# Patient Record
Sex: FEMALE | Race: WHITE | Employment: OTHER | URBAN - METROPOLITAN AREA
[De-identification: names, ages, dates, MRNs, and addresses within clinical notes are randomized per-mention and may not be internally consistent; named-entity substitution may affect disease eponyms.]

---

## 2017-07-01 ENCOUNTER — APPOINTMENT (OUTPATIENT)
Dept: GENERAL RADIOLOGY | Facility: CLINIC | Age: 82
End: 2017-07-01
Attending: EMERGENCY MEDICINE
Payer: MEDICARE

## 2017-07-01 ENCOUNTER — HOSPITAL ENCOUNTER (EMERGENCY)
Facility: CLINIC | Age: 82
Discharge: HOME | End: 2017-07-01
Attending: EMERGENCY MEDICINE | Admitting: EMERGENCY MEDICINE
Payer: MEDICARE

## 2017-07-01 ENCOUNTER — APPOINTMENT (OUTPATIENT)
Dept: CT IMAGING | Facility: CLINIC | Age: 82
End: 2017-07-01
Attending: EMERGENCY MEDICINE
Payer: MEDICARE

## 2017-07-01 VITALS
OXYGEN SATURATION: 95 % | SYSTOLIC BLOOD PRESSURE: 157 MMHG | TEMPERATURE: 98.9 F | RESPIRATION RATE: 20 BRPM | DIASTOLIC BLOOD PRESSURE: 94 MMHG | HEART RATE: 78 BPM

## 2017-07-01 DIAGNOSIS — G89.29 CHRONIC PAIN OF LEFT KNEE: ICD-10-CM

## 2017-07-01 DIAGNOSIS — M25.562 CHRONIC PAIN OF LEFT KNEE: ICD-10-CM

## 2017-07-01 PROCEDURE — 99284 EMERGENCY DEPT VISIT MOD MDM: CPT | Mod: 25

## 2017-07-01 PROCEDURE — 73700 CT LOWER EXTREMITY W/O DYE: CPT | Mod: LT

## 2017-07-01 PROCEDURE — A9270 NON-COVERED ITEM OR SERVICE: HCPCS | Mod: GY | Performed by: EMERGENCY MEDICINE

## 2017-07-01 PROCEDURE — 73502 X-RAY EXAM HIP UNI 2-3 VIEWS: CPT

## 2017-07-01 PROCEDURE — 25000132 ZZH RX MED GY IP 250 OP 250 PS 637: Mod: GY | Performed by: EMERGENCY MEDICINE

## 2017-07-01 PROCEDURE — 73562 X-RAY EXAM OF KNEE 3: CPT | Mod: LT

## 2017-07-01 RX ORDER — CYCLOBENZAPRINE HCL 10 MG
10 TABLET ORAL ONCE
Status: COMPLETED | OUTPATIENT
Start: 2017-07-01 | End: 2017-07-01

## 2017-07-01 RX ORDER — CYCLOBENZAPRINE HCL 10 MG
TABLET ORAL
Status: DISCONTINUED
Start: 2017-07-01 | End: 2017-07-01 | Stop reason: HOSPADM

## 2017-07-01 RX ORDER — CYCLOBENZAPRINE HCL 10 MG
10 TABLET ORAL 2 TIMES DAILY PRN
Qty: 10 TABLET | Refills: 0 | Status: SHIPPED | OUTPATIENT
Start: 2017-07-01 | End: 2018-06-25

## 2017-07-01 RX ADMIN — CYCLOBENZAPRINE HYDROCHLORIDE 10 MG: 10 TABLET, FILM COATED ORAL at 16:38

## 2017-07-01 NOTE — ED PROVIDER NOTES
History     Chief Complaint:  Knee pain    HPI   Kelsey Wood is an 88 year old female with a history of chronic knee pain who presents with knee pain. The patient states that she has cortisone injections in her left knee every 3 months and has fluid drained from her left knee every three months, but she has had the knee drained twice in the past month. Her last cortisone injection was a week and a half ago. The patient states the cortisone injection has not helped her pain and she has been experiencing difficulty ambulating recently. She also noted that she has been experiencing right hip spasms for the last several months that she has talked to her PMD about. The patient called EMS and was given 100 mcg of fentanyl. No fevers. She has been eating and drinking normally. She denies recent trauma or other concerns or complaints at this time.    Allergies:  No known drug allergies.    Medications:    The patient is currently on no regular medications.    Past Medical History:    Hard of hearing  Chronic knee pain    Past Surgical History:    Cholecystectomy  Appendectomy    Family History:    History reviewed. No pertinent family history.    Social History:  Marital Status:   Presents to the ED alone via EMS  PCP: Jen Godfrey     Review of Systems   Musculoskeletal:        Positive for left knee pain  Positive for right hip spasming   All other systems reviewed and are negative.    Physical Exam     Patient Vitals for the past 24 hrs:   BP Temp Temp src Pulse Heart Rate Resp SpO2   07/01/17 1715 - - - - - - 95 %   07/01/17 1700 - - - - - - 99 %   07/01/17 1630 158/73 - - - - - 98 %   07/01/17 1615 159/73 - - - - - 95 %   07/01/17 1537 (!) 174/91 - - - - - 97 %   07/01/17 1530 (!) 174/91 - - - - - 98 %   07/01/17 1523 160/69 98.9  F (37.2  C) Temporal 78 78 20 95 %      Physical Exam  Constitutional: The patient is oriented to person, place, and time. Alert and cooperative.  HENT:   Right Ear: External ear  normal.   Left Ear: External ear normal.   Nose: Nose normal.   Mouth/Throat: Uvula is midline, oropharynx is clear and moist and mucous membranes are normal. No posterior oropharyngeal edema or erythema.   Eyes: Conjunctivae, EOM and lids are normal. Pupils are equal, round, and reactive to light.   Neck: Trachea normal. Normal range of motion. Neck supple.   Cardiovascular: Normal rate, regular rhythm, normal heart sounds, and intact distal pulses.    Pulmonary/Chest: Effort normal and breath sounds equal bilaterally. No crackles or wheezing.   Abdominal: Soft. No tenderness. No rebound and no guarding.   Musculoskeletal: RLE: no obvious deformity, skin intact. Non-tender to palpation over the greater troch, femur, knee, lower leg, ankle, and foot. Normal ROM at the hip, knee, ankle, and foot. Sensation intact to light touch throughout. 2+ DP and PT pulse.  LLE: no obvious deformity, skin intact. Effusion present to the knee. No erythema. No warmth. Non-tender to palpation over the greater troch, femur, knee, lower leg, ankle, and foot. Normal ROM at the hip, knee, ankle, and foot. Sensation intact to light touch throughout. 2+ DP and PT pulse.  Neurological: Alert and Oriented. Strength 5/5 in upper and lower extremities bilaterally. Sensation intact to light touch throughout.  Skin: Skin is dry. No rash noted.          Emergency Department Course   Imaging:  Radiographic findings were communicated with the patient who voiced understanding of the findings.    CT Knee Left without contrast:  IMPRESSION:  1. Advanced osteoarthritis of the medial compartment with large joint effusion.  2. No evidence for acute fracture.    XR Pelvis with hip, right, 2 views:  IMPRESSION: No evidence for fracture or dislocation. Lower lumbar fusion procedure noted along with some degenerative changes in the sacroiliac joints. Stool obscures the sacrum.    XR Knee, 3 views, left:  IMPRESSION: Large joint effusion is present along with  some tricompartmental osteoarthritis. There is a curvilinear density adjacent to the medial tibial plateau which may be related to a fracture. The medial femoral condyle also appears to be possibly indenting the medial tibial plateau suggesting that there may be medial tibial plateau fracture. If clinically indicated, CT might helpful in further evaluation.    Imaging independently reviewed and agree with radiologist interpretation.     Interventions:  1638: Flexeril 10 mg PO     Emergency Department Course:  The patient arrived in the emergency department via EMS.  Past medical records, nursing notes, and vitals reviewed.  I performed an exam of the patient and obtained history as documented above.   Above workup undertaken.  1741: I rechecked the patient.  Findings and plan explained to the Patient. Patient discharged home with instructions regarding supportive care, medications, and reasons to return. The importance of close follow-up was reviewed.      Impression & Plan    Medical Decision Making:  Kelsey Wood is an 88 year old female with a history of chronic knee pain who presents with knee pain. On presentation to the ED, the patient is nontoxic appearing and vitals are within normal limits and stable.  On exam, she is well-appearing.  She is alert, oriented, and neurologic exam is nonfocal.  Cardiopulmonary exam is unremarkable.  Abdomen is soft and nontender throughout.  On exam of the right lower extremity, there is no obvious deformity and skin is intact.  She has no significant tenderness to palpation and has good range of motion of the right lower extremity without significant pain.  She is neurovascularly intact.  On exam of the left lower extremity, there is no obvious deformity and skin is intact.  There is no erythema.  She does have evidence of an effusion of the left knee.  There is no warmth or significant tenderness to palpation in this location.  She has good range of motion of the knee without  significant pain.  She is neurovascularly intact.  The rest of exam is as mentioned above.    X-ray of the pelvis and right hip was obtained and demonstrates no evidence for fracture or dislocation.  X-ray of the left knee was obtained and demonstrates a large joint effusion along with tricompartmental osteoarthritis.  Per radiology, there is a curvilinear density adjacent to the lateral tibial plateau which may be related to a fracture.  Per radiology, if clinically indicated, CT would be helpful for further evaluation.  CT of the knee was then obtained and demonstrates advanced osteoarthritis with a large joint effusion.  There is no evidence for an acute fracture.  These results were discussed with the patient and she notes understanding.  The patient notes a long-standing history of osteoarthritis in the left knee with recurrent effusions.  She does state that she follows with an orthopedic physician for this and the knee is aspirated intermittently.  The patient has no infectious signs on exam here to suggest an infectious process such as a septic joint.  She is afebrile, with no warmth or tenderness over the knee, and has good range of motion of the knee without significant pain.  Therefore, I do not feel that labs are indicated at this time.  Given that she has no findings to suggest a septic arthritis, I do not feel that aspiration of the knee is indicated at this time.  I do feel the risk of introduction of infection outweighs the benefits of therapeutic aspiration.  I did discuss the patient the on-call orthopedic physician and he is in agreement the patient can follow up closely as an outpatient for further evaluation and management.  The patient was given a trial of ambulation and tolerated this well with her walker without difficulty.  The patient also notes a long-standing history of spasms in the right hip, for which she has been evaluated thoroughly for.  She denies taking any muscle relaxers for  this, therefore she was given a dose of Flexeril.  Overall, given that she is well-appearing with relatively unremarkable workup, I do feel that she can be discharged home.  I did discuss with the patient that I recommend close follow-up with a primary care physician and her orthopedic physician and she notes understanding and agreement with this plan.  Return instructions were given.  She was stable/Improved at the time of discharge.    Diagnosis:    ICD-10-CM    1. Chronic pain of left knee M25.562     G89.29        Disposition:  Discharged to home with plan as outlined.    Discharge Medications:  New Prescriptions    CYCLOBENZAPRINE (FLEXERIL) 10 MG TABLET    Take 1 tablet (10 mg) by mouth 2 times daily as needed for muscle spasms     I, Judd Nelson, am serving as a scribe on 7/1/2017 at 3:29 PM to personally document services performed by Dr. Richardson based on my observations and the provider's statements to me.        Radha Richardson MD  07/02/17 1220

## 2017-07-01 NOTE — ED AVS SNAPSHOT
[facility] Emergency Department    [address]    [facility] [address]    Phone:  [phone]    Fax:  [phone]                                       Kelsey Wood   MRN: [id number]    Department:  [facility] Emergency Department   Date of Visit:  7/1/2017           After Visit Summary Signature Page     I have received my discharge instructions, and my questions have been answered. I have discussed any challenges I see with this plan with the nurse or doctor.    ..........................................................................................................................................  Patient/Patient Representative Signature      ..........................................................................................................................................  Patient Representative Print Name and Relationship to Patient    ..................................................               ................................................  Date                                            Time    ..........................................................................................................................................  Reviewed by Signature/Title    ...................................................              ..............................................  Date                                                            Time

## 2017-07-01 NOTE — ED NOTES
Verified correct assisted living facility with pt; called [facility] to give discharge report.  Unable to reach any staff on either number; after hours number was not answered after multiple attempts.

## 2017-07-01 NOTE — ED NOTES
"Reports recurrent L knee pain, EMS states she needs to have fluid drained from knee every 1-2 weeks lately; states pain is much worse today, also reports intermittent spasms to R hip since fall 3 months ago also worse today; given 100 mcg fentanyl by EMS; ABCs intact.  Pt unsure of medications at this time \"pain medicine and lots of vitamins\".  "

## 2017-07-01 NOTE — DISCHARGE INSTRUCTIONS
Please follow up closely with your orthopedic physician. Please return to the ED if your symptoms worsen or if you develop new or concerning symptoms.         Knee Pain  Knee pain is very common. It s especially common in active people who put a lot of pressure on their knees, like runners. It affects women more often than men.  Your kneecap (patella) is a thick, round bone. It covers and protects the front portion of your knee joint. It moves along a groove in your thighbone (femur) as part of the patellofemoral joint. A layer of cartilage surrounds the underside of your kneecap. This layer protects it from grinding against your femur.  When this cartilage softens and breaks down, it can cause knee pain. This is partly because of repetitive stress. The stress irritates the lining of the joint. This causes pain in the underlying bone.  What causes knee pain?  Many things can cause knee pain. You may have more than one cause. Some of these include:    Overuse of the knee joint    The kneecap doesn t line up with the tissue around it    Damage to small nerves in the area    Damage to the ligament-like structure that holds the kneecap in place (retinaculum)    Breakdown of the bone under the cartilage    Swelling in the soft tissues around the kneecap    Injury  You might be more likely to have knee pain if you:    Exercise a lot    Recently increased the intensity of your workouts    Have a body mass index (BMI) greater than 25    Have poor alignment of your kneecap    Walk with your feet turned overly outward or inward    Have weakness in surrounding muscle groups (inner quad or hip adductor muscles)    Have too much tightness in surrounding muscle groups (hamstrings or iliotibial band)    Have a recent history of injury to the area    Are female  Symptoms of knee pain  This type of knee pain is a dull, aching pain in the front of the knee in the area under and around the kneecap. This pain may start quickly or  slowly. Your pain might be worse when you squat, run, or sit for a long time. You might also sometimes feel like your knee is giving out. You may have symptoms in one or both of your knees.  Diagnosing knee pain  Your health care provider will ask about your medical history and your symptoms. Be sure to describe any activities that make your knee pain worse. He or she will look at your knee. This will include tests of your range of motion, strength, and areas of pain of your knee. Your knee alignment will be checked.  Your health care provider will need to rule out other causes of your knee pain, such as arthritis. You may need an imaging test, such as an X-ray or MRI.  Treatment for knee pain  Treatments that can help ease your symptoms may include:    Avoiding activities for a while that make your pain worse, returning to activity over time    Icing the outside of your knee when it causes you pain    Taking over-the-counter pain medicine    Wearing a knee brace or taping your knee to support it    Wearing special shoe inserts to help keep your feet in the proper alignment    Doing special exercises to stretch and strengthen the muscles around your hip and your knee  These steps help most people manage knee pain. But some cases of knee pain need to be treated with surgery. You may need surgery right away. Or you may need it later if other treatments don t work. Your health care provider may refer you to an orthopedic surgeon. He or she will talk with you about your choices.  Preventing knee pain  Losing weight and correcting excess muscle tightness or muscle weakness may help lower your risk.  In some cases, you can prevent knee pain. To help prevent a flare-up of knee pain, you do these things:    Regularly do all the exercises your doctor or physical therapist advises    Support your knee as advised by your doctor or physical therapist    Increase training gradually, and ease up on training when needed    Have an  expert check your gait for running or other sporting activities    Stretch properly before and after exercise    Replace your running shoes regularly    Lose excess weight     When to call your health care provider  Call your health care provider right away if:    Your symptoms don t get better after a few weeks of treatment    You have any new symptoms   Date Last Reviewed: 3/19/2015    [id number] [employer]. [address] [place] [address]. All rights reserved. This information is not intended as a substitute for professional medical care. Always follow your healthcare professional's instructions.

## 2017-07-01 NOTE — ED AVS SNAPSHOT
[facility] Emergency Department    [address]    [facility] [address]    Phone:  [phone]    Fax:  [phone]                                       Kelsey Wood   MRN: [id number]    Department:  [facility] Emergency Department   Date of Visit:  7/1/2017           Patient Information     Date Of Birth          4/18/1929        Your diagnoses for this visit were:     Chronic pain of left knee        You were seen by Radha Richardson MD.      Follow-up Information     Follow up with Jen Godfrey MD In 3 days.    Specialty:  Family Practice    Contact information:    [facility]  [address]  [facility] [address]  [phone]          Schedule an appointment as soon as possible for a [facility]    Contact information:    [address]  [address]  [facility] [address] [phone]        Discharge Instructions       Please follow up closely with your orthopedic physician. Please return to the ED if your symptoms worsen or if you develop new or concerning symptoms.         Knee Pain  Knee pain is very common. It s especially common in active people who put a lot of pressure on their knees, like runners. It affects women more often than men.  Your kneecap (patella) is a thick, round bone. It covers and protects the front portion of your knee joint. It moves along a groove in your thighbone (femur) as part of the patellofemoral joint. A layer of cartilage surrounds the underside of your kneecap. This layer protects it from grinding against your femur.  When this cartilage softens and breaks down, it can cause knee pain. This is partly because of repetitive stress. The stress irritates the lining of the joint. This causes pain in the underlying bone.  What causes knee pain?  Many things can cause knee pain. You may have more than one cause. Some of these include:    Overuse of the knee joint    The  kneecap doesn t line up with the tissue around it    Damage to small nerves in the area    Damage to the ligament-like structure that holds the kneecap in place (retinaculum)    Breakdown of the bone under the cartilage    Swelling in the soft tissues around the kneecap    Injury  You might be more likely to have knee pain if you:    Exercise a lot    Recently increased the intensity of your workouts    Have a body mass index (BMI) greater than 25    Have poor alignment of your kneecap    Walk with your feet turned overly outward or inward    Have weakness in surrounding muscle groups (inner quad or hip adductor muscles)    Have too much tightness in surrounding muscle groups (hamstrings or iliotibial band)    Have a recent history of injury to the area    Are female  Symptoms of knee pain  This type of knee pain is a dull, aching pain in the front of the knee in the area under and around the kneecap. This pain may start quickly or slowly. Your pain might be worse when you squat, run, or sit for a long time. You might also sometimes feel like your knee is giving out. You may have symptoms in one or both of your knees.  Diagnosing knee pain  Your health care provider will ask about your medical history and your symptoms. Be sure to describe any activities that make your knee pain worse. He or she will look at your knee. This will include tests of your range of motion, strength, and areas of pain of your knee. Your knee alignment will be checked.  Your health care provider will need to rule out other causes of your knee pain, such as arthritis. You may need an imaging test, such as an X-ray or MRI.  Treatment for knee pain  Treatments that can help ease your symptoms may include:    Avoiding activities for a while that make your pain worse, returning to activity over time    Icing the outside of your knee when it causes you pain    Taking over-the-counter pain medicine    Wearing a knee brace or taping your knee to  support it    Wearing special shoe inserts to help keep your feet in the proper alignment    Doing special exercises to stretch and strengthen the muscles around your hip and your knee  These steps help most people manage knee pain. But some cases of knee pain need to be treated with surgery. You may need surgery right away. Or you may need it later if other treatments don t work. Your health care provider may refer you to an orthopedic surgeon. He or she will talk with you about your choices.  Preventing knee pain  Losing weight and correcting excess muscle tightness or muscle weakness may help lower your risk.  In some cases, you can prevent knee pain. To help prevent a flare-up of knee pain, you do these things:    Regularly do all the exercises your doctor or physical therapist advises    Support your knee as advised by your doctor or physical therapist    Increase training gradually, and ease up on training when needed    Have an expert check your gait for running or other sporting activities    Stretch properly before and after exercise    Replace your running shoes regularly    Lose excess weight     When to call your health care provider  Call your health care provider right away if:    Your symptoms don t get better after a few weeks of treatment    You have any new symptoms   Date Last Reviewed: 3/19/2015    1550-7840 [employer]. [address] [place] [address]. All rights reserved. This information is not intended as a substitute for professional medical care. Always follow your healthcare professional's instructions.          24 Hour Appointment Hotline       To make an appointment at any [facility], call [phone] ([phone]). If you don't have a family doctor or clinic, we will help you find one. [facility] are conveniently located to serve the needs of you and your family.             Review of your medicines      START taking        Dose / Directions Last  dose taken    cyclobenzaprine 10 MG tablet   Commonly known as:  FLEXERIL   Dose:  10 mg   Quantity:  10 tablet        Take 1 tablet (10 mg) by mouth 2 times daily as needed for muscle spasms   Refills:  0          Our records show that you are taking the medicines listed below. If these are incorrect, please call your family doctor or clinic.        Dose / Directions Last dose taken    UNKNOWN TO PATIENT        Refills:  0                Prescriptions were sent or printed at these locations (1 Prescription)                   Other Prescriptions                Printed at Department/Unit printer (1 of 1)         cyclobenzaprine (FLEXERIL) 10 MG tablet                Procedures and tests performed during your visit     CT Knee Left w/o Contrast    Knee XR, 3 views, left    XR Pelvis w Hip Right G/E 2 Views      Orders Needing Specimen Collection     None      Pending Results     Date and Time Order Name Status Description    7/1/2017 1625 CT Knee Left w/o Contrast Preliminary     7/1/2017 1533 XR Pelvis w Hip Right G/E 2 Views Preliminary     7/1/2017 1533 Knee XR, 3 views, left Preliminary             Pending Culture Results     No orders found from 6/29/2017 to 7/2/2017.            Pending Results Instructions     If you had any lab results that were not finalized at the time of your Discharge, you can call the ED Lab Result RN at [phone]. You will be contacted by this team for any positive Lab results or changes in treatment. The nurses are available 7 days a week from 10A to 6:30P.  You can leave a message 24 hours per day and they will return your call.        Test Results From Your Hospital Stay        7/1/2017  4:20 PM      Narrative     KNEE LEFT THREE VIEW   7/1/2017 4:05 PM     HISTORY: Pain.    COMPARISON: None.        Impression     IMPRESSION: Large joint effusion is present along with some  tricompartmental osteoarthritis. There is a curvilinear density  adjacent to the medial tibial plateau which  may be related to a  fracture. The medial femoral condyle also appears to be possibly  indenting the medial tibial plateau suggesting that there may be  medial tibial plateau fracture. If clinically indicated, CT might  helpful in further evaluation.         7/1/2017  4:20 PM      Narrative     PELVIS AND HIP RIGHT TWO VIEWS   7/1/2017 4:04 PM     HISTORY: Pain.    COMPARISON: None.        Impression     IMPRESSION: No evidence for fracture or dislocation. Lower lumbar  fusion procedure noted along with some degenerative changes in the  sacroiliac joints. Stool obscures the sacrum.         7/1/2017  5:17 PM      Narrative     CT KNEE LEFT WITHOUT CONTRAST 7/1/2017 4:56 PM     HISTORY: Pain, concern for tibial plateau fracture on x-ray.    TECHNIQUE: Axial images were obtained through the left knee joint  without contrast. Coronal and sagittal reconstructions were also  acquired. Radiation dose for this scan was reduced using automated  exposure control, adjustment of the mA and/or kV according to patient  size, or iterative reconstruction technique..    FINDINGS: There is a large joint effusion. Curvilinear density  adjacent to the medial tibial plateau and joint space is felt to be  due to a chronic process versus calcification of the ligament. There  is severe joint space narrowing in the medial compartment with some  bony sclerosis and some subchondral lucencies. There is no evidence  for any acute proximal tibia, distal femur or patella fracture. Some  vascular calcifications are noted. Mild osteoarthritis is noted in the  medial and lateral compartments.        Impression     IMPRESSION:  1. Advanced osteoarthritis of the medial compartment with large joint  effusion.  2. No evidence for acute fracture.                  Clinical Quality Measure: Blood Pressure Screening     Your blood pressure was checked while you were in the emergency department today. The last reading we obtained was  BP: 158/73 . Please  "read the guidelines below about what these numbers mean and what you should do about them.  If your systolic blood pressure (the top number) is less than 120 and your diastolic blood pressure (the bottom number) is less than 80, then your blood pressure is normal. There is nothing more that you need to do about it.  If your systolic blood pressure (the top number) is 120-139 or your diastolic blood pressure (the bottom number) is 80-89, your blood pressure may be higher than it should be. You should have your blood pressure rechecked within a year by a primary care provider.  If your systolic blood pressure (the top number) is 140 or greater or your diastolic blood pressure (the bottom number) is 90 or greater, you may have high blood pressure. High blood pressure is treatable, but if left untreated over time it can put you at risk for heart attack, stroke, or kidney failure. You should have your blood pressure rechecked by a primary care provider within the next 4 weeks.  If your provider in the emergency department today gave you specific instructions to follow-up with your doctor or provider even sooner than that, you should follow that instruction and not wait for up to 4 weeks for your follow-up visit.        Thank you for choosing [facility]       Thank you for choosing [facility] for your care. Our goal is always to provide you with excellent care. Hearing back from our patients is one way we can continue to improve our services. Please take a few minutes to complete the written survey that you may receive in the mail after you visit with us. Thank you!        [employer] Information     [employer] lets you send messages to your doctor, view your test results, renew your prescriptions, schedule appointments and more. To sign up, go to [email or web address] . Click on \"Log in\" on the left side of the screen, which will take you to the Welcome page. Then click on \"Sign up Now\" on the right side of the page.     You " will be asked to enter the access code listed below, as well as some personal information. Please follow the directions to create your username and password.     Your access code is: [email or web address]  Expires: 2017  5:42 PM     Your access code will  in 90 days. If you need help or a new code, please call your [place] clinic or [phone].        Care EveryWhere ID     This is your Care EveryWhere ID. This could be used by other organizations to access your [place] medical records  [id number]        Equal Access to Services     [facility] Center[facility]PHANIE : Park Machado, jose jiménez, usha singh, saqib finnegan . So [facility] [phone].    ATENCIÓN: Si habla español, tiene a barry disposición servicios gratuitos de asistencia lingüística. Llame al [phone].    We comply with applicable federal civil rights laws and [place] laws. We do not discriminate on the basis of race, color, national origin, age, disability sex, sexual orientation or gender identity.            After Visit Summary       This is your record. Keep this with you and show to your community pharmacist(s) and doctor(s) at your next visit.

## 2017-07-01 NOTE — ED NOTES
Bed: ED08  Expected date: 7/1/17  Expected time: 3:07 PM  Means of arrival: Ambulance  Comments:  HE 89yo F  Knee pain

## 2017-07-01 NOTE — ED NOTES
Pt able to ambulate with FWW without assist; MD at bedside to update; pt reports some relief of spasms with ambulation.

## 2017-07-01 NOTE — ED NOTES
Report called to nursing assistant Umu at [facility]; reviewed that pt has no fracture; reviewed new rx for flexeril and increased risk of drowsiness; discussed use of ace wrap during the day to left knee and off at night; discussed followup appointments needed.  Verified address as [address] [facility].  Awaiting wheelchair transport.

## 2018-05-17 ENCOUNTER — RECORDS - HEALTHEAST (OUTPATIENT)
Dept: LAB | Facility: CLINIC | Age: 83
End: 2018-05-17

## 2018-05-17 LAB
ALBUMIN UR-MCNC: ABNORMAL MG/DL
APPEARANCE UR: ABNORMAL
BACTERIA #/AREA URNS HPF: ABNORMAL HPF
BILIRUB UR QL STRIP: NEGATIVE
CAOX CRY #/AREA URNS HPF: PRESENT /[HPF]
COLOR UR AUTO: YELLOW
GLUCOSE UR STRIP-MCNC: NEGATIVE MG/DL
HGB UR QL STRIP: NEGATIVE
KETONES UR STRIP-MCNC: NEGATIVE MG/DL
LEUKOCYTE ESTERASE UR QL STRIP: ABNORMAL
MUCOUS THREADS #/AREA URNS LPF: ABNORMAL LPF
NITRATE UR QL: POSITIVE
PH UR STRIP: 6 [PH] (ref 4.5–8)
RBC #/AREA URNS AUTO: ABNORMAL HPF
RENAL EPI CELLS #/AREA URNS HPF: ABNORMAL LPF
SP GR UR STRIP: 1.02 (ref 1–1.03)
SQUAMOUS #/AREA URNS AUTO: >100 LPF
TRANS CELLS #/AREA URNS HPF: ABNORMAL LPF
UROBILINOGEN UR STRIP-ACNC: ABNORMAL
WBC #/AREA URNS AUTO: >100 HPF

## 2018-05-20 LAB — BACTERIA SPEC CULT: ABNORMAL

## 2018-05-23 ENCOUNTER — RECORDS - HEALTHEAST (OUTPATIENT)
Dept: LAB | Facility: CLINIC | Age: 83
End: 2018-05-23

## 2018-05-23 LAB
ANION GAP SERPL CALCULATED.3IONS-SCNC: 10 MMOL/L (ref 5–18)
BUN SERPL-MCNC: 13 MG/DL (ref 8–28)
CALCIUM SERPL-MCNC: 10.6 MG/DL (ref 8.5–10.5)
CHLORIDE BLD-SCNC: 101 MMOL/L (ref 98–107)
CO2 SERPL-SCNC: 30 MMOL/L (ref 22–31)
CREAT SERPL-MCNC: 0.65 MG/DL (ref 0.6–1.1)
ERYTHROCYTE [DISTWIDTH] IN BLOOD BY AUTOMATED COUNT: 12.7 % (ref 11–14.5)
GFR SERPL CREATININE-BSD FRML MDRD: >60 ML/MIN/1.73M2
GLUCOSE BLD-MCNC: 96 MG/DL (ref 70–125)
HCT VFR BLD AUTO: 42.6 % (ref 35–47)
HGB BLD-MCNC: 13.3 G/DL (ref 12–16)
MCH RBC QN AUTO: 30.7 PG (ref 27–34)
MCHC RBC AUTO-ENTMCNC: 31.2 G/DL (ref 32–36)
MCV RBC AUTO: 98 FL (ref 80–100)
PLATELET # BLD AUTO: 289 THOU/UL (ref 140–440)
PMV BLD AUTO: 9.5 FL (ref 8.5–12.5)
POTASSIUM BLD-SCNC: 4 MMOL/L (ref 3.5–5)
RBC # BLD AUTO: 4.33 MILL/UL (ref 3.8–5.4)
SODIUM SERPL-SCNC: 141 MMOL/L (ref 136–145)
WBC: 6.5 THOU/UL (ref 4–11)

## 2018-05-24 LAB — 25(OH)D3 SERPL-MCNC: 41.8 NG/ML (ref 30–80)

## 2018-06-15 ENCOUNTER — TRANSFERRED RECORDS (OUTPATIENT)
Dept: HEALTH INFORMATION MANAGEMENT | Facility: CLINIC | Age: 83
End: 2018-06-15

## 2018-06-25 ENCOUNTER — HOSPITAL ENCOUNTER (OUTPATIENT)
Facility: CLINIC | Age: 83
Setting detail: OBSERVATION
Discharge: NURSING, REHAB OR LONG-TERM CARE | End: 2018-06-28
Attending: EMERGENCY MEDICINE | Admitting: HOSPITALIST
Payer: MEDICARE

## 2018-06-25 ENCOUNTER — APPOINTMENT (OUTPATIENT)
Dept: GENERAL RADIOLOGY | Facility: CLINIC | Age: 83
End: 2018-06-25
Attending: EMERGENCY MEDICINE
Payer: MEDICARE

## 2018-06-25 DIAGNOSIS — R09.02 HYPOXIA: ICD-10-CM

## 2018-06-25 DIAGNOSIS — J45.31 MILD PERSISTENT ASTHMA WITH EXACERBATION: ICD-10-CM

## 2018-06-25 DIAGNOSIS — K59.00 CONSTIPATION, UNSPECIFIED CONSTIPATION TYPE: ICD-10-CM

## 2018-06-25 DIAGNOSIS — G89.29 OTHER CHRONIC PAIN: Primary | ICD-10-CM

## 2018-06-25 DIAGNOSIS — R53.1 GENERALIZED WEAKNESS: ICD-10-CM

## 2018-06-25 LAB
ALBUMIN SERPL-MCNC: 3.5 G/DL (ref 3.4–5)
ALBUMIN UR-MCNC: 30 MG/DL
ALP SERPL-CCNC: 80 U/L (ref 40–150)
ALT SERPL W P-5'-P-CCNC: 18 U/L (ref 0–50)
ANION GAP SERPL CALCULATED.3IONS-SCNC: 6 MMOL/L (ref 3–14)
APPEARANCE UR: ABNORMAL
AST SERPL W P-5'-P-CCNC: 17 U/L (ref 0–45)
BACTERIA #/AREA URNS HPF: ABNORMAL /HPF
BASOPHILS # BLD AUTO: 0.1 10E9/L (ref 0–0.2)
BASOPHILS NFR BLD AUTO: 0.8 %
BILIRUB SERPL-MCNC: 0.2 MG/DL (ref 0.2–1.3)
BILIRUB UR QL STRIP: NEGATIVE
BUN SERPL-MCNC: 14 MG/DL (ref 7–30)
CALCIUM SERPL-MCNC: 9.4 MG/DL (ref 8.5–10.1)
CHLORIDE SERPL-SCNC: 103 MMOL/L (ref 94–109)
CO2 SERPL-SCNC: 30 MMOL/L (ref 20–32)
COLOR UR AUTO: YELLOW
CREAT SERPL-MCNC: 0.6 MG/DL (ref 0.52–1.04)
DIFFERENTIAL METHOD BLD: ABNORMAL
EOSINOPHIL # BLD AUTO: 0.8 10E9/L (ref 0–0.7)
EOSINOPHIL NFR BLD AUTO: 8.4 %
ERYTHROCYTE [DISTWIDTH] IN BLOOD BY AUTOMATED COUNT: 12.8 % (ref 10–15)
GFR SERPL CREATININE-BSD FRML MDRD: >90 ML/MIN/1.7M2
GLUCOSE SERPL-MCNC: 116 MG/DL (ref 70–99)
GLUCOSE UR STRIP-MCNC: NEGATIVE MG/DL
HCT VFR BLD AUTO: 39.6 % (ref 35–47)
HGB BLD-MCNC: 12.3 G/DL (ref 11.7–15.7)
HGB UR QL STRIP: NEGATIVE
IMM GRANULOCYTES # BLD: 0 10E9/L (ref 0–0.4)
IMM GRANULOCYTES NFR BLD: 0.2 %
INR PPP: 0.91 (ref 0.86–1.14)
KETONES UR STRIP-MCNC: NEGATIVE MG/DL
LACTATE BLD-SCNC: 1.2 MMOL/L (ref 0.7–2)
LEUKOCYTE ESTERASE UR QL STRIP: ABNORMAL
LYMPHOCYTES # BLD AUTO: 1.6 10E9/L (ref 0.8–5.3)
LYMPHOCYTES NFR BLD AUTO: 16.5 %
MAGNESIUM SERPL-MCNC: 1.8 MG/DL (ref 1.6–2.3)
MCH RBC QN AUTO: 30.4 PG (ref 26.5–33)
MCHC RBC AUTO-ENTMCNC: 31.1 G/DL (ref 31.5–36.5)
MCV RBC AUTO: 98 FL (ref 78–100)
MONOCYTES # BLD AUTO: 1.1 10E9/L (ref 0–1.3)
MONOCYTES NFR BLD AUTO: 11.5 %
NEUTROPHILS # BLD AUTO: 5.9 10E9/L (ref 1.6–8.3)
NEUTROPHILS NFR BLD AUTO: 62.6 %
NITRATE UR QL: NEGATIVE
NRBC # BLD AUTO: 0 10*3/UL
NRBC BLD AUTO-RTO: 0 /100
NT-PROBNP SERPL-MCNC: 185 PG/ML (ref 0–1800)
PH UR STRIP: 7 PH (ref 5–7)
PLATELET # BLD AUTO: 255 10E9/L (ref 150–450)
POTASSIUM SERPL-SCNC: 3.9 MMOL/L (ref 3.4–5.3)
PROT SERPL-MCNC: 7.4 G/DL (ref 6.8–8.8)
RBC # BLD AUTO: 4.05 10E12/L (ref 3.8–5.2)
RBC #/AREA URNS AUTO: 2 /HPF (ref 0–2)
SODIUM SERPL-SCNC: 139 MMOL/L (ref 133–144)
SOURCE: ABNORMAL
SP GR UR STRIP: 1.01 (ref 1–1.03)
SQUAMOUS #/AREA URNS AUTO: 81 /HPF (ref 0–1)
TROPONIN I SERPL-MCNC: <0.015 UG/L (ref 0–0.04)
UROBILINOGEN UR STRIP-MCNC: 0 MG/DL (ref 0–2)
WBC # BLD AUTO: 9.5 10E9/L (ref 4–11)
WBC #/AREA URNS AUTO: 25 /HPF (ref 0–5)

## 2018-06-25 PROCEDURE — 25000128 H RX IP 250 OP 636: Performed by: EMERGENCY MEDICINE

## 2018-06-25 PROCEDURE — 85610 PROTHROMBIN TIME: CPT | Performed by: EMERGENCY MEDICINE

## 2018-06-25 PROCEDURE — 96374 THER/PROPH/DIAG INJ IV PUSH: CPT

## 2018-06-25 PROCEDURE — 83605 ASSAY OF LACTIC ACID: CPT | Performed by: EMERGENCY MEDICINE

## 2018-06-25 PROCEDURE — 83880 ASSAY OF NATRIURETIC PEPTIDE: CPT | Performed by: EMERGENCY MEDICINE

## 2018-06-25 PROCEDURE — 25000125 ZZHC RX 250: Performed by: EMERGENCY MEDICINE

## 2018-06-25 PROCEDURE — 94640 AIRWAY INHALATION TREATMENT: CPT

## 2018-06-25 PROCEDURE — 71046 X-RAY EXAM CHEST 2 VIEWS: CPT

## 2018-06-25 PROCEDURE — 87086 URINE CULTURE/COLONY COUNT: CPT | Performed by: PHYSICIAN ASSISTANT

## 2018-06-25 PROCEDURE — 99285 EMERGENCY DEPT VISIT HI MDM: CPT | Mod: 25

## 2018-06-25 PROCEDURE — 99219 ZZC INITIAL OBSERVATION CARE,LEVL II: CPT | Performed by: HOSPITALIST

## 2018-06-25 PROCEDURE — 80053 COMPREHEN METABOLIC PANEL: CPT | Performed by: EMERGENCY MEDICINE

## 2018-06-25 PROCEDURE — 25000132 ZZH RX MED GY IP 250 OP 250 PS 637: Mod: GY | Performed by: EMERGENCY MEDICINE

## 2018-06-25 PROCEDURE — 81001 URINALYSIS AUTO W/SCOPE: CPT | Performed by: EMERGENCY MEDICINE

## 2018-06-25 PROCEDURE — 93005 ELECTROCARDIOGRAM TRACING: CPT

## 2018-06-25 PROCEDURE — 85025 COMPLETE CBC W/AUTO DIFF WBC: CPT | Performed by: EMERGENCY MEDICINE

## 2018-06-25 PROCEDURE — A9270 NON-COVERED ITEM OR SERVICE: HCPCS | Mod: GY | Performed by: EMERGENCY MEDICINE

## 2018-06-25 PROCEDURE — 83735 ASSAY OF MAGNESIUM: CPT | Performed by: EMERGENCY MEDICINE

## 2018-06-25 PROCEDURE — 99207 ZZC CDG-MDM COMPONENT: MEETS LOW - DOWN CODED: CPT | Performed by: HOSPITALIST

## 2018-06-25 PROCEDURE — G0378 HOSPITAL OBSERVATION PER HR: HCPCS

## 2018-06-25 PROCEDURE — 84484 ASSAY OF TROPONIN QUANT: CPT | Performed by: EMERGENCY MEDICINE

## 2018-06-25 RX ORDER — LATANOPROST 50 UG/ML
1 SOLUTION/ DROPS OPHTHALMIC AT BEDTIME
COMMUNITY

## 2018-06-25 RX ORDER — ESTRADIOL 0.1 MG/G
2 CREAM VAGINAL
COMMUNITY
End: 2019-04-03

## 2018-06-25 RX ORDER — ONDANSETRON 2 MG/ML
4 INJECTION INTRAMUSCULAR; INTRAVENOUS EVERY 30 MIN PRN
Status: DISCONTINUED | OUTPATIENT
Start: 2018-06-25 | End: 2018-06-26

## 2018-06-25 RX ORDER — SODIUM CHLORIDE, SODIUM LACTATE, POTASSIUM CHLORIDE, CALCIUM CHLORIDE 600; 310; 30; 20 MG/100ML; MG/100ML; MG/100ML; MG/100ML
1000 INJECTION, SOLUTION INTRAVENOUS CONTINUOUS
Status: DISCONTINUED | OUTPATIENT
Start: 2018-06-25 | End: 2018-06-26

## 2018-06-25 RX ORDER — IBUPROFEN 200 MG
400 TABLET ORAL ONCE
Status: COMPLETED | OUTPATIENT
Start: 2018-06-25 | End: 2018-06-25

## 2018-06-25 RX ORDER — LIDOCAINE 40 MG/G
CREAM TOPICAL
Status: DISCONTINUED | OUTPATIENT
Start: 2018-06-25 | End: 2018-06-26

## 2018-06-25 RX ORDER — CALCIUM CARBONATE 500(1250)
1 TABLET ORAL DAILY
COMMUNITY

## 2018-06-25 RX ORDER — HYDROCODONE BITARTRATE AND ACETAMINOPHEN 5; 325 MG/1; MG/1
1 TABLET ORAL EVERY 6 HOURS PRN
Status: ON HOLD | COMMUNITY
End: 2018-06-28

## 2018-06-25 RX ORDER — METHYLPREDNISOLONE SODIUM SUCCINATE 125 MG/2ML
125 INJECTION, POWDER, LYOPHILIZED, FOR SOLUTION INTRAMUSCULAR; INTRAVENOUS ONCE
Status: COMPLETED | OUTPATIENT
Start: 2018-06-25 | End: 2018-06-25

## 2018-06-25 RX ORDER — ALBUTEROL SULFATE 90 UG/1
2 AEROSOL, METERED RESPIRATORY (INHALATION) 4 TIMES DAILY PRN
COMMUNITY

## 2018-06-25 RX ORDER — IPRATROPIUM BROMIDE AND ALBUTEROL SULFATE 2.5; .5 MG/3ML; MG/3ML
3 SOLUTION RESPIRATORY (INHALATION)
Status: DISCONTINUED | OUTPATIENT
Start: 2018-06-25 | End: 2018-06-26

## 2018-06-25 RX ORDER — BISMUTH SUBSALICYLATE 262 MG/1
2 TABLET, CHEWABLE ORAL
COMMUNITY

## 2018-06-25 RX ORDER — FENTANYL 12.5 UG/1
1 PATCH TRANSDERMAL
Status: ON HOLD | COMMUNITY
End: 2018-06-28

## 2018-06-25 RX ADMIN — METHYLPREDNISOLONE SODIUM SUCCINATE 125 MG: 125 INJECTION, POWDER, FOR SOLUTION INTRAMUSCULAR; INTRAVENOUS at 18:56

## 2018-06-25 RX ADMIN — IPRATROPIUM BROMIDE AND ALBUTEROL SULFATE 3 ML: .5; 3 SOLUTION RESPIRATORY (INHALATION) at 19:45

## 2018-06-25 RX ADMIN — IPRATROPIUM BROMIDE AND ALBUTEROL SULFATE 3 ML: .5; 3 SOLUTION RESPIRATORY (INHALATION) at 18:56

## 2018-06-25 RX ADMIN — SODIUM CHLORIDE, POTASSIUM CHLORIDE, SODIUM LACTATE AND CALCIUM CHLORIDE 1000 ML: 600; 310; 30; 20 INJECTION, SOLUTION INTRAVENOUS at 18:57

## 2018-06-25 RX ADMIN — IBUPROFEN 400 MG: 200 TABLET, FILM COATED ORAL at 23:23

## 2018-06-25 ASSESSMENT — ENCOUNTER SYMPTOMS
SHORTNESS OF BREATH: 1
CHILLS: 0
FEVER: 0
COUGH: 1
WHEEZING: 1

## 2018-06-25 NOTE — ED PROVIDER NOTES
History     Chief Complaint:  Shortness of Breath    HPI   Kelsey Wood is an 89 year old female with a history of asthma who presents with shortness of breath. The patient was diagnosed with a UTI on 5/26/18 by her primary care physician Dr. Jen Godfrey for which she took a course of Cipro. Additionally, 10 days ago, the patient was diagnosed with left-sided pneumonia by a provider who came to her assisted living facility [facility]. At that time, she states she was prescribed a course of Zithromax, which she finished on Tuesday, 6 days ago. The patient reports she was feeling better initially, but began coughing and feeling short of breath again 2 days ago. Today, the patient states she was evaluated by a nurse at her assisted living facility who suggested she come to the ED for further evaluation. On presentation to the ED, the patient continues to endorse shortness of breath at rest as well as a cough and wheezing, but denies any fevers. The patient also denies any known ill contacts or past use of supplemental oxygen even while she had pneumonia.    Allergies:  No known drug allergies    Medications:    Flovent inhaler  Proair inhaler  Gabapentin  Flexeril    Past Medical History:    Glaucoma  Mild persistent asthma  Osteoarthritis  Cataracts, bilateral  Back pain  Allergic rhinitis  Vitamin D deficiency  Seborrheic keratosis  Scoliosis  Membranous glomerulonephritis  Osteoporosis    Past Surgical History:    Tonsillectomy  Appendectomy  Cholecystectomy  Excise parotid tumor    Family History:    Glaucoma  Prostate cancer  Retinal detachment  Thyroid disorder  Asthma  Hypertension  Hyperlipidemia  Other cancer    Social History:  Smoking status: No  Alcohol use: No  PCP: Jen Godfrey  Presents to the ED with her son  Marital Status:  [5]     Review of Systems   Constitutional: Negative for chills and fever.   Respiratory: Positive for cough, shortness of breath and wheezing.    All other systems  reviewed and are negative.    Physical Exam   Patient Vitals for the past 24 hrs:   BP Temp Temp src Heart Rate Resp SpO2   06/25/18 2130 - - - - - 90 %   06/25/18 2115 - - - - - (!) 89 %   06/25/18 2100 - - - - - 93 %   06/25/18 2030 159/71 - - - - 90 %   06/25/18 2015 147/64 - - - - 90 %   06/25/18 2000 143/64 - - - - (!) 89 %   06/25/18 1945 - - - - - 91 %   06/25/18 1900 126/49 - - - - 92 %   06/25/18 1845 153/70 - - - - 93 %   06/25/18 1809 162/80 98.6  F (37  C) Oral 80 24 93 %     Physical Exam   Constitutional: She appears well-developed and well-nourished.   HENT:   Right Ear: External ear normal.   Left Ear: External ear normal.   Mouth/Throat: Oropharynx is clear and moist. No oropharyngeal exudate.   TM's clear bilaterally   Eyes: Conjunctivae and EOM are normal. Pupils are equal, round, and reactive to light. No scleral icterus.   Neck: Normal range of motion. Neck supple.   Cardiovascular: Normal rate, regular rhythm, normal heart sounds and intact distal pulses.  Exam reveals no gallop and no friction rub.    No murmur heard.  Pulmonary/Chest: Effort normal. No respiratory distress. She has wheezes (Diffusely). She has no rales.   Abdominal: Soft. Bowel sounds are normal. She exhibits no distension and no mass. There is no tenderness.   Musculoskeletal: Normal range of motion. She exhibits no edema.   Lymphadenopathy:     She has no cervical adenopathy.   Neurological: She is alert.   Speech clear  Strength 5/5 x 4, tested in bed     Skin: Skin is warm and dry. No rash noted.   Psychiatric: She has a normal mood and affect.     Emergency Department Course   ECG (18:45:03):  Rate 73 bpm. NC interval 162. QRS duration 120. QT/QTc 400/440. P-R-T axes 64 -11 35. Normal sinus rhythm. Right bundle branch block. Abnormal ECG. Interpreted at 1847 by Nette Aldrich MD.    Imaging:  Radiographic findings were communicated with the patient who voiced understanding of the findings.    XR Chest 2 views:  No focal  alveolar-type infiltrates are seen.      As read by Radiology.    Laboratory:  CBC: WNL (WBC 9.5, HGB 12.3, )  CMP: Glucose 116 (H), o/w WNL (Creatinine 0.60)  Troponin: <0.015  BNP: 185  INR: 0.91  Lactic acid: 1.2  Magnesium: 1.8  UA (1924): Moderate leukocyte esterase, protein albumin 30, WBC 25 (H), many bacteria, squamous epithelia 81 (H), o/w negative    Interventions:  1856: DuoNeb, 2.5mg/3 mL, Inhalation solution, Nebulizer   1856: 125 mg Solu-medrol IV  1857: LR 1L IV Bolus  1945: DuoNeb, 2.5mg/3 mL, Inhalation solution, Nebulizer     Emergency Department Course:  Past medical records, nursing notes, and vitals reviewed.  1835: I performed an exam of the patient and obtained history, as documented above.  ECG performed, results above.  IV inserted and blood drawn.  The patient was sent for a chest x-ray while in the emergency department, findings above.    1935: I rechecked and re-examined the patient. Her lung sounds are coarse at the bases after he Duoneb, but there are no wheezes at this time. I also discussed findings to the patient and her son.    2051: I rechecked the patient. She was ambulated here in the ED with oxygen saturations as low as 81% while walking and 87% when she returned to her room. After resting, her oxygen saturations returned to 90%.    2208: I spoke to Dr. Madison of the hospitalist service who accepts the patient for admission.     2242: In discussion with the patient and Dr. Madison, they both feel comfortable with her going home. I will speak with the patient's son regarding this and determine her ultimate disposition thereafter.    2248: I spoke with the patient's son and he states he would be more comfortable if she stayed in the hospital tonight since she has no support for overnight. Dr. Madison was contacted again and notified of the son's wishes.    Findings and plan explained to the patient who consents to admission. Discussed the patient with Dr. Madison, who will admit the  patient to an observation bed for further monitoring, evaluation, and treatment.     Impression & Plan    Medical Decision Making:  Kelsey Wood is an 89 year old female who presents to the ED for evaluation of a possible pneumonia and shortness of breath. On my initial examination, the patient was wheezing diffusely and mildly hypoxic with saturations at 91-92% on room air. Her labs here are reassuring. She did not have any fevers or leukocytosis. Her urine is likely contaminated as it does not appear to be a clean catch. Her chest x-ray shows no evidence of pneumonia. We ambulated her and her oxygen went as low as 81% and was 87% when she got back to the room. She settled in at around 91-92% on room air. Due to the hypoxia, the patient will need to be admitted for further evaluation and treatment. She was admitted to Dr. Madison of the observation unit.    Diagnosis:    ICD-10-CM   1. Mild persistent asthma with exacerbation J45.31   2. Hypoxia R09.02   3. Generalized weakness R53.1     Disposition: Admitted to observation    Christy Edwards  6/25/2018   [facility] EMERGENCY DEPARTMENT    I, Christy Edwards, am serving as a scribe at 6:35 PM on 6/25/2018 to document services personally performed by Nette Aldrich MD based on my observations and the provider's statements to me.        Nette Aldrich MD  06/25/18 2483

## 2018-06-25 NOTE — LETTER
Key Recommendations:  Pt is admitted with COPD exacerbation.  We discussed COPD action care plan.  Pt does complain of right leg/hip spasm, which is her main concern.  She said her lidoderm patch works well.  Recommendations are ***    Lashonda Troncoso    AVS/Discharge Summary is the source of truth; this is a helpful guide for improved communication of patient story

## 2018-06-25 NOTE — IP AVS SNAPSHOT
[facility] Observation Department    [address]    [facility] [address]    Phone:  [phone]                                       After Visit Summary   6/25/2018    Kelsey Wood    MRN: [id number]           After Visit Summary Signature Page     I have received my discharge instructions, and my questions have been answered. I have discussed any challenges I see with this plan with the nurse or doctor.    ..........................................................................................................................................  Patient/Patient Representative Signature      ..........................................................................................................................................  Patient Representative Print Name and Relationship to Patient    ..................................................               ................................................  Date                                            Time    ..........................................................................................................................................  Reviewed by Signature/Title    ...................................................              ..............................................  Date                                                            Time

## 2018-06-25 NOTE — IP AVS SNAPSHOT
Kelsey Wood #[id number] (CSN: [id number])  (89 year old F)  (Adm: 18)     [id number]               [facility] OBSERVATION DEPARTMENT: [phone]            Patient Demographics     Patient Name Sex          Age SSN Address Phone    IsraelKelsey Female 1929 (89 year old) [id number]   [place] [address] [phone] (Home)  NONE (Work)  [phone] (Mobile)      Emergency Contact(s)     Name Relation Home Work Mobile    Sha Wood [phone]      Admission Information     Attending Provider Admitting Provider Admission Type Admission Date/Time    Nilesh Madison,  [place],  Emergency 18  1803    Discharge Date Hospital Service Auth/Cert Status Service Area     Observation Incomplete [facility] SERVICES    Unit Room/Bed Admission Status        OBSERVATION DEPT  Admission (Confirmed)       Admission     Complaint    COPD exacerbation (H)      Hospital Account     Name Acct ID Class Status Primary Coverage    Kelsey Wood [id number] Observation Open MEDICARE - MEDICARE FOR HB SUPPLEMENT            Guarantor Account (for Hospital Account #[id number])     Name Relation to Pt Service Area Active? Acct Type    Kelsey Wood Self FCS Yes Personal/Family    Address Phone          [address]   [place] [address] [phone](H)  NONE(O)              Coverage Information (for Hospital Account #[id number])     1. MEDICARE/MEDICARE FOR HB SUPPLEMENT     F/O Payor/Plan Precert #    MEDICARE/MEDICARE FOR HB SUPPLEMENT     Subscriber Subscriber #    Kelsey Wood [id number]    Address Phone    ATTN CLAIMS  [address]  [facility] [place] [address] [phone]          2. HEALTH[employer]ERS/HE[employer] FREEDOM PLAN     F/O Payor/Plan Precert #    HE[employer]NERS/HE[employer]ERS FREEDOM PLAN     Subscriber Subscriber #    Kelsey Wood [id number]    Address Phone    [address]  [facility]  "[place] [address] [phone]                                                      INTERAGENCY TRANSFER FORM - PHYSICIAN ORDERS   6/25/2018                       [facility] OBSERVATION DEPARTMENT: [phone]            Attending Provider: Nilesh Madison DO     Allergies:  No Known Allergies    Infection:  None   Service:  Observation    Ht:  1.575 m (5' 2\")   Wt:  55 kg (121 lb 4.8 oz)   Admission Wt:  55 kg (121 lb 4.8 oz)    BMI:  22.19 kg/m 2   BSA:  1.55 m 2            ED Clinical Impression     Diagnosis Description Comment Added By Time Added    Mild persistent asthma with exacerbation [J45.31] Mild persistent asthma with exacerbation [J45.31]  Nette Aldrich MD 6/25/2018  8:54 PM    Hypoxia [R09.02] Hypoxia [R09.02]  Nette Aldrich MD 6/25/2018  8:54 PM    Generalized weakness [R53.1] Generalized weakness [R53.1]  Nette Aldrich MD 6/25/2018  8:54 PM      Hospital Problems as of 6/28/2018              Priority Class Noted POA    Mild asthma with exacerbation Medium  6/26/2018 Yes      Non-Hospital Problems as of 6/28/2018     None      Code Status History     Date Active Date Inactive Code Status Order ID Comments User Context    6/28/2018  8:28 AM  Full Code [id number]  Katheryn Coleman PA-C Outpatient    6/26/2018 12:10 AM 6/28/2018  8:28 AM Full Code [id number]  Nilesh Madison DO Inpatient      Current Code Status     Date Active Code Status Order ID Comments User Context       Prior      Summary of Visit     Reason for your hospital stay       You were hospitalized with an Asthma Exacerbation with generalized weakness likely due to underlying chronic pain and deconditioning.                Medication Review      START taking        Dose / Directions Comments    acetaminophen 325 MG tablet   Commonly known as:  TYLENOL   Used for:  Other chronic pain        Dose:  650 mg   Take 2 tablets (650 mg) by mouth every 4 hours as needed for mild pain   Quantity:  100 tablet   Refills:  0     "    ipratropium - albuterol 0.5 mg/2.5 mg/3 mL 0.5-2.5 (3) MG/3ML neb solution   Commonly known as:  DUONEB   Used for:  Mild persistent asthma with exacerbation        Dose:  3 mL   Take 1 vial (3 mLs) by nebulization every 4 hours as needed for wheezing   Quantity:  360 mL   Refills:  0        predniSONE 20 MG tablet   Commonly known as:  DELTASONE   Used for:  Mild persistent asthma with exacerbation        Dose:  40 mg   Start taking on:  6/29/2018   Take 2 tablets (40 mg) by mouth daily for 2 days   Quantity:  4 tablet   Refills:  0        sennosides 8.6 MG tablet   Commonly known as:  SENOKOT   Used for:  Constipation, unspecified constipation type        Dose:  1-2 tablet   Take 1-2 tablets by mouth 2 times daily as needed for constipation   Quantity:  30 each   Refills:  0          CONTINUE these medications which have NOT CHANGED        Dose / Directions Comments    albuterol 108 (90 Base) MCG/ACT Inhaler   Commonly known as:  PROAIR HFA/PROVENTIL HFA/VENTOLIN HFA        Dose:  2 puff   Inhale 2 puffs into the lungs every 4 hours as needed for shortness of breath / dyspnea or wheezing   Refills:  0        bismuth subsalicylate 262 MG chewable tablet   Commonly known as:  PEPTO BISMOL        Dose:  2 tablet   Take 2 tablets by mouth every hour as needed for heartburn Max of 16 tabs per 24 hrs   Refills:  0        calcium carbonate 500 MG tablet   Commonly known as:  OS-MARY GRACE 500 mg [place]. [place]        Dose:  2 tablet   Take 2 tablets by mouth daily   Refills:  0        estradiol 0.1 MG/GM cream   Commonly known as:  ESTRACE        Dose:  2 g   Place 2 g vaginally twice a week   Refills:  0        fentaNYL 12 mcg/hr 72 hr patch   Commonly known as:  DURAGESIC   Used for:  Other chronic pain        Dose:  1 patch   Start taking on:  6/29/2018   Place 1 patch onto the skin every 72 hours remove old patch.   Quantity:  3 patch   Refills:  0    Last placed on 6/26/18 at 1628       GABAPENTIN PO        Dose:  300 mg    Take 300 mg by mouth 3 times daily   Refills:  0        HYDROcodone-acetaminophen 5-325 MG per tablet   Commonly known as:  NORCO   Used for:  Other chronic pain        Dose:  1 tablet   Take 1 tablet by mouth every 6 hours as needed for severe pain   Quantity:  20 tablet   Refills:  0        hydrocortisone 1 % Crea cream        Apply topically daily as needed for itching   Refills:  0        IBUPROFEN PO        Dose:  400 mg   Take 400 mg by mouth every 6 hours as needed for moderate pain   Refills:  0        latanoprost 0.005 % ophthalmic solution   Commonly known as:  XALATAN        Dose:  1 drop   Place 1 drop into both eyes At Bedtime   Refills:  0        OSTEO BI-FLEX ADV DOUBLE ST PO        Dose:  1 tablet   Take 1 tablet by mouth 2 times daily   Refills:  0        SALONPAS PAIN RELIEF PATCH EX        Dose:  1 patch   Externally apply 1 patch topically daily as needed   Refills:  0                After Care     Activity - Ambulate in hallway       Every shift       Activity - Up with assistive device       walker       Activity - Up with nursing assistance           Additional Discharge Instructions       Requires min A x1 with all transfers       Advance Diet as Tolerated       Follow this diet upon discharge: Orders Placed This Encounter      Regular Diet Adult       Fall precautions           General info for SNF       Length of Stay Estimate: Short Term Care: Estimated # of Days <30  Condition at Discharge: Stable  Level of care:skilled   Rehabilitation Potential: Good  Admission H&P remains valid and up-to-date: Yes  Recent Chemotherapy: N/A  Use Nursing Home Standing Orders: Yes       Mantoux instructions       Give two-step Mantoux (PPD) Per Facility Policy Yes             Referrals     Physical Therapy Adult Consult       Evaluate and treat as clinically indicated.    Reason:  Generalized weakness             Follow-Up Appointment Instructions     Follow Up and recommended labs and tests        "Follow up with PCP within one week.             Statement of Approval     Ordered          06/28/18 0829  I have reviewed and agree with all the recommendations and orders detailed in this document.  EFFECTIVE NOW     Approved and electronically signed by:  Katheryn Coleman PA-C                                                 INTERAGENCY TRANSFER FORM - NURSING   6/25/2018                       [facility] OBSERVATION DEPARTMENT: [phone]            Attending Provider: Nilesh Madison DO     Allergies:  No Known Allergies    Infection:  None   Service:  Observation    Ht:  1.575 m (5' 2\")   Wt:  55 kg (121 lb 4.8 oz)   Admission Wt:  55 kg (121 lb 4.8 oz)    BMI:  22.19 kg/m 2   BSA:  1.55 m 2            Advance Directives        Scanned docmt in ACP Activity?           Yes, scanned ACP docmt        Immunizations     None      ASSESSMENT     Discharge Profile Flowsheet     EXPECTED DISCHARGE     FINAL RESOURCES      Expected Discharge Date  -- ([facility]) 06/26/18 1328   PAS Number  [id number] 06/27/18 1615    DISCHARGE NEEDS ASSESSMENT     SKIN      Patient/family verbalizes understanding of discharge plan recommendations?  Yes 06/26/18 1322   Inspection of bony prominences  Full 06/28/18 1004    Equipment Currently Used at Home  grab bar;walker, rolling 06/26/18 1447   Inspection under devices  Full 06/28/18 1004    Transportation Available  family or friend will provide 06/26/18 1447   Skin WDL  ex 06/28/18 1004    # of Referrals Placed by CTS  Post Acute Facilities 06/28/18 0954   Skin Color/Characteristics  bruised (ecchymotic) 06/28/18 1004    New Steerage to  Clinics?  No 06/26/18 1322   Skin Temperature  warm 06/28/18 1004    Primary Care Clinic Name  [facility] 06/26/18 1322   Skin Moisture  dry 06/28/18 1004    Primary Care MD Name  Dr Demetrio Arriaza 06/26/18 1322   Skin Elasticity  slow return to original state 06/27/18 0736    GASTROINTESTINAL (ADULT,PEDIATRIC,OB) " "    Skin Integrity  bruise(s);other (see comments) (skin tags) 06/28/18 1004    GI WDL  WDL 06/28/18 1004   SAFETY      Last Bowel Movement  06/27/18 06/28/18 1004   Safety WDL  ex 06/28/18 0921    Passing flatus  yes 06/28/18 1004   Safety Factors  bed in low position;wheels locked;call light in reach;upper side rails raised x 2;ID band on 06/28/18 0921    COMMUNICATION ASSESSMENT     All Alarms  alarm(s) activated and audible 06/28/18 0921    Patient's communication style  spoken language (English or Bilingual) 06/25/18 1808                      Assessment WDL (Within Defined Limits) Definitions           Safety WDL     Effective: 09/28/15    Row Information: <b>WDL Definition:</b> Bed in low position, wheels locked; call light in reach; upper side rails up x 2; ID band on<br> <font color=\"gray\"><i>Item=AS safety wdl>>List=AS safety wdl>>Version=F14</i></font>      Skin WDL     Effective: 09/28/15    Row Information: <b>WDL Definition:</b> Warm; dry; intact; elastic; without discoloration; pressure points without redness<br> <font color=\"gray\"><i>Item=AS skin wdl>>List=AS skin wdl>>Version=F14</i></font>      Vitals     Vital Signs Flowsheet     VITAL SIGNS     Pain Management Interventions  analgesia administered 06/27/18 0727    Temp  97.4  F (36.3  C) 06/28/18 1142   Pain Intervention(s)  Medication (See eMAR) 06/27/18 0727    Temp src  Oral 06/28/18 1141   Response to Interventions  Decrease in pain 06/27/18 0059    Resp  18 06/28/18 1141   ANALGESIA SIDE EFFECTS MONITORING      Pulse  77 06/27/18 1021   Side Effects Monitoring: Respiratory Quality  R 06/28/18 0416    Heart Rate  83 06/28/18 1141   Side Effects Monitoring: Respiratory Depth  N 06/28/18 0416    Pulse/Heart Rate Source  Monitor 06/28/18 1141   Side Effects Monitoring: Sedation Level  1 06/28/18 0416    BP  158/72 06/28/18 1141   HEIGHT AND WEIGHT      BP Location  Right arm 06/28/18 1141   Height  1.575 m (5' 2\") 06/27/18 1145    OXYGEN THERAPY  "    Height Method  Stated 06/27/18 1145    SpO2  93 % 06/28/18 1141   Weight  55 kg (121 lb 4.8 oz) 06/27/18 1145    O2 Device  None (Room air) 06/28/18 1141   Weight Method  Bed scale 06/27/18 1145    Oxygen Delivery  1 LPM 06/26/18 1944   BSA (Calculated - sq m)  1.55 06/27/18 1145    PAIN/COMFORT     BMI (Calculated)  22.23 06/27/18 1145    Patient Currently in Pain  no 06/28/18 0959   DAILY CARE      Preferred Pain Scale  number (Numeric Rating Pain Scale) 06/28/18 0416   Activity Management  activity adjusted per tolerance 06/28/18 0921    Patient's Stated Pain Goal  No pain 06/28/18 0416   Activity Assistance Provided  assistance, 1 person 06/28/18 0921    0-10 Pain Scale  0 06/28/18 0416   Assistive Device Utilized  gait belt;walker 06/28/18 0921    Word Pain Scale  2 06/27/18 0727   POSITIONING      Pain Location  Back 06/27/18 0727   Body Position  independently positioning 06/28/18 0921    Pain Orientation  Right 06/26/18 2217   Head of Bed (HOB)  HOB at 30-45 degrees 06/28/18 0921    Pain Descriptors  Discomfort 06/27/18 0727   Positioning/Transfer Devices  pillows;in use 06/28/18 0821            Patient Lines/Drains/Airways Status    Active LINES/DRAINS/AIRWAYS     None            Patient Lines/Drains/Airways Status    Active PICC/CVC     None            Intake/Output Detail Report     None      Last Void/BM       Most Recent Value    Urine Occurrence 1 at 06/28/2018 0921    Stool Occurrence 1 at 06/28/2018 0921      Case Management/Discharge Planning     Case Management/Discharge Planning Flowsheet     REFERRAL INFORMATION     COPING/STRESS      Did the Initial Social Work Assessment result in a Social Work Case?  Yes 06/26/18 1320   Major Change/Loss/Stressor  none 06/26/18 0014    Admission Type  observation 06/26/18 1320   EXPECTED DISCHARGE      Arrived From  other (see comments) ([facility]) 06/26/18 1322   Expected Discharge Date  -- (TBD [facility]) 06/26/18 1328    Referral Source   case finding;high risk screening 06/26/18 1322   DISCHARGE PLANNING      New Steerage to  Clinics?  No 06/26/18 1322   Patient/family verbalizes understanding of discharge plan recommendations?  Yes 06/26/18 1322    # of Referrals Placed by CTS  Post Acute Facilities 06/28/18 0954   Transportation Available  family or friend will provide 06/26/18 1447    Reason For Consult  care coordination/care conference;discharge planning 06/26/18 1322   FINAL RESOURCES      Record Reviewed  clinical discipline documentation;history and physical;medical record;patient profile;plan of care 06/26/18 1322   Equipment Currently Used at Home  grab bar;walker, rolling 06/26/18 1447    CTS Assigned to Nicolas Dickinson RN CTS 06/28/18 0954   PAS Number  [id number] 06/27/18 1615    Primary Care Clinic Name  [facility] 06/26/18 1322   ABUSE RISK SCREEN      Primary Care MD Name  Dr Demetrio Arriaza 06/26/18 1322   QUESTION TO PATIENT:  Has a member of your family or a partner(now or in the past) intimidated, hurt, manipulated, or controlled you in any way?  no 06/25/18 1810    LIVING ENVIRONMENT     QUESTION TO PATIENT: Do you feel safe going back to the place where you are living?  yes 06/25/18 1810    Lives With  facility resident 06/26/18 1447   OBSERVATION: Is there reason to believe there has been maltreatment of a vulnerable adult (ie. Physical/Sexual/Emotional abuse, self neglect, lack of adequate food, shelter, medical care, or financial exploitation)?  no 06/25/18 1810    Living Arrangements  assisted living 06/26/18 1447   OTHER      Able to Return to Prior Living Arrangements  yes 06/26/18 1322   Are you depressed or being treated for depression?  No 06/26/18 0014    ASSESSMENT OF FAMILY/SOCIAL SUPPORT     HOMICIDE RISK      Who is your support system?  Facility resident(s)/Staff 06/26/18 1322   Feels Like Hurting Others  no 06/25/18 1810                  [facility] OBSERVATION DEPARTMENT: [phone]             Medication Administration Report for Kelsey Wood as of 06/28/18 1202   Legend:    Given Hold Not Given Due Canceled Entry Other Actions    Time Time (Time) Time  Time-Action       Inactive    Active    Linked        Medications 06/22/18 06/23/18 06/24/18 06/25/18 06/26/18 06/27/18 06/28/18    acetaminophen (TYLENOL) tablet 650 mg  Dose: 650 mg  Freq: EVERY 4 HOURS PRN Route: PO  PRN Reason: mild pain  Start: 06/26/18 0009   Admin Instructions: Alternate ibuprofen (if ordered) with acetaminophen.  Maximum acetaminophen dose from all sources = 75 mg/kg/day not to exceed 4 grams/day.    Admin. Amount: 2 tablet (2 × 325 mg tablet)  Dispense Loc:  ADS MS2B OBS               albuterol neb solution 2.5 mg  Dose: 2.5 mg  Freq: EVERY 4 HOURS PRN Route: NEBULIZATION  PRN Reason: wheezing  Start: 06/26/18 0022   Admin. Amount: 2.5 mg = 3 mL Conc: 2.5 mg/3 mL  Dispense Loc:  ADS MS2B OBS  Volume: 3 mL               bisacodyl (DULCOLAX) Suppository 10 mg  Dose: 10 mg  Freq: DAILY PRN Route: RE  PRN Reason: constipation  Start: 06/26/18 0009   Admin Instructions: Hold for loose stools.  This is the third step of a three step constipation treatment.    Admin. Amount: 1 suppository (1 × 10 mg suppository)  Dispense Loc:  ADS MS2B OBS               fentaNYL (DURAGESIC) 12 mcg/hr 72 hr patch 1 patch  Dose: 12 mcg  Freq: EVERY 72 HOURS Route: TD  Start: 06/26/18 1600   Admin Instructions: Used fentaNYL patches must be disposed of by sticking sides together and flushing down a toilet.    Admin. Amount: 1 patch  Last Admin: 06/26/18 1628  Dispense Loc:  ADS MS2B OBS         1628 (1 patch)-Given             fentaNYL (DURAGESIC) Patch in Place  Freq: EVERY 8 HOURS Route: TD  Start: 06/26/18 1600   Admin Instructions: Chart every shift, confirming that patch is still in place on patient (no barcode scan needed). See patch order for dose information.    Last Admin: 06/28/18 0837  Dispense Loc:  Main Pharmacy         1633 (  )-Patch in Place        0100 ( )-Patch in Place       0730 ( )-Patch in Place       1557 ( )-Patch in Place        0022 ( )-Patch in Place       0837 ( )-Patch in Place       [ ] 1600           gabapentin (NEURONTIN) capsule 300 mg  Dose: 300 mg  Freq: 3 TIMES DAILY Route: PO  Start: 06/26/18 1536   Admin. Amount: 1 capsule (1 × 300 mg capsule)  Last Admin: 06/28/18 0826  Dispense Loc: RH ADS MS2B OBS         1628 (300 mg)-Given       2039 (300 mg)-Given        0730 (300 mg)-Given       1418 (300 mg)-Given       2038 (300 mg)-Given        0826 (300 mg)-Given       [ ] 1400       [ ] 2000           HYDROcodone-acetaminophen (NORCO) 5-325 MG per tablet 1 tablet  Dose: 1 tablet  Freq: EVERY 6 HOURS PRN Route: PO  PRN Reason: moderate to severe pain  Start: 06/26/18 1536   Admin Instructions: Maximum acetaminophen dose from all sources= 75 mg/kg/day not to exceed 4 grams    Admin. Amount: 1 tablet  Last Admin: 06/28/18 1142  Dispense Loc: RH ADS MS2B OBS         1632 (1 tablet)-Given       2216 (1 tablet)-Given        0730 (1 tablet)-Given        1142 (1 tablet)-Given           ibuprofen (ADVIL/MOTRIN) tablet 400 mg  Dose: 400 mg  Freq: EVERY 6 HOURS PRN Route: PO  PRN Reason: moderate pain  Start: 06/26/18 1536   Admin. Amount: 1 tablet (1 × 400 mg tablet)  Dispense Loc: RH ADS MS2B OBS               ipratropium - albuterol 0.5 mg/2.5 mg/3 mL (DUONEB) neb solution 3 mL  Dose: 3 mL  Freq: EVERY 4 HOURS PRN Route: NEBULIZATION  PRN Reason: wheezing  Start: 06/27/18 1134   Admin. Amount: 3 mL  Dispense Loc: RH ADS MS2B OBS  Volume: 3 mL               Lidocaine (LIDOCARE) 4 % Patch 1-2 patch  Dose: 1-2 patch  Freq: EVERY 24 HOURS Route: TD  Start: 06/26/18 1435   Admin Instructions: Apply patch(s) to right hip pain. To prevent lidocaine toxicity, patient should be patch free for 12 hrs daily. Patches may be cut to smaller size prior to removing release liner.  NEVER APPLY HEAT OVER PATCH which increases absorption and risk  of local anesthetic toxicity. Do not apply over area where liposomal bupivacaine was injected for 96 hours post injection.    Admin. Amount: 1-2 patch  Last Admin: 06/27/18 1418  Dispense Loc:  ADS MS2B OBS  Infused Over: 12 Hours         1522 (2 patch)-Given        1418 (2 patch)-Given        [ ] 1600          And  lidocaine patch REMOVAL  Freq: EVERY 24 HOURS 0800 Route: TD  Start: 06/27/18 0300   Admin Instructions: Remove lidocaine Patch.    Dispense Loc:  Main Pharmacy          0358 ( )-Patch Removed        0416 ( )-Patch Removed          And  lidocaine patch in PLACE  Freq: EVERY 8 HOURS Route: TD  Start: 06/26/18 1500   Admin Instructions: Chart every shift, confirming that patch is still in place on patient (no barcode scan needed). See patch order for dose information.  NEVER APPLY HEAT OVER PATCH which will increase absorption and may lead to risk of local anesthetic toxicity. Do not apply over area where liposomal bupivacaine injected for 96 hours.    Last Admin: 06/28/18 0022  Dispense Loc:  Main Pharmacy         1522 ( )-Patch in Place       2217 ( )-Patch in Place        (0615)-Not Given [C]       1418 ( )-Patch in Place        0022 ( )-Patch in Place       [ ] 1600           melatonin tablet 1 mg  Dose: 1 mg  Freq: AT BEDTIME PRN Route: PO  PRN Reason: sleep  Start: 06/26/18 0009   Admin Instructions: Do not give unless at least 6 hours of uninterrupted sleep is expected.    Admin. Amount: 1 tablet (1 × 1 mg tablet)  Dispense Loc:  ADS MS2B OBS               naloxone (NARCAN) injection 0.1-0.4 mg  Dose: 0.1-0.4 mg  Freq: EVERY 2 MIN PRN Route: IV  PRN Reason: opioid reversal  Start: 06/26/18 0009   Admin Instructions: For respiratory rate LESS than or EQUAL to 8.  Partial reversal dose:  0.1 mg titrated q 2 minutes for Analgesia Side Effects Monitoring Sedation Level of 3 (frequently drowsy, arousable, drifts to sleep during conversation).Full reversal dose:  0.4 mg bolus for Analgesia Side  Effects Monitoring Sedation Level of 4 (somnolent, minimal or no response to stimulation).  For ordered doses up to 2mg give IVP. Give each 0.4mg over 15 seconds in emergency situations. For non-emergent situations further dilute in 9mL of NS to facilitate titration of response.    Admin. Amount: 0.1-0.4 mg = 0.25-1 mL Conc: 0.4 mg/mL  Dispense Loc: RH ADS MS2B OBS  Volume: 1 mL               ondansetron (ZOFRAN-ODT) ODT tab 4 mg  Dose: 4 mg  Freq: EVERY 6 HOURS PRN Route: PO  PRN Reasons: nausea,vomiting  Start: 06/26/18 0009   Admin Instructions: This is Step 1 of nausea and vomiting management.  If nausea not resolved in 15 minutes, go to Step 2 prochlorperazine (COMPAZINE). Do not push through foil backing. Peel back foil and gently remove. Place on tongue immediately. Administration with liquid unnecessary  With dry hands, peel back foil backing and gently remove tablet; do not push oral disintegrating tablet through foil backing; administer immediately on tongue and oral disintegrating tablet dissolves in seconds; then swallow with saliva; liquid not required.    Admin. Amount: 1 tablet (1 × 4 mg tablet)  Dispense Loc: RH ADS MS2B OBS              Or  ondansetron (ZOFRAN) injection 4 mg  Dose: 4 mg  Freq: EVERY 6 HOURS PRN Route: IV  PRN Reasons: nausea,vomiting  Start: 06/26/18 0009   Admin Instructions: This is Step 1 of nausea and vomiting management.  If nausea not resolved in 15 minutes, go to Step 2 prochlorperazine (COMPAZINE).  Irritant. For ordered doses up to 4 mg, give IV Push undiluted over 2-5 minutes.    Admin. Amount: 4 mg = 2 mL Conc: 4 mg/2 mL  Dispense Loc: RH ADS MS2B OBS  Infused Over: 2-5 Minutes  Volume: 2 mL               polyethylene glycol (MIRALAX/GLYCOLAX) Packet 17 g  Dose: 17 g  Freq: DAILY PRN Route: PO  PRN Reason: constipation  Start: 06/26/18 0009   Admin Instructions: Give in 8oz of  water, juice, or soda. Hold for loose stools.  This is the second step of a three step  constipation treatment.  1 Packet = 17 grams. Mixed prescribed dose in 8 ounces of water. Follow with 8 oz. of water.    Admin. Amount: 17 g  Dispense Loc: RH ADS MS2B OBS               predniSONE (DELTASONE) tablet 40 mg  Dose: 40 mg  Freq: DAILY Route: PO  Start: 06/26/18 0800   Admin. Amount: 2 tablet (2 × 20 mg tablet)  Last Admin: 06/28/18 0826  Dispense Loc: RH ADS MS2B OBS         0826 (40 mg)-Given        0730 (40 mg)-Given        0826 (40 mg)-Given           prochlorperazine (COMPAZINE) injection 5 mg  Dose: 5 mg  Freq: EVERY 6 HOURS PRN Route: IV  PRN Reasons: nausea,vomiting  Start: 06/26/18 0009   Admin Instructions: This is Step 2 of nausea and vomiting management. Give if nausea not resolved 15 minutes after giving ondansetron (ZOFRAN). If nausea not resolved in 15 minutes, go to Step 3 metoclopramide (REGLAN), if ordered.  For ordered doses up to 10 mg, give IV Push undiluted. Each 5mg over 1 minute.    Admin. Amount: 5 mg = 1 mL Conc: 5 mg/mL  Dispense Loc: [facility] ADS MS2B OBS  Infused Over: 1-2 Minutes  Volume: 1 mL              Or  prochlorperazine (COMPAZINE) tablet 5 mg  Dose: 5 mg  Freq: EVERY 6 HOURS PRN Route: PO  PRN Reason: vomiting  Start: 06/26/18 0009   Admin Instructions: This is Step 2 of nausea and vomiting management. Give if nausea not resolved 15 minutes after giving ondansetron (ZOFRAN). If nausea not resolved in 15 minutes, go to Step 3 metoclopramide (REGLAN), if ordered.    Admin. Amount: 1 tablet (1 × 5 mg tablet)  Dispense Loc: [facility] ADS MS2B OBS              Or  prochlorperazine (COMPAZINE) Suppository 12.5 mg  Dose: 12.5 mg  Freq: EVERY 12 HOURS PRN Route: RE  PRN Reasons: nausea,vomiting  Start: 06/26/18 0009   Admin Instructions: This is Step 2 of nausea and vomiting management. Give if nausea not resolved 15 minutes after giving ondansetron (ZOFRAN). If nausea not resolved in 15 minutes, go to Step 3 metoclopramide (REGLAN), if ordered.    Admin. Amount: 0.5 suppository (0.5 ×  25 mg suppository)  Dispense Loc:  ADS MS2B OBS               sennosides (SENOKOT) tablet 1-2 tablet  Dose: 1-2 tablet  Freq: 2 TIMES DAILY PRN Route: PO  PRN Reason: constipation  Start: 18 1049   Admin. Amount: 1-2 tablet  Dispense Loc:  ADS MS2B OBS              Future Medications  Medications 18       fentaNYL (DURAGESIC) patch REMOVAL  Freq: EVERY 72 HOURS Route: TD  Start: 18 1600   Admin Instructions: Remove patch when new patch is applied or patch is discontinued.  Used fentaNYL patches must be disposed of by sticking sides together and flushing down a toilet.    Dispense Loc:  Main Pharmacy              Completed Medications  Medications 18         Dose: 400 mg  Freq: ONCE Route: PO  Start: 18   End: 18   Admin. Amount: 2 tablet (2 × 200 mg tablet)  Last Admin: 18  Dispense Loc:  ADS ERA  Administrations Remainin        2323 (400 mg)-Given                Dose: 3 mL  Freq: ONCE Route: NEBULIZATION  Start: 18 0815   End: 18 0831   Admin. Amount: 3 mL  Last Admin: 18 0831  Dispense Loc:  ADS MS2B OBS  Administrations Remainin  Volume: 3 mL           0831 (3 mL)-Given             Dose: 3 mL  Freq: ONCE Route: NEBULIZATION  Start: 18 1018   End: 18 1034   Admin. Amount: 3 mL  Last Admin: 18 1034  Dispense Loc:  ADS MS2B OBS  Administrations Remainin  Volume: 3 mL          1034 (3 mL)-Given              Dose: 1,000 mL  Freq: ONCE Route: IV  Last Dose: 1,000 mL (18)  Start: 18   End: 18   Admin. Amount: 1,000 mL  Last Admin: 18  Dispense Loc: [facility] FS ER  Infused Over: 1 Hours  Administrations Remainin  Volume: 1,000 mL         (1,000 mL)-New Bag                Dose: 125 mg  Freq: ONCE Route: IV  Start: 18   End: 18   Admin  Instructions: Give Doses 125mg and less IV Push over 2-3 minutes - reconstitute with 2 mL of bacteriostatic water if no diluent is provided. Doses greater than 125 mg need to be in at least 50 mL IVPB.    Admin. Amount: 125 mg = 2 mL Conc: 125 mg/2 mL  Last Admin: 18  Dispense Loc:  ADS ERA  Administrations Remainin  Volume: 2 mL        1856 (125 mg)-Given             Discontinued Medications  Medications 18         Dose: 650 mg  Freq: EVERY 4 HOURS PRN Route: RE  PRN Reason: mild pain  Start: 18 0009   End: 18 1536   Admin Instructions: Alternate ibuprofen (if ordered) with acetaminophen.  Maximum acetaminophen dose from all sources = 75 mg/kg/day not to exceed 4 grams/day.    Admin. Amount: 1 suppository (1 × 650 mg suppository)  Dispense Loc:  Main Pharmacy         1536-Med Discontinued           Dose: 1-2 tablet  Freq: EVERY 6 HOURS PRN Route: PO  PRN Reason: moderate to severe pain  Start: 18 0027   End: 18 1537   Admin Instructions: Maximum acetaminophen dose from all sources= 75 mg/kg/day not to exceed 4 grams    Admin. Amount: 1-2 tablet  Last Admin: 18 0832  Dispense Loc:  ADS MS2B OBS         0051 (1 tablet)-Given       0512 (1 tablet)-Given       0832 (1 tablet)-Given       1537-Med Discontinued           Dose: 3 mL  Freq: 4 TIMES DAILY Route: NEBULIZATION  Start: 18 1600   End: 18 1134   Admin. Amount: 3 mL  Dispense Loc:  ADS MS2B OBS  Volume: 3 mL          1134-Med Discontinued          Dose: 3 mL  Freq: EVERY 6 HOURS PRN Route: NEBULIZATION  PRN Reason: wheezing  Start: 18 1203   End: 18 1035   Admin. Amount: 3 mL  Dispense Loc:  ADS MS2B OBS  Volume: 3 mL          1035-Med Discontinued          Dose: 3 mL  Freq: 4 TIMES DAILY Route: NEBULIZATION  Start: 18 0800   End: 18 1202   Admin. Amount: 3 mL  Last Admin: 18 0745  Dispense Loc: [facility] MS2B  "OBS  Volume: 3 mL         0745 (3 mL)-Given              1202-Med Discontinued           Dose: 3 mL  Freq: EVERY 20 MIN PRN Route: NEBULIZATION  PRN Reason: wheezing  PRN Comment: For Shortness of Breath  Start: 18   End: 18   Admin. Amount: 3 mL  Last Admin: 18  Dispense Loc:  ADS MS2B OBS  Administrations Remainin  Volume: 3 mL         (3 mL)-Given        (3 mL)-Given        0009-Med Discontinued           Rate: 125 mL/hr Dose: 1000 mL  Freq: CONTINUOUS Route: IV  Last Dose: Stopped (18 0136)  Start: 18   End: 18   Admin Instructions: Administer after the bolus.    Admin. Amount: 1,000 mL  Dispense Loc: [facility] Floor Stock  Volume: 1,000 mL                0009-Med Discontinued  0136-Stopped               Freq: EVERY 1 HOUR PRN Route: Top  PRN Reason: pain  PRN Comment: with VAD insertion or accessing implanted port.  Start: 18   End: 18   Admin Instructions: Do NOT give if patient has a history of allergy to any local anesthetic or any \"kaila\" product.   Apply 30 minutes prior to VAD insertion or port access.  MAX Dose:  2.5 g (  of 5 g tube)    Dispense Loc: [facility]         0009-Med Discontinued           Dose: 1 mL  Freq: EVERY 1 HOUR PRN Route: OTHER  PRN Comment: mild pain with VAD insertion or accessing implanted port  Start: 18   End: 18   Admin Instructions: Do NOT give if patient has a history of allergy to any local anesthetic or any \"kaila\" product. MAX dose 1 mL subcutaneous OR intradermal in divided doses.    Admin. Amount: 1 mL  Dispense Loc: [facility] Floor Stock  Volume: 2 mL         0009-Med Discontinued           Dose: 4 mg  Freq: EVERY 30 MIN PRN Route: IV  PRN Reasons: nausea,vomiting  Start: 18   End: 18   Admin Instructions: May repeat in 30 minutes as needed, up to 3 doses.  Irritant. For ordered doses up to 4 mg, give IV Push undiluted over 2-5 minutes.  "   Admin. Amount: 4 mg = 2 mL Conc: 4 mg/2 mL  Dispense Loc: [facility] ADS MS2B OBS  Infused Over: 2-5 Minutes  Administrations Remaining: 3  Volume: 2 mL         0009-Med Discontinued           Dose: 3 mL  Freq: EVERY 8 HOURS Route: IK  Start: 06/25/18 1830   End: 06/26/18 0009   Admin Instructions: And Q1H PRN, to lock peripheral IV dormant line.    Admin. Amount: 3 mL  Dispense Loc: [facility] Floor Stock  Volume: 4 mL                0009-Med Discontinued           Dose: 3 mL  Freq: EVERY 1 HOUR PRN Route: IK  PRN Reason: line flush  PRN Comment: for peripheral IV flush post IV meds  Start: 06/25/18 1828   End: 06/26/18 0009   Admin. Amount: 3 mL  Dispense Loc: [facility] Floor Stock  Volume: 4 mL         0009-Med Discontinued      Medications 06/22/18 06/23/18 06/24/18 06/25/18 06/26/18 06/27/18 06/28/18               INTERAGENCY TRANSFER FORM - NOTES (H&P, Discharge Summary, Consults, Procedures, Therapies)   6/25/2018                       [facility] OBSERVATION DEPARTMENT: [phone]               History & Physicals      H&P by Nilesh Madison DO at 6/25/2018 10:51 PM     Author:  Nilesh Madison DO Service:  (none) Author Type:  Physician    Filed:  6/25/2018 10:55 PM Date of Service:  6/25/2018 10:51 PM Creation Time:  6/25/2018 10:51 PM    Status:  Signed :  Nilesh Madison DO (Physician)         [facility]  Hospitalist H&P    Name: Kelsey Wood      MRN: [id number]  YOB: 1929    Age: 89 year old  Date of admission: 6/25/2018  Primary care provider: Jen Godfrey            Assessment and Plan:   Kelsey Wood is a 89 year old female with a history of glaucoma, asthma, arthritis, chronic back pain who presents with shortness of breath and wheezing consistent with reactive airway disease.    1.  Acute exacerbation of asthma: Quite mild at this time.  Lungs are essentially clear.  May be some faint wheezing present.  Oxygen saturations 92% on room air while at rest.   She does not appear in any respiratory distress.  No fever nor focal infiltrate on chest x-ray to suggest pneumonia.  Will start 40 Milgram's of prednisone daily and complete a 5 day burst.  Continue duo nebulizer treatments while in hospitalized.  I suspect she will be able to discharge home tomorrow.    Code status: Full.  Admit to observation status.  Prophylaxis: None.  Disposition: Home tomorrow.            Chief Complaint:   Shortness of breath.         History of Present Illness:   Kelsey Wood is a 89 year old female who presents with shortness of breath and cough.  Patient indicates she has had some ongoing respiratory symptoms for about 10-14 days.  She has been seen in clinic a few times and been diagnosed with pneumonia.  She completed a course of azithromycin on Tuesday, 6 days ago.  She initially felt somewhat better but then breathing became worse about 2 days ago.  She also was recently on ciprofloxacin for urinary tract infection.  Over the past 2 days at her assisted-living facility she has become more weak and short of breath.  She has noticed some wheezing.  Due to these issues she came to the emergency department for evaluation.    I discussed the case with Dr. Ibarra.  Patient was noted to have some wheezing on examination.  She was given some systemic steroids and duo nebulizers.  Ambulation was attempted but she desaturated and it was aborted.  Admission was requested for asthma exacerbation.            Past Medical History:     Past Medical History:   Diagnosis Date     Hard of hearing              Past Surgical History:     Past Surgical History:   Procedure Laterality Date     CHOLECYSTECTOMY       LAPAROSCOPIC APPENDECTOMY               Social History:     Social History   Substance Use Topics     Smoking status: Not on file     Smokeless tobacco: Not on file     Alcohol use Not on file             Family History:   The family history was fully reviewed and non-contributory in this  case.         Allergies:   No Known Allergies          Medications:     Prior to Admission medications    Medication Sig Last Dose Taking? Auth Provider   albuterol (PROAIR HFA/PROVENTIL HFA/VENTOLIN HFA) 108 (90 Base) MCG/ACT Inhaler Inhale 2 puffs into the lungs every 4 hours as needed for shortness of breath / dyspnea or wheezing  Yes Unknown, Entered By History   bismuth subsalicylate (PEPTO BISMOL) 262 MG chewable tablet Take 2 tablets by mouth every hour as needed for heartburn Max of 16 tabs per 24 hrs  Yes Unknown, Entered By History   calcium carbonate (OS-MARY GRACE 500 MG [place]. [place]) 500 MG tablet Take 2 tablets by mouth daily 6/25/2018 at Unknown time Yes Unknown, Entered By History   estradiol (ESTRACE) 0.1 MG/GM cream Place 2 g vaginally twice a week Past Week at Unknown time Yes Unknown, Entered By History   fentaNYL (DURAGESIC) 12 mcg/hr 72 hr patch Place 1 patch onto the skin every 72 hours remove old patch. removed 6-24-18  Unknown, Entered By History   GABAPENTIN PO Take 300 mg by mouth 3 times daily 6/25/2018 at 1200 Yes Unknown, Entered By History   HYDROcodone-acetaminophen (NORCO) 5-325 MG per tablet Take 1 tablet by mouth every 6 hours as needed for severe pain  Yes Unknown, Entered By History   hydrocortisone 1 % CREA cream Apply topically daily as needed for itching  Yes Unknown, Entered By History   IBUPROFEN PO Take 400 mg by mouth every 6 hours as needed for moderate pain  Yes Unknown, Entered By History   latanoprost (XALATAN) 0.005 % ophthalmic solution Place 1 drop into both eyes At Bedtime 6/24/2018 at Unknown time Yes Unknown, Entered By History   Liniments (SALONPAS PAIN RELIEF PATCH EX) Externally apply 1 patch topically daily as needed 6/25/2018 at Unknown time Yes Unknown, Entered By History   Misc Natural Products (OSTEO BI-FLEX ADV DOUBLE ST PO) Take 1 tablet by mouth 2 times daily 6/25/2018 at am Yes Unknown, Entered By History             Review of Systems:   A Comprehensive greater  than 10 system review of systems was carried out.  Pertinent positives and negatives are noted above.  Otherwise negative for contributory information.           Physical Exam:   Blood pressure 155/67, temperature 98.6  F (37  C), temperature source Oral, resp. rate 24, SpO2 (!) 89 %.  Wt Readings from Last 1 Encounters:   No data found for Wt     Exam:  GENERAL: No apparent distress. Awake, alert, and fully oriented.  HEENT: Normocephalic, atraumatic. Extraocular movements intact.  CARDIOVASCULAR: Regular rate and rhythm without murmurs or rubs. No S3.  PULMONARY: Wheezing bilaterally.  ABDOMINAL: Soft, non-tender, non-distended. Bowel sounds normoactive.   EXTREMITIES: No cyanosis or clubbing. No appreciable edema.  NEUROLOGICAL: CN 2-12 grossly intact, no focal neurological deficits.  DERMATOLOGICAL: No rash, ulcer, bruising, nor jaundice.          Data:   Laboratory:    Recent Labs  Lab 06/25/18  1852   WBC 9.5   HGB 12.3   HCT 39.6   MCV 98          Recent Labs  Lab 06/25/18  1852      POTASSIUM 3.9   CHLORIDE 103   CO2 30   ANIONGAP 6   *   BUN 14   CR 0.60   GFRESTIMATED >90   GFRESTBLACK >90   MARY GRACE 9.4       Recent Labs  Lab 06/25/18  1852   NTBNPI 185       Recent Labs  Lab 06/25/18  1852   AST 17   ALT 18   ALKPHOS 80   BILITOTAL 0.2       Recent Labs  Lab 06/25/18  1852   LACT 1.2       Recent Labs  Lab 06/25/18  1852   TROPI <0.015     No results for input(s): CULT in the last 168 hours.    Imaging:  Recent Results (from the past 24 hour(s))   XR Chest 2 Views    Narrative    XR CHEST 2 VW   6/25/2018 7:15 PM     HISTORY: cough;     COMPARISON: None.    FINDINGS: The heart is negative.  Linear platelike atelectasis is seen  at the right lung base.. The pulmonary vasculature is normal.  The  bones and soft tissues are unremarkable.      Impression    IMPRESSION: No focal alveolar-type infiltrates are seen.        MD Nilesh NELSON,  MPH  Formerly [facility], 1928–1983 Hospitalist  [address] [place] [address]  Pager: [phone]  06/25/2018[JK1.1]      Revision History        User Key Date/Time User Provider Type Action    > [email or web address] 6/25/2018 10:55 PM Nilesh Madison DO Physician Sign                     Discharge Summaries      Discharge Summaries by Katheryn Coleman PA-C at 6/28/2018  8:29 AM     Author:  Katheryn Coleman PA-C Service:  Internal Medicine Author Type:  Physician Assistant - LISBETH    Filed:  6/28/2018 11:55 AM Date of Service:  6/28/2018  8:29 AM Creation Time:  6/28/2018  8:29 AM    Status:  Cosign Needed :  Katheryn Coleman PA-C (Physician Assistant - LISBETH)    Cosign Required:  Yes             [facility] Observation Unit Discharge Summary          Kelsey Wood MRN# [id number]   Age: 89 year old YOB: 1929     Date of Admission:  6/25/2018  Date of Discharge::[EM1.1]  6/28/2018[EM1.2]  Admitting Physician:  Nilesh Madison DO  Discharge Physician:  Katheryn Coleman PA-C  Primary Physician: Demetrio Arriaza     Primary Discharge Diagnoses:[EM1.1]   Acute Asthma Exacerbation  Generalized Weakness[EM1.2]     Secondary Discharge Diagnoses:[EM1.1]   Chronic Pain  Osteoarthritis  Osteoporosis  Allergic Rhinitis[EM1.2]     Hospital Course:   For detail history, please refer to H & P from 6/25/2018. In brief, this is a[EM1.1]n 89 year old female with a history of Osteoarthritis, Osteoporosis, Back Pain, Asthma, Allergic Rhinitis who presented from [facility]25 with shortness of breath and wheezing and found to have an Asthma Exacerbation.      Acute Asthma Exacerbation - noted to be hypoxic on admission.  S/p IV Solumedrol in the ED.  CXR with no acute infiltrate.  Symptoms improved and saturations 92[EM1.2]-95[EM1.3]% on room air.  Patient was transitioned to po Prednisone 40mg daily.  She was discharged with 2 additional days to complete a total 5 day course.  Patient used duonebs prn while hospitalized with  improvement.  She was discharged with prn Duonebs and to continue home Albuterol inhaler prn.    Generalized Weakness - per report, patient ambulates at baseline with a walker and has meals delivered and medication set up at her [facility] prior to admission.  Noted to be a assist of 2 on admission.  Likely has deconditioning from recent UTI 5/26 and Pneumonia earlier this month.  PT evaluation recommended TCU.  SW was consulted and patient was discharged to[EM1.2] TCU.[EM1.3]      Chronic Pain - 2/2 chronic left knee OA and patient reports chronic right hip pain.  Patient was continued on her pta Gabapentin, Ibuprofen prn, Norco prn, Fentanyl patch and lidocaine patch.[EM1.2]    # Discharge Pain Plan:[EM1.1] - During her hospitalization, Kelsey experienced pain due to chronic pain.  The pain plan for discharge was discussed with Kelsey and the plan was created in a collaborative fashion.    - she was resumed on her pta pain medications.[EM1.3]    Procedures/Imaging:     Results for orders placed or performed during the hospital encounter of 06/25/18   XR Chest 2 Views    Narrative    XR CHEST 2 VW   6/25/2018 7:15 PM     HISTORY: cough;     COMPARISON: None.    FINDINGS: The heart is negative.  Linear platelike atelectasis is seen  at the right lung base.. The pulmonary vasculature is normal.  The  bones and soft tissues are unremarkable.      Impression    IMPRESSION: No focal alveolar-type infiltrates are seen.        RANDY VILLALPANDO MD       Consultations:[EM1.1]   Social Work  Physical Therapy[EM1.2]    Code Status:[EM1.1]   Full[EM1.2]    Allergies:   No Known Allergies     Subjective:[EM1.1]   Patient reports she feels well today.  Denies chest pain, shortness of breath or wheezing.  Reports her chronic pains are at baseline.  She is making a list of things she needs to bring to TCU.[EM1.3]    Physical Exam:[EM1.1]   Blood pressure 158/72, pulse 77, temperature 97.4  F (36.3  C), temperature source Oral, resp. rate 18,  "height 1.575 m (5' 2\"), weight 55 kg (121 lb 4.8 oz), SpO2 93 %.[EM1.4] on room air[EM1.3]  General: Alert, interactive, NAD[EM1.1], sitting up in a chair on room air writing a list.[EM1.3]  HEENT: AT/NC, sclera anicteric, PERRL, EOM[EM1.1]i[EM1.3]  Resp: clear to auscultation bilaterally, no crackles or wheezes  Cardiac: regular rate and rhythm, no murmur  Abdomen: Soft, nontender, nondistended. +BS.  No rebound or guarding.  Extremities: No LE edema  Skin: Warm and dry, no jaundice or rash  Neuro: Alert & oriented[EM1.1] to hospital and year.  Seems to have some short term memory difficulty.  N[EM1.3]o focal deficits, moves all extremities equally     Discharge Medicatios:[EM1.1]        Current Discharge Medication List      START taking these medications    Details   acetaminophen (TYLENOL) 325 MG tablet Take 2 tablets (650 mg) by mouth every 4 hours as needed for mild pain  Qty: 100 tablet, Refills: 0    Associated Diagnoses: Other chronic pain      ipratropium - albuterol 0.5 mg/2.5 mg/3 mL (DUONEB) 0.5-2.5 (3) MG/3ML neb solution Take 1 vial (3 mLs) by nebulization every 4 hours as needed for wheezing  Qty: 360 mL, Refills: 0    Associated Diagnoses: Mild persistent asthma with exacerbation      predniSONE (DELTASONE) 20 MG tablet Take 2 tablets (40 mg) by mouth daily for 2 days  Qty: 4 tablet, Refills: 0    Associated Diagnoses: Mild persistent asthma with exacerbation      sennosides (SENOKOT) 8.6 MG tablet Take 1-2 tablets by mouth 2 times daily as needed for constipation  Qty: 30 each, Refills: 0    Associated Diagnoses: Constipation, unspecified constipation type         CONTINUE these medications which have CHANGED    Details   fentaNYL (DURAGESIC) 12 mcg/hr 72 hr patch Place 1 patch onto the skin every 72 hours remove old patch.  Qty: 3 patch, Refills: 0    Comments: Last placed on 6/26/18 at 1628  Associated Diagnoses: Other chronic pain      HYDROcodone-acetaminophen (NORCO) 5-325 MG per tablet Take 1 " tablet by mouth every 6 hours as needed for severe pain  Qty: 20 tablet, Refills: 0    Associated Diagnoses: Other chronic pain         CONTINUE these medications which have NOT CHANGED    Details   albuterol (PROAIR HFA/PROVENTIL HFA/VENTOLIN HFA) 108 (90 Base) MCG/ACT Inhaler Inhale 2 puffs into the lungs every 4 hours as needed for shortness of breath / dyspnea or wheezing      bismuth subsalicylate (PEPTO BISMOL) 262 MG chewable tablet Take 2 tablets by mouth every hour as needed for heartburn Max of 16 tabs per 24 hrs      calcium carbonate (OS-MARY GRACE 500 MG [place]. [place]) 500 MG tablet Take 2 tablets by mouth daily      estradiol (ESTRACE) 0.1 MG/GM cream Place 2 g vaginally twice a week      GABAPENTIN PO Take 300 mg by mouth 3 times daily      hydrocortisone 1 % CREA cream Apply topically daily as needed for itching      IBUPROFEN PO Take 400 mg by mouth every 6 hours as needed for moderate pain      latanoprost (XALATAN) 0.005 % ophthalmic solution Place 1 drop into both eyes At Bedtime      Liniments (SALONPAS PAIN RELIEF PATCH EX) Externally apply 1 patch topically daily as needed      Misc Natural Products (OSTEO BI-FLEX ADV DOUBLE ST PO) Take 1 tablet by mouth 2 times daily[EM1.5]             Instructions Given to Patient as Discharge:[EM1.1]     Discharge Procedure Orders  General info for SNF   Order Comments: Length of Stay Estimate: Short Term Care: Estimated # of Days <30  Condition at Discharge: Stable  Level of care:skilled   Rehabilitation Potential: Good  Admission H&P remains valid and up-to-date: Yes  Recent Chemotherapy: N/A  Use Nursing Home Standing Orders: Yes     Mantoux instructions   Order Comments: Give two-step Mantoux (PPD) Per Facility Policy Yes     Reason for your hospital stay   Order Comments: You were hospitalized with an Asthma Exacerbation with generalized weakness likely due to underlying chronic pain and deconditioning.     Additional Discharge Instructions   Order Comments:  Requires min A x1 with all transfers     Activity - Up with assistive device   Order Comments: walker   Order Specific Question Answer Comments   Is discharge order? Yes      Activity - Up with nursing assistance   Order Specific Question Answer Comments   Is discharge order? Yes      Activity - Ambulate in hallway   Order Comments: Every shift   Order Specific Question Answer Comments   Is discharge order? Yes      Follow Up and recommended labs and tests   Order Comments: Follow up with PCP within one week.     Full Code     Physical Therapy Adult Consult   Order Comments: Evaluate and treat as clinically indicated.    Reason:  Generalized weakness     Fall precautions     Advance Diet as Tolerated   Order Comments: Follow this diet upon discharge: Orders Placed This Encounter     Regular Diet Adult   Order Specific Question Answer Comments   Is discharge order? Yes[EM1.5]          Pending Tests at Discharge:[EM1.1]   none[EM1.3]    Discharge Disposition:[EM1.1]   Discharged to short-term care facility[EM1.3]     Katheryn Coleman MS, PA-C  Hospitalist Service  Pager [phone][EM1.1]    >30[EM1.3] minutes was spent in discharge planning, care coordination, physical examination and medication reconciliation on the date of discharge,[EM1.1] 6/28/2018[EM1.3]       Revision History        User Key Date/Time User Provider Type Action    > [N/A] 6/28/2018 11:55 AM Katheryn Coleman PA-C Physician Assistant - C Sign     EM1.5 6/28/2018 11:54 AM Katheryn Coleman PA-C Physician Assistant - C Sign     EM1.4 6/28/2018 11:52 AM Katheryn Coleman PA-C Physician Assistant - C      EM1.3 6/28/2018 11:50 AM Katheryn Coleman PA-C Physician Assistant - C      EM1.2 6/28/2018 11:03 AM Katheryn Coleman PA-C Physician Assistant - C      EM1.1 6/28/2018  8:29 AM Katheryn Coleman PA-C Physician Assistant - C                      Consult Notes      Consults by Darling Flores at 6/27/2018 10:43 AM     Author:  Darling Flores Service:   Social Work Author Type:      Filed:  6/27/2018 10:43 AM Date of Service:  6/27/2018 10:43 AM Creation Time:  6/27/2018 10:38 AM    Status:  Signed :  Darling Flores ()     Consult Orders:    1. Social Work IP Consult [[id number]] ordered by Katheryn Coleman PA-C at 06/26/18 1528                SWS  WINNIE reviewed PT eval and discussed with Katheryn BROCK.  Pt is recommending rehab/TCU at this time.  SW reached out and spoke to toby Dalton.  Reviewed PT consult.  He is in agreement with rehab and requests referrals be sent.  He does not want [facility].  SW sent referrals for 5 TCUs and will await their review.  SW did discuss with Sha that rehab would be private pay as pt is OBS and does not have any qualifying stays to cover rehab.  Sha states his understanding and states his mother does have the funds to pay privatelyl  P:  SW following[AJ1.1]     Revision History        User Key Date/Time User Provider Type Action    > AJ1.1 6/27/2018 10:43 AM Darling Flores  Sign            Consults by Lashonda Troncoso RN at 6/26/2018  1:28 PM     Author:  Lashonda Troncoso RN Service:  (none) Author Type:      Filed:  6/26/2018  1:28 PM Date of Service:  6/26/2018  1:28 PM Creation Time:  6/26/2018  1:22 PM    Status:  Signed :  Lashonda Troncoso RN ()     Consult Orders:    1. Care Coordinator IP Consult [[id number]] ordered by Nilesh Madison DO at 06/26/18 1320                Care Transition Initial Assessment - RN    Reason For Consult: care coordination/care conference, discharge planning   Met with: Patient.  DATA   Active Problems:    COPD exacerbation (H)       Cognitive Status: awake and alert.  Primary Care Clinic Name: [facility]  Primary Care MD Name: Dr Demetrio Arriaza  Contact information and PCP information verified: Yes, she recently started seeing [facility].  She usually doesn't see Dr Godfrey anymore  Lives With: facility  resident            Who is your support system?: Facility resident(s)/Staff       Insurance concerns: No Insurance issues identified  ASSESSMENT  Patient currently receives the following services:  Pt lives at [facility].  Jessica [phone] should be notified of any dc planning.  Dc orders need to be faxed to [phone].  They need dc orders before 4:30pm and it's ok if pt returns after that as long as a nurse is able to go through orders.  At baseline pt ambulates independently with her walker.  She mostly stays in her room.  She gets assistance with medication set up and then she takes the medication.  She also gets meals delivered to her room.          Identified issues/concerns regarding health management: Pt is admitted with a  COPD exacerbation.  We did discuss the COPD action care plan.  Pt is aware she should f/u with her MD within 7 days after dc.  Hand off will be given to Abel Marshall Physician RN at [facility].  Pt was concerned about getting her medication for her chronic right leg/hip spasm.  Bedside RN aware and getting lidoderm patch.    PLAN  Patient given options and choices for discharge yes .  Patient/family is agreeable to the plan?  Yes:   Patient anticipates discharging to [facility] .        Patient anticipates needs for home equipment: No  Plan/Disposition: Home   Appointments: She will need to f/u with Dr Arriaza within 7 days.        Care  (CTS) will continue to follow as needed.    Teena SUAZO CTS [id number][RB1.1]               Revision History        User Key Date/Time User Provider Type Action    > RB1.1 6/26/2018  1:28 PM Lashonda Troncoso RN Case Manager Sign                     Progress Notes - Physician (Notes for yesterday and today)      Progress Notes by Katheryn Coleman PA-C at 6/27/2018 10:42 AM     Author:  Katheryn Coleman PA-C Service:  Internal Medicine Author Type:  Physician Assistant - C    Filed:  6/27/2018 11:20 AM Date of Service:   6/27/2018 10:42 AM Creation Time:  6/27/2018 10:42 AM    Status:  Addendum :  Katheryn Coleman PA-C (Physician Assistant - C)         [facility]  Observation Unit  Progress Note    Date of Service: 6/27/2018    Patient: Kelsey Wood  MRN: [id number]  Admission Date: 6/25/2018  Hospital Day # 2    Assessment & Plan: Kelsey Wood is a 89 year old female with a history of Osteoarthritis, Osteoporosis, Back Pain, Asthma, Allergic Rhinitis who presented from [facility] 6/25 with shortness of breath and wheezing and found to have an Asthma Exacerbation.       Acute Asthma Exacerbation - noted to be hypoxic on admission.  S/p IV Solumedrol in the ED.  CXR with no acute infiltrate.  Symptoms improved and saturations 92% on room air.  Mild wheezing[EM1.1] and rhonchi[EM1.2] on my exam this morning.  Did not receive nebs since yesterday morning.[EM1.1]  Received a duoneb, deep coughing and I instructed how to use IS and lung sound[EM1.2]s[EM1.3] then cleared.[EM1.2]  - continue po Prednisone (day 2/5)  - duonebs QID, albuterol q2 hours prn[EM1.1]  - IS encouraged.[EM1.2]     Generalized Weakness - per report, patient ambulates at baseline with a walker and has meals delivered and medication set up.  Noted to be a assist of 2 on admission.  Likely has deconditioning from recent UTI 5/26 and Pneumonia earlier this month.  PT evaluation on 6/26 recommends TCU and if patient declines, will need assist of 1 with all transfers at her [facility] with home PT/OT.    - SW consulted for discharge planning     Chronic Pain - 2/2 chronic left knee OA and patient reports chronic right hip pain.  Home pain medications resumed yesterday and patient reports improved pain today.  - continue home Gabapentin, Ibuprofen prn, Norco prn, Fentanyl patch and lidocaine patch    # Pain Assessment:[EM1.1]  Current Pain Score 6/27/2018   Patient currently in pain? -   Pain score (0-10) 4   Pain location -   Pain  descriptors -[EM1.4]   - Kelsey is experiencing pain due to chronic arthritis. Pain management was discussed and the plan was created in a collaborative fashion.  Kelsey's response to the current recommendations: engaged  - Please see the plan for pain management as documented above    CODE: Full  DVT: SCD  Diet/fluids: regular      Katherynsukumar Shethstephani MAJANO PA-C  Hospitalist Physician Assistant  [facility]  Pager: [phone]      Subjective & Interval Hx:    Patient reports breathing has improved since admission.  No abdominal pain, nausea.  Wants to have a BM.  Tolerating po well.  Left knee and right hip pain improved from admission.  Has chronic pain due to OA and receives left knee cortisone injections.      Last 24 hr care team notes reviewed.   ROS:  4 point ROS including Respiratory, CV, GI and , other than that noted in the HPI, is negative.    Physical Exam:[EM1.1]    Blood pressure 159/69, pulse 77, temperature 96.6  F (35.9  C), temperature source Oral, resp. rate 20, SpO2 92 %.[EM1.4] on room air  General: Alert, interactive, NAD, forgetful.  HEENT: AT/NC, sclera anicteric, PERRL, EOMI  Resp: mild wheezing[EM1.1] and rhonchi[EM1.2], no accessory muscle use.  Cardiac: regular rate and rhythm, no murmur  Abdomen: Soft, nontender, nondistended. +BS.  No HSM or masses, no rebound or guarding.  Extremities: No LE edema  Skin: Warm and dry, no jaundice or rash  Neuro: Alert & oriented[EM1.1] to hospital, family at the bedside and month/year,[EM1.2] moves all extremities equally    Labs & Images:  Reviewed in Epic   Medications:[EM1.1]    Current Facility-Administered Medications   Medication     acetaminophen (TYLENOL) tablet 650 mg     albuterol neb solution 2.5 mg     bisacodyl (DULCOLAX) Suppository 10 mg     fentaNYL (DURAGESIC) 12 mcg/hr 72 hr patch 1 patch     fentaNYL (DURAGESIC) Patch in Place     [START ON 6/29/2018] fentaNYL (DURAGESIC) patch REMOVAL     gabapentin (NEURONTIN) capsule 300 mg      HYDROcodone-acetaminophen (NORCO) 5-325 MG per tablet 1 tablet     ibuprofen (ADVIL/MOTRIN) tablet 400 mg     ipratropium - albuterol 0.5 mg/2.5 mg/3 mL (DUONEB) neb solution 3 mL     Lidocaine (LIDOCARE) 4 % Patch 1-2 patch    And     lidocaine patch REMOVAL    And     lidocaine patch in PLACE     melatonin tablet 1 mg     naloxone (NARCAN) injection 0.1-0.4 mg     ondansetron (ZOFRAN-ODT) ODT tab 4 mg    Or     ondansetron (ZOFRAN) injection 4 mg     polyethylene glycol (MIRALAX/GLYCOLAX) Packet 17 g     predniSONE (DELTASONE) tablet 40 mg     prochlorperazine (COMPAZINE) injection 5 mg    Or     prochlorperazine (COMPAZINE) tablet 5 mg    Or     prochlorperazine (COMPAZINE) Suppository 12.5 mg[EM1.4]          Revision History        User Key Date/Time User Provider Type Action    > EM1.3 6/27/2018 11:20 AM Katheryn Coleman PA-C Physician Assistant - C Addend     EM1.2 6/27/2018 11:19 AM Katheryn Coleman PA-C Physician Assistant - C Sign     EM1.4 6/27/2018 10:44 AM Katheryn Coleman PA-C Physician Assistant - C      EM1.1 6/27/2018 10:42 AM Katheryn Coleman PA-C Physician Assistant - C                   Procedure Notes     No notes of this type exist for this encounter.         Progress Notes - Therapies (Notes from 06/25/18 through 06/28/18)      Progress Notes by Melodie Armendariz RT at 6/27/2018 10:37 AM     Author:  Melodie Armendariz RT Service:  Respiratory Therapy Author Type:  Respiratory Therapist    Filed:  6/27/2018 10:39 AM Date of Service:  6/27/2018 10:37 AM Creation Time:  6/27/2018 10:37 AM    Status:  Signed :  Melodie Armendariz RT (Respiratory Therapist)         RT- Patient given one time nebulizer. Patient reports no shortness of breath at this time. Breath sounds are clear, patient currently on room air with oxygen saturations at 92%.[KT1.1]      Revision History        User Key Date/Time User Provider Type Action    > KT1.1 6/27/2018 10:39 AM Melodie Armendariz, RT Respiratory  Therapist Sign            Progress Notes by Nena Yu, PT at 6/26/2018  3:34 PM     Author:  Nena Yu PT Service:  (none) Author Type:  Physical Therapist    Filed:  6/26/2018  6:31 PM Date of Service:  6/26/2018  3:34 PM Creation Time:  6/26/2018  6:31 PM    Status:  Addendum :  Nena Yu PT (Physical Therapist)            06/26/18 1434   Quick Adds   Type of Visit Initial PT Evaluation   Living Environment   Lives With facility resident   Living Arrangements assisted living   Home Accessibility no concerns   Number of Stairs to Enter Home 0   Number of Stairs Within Home 0   Transportation Available family or friend will provide   Living Environment Comment Patient gets meals delivered to her room, medication management, housecleaning 1x/week, laundry   Self-Care   Usual Activity Tolerance moderate   Current Activity Tolerance fair   Regular Exercise no   Equipment Currently Used at Home grab bar;walker, rolling   Functional Level Prior   Ambulation 1-->assistive equipment   Transferring 1-->assistive equipment   Toileting 1-->assistive equipment   Bathing (Patient reports sponge bathes)   Dressing 0-->independent   Eating 0-->independent   Communication 0-->understands/communicates without difficulty   Swallowing 0-->swallows foods/liquids without difficulty   Cognition 0 - no cognition issues reported   Prior Functional Level Comment Patient reports being unable to get into shower alone, patient reports she usually sponge bathes.  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   General Information   Onset of Illness/Injury or Date of Surgery - Date 06/25/18   Referring Physician Nilesh Madison, DO   Patient/Family Goals Statement return to home   Pertinent History of Current Problem (include personal factors and/or comorbidities that impact the POC) Patient with PMH including hx of glaucoma, asthma, arthritis, chronic back pain now admitted with acute asthma exacerbation (with hx  "of UTI and pneumonia within the last month).    Precautions/Limitations fall precautions   Cognitive Status Examination   Orientation orientation to person, place and time   Personal Safety and Judgment impulsive   Cognitive Comment limited insight into deficits   Pain Assessment   Patient Currently in Pain Yes, see Vital Sign flowsheet  (reports pain at right lateral thigh)   Integumentary/Edema   Integumentary/Edema no deficits were identifed   Posture    Posture Forward head position;Protracted shoulders   Posture Comments significant trunk flexion   Range of Motion (ROM)   ROM Comment WFL   Strength   Strength Comments significant strength deficits   Bed Mobility   Bed Mobility Comments independent   Transfer Skills   Transfer Comments Required min A to transfer to standing with 4WW   Gait   Gait Comments Patient amb 75 feet total with 4WW and CGA-Lizbet with one seated rest break   Balance   Balance Comments poor dynamic standing balance, high fall risk   General Therapy Interventions   Planned Therapy Interventions balance training;bed mobility training;gait training;strengthening;transfer training;home program guidelines;progressive activity/exercise   Clinical Impression   Criteria for Skilled Therapeutic Intervention yes, treatment indicated   PT Diagnosis decreased independence with mobility   Clinical Presentation Stable/Uncomplicated   Clinical Presentation Rationale complex pmh, stable presentation, limited social support   Clinical Decision Making (Complexity) Low complexity   Therapy Frequency` 5 times/week   Predicted Duration of Therapy Intervention (days/wks) 3 days   Anticipated Discharge Disposition Transitional Care Facility   Risk & Benefits of therapy have been explained Yes   Patient, Family & other staff in agreement with plan of care Yes   [facility] AM-PAC TM \"6 Clicks\"   2016, Trustees of [facility], under license to [employer].  All rights reserved.   6 Clicks Short Forms " "Basic Mobility Inpatient Short Form   [facility] AM-PAC  \"6 Clicks\" V.2 Basic Mobility Inpatient Short Form   1. Turning from your back to your side while in a flat bed without using bedrails? 4 - None   2. Moving from lying on your back to sitting on the side of a flat bed without using bedrails? 4 - None   3. Moving to and from a bed to a chair (including a wheelchair)? 3 - A Little   4. Standing up from a chair using your arms (e.g., wheelchair, or bedside chair)? 3 - A Little   5. To walk in hospital room? 3 - A Little   6. Climbing 3-5 steps with a railing? 2 - A Lot   Basic Mobility Raw Score (Score out of 24.Lower scores equate to lower levels of function) 19   Total Evaluation Time   Total Evaluation Time (Minutes) 5[MM1.1]        Revision History        User Key Date/Time User Provider Type Action    > [N/A] 6/26/2018  6:31 PM Nena Yu, PT Physical Therapist Addend     MM1.1 6/26/2018  6:31 PM Nena Yu, PT Physical Therapist Sign            Progress Notes by Melodie Armendariz RT at 6/26/2018 12:03 PM     Author:  Melodie Armendariz RT Service:  Respiratory Therapy Author Type:  Respiratory Therapist    Filed:  6/26/2018 12:05 PM Date of Service:  6/26/2018 12:03 PM Creation Time:  6/26/2018 12:03 PM    Status:  Signed :  Melodie Armendariz RT (Respiratory Therapist)         [facility] RCAT     Date: 6/26/18  Admission Dx: Hypoxia  Pulmonary History: COPD, Asthma  Home Nebulizer/MDI Use: Flovent and Proair inhalers  Home Oxygen: None  Acuity Level (RCAT flow sheet): 4  Aerosol Therapy initiated: Duoneb Q6 prn, Alb Q4 prn      Pulmonary Hygiene initiated: Cough and deep breathing techniques      Volume Expansion initiated: Incentive Spirometry      Current Oxygen Requirements: 1L NC  Current SpO2: 92%    Re-evaluation date: 6/29/18    Patient Education: Patient re rcat'd due to much improvement in BS. Will change to prn at this time.       See \"RT Assessments\" flow sheet for patient " assessment scoring and Acuity Level Details.[KT1.1]                Revision History        User Key Date/Time User Provider Type Action    > KT1.1 6/26/2018 12:05 PM Melodie Armendariz RT Respiratory Therapist Sign            Progress Notes by Ameena Hernandez RT at 6/26/2018 12:17 AM     Author:  Ameena Hernandez RT Service:  Respiratory Therapy Author Type:  Respiratory Therapist    Filed:  6/26/2018 12:18 AM Date of Service:  6/26/2018 12:17 AM Creation Time:  6/26/2018 12:17 AM    Status:  Signed :  Ameena Hernandez RT (Respiratory Therapist)         Date: 6/26/2018  Admission Dx: COPD exacerbation, hypoxia  Pulmonary hx: Asthma  Home nebulizer/MDI: Flovent and Proair  Home oxygen: None  Acuity level (RCAT flow sheet): 3  Aerosol therapy initiated: Duoneb QID  Pulmonary hygiene initiated: None  Volume expansion initiated: IS TID  Current oxygen requirements: RA  Current spo2: 93%  Re-evaluation date: 6/29/2018  Patient education    Ameena Hernandez, FERNANDO[MV1.1]       Revision History        User Key Date/Time User Provider Type Action    > MV1.1 6/26/2018 12:18 AM Ameena Hernandez RT Respiratory Therapist Sign                                                      INTERAGENCY TRANSFER FORM - LAB / IMAGING / EKG / EMG RESULTS   6/25/2018                       [facility] OBSERVATION DEPARTMENT: [phone]            Unresulted Labs     None         Lab Results - 3 Days      Urine Culture Aerobic Bacterial [[id number]]  Resulted: 06/27/18 2343, Result status: Final result    Ordering provider: Katheryn Coleman PA-C  06/26/18 1133 Resulting lab: [facility]    Specimen Information    Type Source Collected On   Midstream Urine Urine clean catch 06/25/18 2603          Components       Value Reference Range Flag Lab   Specimen Description Midstream Urine      Culture Micro --   225   Result:         50,000 to 100,000 colonies/mL  mixed urogenital anand  Susceptibility testing not routinely  done              Comprehensive metabolic panel [[id number]] (Abnormal)  Resulted: 06/1935, Result status: Final result    Ordering provider: Nette Aldrich MD  06/25/18 1829 Resulting lab: [facility]    Specimen Information    Type Source Collected On   Blood  06/25/18 1852          Components       Value Reference Range Flag Lab   Sodium 139 133 - 144 mmol/L  FrRdHs   Potassium 3.9 3.4 - 5.3 mmol/L  FrRdHs   Chloride 103 94 - 109 mmol/L  FrRdHs   Carbon Dioxide 30 20 - 32 mmol/L  FrRdHs   Anion Gap 6 3 - 14 mmol/L  FrRdHs   Glucose 116 70 - 99 mg/dL H FrRdHs   Urea Nitrogen 14 7 - 30 mg/dL  FrRdHs   Creatinine 0.60 0.52 - 1.04 mg/dL  FrRdHs   GFR Estimate >90 >60 mL/min/1.7m2  FrRdHs   Comment:  Non  GFR Calc   GFR Estimate If Black >90 >60 mL/min/1.7m2  FrRdHs   Comment:  African American GFR Calc   Calcium 9.4 8.5 - 10.1 mg/dL  FrRdHs   Bilirubin Total 0.2 0.2 - 1.3 mg/dL  FrRdHs   Albumin 3.5 3.4 - 5.0 g/dL  FrRdHs   Protein Total 7.4 6.8 - 8.8 g/dL  FrRdHs   Alkaline Phosphatase 80 40 - 150 U/L  FrRdHs   ALT 18 0 - 50 U/L  FrRdHs   AST 17 0 - 45 U/L  FrRdHs            Magnesium [[id number]]  Resulted: 06/1935, Result status: Final result    Ordering provider: Nette Aldrich MD  06/25/18 1829 Resulting lab: [facility]    Specimen Information    Type Source Collected On   Blood  06/25/18 1852          Components       Value Reference Range Flag Lab   Magnesium 1.8 1.6 - 2.3 mg/dL  FrRdHs            Troponin I [[id number]]  Resulted: 06/1935, Result status: Final result    Ordering provider: Nette Aldrich MD  06/25/18 1829 Resulting lab: [facility]    Specimen Information    Type Source Collected On   Blood  06/25/18 1852          Components       Value Reference Range Flag Lab   Troponin I ES <0.015 0.000 - 0.045 ug/L  FrRdHs   Comment:         The 99th percentile for upper reference range is 0.045 ug/L.  Troponin values   in the range of  0.045 - 0.120 ug/L may be associated with risks of adverse   clinical events.              Nt probnp inpatient (BNP) [[id number]]  Resulted: 06/1935, Result status: Final result    Ordering provider: Nette Aldrich MD  06/25/18 1829 Resulting lab: [facility]    Specimen Information    Type Source Collected On   Blood  06/25/18 1852          Components       Value Reference Range Flag Lab   N-Terminal Pro BNP Inpatient 185 0 - 1800 pg/mL  FrRdHs   Comment:            Reference range shown and results flagged as abnormal are suggested inpatient   cut points for confirming diagnosis if CHF in an acute setting. Establishing a   baseline value for each individual patient is useful for follow-up. An   inpatient or emergency department NT-proPBNP <300 pg/mL effectively rules out   acute CHF, with 99% negative predictive value.  The outpatient non-acute reference range for ruling out CHF is:   0-125 pg/mL (age 18 to less than 75)   0-450 pg/mL (age 75 yrs and older)              INR [[id number]]  Resulted: 06/25/18 1925, Result status: Final result    Ordering provider: Nette Aldrich MD  06/25/18 1829 Resulting lab: [facility]    Specimen Information    Type Source Collected On   Blood  06/25/18 1852          Components       Value Reference Range Flag Lab   INR 0.91 0.86 - 1.14  FrRdHs            UA with Microscopic [[id number]] (Abnormal)  Resulted: 06/25/18 1924, Result status: Final result    Ordering provider: Nette Aldrich MD  06/25/18 1829 Resulting lab: [facility]    Specimen Information    Type Source Collected On   Midstream Urine  06/25/18 1852          Components       Value Reference Range Flag Lab   Color Urine Yellow   FrRdHs   Appearance Urine Cloudy   FrRdHs   Glucose Urine Negative NEG^Negative mg/dL  FrRdHs   Bilirubin Urine Negative NEG^Negative  FrRdHs   Ketones Urine Negative NEG^Negative mg/dL  FrRdHs   Specific Gravity Urine 1.013 1.003 - 1.035  FrRdHs    Blood Urine Negative NEG^Negative  FrRdHs   pH Urine 7.0 5.0 - 7.0 pH  FrRdHs   Protein Albumin Urine 30 NEG^Negative mg/dL A FrRdHs   Urobilinogen mg/dL 0.0 0.0 - 2.0 mg/dL  FrRdHs   Nitrite Urine Negative NEG^Negative  FrRdHs   Leukocyte Esterase Urine Moderate NEG^Negative A FrRdHs   Source Midstream Urine   FrRdHs   WBC Urine 25 0 - 5 /HPF H FrRdHs   RBC Urine 2 0 - 2 /HPF  FrRdHs   Bacteria Urine Many NEG^Negative /HPF A FrRdHs   Squamous Epithelial /HPF Urine 81 0 - 1 /HPF H FrRdHs            Lactic acid whole blood [[id number]]  Resulted: 06/25/18 1919, Result status: Final result    Ordering provider: Nette Aldrich MD  06/25/18 1829 Resulting lab: [facility]    Specimen Information    Type Source Collected On   Blood  06/25/18 1852          Components       Value Reference Range Flag Lab   Lactic Acid 1.2 0.7 - 2.0 mmol/L  FrRdHs            CBC with platelets differential [[id number]] (Abnormal)  Resulted: 06/25/18 1915, Result status: Final result    Ordering provider: Nette Aldrich MD  06/25/18 1829 Resulting lab: [facility]    Specimen Information    Type Source Collected On   Blood  06/25/18 1852          Components       Value Reference Range Flag Lab   WBC 9.5 4.0 - 11.0 10e9/L  FrRdHs   RBC Count 4.05 3.8 - 5.2 10e12/L  FrRdHs   Hemoglobin 12.3 11.7 - 15.7 g/dL  FrRdHs   Hematocrit 39.6 35.0 - 47.0 %  FrRdHs   MCV 98 78 - 100 fl  FrRdHs   MCH 30.4 26.5 - 33.0 pg  FrRdHs   MCHC 31.1 31.5 - 36.5 g/dL L FrRdHs   RDW 12.8 10.0 - 15.0 %  FrRdHs   Platelet Count 255 150 - 450 10e9/L  FrRdHs   Diff Method Automated Method   FrRdHs   % Neutrophils 62.6 %  FrRdHs   % Lymphocytes 16.5 %  FrRdHs   % Monocytes 11.5 %  FrRdHs   % Eosinophils 8.4 %  FrRdHs   % Basophils 0.8 %  FrRdHs   % Immature Granulocytes 0.2 %  FrRdHs   Nucleated RBCs 0 0 /100  FrRdHs   Absolute Neutrophil 5.9 1.6 - 8.3 10e9/L  FrRdHs   Absolute Lymphocytes 1.6 0.8 - 5.3 10e9/L  FrRdHs   Absolute Monocytes 1.1 0.0  - 1.3 10e9/L  FrRdHs   Absolute Eosinophils 0.8 0.0 - 0.7 10e9/L H FrRdHs   Absolute Basophils 0.1 0.0 - 0.2 10e9/L  FrRdHs   Abs Immature Granulocytes 0.0 0 - 0.4 10e9/L  FrRdHs   Absolute Nucleated RBC 0.0   FrRdHs            Testing Performed By     Lab - Abbreviation Name Director Address Valid Date Range    12 - FrRdHs [facility] Unknown [address]  [facility] [address] 05/08/15 1057 - Present    225 - Unknown [facility] Unknown [address] [facility] [id number] 12/19/14 0954 - Present               Imaging Results - 3 Days      XR Chest 2 Views [[id number]]  Resulted: 06/25/18 1937, Result status: Final result    Ordering provider: Nette Aldrich MD  06/25/18 1829 Resulted by: Nick Villalpando MD    Performed: 06/25/18 1908 - 06/25/18 1915 Resulting lab: RADIOLOGY RESULTS    Narrative:       XR CHEST 2 VW   6/25/2018 7:15 PM     HISTORY: cough;     COMPARISON: None.    FINDINGS: The heart is negative.  Linear platelike atelectasis is seen  at the right lung base.. The pulmonary vasculature is normal.  The  bones and soft tissues are unremarkable.      Impression:       IMPRESSION: No focal alveolar-type infiltrates are seen.        RANDY VILLALPANDO MD      Testing Performed By     Lab - Abbreviation Name Director Address Valid Date Range    104 - Rad Rslts RADIOLOGY RESULTS Unknown Unknown 02/16/05 1553 - Present            Encounter-Level Documents:     There are no encounter-level documents.      Order-Level Documents:     There are no order-level documents.

## 2018-06-25 NOTE — LETTER
Transition Communication Hand-off for Care Transitions to Next Level of Care Provider    Hand-off for Care Transitions to Next Level of Care Provider  Name: Kelsey Wood  : 1929  MRN #: [id number]  Reason for Hospitalization:  Mild persistent asthma with exacerbation [J45.31]  Hypoxia [R09.02]  Generalized weakness [R53.1]  Admit Date/Time: 2018  6:03 PM  Discharge Date: 2018    Reason for Communication Hand-off Referral: Admission diagnoses: COPD    Discharge Plan:  Discharged to: TCU: [facility]                   Patient agreeable to post-hospital support suggestions:  Yes    Patient is on new medications:   Yes    MTM follow up recommended: No    Tel-Assurance program:  Ineligible    Patient will receive  TCU  at:  Discharge to [facility]    Follow-up specialty is recommended: Yes. Follow up with Physical Therapy while at TCU.     Follow-up plan:  No future appointments.    Any outstanding tests or procedures:  No. Recommend give two-step Mantoux (PPD) Per Facility Policy       Referrals     Future Labs/Procedures    Physical Therapy Adult Consult     Comments:    Evaluate and treat as clinically indicated.    Reason:  Generalized weakness          Key Recommendations:   Pt is admitted with COPD exacerbation.  We discussed COPD action care plan.  Pt does complain of right leg/hip spasm, which is her main concern.  She said her lidoderm patch works well.  Recommendations are Physical Therapy while at TCU and f/u with PCP in 1 week.     Lashonda Troncoso    Communicated handoff via [employer] to Dr. Arriaza'  CC at [phone].      Sent by Koki Garay RN, BSN, CTS  [facility]  [phone]    AVS/Discharge Summary is the source of truth; this is a helpful guide for improved communication of patient story

## 2018-06-25 NOTE — IP AVS SNAPSHOT
MRN:[id number]                      After Visit Summary   6/25/2018    Kelsey Wood    MRN: [id number]           Thank you!     Thank you for choosing [facility] for your care. Our goal is always to provide you with excellent care. Hearing back from our patients is one way we can continue to improve our services. Please take a few minutes to complete the written survey that you may receive in the mail after you visit. If you would like to speak to someone directly about your visit please contact Patient Relations at [phone]. Thank you!          Patient Information     Date Of Birth          4/18/1929        Designated Caregiver       Most Recent Value    Caregiver    Will someone help with your care after discharge? no      About your hospital stay     You were admitted on:  June 25, 2018 You last received care in the:  [facility] Observation Department    You were discharged on:  June 28, 2018        Reason for your hospital stay       You were hospitalized with an Asthma Exacerbation with generalized weakness likely due to underlying chronic pain and deconditioning.                  Who to Call     For medical emergencies, please call 911.  For non-urgent questions about your medical care, please call your primary care provider or clinic, [phone]          Attending Provider     Provider Specialty    Nette Aldrich MD Emergency Medicine    Nilesh Madison DO Internal Medicine       Primary Care Provider Office Phone # Fax #    Demetrio KYREE Arriaza [phone]      After Care Instructions     Activity - Ambulate in hallway       Every shift            Activity - Up with assistive device       walker            Activity - Up with nursing assistance           Additional Discharge Instructions       Requires min A x1 with all transfers            Advance Diet as Tolerated       Follow this diet upon discharge: Orders Placed This Encounter      Regular  "Diet Adult            Fall precautions           General info for SNF       Length of Stay Estimate: Short Term Care: Estimated # of Days <30  Condition at Discharge: Stable  Level of care:skilled   Rehabilitation Potential: Good  Admission H&P remains valid and up-to-date: Yes  Recent Chemotherapy: N/A  Use Nursing Home Standing Orders: Yes            Mantoux instructions       Give two-step Mantoux (PPD) Per Facility Policy Yes                  Follow-up Appointments     Follow Up and recommended labs and tests       Follow up with PCP within one week.                  Additional Services     Physical Therapy Adult Consult       Evaluate and treat as clinically indicated.    Reason:  Generalized weakness                             Pending Results     No orders found from 6/23/2018 to 6/26/2018.            Statement of Approval     Ordered          06/28/18 0829  I have reviewed and agree with all the recommendations and orders detailed in this document.  EFFECTIVE NOW     Approved and electronically signed by:  Kahteryn Coleman PA-C             Admission Information     Date & Time Provider Department Dept. Phone    6/25/2018 Nilesh Madison DO [facility] Observation Department [phone]      Your Vitals Were     Blood Pressure Pulse Temperature Respirations Height Weight    150/61 (BP Location: Right arm) 77 98.4  F (36.9  C) (Oral) 18 1.575 m (5' 2\") 55 kg (121 lb 4.8 oz)    Pulse Oximetry BMI (Body Mass Index)                93% 22.19 kg/m2          [email or web address] Information     Inside lets you send messages to your doctor, view your test results, renew your prescriptions, schedule appointments and more. To sign up, go to [email or web address] . Click on \"Log in\" on the left side of the screen, which will take you to the Welcome page. Then click on \"Sign up Now\" on the right side of the page.     You will be asked to enter the access code listed below, as well as some personal information. " Please follow the directions to create your username and password.     Your access code is: [email or web address]  Expires: 2018 10:12 PM     Your access code will  in 90 days. If you need help or a new code, please call your [place] clinic or [phone].        Care EveryWhere ID     This is your Care EveryWhere ID. This could be used by other organizations to access your [place] medical records  [id number]        Equal Access to Services     JONATHAN KELLEY : Hadii prema ferrara hadasho Soomaali, waaxda luqadaha, qaybta kaalmada adeegyada, waxdana finnegan . So [facility] [phone].    ATENCIÓN: Si habla español, tiene a barry disposición servicios gratuitos de asistencia lingüística. Llame al [phone].    We comply with applicable federal civil rights laws and [place] laws. We do not discriminate on the basis of race, color, national origin, age, disability, sex, sexual orientation, or gender identity.               Review of your medicines      START taking        Dose / Directions    acetaminophen 325 MG tablet   Commonly known as:  TYLENOL   Used for:  Other chronic pain        Dose:  650 mg   Take 2 tablets (650 mg) by mouth every 4 hours as needed for mild pain   Quantity:  100 tablet   Refills:  0       ipratropium - albuterol 0.5 mg/2.5 mg/3 mL 0.5-2.5 (3) MG/3ML neb solution   Commonly known as:  DUONEB   Used for:  Mild persistent asthma with exacerbation        Dose:  3 mL   Take 1 vial (3 mLs) by nebulization every 4 hours as needed for wheezing   Quantity:  360 mL   Refills:  0       predniSONE 20 MG tablet   Commonly known as:  DELTASONE   Used for:  Mild persistent asthma with exacerbation        Dose:  40 mg   Start taking on:  2018   Take 2 tablets (40 mg) by mouth daily for 2 days   Quantity:  4 tablet   Refills:  0       sennosides 8.6 MG tablet   Commonly known as:  SENOKOT   Used for:  Constipation, unspecified constipation type        Dose:  1-2 tablet   Take 1-2  tablets by mouth 2 times daily as needed for constipation   Quantity:  30 each   Refills:  0         CONTINUE these medicines which have NOT CHANGED        Dose / Directions    albuterol 108 (90 Base) MCG/ACT Inhaler   Commonly known as:  PROAIR HFA/PROVENTIL HFA/VENTOLIN HFA        Dose:  2 puff   Inhale 2 puffs into the lungs every 4 hours as needed for shortness of breath / dyspnea or wheezing   Refills:  0       bismuth subsalicylate 262 MG chewable tablet   Commonly known as:  PEPTO BISMOL        Dose:  2 tablet   Take 2 tablets by mouth every hour as needed for heartburn Max of 16 tabs per 24 hrs   Refills:  0       calcium carbonate 500 MG tablet   Commonly known as:  OS-MARY GRACE 500 mg [place]. [place]        Dose:  2 tablet   Take 2 tablets by mouth daily   Refills:  0       estradiol 0.1 MG/GM cream   Commonly known as:  ESTRACE        Dose:  2 g   Place 2 g vaginally twice a week   Refills:  0       fentaNYL 12 mcg/hr 72 hr patch   Commonly known as:  DURAGESIC   Used for:  Other chronic pain        Dose:  1 patch   Start taking on:  6/29/2018   Place 1 patch onto the skin every 72 hours remove old patch.   Quantity:  3 patch   Refills:  0       GABAPENTIN PO        Dose:  300 mg   Take 300 mg by mouth 3 times daily   Refills:  0       HYDROcodone-acetaminophen 5-325 MG per tablet   Commonly known as:  NORCO   Used for:  Other chronic pain        Dose:  1 tablet   Take 1 tablet by mouth every 6 hours as needed for severe pain   Quantity:  20 tablet   Refills:  0       hydrocortisone 1 % Crea cream        Apply topically daily as needed for itching   Refills:  0       IBUPROFEN PO        Dose:  400 mg   Take 400 mg by mouth every 6 hours as needed for moderate pain   Refills:  0       latanoprost 0.005 % ophthalmic solution   Commonly known as:  XALATAN        Dose:  1 drop   Place 1 drop into both eyes At Bedtime   Refills:  0       OSTEO BI-FLEX ADV DOUBLE ST PO        Dose:  1 tablet   Take 1 tablet by mouth 2 times  daily   Refills:  0       SALONPAS PAIN RELIEF PATCH EX        Dose:  1 patch   Externally apply 1 patch topically daily as needed   Refills:  0            Where to get your medicines      Some of these will need a paper prescription and others can be bought over the counter. Ask your nurse if you have questions.     Bring a paper prescription for each of these medications     fentaNYL 12 mcg/hr 72 hr patch    HYDROcodone-acetaminophen 5-325 MG per tablet       You don't need a prescription for these medications     acetaminophen 325 MG tablet    ipratropium - albuterol 0.5 mg/2.5 mg/3 mL 0.5-2.5 (3) MG/3ML neb solution    predniSONE 20 MG tablet    sennosides 8.6 MG tablet                Protect others around you: Learn how to safely use, store and throw away your medicines at www.disposemymeds.org.        Information about OPIOIDS     PRESCRIPTION OPIOIDS: WHAT YOU NEED TO KNOW   We gave you an opioid (narcotic) pain medicine. It is important to manage your pain, but opioids are not always the best choice. You should first try all the other options your care team gave you. Take this medicine for as short a time (and as few doses) as possible.     These medicines have risks:    DO NOT drive when on new or higher doses of pain medicine. These medicines can affect your alertness and reaction times, and you could be arrested for driving under the influence (DUI). If you need to use opioids long-term, talk to your care team about driving.    DO NOT operate heave machinery    DO NOT do any other dangerous activities while taking these medicines.     DO NOT drink any alcohol while taking these medicines.      If the opioid prescribed includes acetaminophen, DO NOT take with any other medicines that contain acetaminophen. Read all labels carefully. Look for the word  acetaminophen  or  Tylenol.  Ask your pharmacist if you have questions or are unsure.    You can get addicted to pain medicines, especially if you have a  history of addiction (chemical, alcohol or substance dependence). Talk to your care team about ways to reduce this risk.    Store your pills in a secure place, locked if possible. We will not replace any lost or stolen medicine. If you don t finish your medicine, please throw away (dispose) as directed by your pharmacist. The [place] Pollution Control Agency has more information about safe disposal: https://www.pca.[facility].mn.us/living-green/managing-unwanted-medications.     All opioids tend to cause constipation. Drink plenty of water and eat foods that have a lot of fiber, such as fruits, vegetables, prune juice, apple juice and high-fiber cereal. Take a laxative (Miralax, milk of magnesia, Colace, Senna) if you don t move your bowels at least every other day.              Medication List: This is a list of all your medications and when to take them. Check marks below indicate your daily home schedule. Keep this list as a reference.      Medications           Morning Afternoon Evening Bedtime As Needed    acetaminophen 325 MG tablet   Commonly known as:  TYLENOL   Take 2 tablets (650 mg) by mouth every 4 hours as needed for mild pain                                albuterol 108 (90 Base) MCG/ACT Inhaler   Commonly known as:  PROAIR HFA/PROVENTIL HFA/VENTOLIN HFA   Inhale 2 puffs into the lungs every 4 hours as needed for shortness of breath / dyspnea or wheezing                                bismuth subsalicylate 262 MG chewable tablet   Commonly known as:  PEPTO BISMOL   Take 2 tablets by mouth every hour as needed for heartburn Max of 16 tabs per 24 hrs                                calcium carbonate 500 MG tablet   Commonly known as:  OS-MARY GRACE 500 mg [place]. Ca   Take 2 tablets by mouth daily                                estradiol 0.1 MG/GM cream   Commonly known as:  ESTRACE   Place 2 g vaginally twice a week                                fentaNYL 12 mcg/hr 72 hr patch   Commonly known as:  DURAGESIC   Place  1 patch onto the skin every 72 hours remove old patch.   Start taking on:  6/29/2018   Last time this was given:  1 patch on 6/26/2018  4:28 PM                                GABAPENTIN PO   Take 300 mg by mouth 3 times daily   Last time this was given:  300 mg on 6/28/2018  8:26 AM                                HYDROcodone-acetaminophen 5-325 MG per tablet   Commonly known as:  NORCO   Take 1 tablet by mouth every 6 hours as needed for severe pain   Last time this was given:  1 tablet on 6/28/2018 11:42 AM                                hydrocortisone 1 % Crea cream   Apply topically daily as needed for itching                                IBUPROFEN PO   Take 400 mg by mouth every 6 hours as needed for moderate pain   Last time this was given:  400 mg on 6/25/2018 11:23 PM                                ipratropium - albuterol 0.5 mg/2.5 mg/3 mL 0.5-2.5 (3) MG/3ML neb solution   Commonly known as:  DUONEB   Take 1 vial (3 mLs) by nebulization every 4 hours as needed for wheezing   Last time this was given:  3 mLs on 6/28/2018  8:31 AM                                latanoprost 0.005 % ophthalmic solution   Commonly known as:  XALATAN   Place 1 drop into both eyes At Bedtime                                OSTEO BI-FLEX ADV DOUBLE ST PO   Take 1 tablet by mouth 2 times daily                                predniSONE 20 MG tablet   Commonly known as:  DELTASONE   Take 2 tablets (40 mg) by mouth daily for 2 days   Start taking on:  6/29/2018   Last time this was given:  40 mg on 6/28/2018  8:26 AM                                SALONPAS PAIN RELIEF PATCH EX   Externally apply 1 patch topically daily as needed                                sennosides 8.6 MG tablet   Commonly known as:  SENOKOT   Take 1-2 tablets by mouth 2 times daily as needed for constipation

## 2018-06-25 NOTE — ED TRIAGE NOTES
Patient presents with shortness of breath and cough. Patient was diagnosed with pneumonia 10 days ago and a bladder infection before that. Patient finished her course of antibiotics 6 days ago and patient states she is feeling worse again today. Her facility where she lives sent her here for further evaluation because was has continued wheezing and feeling worse. ABCDs intact, alert and oriented x 4.

## 2018-06-26 ENCOUNTER — APPOINTMENT (OUTPATIENT)
Dept: PHYSICAL THERAPY | Facility: CLINIC | Age: 83
End: 2018-06-26
Attending: HOSPITALIST
Payer: MEDICARE

## 2018-06-26 PROBLEM — J44.1 COPD EXACERBATION (H): Status: ACTIVE | Noted: 2018-06-26

## 2018-06-26 PROBLEM — J45.901 MILD ASTHMA WITH EXACERBATION: Status: ACTIVE | Noted: 2018-06-26

## 2018-06-26 LAB — INTERPRETATION ECG - MUSE: NORMAL

## 2018-06-26 PROCEDURE — 25000132 ZZH RX MED GY IP 250 OP 250 PS 637: Mod: GY | Performed by: INTERNAL MEDICINE

## 2018-06-26 PROCEDURE — 40000274 ZZH STATISTIC RCP CONSULT EA 30 MIN

## 2018-06-26 PROCEDURE — G0378 HOSPITAL OBSERVATION PER HR: HCPCS

## 2018-06-26 PROCEDURE — 25000125 ZZHC RX 250: Performed by: HOSPITALIST

## 2018-06-26 PROCEDURE — 97530 THERAPEUTIC ACTIVITIES: CPT | Mod: GP

## 2018-06-26 PROCEDURE — A9270 NON-COVERED ITEM OR SERVICE: HCPCS | Mod: GY | Performed by: PHYSICIAN ASSISTANT

## 2018-06-26 PROCEDURE — 94640 AIRWAY INHALATION TREATMENT: CPT

## 2018-06-26 PROCEDURE — 40000193 ZZH STATISTIC PT WARD VISIT

## 2018-06-26 PROCEDURE — 99225 ZZC SUBSEQUENT OBSERVATION CARE,LEVEL II: CPT | Performed by: PHYSICIAN ASSISTANT

## 2018-06-26 PROCEDURE — 97116 GAIT TRAINING THERAPY: CPT | Mod: GP,XU

## 2018-06-26 PROCEDURE — 97161 PT EVAL LOW COMPLEX 20 MIN: CPT | Mod: GP

## 2018-06-26 PROCEDURE — 25000132 ZZH RX MED GY IP 250 OP 250 PS 637: Mod: GY | Performed by: PHYSICIAN ASSISTANT

## 2018-06-26 PROCEDURE — 40000275 ZZH STATISTIC RCP TIME EA 10 MIN

## 2018-06-26 PROCEDURE — A9270 NON-COVERED ITEM OR SERVICE: HCPCS | Mod: GY | Performed by: INTERNAL MEDICINE

## 2018-06-26 RX ORDER — POLYETHYLENE GLYCOL 3350 17 G/17G
17 POWDER, FOR SOLUTION ORAL DAILY PRN
Status: DISCONTINUED | OUTPATIENT
Start: 2018-06-26 | End: 2018-06-28 | Stop reason: HOSPADM

## 2018-06-26 RX ORDER — ONDANSETRON 4 MG/1
4 TABLET, ORALLY DISINTEGRATING ORAL EVERY 6 HOURS PRN
Status: DISCONTINUED | OUTPATIENT
Start: 2018-06-26 | End: 2018-06-28 | Stop reason: HOSPADM

## 2018-06-26 RX ORDER — IBUPROFEN 400 MG/1
400 TABLET, FILM COATED ORAL EVERY 6 HOURS PRN
Status: DISCONTINUED | OUTPATIENT
Start: 2018-06-26 | End: 2018-06-28 | Stop reason: HOSPADM

## 2018-06-26 RX ORDER — IPRATROPIUM BROMIDE AND ALBUTEROL SULFATE 2.5; .5 MG/3ML; MG/3ML
3 SOLUTION RESPIRATORY (INHALATION)
Status: DISCONTINUED | OUTPATIENT
Start: 2018-06-26 | End: 2018-06-26

## 2018-06-26 RX ORDER — PROCHLORPERAZINE MALEATE 5 MG
5 TABLET ORAL EVERY 6 HOURS PRN
Status: DISCONTINUED | OUTPATIENT
Start: 2018-06-26 | End: 2018-06-28 | Stop reason: HOSPADM

## 2018-06-26 RX ORDER — ACETAMINOPHEN 325 MG/1
650 TABLET ORAL EVERY 4 HOURS PRN
Status: DISCONTINUED | OUTPATIENT
Start: 2018-06-26 | End: 2018-06-28 | Stop reason: HOSPADM

## 2018-06-26 RX ORDER — HYDROCODONE BITARTRATE AND ACETAMINOPHEN 5; 325 MG/1; MG/1
1 TABLET ORAL EVERY 6 HOURS PRN
Status: DISCONTINUED | OUTPATIENT
Start: 2018-06-26 | End: 2018-06-28 | Stop reason: HOSPADM

## 2018-06-26 RX ORDER — HYDROCODONE BITARTRATE AND ACETAMINOPHEN 5; 325 MG/1; MG/1
1-2 TABLET ORAL EVERY 6 HOURS PRN
Status: DISCONTINUED | OUTPATIENT
Start: 2018-06-26 | End: 2018-06-26

## 2018-06-26 RX ORDER — PREDNISONE 20 MG/1
40 TABLET ORAL DAILY
Status: DISCONTINUED | OUTPATIENT
Start: 2018-06-26 | End: 2018-06-28 | Stop reason: HOSPADM

## 2018-06-26 RX ORDER — GABAPENTIN 300 MG/1
300 CAPSULE ORAL 3 TIMES DAILY
Status: DISCONTINUED | OUTPATIENT
Start: 2018-06-26 | End: 2018-06-28 | Stop reason: HOSPADM

## 2018-06-26 RX ORDER — FENTANYL 12.5 UG/1
12 PATCH TRANSDERMAL
Status: DISCONTINUED | OUTPATIENT
Start: 2018-06-26 | End: 2018-06-28 | Stop reason: HOSPADM

## 2018-06-26 RX ORDER — IPRATROPIUM BROMIDE AND ALBUTEROL SULFATE 2.5; .5 MG/3ML; MG/3ML
3 SOLUTION RESPIRATORY (INHALATION) EVERY 6 HOURS PRN
Status: DISCONTINUED | OUTPATIENT
Start: 2018-06-26 | End: 2018-06-27

## 2018-06-26 RX ORDER — ACETAMINOPHEN 650 MG/1
650 SUPPOSITORY RECTAL EVERY 4 HOURS PRN
Status: DISCONTINUED | OUTPATIENT
Start: 2018-06-26 | End: 2018-06-26

## 2018-06-26 RX ORDER — ALBUTEROL SULFATE 0.83 MG/ML
2.5 SOLUTION RESPIRATORY (INHALATION) EVERY 4 HOURS PRN
Status: DISCONTINUED | OUTPATIENT
Start: 2018-06-26 | End: 2018-06-28 | Stop reason: HOSPADM

## 2018-06-26 RX ORDER — NALOXONE HYDROCHLORIDE 0.4 MG/ML
.1-.4 INJECTION, SOLUTION INTRAMUSCULAR; INTRAVENOUS; SUBCUTANEOUS
Status: DISCONTINUED | OUTPATIENT
Start: 2018-06-26 | End: 2018-06-28 | Stop reason: HOSPADM

## 2018-06-26 RX ORDER — ONDANSETRON 2 MG/ML
4 INJECTION INTRAMUSCULAR; INTRAVENOUS EVERY 6 HOURS PRN
Status: DISCONTINUED | OUTPATIENT
Start: 2018-06-26 | End: 2018-06-28 | Stop reason: HOSPADM

## 2018-06-26 RX ORDER — PROCHLORPERAZINE 25 MG
12.5 SUPPOSITORY, RECTAL RECTAL EVERY 12 HOURS PRN
Status: DISCONTINUED | OUTPATIENT
Start: 2018-06-26 | End: 2018-06-28 | Stop reason: HOSPADM

## 2018-06-26 RX ORDER — LIDOCAINE 4 G/G
1-2 PATCH TOPICAL EVERY 24 HOURS
Status: DISCONTINUED | OUTPATIENT
Start: 2018-06-26 | End: 2018-06-28 | Stop reason: HOSPADM

## 2018-06-26 RX ORDER — BISACODYL 10 MG
10 SUPPOSITORY, RECTAL RECTAL DAILY PRN
Status: DISCONTINUED | OUTPATIENT
Start: 2018-06-26 | End: 2018-06-28 | Stop reason: HOSPADM

## 2018-06-26 RX ADMIN — GABAPENTIN 300 MG: 300 CAPSULE ORAL at 20:39

## 2018-06-26 RX ADMIN — IPRATROPIUM BROMIDE AND ALBUTEROL SULFATE 3 ML: .5; 3 SOLUTION RESPIRATORY (INHALATION) at 07:45

## 2018-06-26 RX ADMIN — GABAPENTIN 300 MG: 300 CAPSULE ORAL at 16:28

## 2018-06-26 RX ADMIN — HYDROCODONE BITARTRATE AND ACETAMINOPHEN 1 TABLET: 5; 325 TABLET ORAL at 08:32

## 2018-06-26 RX ADMIN — FENTANYL 1 PATCH: 12.5 PATCH TRANSDERMAL at 16:28

## 2018-06-26 RX ADMIN — LIDOCAINE 2 PATCH: 560 PATCH PERCUTANEOUS; TOPICAL; TRANSDERMAL at 15:22

## 2018-06-26 RX ADMIN — PREDNISONE 40 MG: 20 TABLET ORAL at 08:26

## 2018-06-26 RX ADMIN — HYDROCODONE BITARTRATE AND ACETAMINOPHEN 1 TABLET: 5; 325 TABLET ORAL at 05:12

## 2018-06-26 RX ADMIN — HYDROCODONE BITARTRATE AND ACETAMINOPHEN 1 TABLET: 5; 325 TABLET ORAL at 00:51

## 2018-06-26 RX ADMIN — HYDROCODONE BITARTRATE AND ACETAMINOPHEN 1 TABLET: 5; 325 TABLET ORAL at 22:16

## 2018-06-26 RX ADMIN — HYDROCODONE BITARTRATE AND ACETAMINOPHEN 1 TABLET: 5; 325 TABLET ORAL at 16:32

## 2018-06-26 ASSESSMENT — PAIN DESCRIPTION - DESCRIPTORS: DESCRIPTORS: ACHING

## 2018-06-26 NOTE — PLAN OF CARE
Problem: Patient Care Overview  Goal: Plan of Care/Patient Progress Review  Outcome: No Change  PRIMARY DIAGNOSIS: GENERALIZED WEAKNESS    OUTPATIENT/OBSERVATION GOALS TO BE MET BEFORE DISCHARGE  1. Orthostatic performed: No    2. Tolerating PO medications: Yes    3. Return to near baseline physical activity: No    4. Cleared for discharge by consultants (if involved): No    Discharge Planner Nurse   Safe discharge environment identified: Yes  Barriers to discharge: Yes       Entered by: Mónica Us 06/26/2018 1:40 AM      Admitted to unit around 0100. Heavy assist of 2 with increased redirection to step forward and pivot. Using bedside commode. Home medications locked in lock box in room. PRN norco given x1. Will need SW and PT consult for possible increased cares at assisted living facility. Uses call light appropriately. VSS.    Please review provider order for any additional goals.   Nurse to notify provider when observation goals have been met and patient is ready for discharge.

## 2018-06-26 NOTE — PROGRESS NOTES
06/26/18 1434   Quick Adds   Type of Visit Initial PT Evaluation   Living Environment   Lives With facility resident   Living Arrangements assisted living   Home Accessibility no concerns   Number of Stairs to Enter Home 0   Number of Stairs Within Home 0   Transportation Available family or friend will provide   Living Environment Comment Patient gets meals delivered to her room, medication management, housecleaning 1x/week, laundry   Self-Care   Usual Activity Tolerance moderate   Current Activity Tolerance fair   Regular Exercise no   Equipment Currently Used at Home grab bar;walker, rolling   Functional Level Prior   Ambulation 1-->assistive equipment   Transferring 1-->assistive equipment   Toileting 1-->assistive equipment   Bathing (Patient reports sponge bathes)   Dressing 0-->independent   Eating 0-->independent   Communication 0-->understands/communicates without difficulty   Swallowing 0-->swallows foods/liquids without difficulty   Cognition 0 - no cognition issues reported   Prior Functional Level Comment Patient reports being unable to get into shower alone, patient reports she usually sponge bathes.  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   General Information   Onset of Illness/Injury or Date of Surgery - Date 06/25/18   Referring Physician Nilesh Madison, DO   Patient/Family Goals Statement return to home   Pertinent History of Current Problem (include personal factors and/or comorbidities that impact the POC) Patient with PMH including hx of glaucoma, asthma, arthritis, chronic back pain now admitted with acute asthma exacerbation (with hx of UTI and pneumonia within the last month).    Precautions/Limitations fall precautions   Cognitive Status Examination   Orientation orientation to person, place and time   Personal Safety and Judgment impulsive   Cognitive Comment limited insight into deficits   Pain Assessment   Patient Currently in Pain Yes, see Vital Sign flowsheet  (reports pain  "at right lateral thigh)   Integumentary/Edema   Integumentary/Edema no deficits were identifed   Posture    Posture Forward head position;Protracted shoulders   Posture Comments significant trunk flexion   Range of Motion (ROM)   ROM Comment WFL   Strength   Strength Comments significant strength deficits   Bed Mobility   Bed Mobility Comments independent   Transfer Skills   Transfer Comments Required min A to transfer to standing with 4WW   Gait   Gait Comments Patient amb 75 feet total with 4WW and CGA-Lizbet with one seated rest break   Balance   Balance Comments poor dynamic standing balance, high fall risk   General Therapy Interventions   Planned Therapy Interventions balance training;bed mobility training;gait training;strengthening;transfer training;home program guidelines;progressive activity/exercise   Clinical Impression   Criteria for Skilled Therapeutic Intervention yes, treatment indicated   PT Diagnosis decreased independence with mobility   Clinical Presentation Stable/Uncomplicated   Clinical Presentation Rationale complex pmh, stable presentation, limited social support   Clinical Decision Making (Complexity) Low complexity   Therapy Frequency` 5 times/week   Predicted Duration of Therapy Intervention (days/wks) 3 days   Anticipated Discharge Disposition Transitional Care Facility   Risk & Benefits of therapy have been explained Yes   Patient, Family & other staff in agreement with plan of care Yes   [facility] TM \"6 Clicks\"   2016, Trustees of [facility], under license to [employer].  All rights reserved.   6 Clicks Short Forms Basic Mobility Inpatient Short Form   [facility]  \"6 Clicks\" V.2 Basic Mobility Inpatient Short Form   1. Turning from your back to your side while in a flat bed without using bedrails? 4 - None   2. Moving from lying on your back to sitting on the side of a flat bed without using bedrails? 4 - None   3. Moving to and from a bed to a chair " (including a wheelchair)? 3 - A Little   4. Standing up from a chair using your arms (e.g., wheelchair, or bedside chair)? 3 - A Little   5. To walk in hospital room? 3 - A Little   6. Climbing 3-5 steps with a railing? 2 - A Lot   Basic Mobility Raw Score (Score out of 24.Lower scores equate to lower levels of function) 19   Total Evaluation Time   Total Evaluation Time (Minutes) 5

## 2018-06-26 NOTE — ED NOTES
"Pt ambulated with pulse ox with walker assistance. Began at 91% - throughout ambulation patient went down to as low as 81%. Md notified. No complaints of dizziness or shortness of breathe from patient. \"Legs felt weaker\".   "

## 2018-06-26 NOTE — PLAN OF CARE
Problem: Patient Care Overview  Goal: Plan of Care/Patient Progress Review  Outcome: No Change  Problem: Patient Care Overview  Goal: Plan of Care/Patient Progress Review  Outcome: No Change  PRIMARY DIAGNOSIS: GENERALIZED WEAKNESS    OUTPATIENT/OBSERVATION GOALS TO BE MET BEFORE DISCHARGE  1. Orthostatic performed: No    2. Tolerating PO medications: Yes    3. Return to near baseline physical activity: No    4. Cleared for discharge by consultants (if involved): No    Discharge Planner Nurse   Safe discharge environment identified: Yes  Barriers to discharge: Yes       Entered by: Mónica Us 06/26/2018 6:13 AM      Heavy assist of 2 with increased redirection to step forward and pivot. Using bedside commode. Home medications locked in lock box in room. PRN norco given last at 0500. Will need PT consult for assistance with changes to ambulation. Uses call light appropriately. VSS. Plan to d/c today back to ind living facility.     Please review provider order for any additional goals.   Nurse to notify provider when observation goals have been met and patient is ready for discharge.

## 2018-06-26 NOTE — PLAN OF CARE
Son called for updates. Plan of care reviewed with toby Dalton. He is able to provide transportation back to [facility] if pt discharges today and has WC in car.

## 2018-06-26 NOTE — PLAN OF CARE
Returned call. Jessica RN at [facility] updated with plan of care. Per [facility] RN, pt has meal delivered to room and medications set up but takes pills on her own.

## 2018-06-26 NOTE — PROGRESS NOTES
[facility]  Observation Unit  Progress Note    Date of Service: 6/26/2018    Patient: Kelsey Wood  MRN: [id number]  Admission Date: 6/25/2018  Hospital Day # 1    Assessment & Plan: Kelsey Wood is a 89 year old female with a history of Osteoarthritis, Osteoporosis, Back Pain, Asthma, Allergic Rhinitis who presented from [facility] 6/25 with shortness of breath and wheezing and found to have an Asthma Exacerbation.    10 days ago, the patient was diagnosed with left-sided pneumonia s/p Zithromax, which she finished on 6/19.  The patient reports she was feeling better initially, but began coughing and feeling short of breath again 2 days pta with audible wheezing.    CXR on admission negative for acute infiltrates.  Patient noted to by hypoxic with ambulation.  Patient received IV Solumedrol on admission.        Acute Asthma Exacerbation - symptoms improved and saturations 92-95% on room air.  Lungs cta today.  - continue po Prednisone  - duonebs q4 hours prn, albuterol q2 hours prn    Generalized Weakness - per report, patient ambulates at baseline with a walker and has meals delivered and medication set up.  Noted to be a assist of 2 on admission.  Likely has deconditioning from recent UTI 5/26 and Pneumonia earlier this month.  - PT consulted and recommend TCU vs. Increased cares at her facility  - SW consulted for discharge planning    Chronic Pain - 2/2 chronic left knee OA and patient reports chronic right hip pain.    - continue home Gabapentin, Ibuprofen prn, Norco prn, Fentanyl patch and lidocaine patch      # Pain Assessment:  Current Pain Score 6/26/2018   Patient currently in pain? yes   Pain score (0-10) 4   Pain location Hip   Pain descriptors Aching   - Kelsey is experiencing pain due to chronic OA. Pain management was discussed and the plan was created in a collaborative fashion.  Kelsey's response to the current recommendations: engaged  - Please see the plan for  pain management as documented above      CODE: full  DVT: SCD  Diet/fluids: regular  Disposition: likely d/c tomorrow    Katheryn Shethstephani REEDC  Hospitalist Physician Assistant  [facility]  Pager: [phone]      Subjective & Interval Hx:    Patient is forgetful.  Reports chronic left knee pain and chronic right hip pain at baseline.  Feels her breathing has improved. Denies chest pain or wheezing.      Last 24 hr care team notes reviewed.   ROS:  4 point ROS including Respiratory, CV, GI and , other than that noted in the HPI, is negative.    Physical Exam:    Blood pressure (P) 113/44, temperature (P) 96.4  F (35.8  C), temperature source (P) Oral, resp. rate (P) 20, SpO2 95 %. on room air  General: Alert, interactive, NAD, sitting up in bed  HEENT: AT/NC, sclera anicteric, PERRL, EOMI  Resp: clear to auscultation bilaterally, no crackles or wheezes  Cardiac: regular rate and rhythm, no murmur  Abdomen: Soft, nontender, nondistended. +BS.  No HSM or masses, no rebound or guarding.  Extremities: No LE edema  Skin: Warm and dry, no jaundice or rash  Neuro: Alert & oriented to place.  Knows it is 2018.  Forgetful at times.  Able to tell me about her recent symptoms.  Cns 2-12 intact, moves all extremities equally    Labs & Images:  Reviewed in Epic   Medications:    Current Facility-Administered Medications   Medication     acetaminophen (TYLENOL) Suppository 650 mg     acetaminophen (TYLENOL) tablet 650 mg     albuterol neb solution 2.5 mg     bisacodyl (DULCOLAX) Suppository 10 mg     HYDROcodone-acetaminophen (NORCO) 5-325 MG per tablet 1-2 tablet     ipratropium - albuterol 0.5 mg/2.5 mg/3 mL (DUONEB) neb solution 3 mL     Lidocaine (LIDOCARE) 4 % Patch 1-2 patch    And     [START ON 6/27/2018] lidocaine patch REMOVAL    And     lidocaine patch in PLACE     melatonin tablet 1 mg     naloxone (NARCAN) injection 0.1-0.4 mg     ondansetron (ZOFRAN-ODT) ODT tab 4 mg    Or     ondansetron (ZOFRAN)  injection 4 mg     polyethylene glycol (MIRALAX/GLYCOLAX) Packet 17 g     predniSONE (DELTASONE) tablet 40 mg     prochlorperazine (COMPAZINE) injection 5 mg    Or     prochlorperazine (COMPAZINE) tablet 5 mg    Or     prochlorperazine (COMPAZINE) Suppository 12.5 mg

## 2018-06-26 NOTE — PLAN OF CARE
Problem: Patient Care Overview  Goal: Plan of Care/Patient Progress Review  PT: Patient seen by physical therapy for evaluation and treatment.  Patient with PMH including hx of glaucoma, asthma, arthritis, chronic back pain now admitted with acute asthma exacerbation (with hx of UTI and pneumonia within the last month).  Patient reports that she lives in [facility], receives meals delivered, medication management, weekly housecleaning/laundry.  Patient reports that she uses a 4WW at baseline for short distances, a wheelchair for longer distances.  Patient reports that she is unable to take a shower by herself at this time, patient reports that she prefers not to ask for assistance at [facility], but takes a sponge bath instead.    Discharge Planner PT   Patient plan for discharge: return to [facility] with previous level of assist  Current status: Patient supine upon arrival.  Transferred to sitting at EOB independently.  Patient with high fear of falling, upon standing at EOB, patient with posterior lean against bed initially.  Patient reported fear of smooth anita in room and requested 2 hand assist at waist from therapist for safety and support.  Patient amb 75 feet total with 4WW with one seated rest break.  Patient with poor walker management, holding walker at least a foot away from body and unable to bring walker closer to self during session even with near constant cueing.  Patient with slow, shuffling gait pattern, increased trunk flexion.  Patient required a seated rest break secondary to fatigue; required direct cueing, min A and hand over hand assist to transfer to sitting safely on walker seat.  Required min A to transfer to standing, required hand over hand cueing to turn walker and remove breaks.  Upon return to room, patient requesting to use commode vs toilet as it is difficult for her to access toilet with walker.  Patient reports that she has a history of restricting water intact to avoid going to bathroom.   "Reports that going to bathroom has been \"difficult for a while now.\"  Patient on commode with nursing staff at end of session.  Barriers to return to prior living situation: Lives alone, receives limited services from [facility], high fall risk  Recommendations for discharge: TCU.  If patient declines TCU, will require min A x1 with all transfers and mobility at [facility], Home PT/OT  Rationale for recommendations: Patient currently requires assist x1 with all ambulation, direct cueing and walker management with all transfers, presents with high fear of falling and is a high fall risk.  Would benefit from ongoing physical therapy in order to increase strength, endurance and independence with mobility.       Entered by: Nena Yu 06/26/2018 3:20 PM           "

## 2018-06-26 NOTE — PLAN OF CARE
Problem: Patient Care Overview  Goal: Plan of Care/Patient Progress Review  Outcome: No Change  PRIMARY DIAGNOSIS: GENERALIZED WEAKNESS    OUTPATIENT/OBSERVATION GOALS TO BE MET BEFORE DISCHARGE  1. Orthostatic performed: N/A    2. Tolerating PO medications: Yes    3. Return to near baseline physical activity: No    4. Cleared for discharge by consultants (if involved): No    Discharge Planner Nurse   Safe discharge environment identified: No  Barriers to discharge: Yes       Entered by: Veronica Martin 06/26/2018 4:00PM     Please review provider order for any additional goals.   Nurse to notify provider when observation goals have been met and patient is ready for discharge.    Forgetful, continues to report pain in right hip, PTA meds restarted - fentanyl patch in place, norco x1, lidocaine PIP x2, PT recommending TCU or 24 hr supervision, will continue to monitor and provide supportive cares.

## 2018-06-26 NOTE — PLAN OF CARE
Problem: Patient Care Overview  Goal: Plan of Care/Patient Progress Review  Outcome: Improving  PRIMARY DIAGNOSIS: GENERALIZED WEAKNESS    OUTPATIENT/OBSERVATION GOALS TO BE MET BEFORE DISCHARGE  1. Orthostatic performed: N/A    2. Tolerating PO medications: Yes    3. Return to near baseline physical activity: Yes    4. Cleared for discharge by consultants (if involved): N/A    Discharge Planner Nurse   Safe discharge environment identified: Yes  Barriers to discharge: No       Entered by: Veronica Martin 06/26/2018 8:00AM     Please review provider order for any additional goals.   Nurse to notify provider when observation goals have been met and patient is ready for discharge.    Temp: 95.9  F (35.5  C) Temp src: Oral BP: 139/57   Heart Rate: 65 Resp: 20 SpO2: 94 % O2 Device: Nasal cannula Oxygen Delivery: 1 LPM     A&Ox4, tolerating regular diet, norco x1 for right hip pain, LS clear, room air, sats in high 90s, BS A&Ax4, passing gas, pt to ambulate [place], DC later, will continue to monitor and provide supportive cares.          Pt returned to ambulatory from IR via cart. VSS, dressing CDI

## 2018-06-26 NOTE — CONSULTS
Care Transition Initial Assessment - RN    Reason For Consult: care coordination/care conference, discharge planning   Met with: Patient.  DATA   Active Problems:    COPD exacerbation (H)       Cognitive Status: awake and alert.  Primary Care Clinic Name: [facility]  Primary Care MD Name: Dr Demetrio Arriaza  Contact information and PCP information verified: Yes, she recently started seeing [facility].  She usually doesn't see Dr Godfrey anymore  Lives With: facility resident            Who is your support system?: Facility resident(s)/Staff       Insurance concerns: No Insurance issues identified  ASSESSMENT  Patient currently receives the following services:  Pt lives at [facility].  Jessica [phone] should be notified of any dc planning.  Dc orders need to be faxed to [phone].  They need dc orders before 4:30pm and it's ok if pt returns after that as long as a nurse is able to go through orders.  At baseline pt ambulates independently with her walker.  She mostly stays in her room.  She gets assistance with medication set up and then she takes the medication.  She also gets meals delivered to her room.          Identified issues/concerns regarding health management: Pt is admitted with a  COPD exacerbation.  We did discuss the COPD action care plan.  Pt is aware she should f/u with her MD within 7 days after dc.  Hand off will be given to Abel Marshall Physician RN at [facility].  Pt was concerned about getting her medication for her chronic right leg/hip spasm.  Bedside RN aware and getting lidoderm patch.    PLAN  Patient given options and choices for discharge yes .  Patient/family is agreeable to the plan?  Yes:   Patient anticipates discharging to [facility] .        Patient anticipates needs for home equipment: No  Plan/Disposition: Home   Appointments: She will need to f/u with Dr Arriaza within 7 days.        Care  (CTS) will continue to follow as  needed.    Teena SUAZO CTS [phone]

## 2018-06-26 NOTE — PROGRESS NOTES
ROOM # 226    Living Situation (if not independent, order SW consult): Assisted Living  [facility] name: [facility]  :     Activity level at baseline: Independent/walker  Activity level on admit: assist 2/walker      Patient registered to observation; given Patient Bill of Rights; given the opportunity to ask questions about observation status and their plan of care.  Patient has been oriented to the observation room, bathroom and call light is in place.    Discussed discharge goals and expectations with patient/family.

## 2018-06-26 NOTE — PLAN OF CARE
Problem: Patient Care Overview  Goal: Plan of Care/Patient Progress Review  Outcome: No Change  PRIMARY DIAGNOSIS: GENERALIZED WEAKNESS     OUTPATIENT/OBSERVATION GOALS TO BE MET BEFORE DISCHARGE  1. Orthostatic performed: N/A     2. Tolerating PO medications: Yes     3. Return to near baseline physical activity: Yes     4. Cleared for discharge by consultants (if involved): N/A     Discharge Planner Nurse   Safe discharge environment identified: Yes  Barriers to discharge: No       Entered by: Veronica Martin 06/26/2018 12:00PM  Please review provider order for any additional goals.   Nurse to notify provider when observation goals have been met and patient is ready for discharge.     Temp: 96.5  F (35.8  C) Temp src: Oral BP: 129/48   Heart Rate: 76 Resp: 20 SpO2: 92 % O2 Device: None (Room air)     A&Ox4, tolerating regular diet, norco x1 for right hip pain, will order lidocaine patches, LS clear, room air, sats in 90s, BS A&Ax4, passing gas, pt to ambulate halls, possible DC later, PT to see at 2:30PM, [facility] is able to take pt today and need orders to be faxed by 4:30PM if pt discharging, will continue to monitor and provide supportive cares.

## 2018-06-26 NOTE — H&P
[facility]  Hospitalist H&P    Name: Kelsey Wood      MRN: [id number]  YOB: 1929    Age: 89 year old  Date of admission: 6/25/2018  Primary care provider: Jen Godfrey            Assessment and Plan:   Kelsey Wood is a 89 year old female with a history of glaucoma, asthma, arthritis, chronic back pain who presents with shortness of breath and wheezing consistent with reactive airway disease.    1.  Acute exacerbation of asthma: Quite mild at this time.  Lungs are essentially clear.  May be some faint wheezing present.  Oxygen saturations 92% on room air while at rest.  She does not appear in any respiratory distress.  No fever nor focal infiltrate on chest x-ray to suggest pneumonia.  Will start 40 Milgram's of prednisone daily and complete a 5 day burst.  Continue duo nebulizer treatments while in hospitalized.  I suspect she will be able to discharge home tomorrow.    Code status: Full.  Admit to observation status.  Prophylaxis: None.  Disposition: Home tomorrow.            Chief Complaint:   Shortness of breath.         History of Present Illness:   Kelsey Wood is a 89 year old female who presents with shortness of breath and cough.  Patient indicates she has had some ongoing respiratory symptoms for about 10-14 days.  She has been seen in clinic a few times and been diagnosed with pneumonia.  She completed a course of azithromycin on Tuesday, 6 days ago.  She initially felt somewhat better but then breathing became worse about 2 days ago.  She also was recently on ciprofloxacin for urinary tract infection.  Over the past 2 days at her assisted-living facility she has become more weak and short of breath.  She has noticed some wheezing.  Due to these issues she came to the emergency department for evaluation.    I discussed the case with Dr. Ibarra.  Patient was noted to have some wheezing on examination.  She was given some systemic steroids and duo nebulizers.  Ambulation was  attempted but she desaturated and it was aborted.  Admission was requested for asthma exacerbation.            Past Medical History:     Past Medical History:   Diagnosis Date     Hard of hearing              Past Surgical History:     Past Surgical History:   Procedure Laterality Date     CHOLECYSTECTOMY       LAPAROSCOPIC APPENDECTOMY               Social History:     Social History   Substance Use Topics     Smoking status: Not on file     Smokeless tobacco: Not on file     Alcohol use Not on file             Family History:   The family history was fully reviewed and non-contributory in this case.         Allergies:   No Known Allergies          Medications:     Prior to Admission medications    Medication Sig Last Dose Taking? Auth Provider   albuterol (PROAIR HFA/PROVENTIL HFA/VENTOLIN HFA) 108 (90 Base) MCG/ACT Inhaler Inhale 2 puffs into the lungs every 4 hours as needed for shortness of breath / dyspnea or wheezing  Yes Unknown, Entered By History   bismuth subsalicylate (PEPTO BISMOL) 262 MG chewable tablet Take 2 tablets by mouth every hour as needed for heartburn Max of 16 tabs per 24 hrs  Yes Unknown, Entered By History   calcium carbonate (OS-MARY GRACE 500 MG [place]. CA) 500 MG tablet Take 2 tablets by mouth daily 6/25/2018 at Unknown time Yes Unknown, Entered By History   estradiol (ESTRACE) 0.1 MG/GM cream Place 2 g vaginally twice a week Past Week at Unknown time Yes Unknown, Entered By History   fentaNYL (DURAGESIC) 12 mcg/hr 72 hr patch Place 1 patch onto the skin every 72 hours remove old patch. removed 6-24-18  Unknown, Entered By History   GABAPENTIN PO Take 300 mg by mouth 3 times daily 6/25/2018 at 1200 Yes Unknown, Entered By History   HYDROcodone-acetaminophen (NORCO) 5-325 MG per tablet Take 1 tablet by mouth every 6 hours as needed for severe pain  Yes Unknown, Entered By History   hydrocortisone 1 % CREA cream Apply topically daily as needed for itching  Yes Unknown, Entered By History    IBUPROFEN PO Take 400 mg by mouth every 6 hours as needed for moderate pain  Yes Unknown, Entered By History   latanoprost (XALATAN) 0.005 % ophthalmic solution Place 1 drop into both eyes At Bedtime 6/24/2018 at Unknown time Yes Unknown, Entered By History   Liniments (SALONPAS PAIN RELIEF PATCH EX) Externally apply 1 patch topically daily as needed 6/25/2018 at Unknown time Yes Unknown, Entered By History   Misc Natural Products (OSTEO BI-FLEX ADV DOUBLE ST PO) Take 1 tablet by mouth 2 times daily 6/25/2018 at am Yes Unknown, Entered By History             Review of Systems:   A Comprehensive greater than 10 system review of systems was carried out.  Pertinent positives and negatives are noted above.  Otherwise negative for contributory information.           Physical Exam:   Blood pressure 155/67, temperature 98.6  F (37  C), temperature source Oral, resp. rate 24, SpO2 (!) 89 %.  Wt Readings from Last 1 Encounters:   No data found for Wt     Exam:  GENERAL: No apparent distress. Awake, alert, and fully oriented.  HEENT: Normocephalic, atraumatic. Extraocular movements intact.  CARDIOVASCULAR: Regular rate and rhythm without murmurs or rubs. No S3.  PULMONARY: Wheezing bilaterally.  ABDOMINAL: Soft, non-tender, non-distended. Bowel sounds normoactive.   EXTREMITIES: No cyanosis or clubbing. No appreciable edema.  NEUROLOGICAL: CN 2-12 grossly intact, no focal neurological deficits.  DERMATOLOGICAL: No rash, ulcer, bruising, nor jaundice.          Data:   Laboratory:    Recent Labs  Lab 06/25/18 1852   WBC 9.5   HGB 12.3   HCT 39.6   MCV 98          Recent Labs  Lab 06/25/18 1852      POTASSIUM 3.9   CHLORIDE 103   CO2 30   ANIONGAP 6   *   BUN 14   CR 0.60   GFRESTIMATED >90   GFRESTBLACK >90   MARY GRACE 9.4       Recent Labs  Lab 06/25/18 1852   NTBNPI 185       Recent Labs  Lab 06/25/18 1852   AST 17   ALT 18   ALKPHOS 80   BILITOTAL 0.2       Recent Labs  Lab 06/25/18 1852   LACT 1.2        Recent Labs  Lab 06/25/18  1852   TROPI <0.015     No results for input(s): CULT in the last 168 hours.    Imaging:  Recent Results (from the past 24 hour(s))   XR Chest 2 Views    Narrative    XR CHEST 2 VW   6/25/2018 7:15 PM     HISTORY: cough;     COMPARISON: None.    FINDINGS: The heart is negative.  Linear platelike atelectasis is seen  at the right lung base.. The pulmonary vasculature is normal.  The  bones and soft tissues are unremarkable.      Impression    IMPRESSION: No focal alveolar-type infiltrates are seen.        MD Nilesh NELSON DO MPH  [facility] Hospitalist  [address] [place] [address]  Pager: [phone]  06/25/2018

## 2018-06-26 NOTE — PLAN OF CARE
Per physical therapy, pt needs 24 hrs sup or TCU. [facility] at [facility] updated. Jemal Dalton called and updated as well. Agreeable with plan of care.

## 2018-06-26 NOTE — PROGRESS NOTES
Date: 6/26/2018  Admission Dx: COPD exacerbation, hypoxia  Pulmonary hx: Asthma  Home nebulizer/MDI: Flovent and Proair  Home oxygen: None  Acuity level (RCAT flow sheet): 3  Aerosol therapy initiated: Duoneb QID  Pulmonary hygiene initiated: None  Volume expansion initiated: IS TID  Current oxygen requirements: RA  Current spo2: 93%  Re-evaluation date: 6/29/2018  Patient education    Ameena Hernandez, RRT

## 2018-06-26 NOTE — PROGRESS NOTES
"[facility] RCAT     Date: 6/26/18  Admission Dx: Hypoxia  Pulmonary History: COPD, Asthma  Home Nebulizer/MDI Use: Flovent and Proair inhalers  Home Oxygen: None  Acuity Level (RCAT flow sheet): 4  Aerosol Therapy initiated: Duoneb Q6 prn, Alb Q4 prn      Pulmonary Hygiene initiated: Cough and deep breathing techniques      Volume Expansion initiated: Incentive Spirometry      Current Oxygen Requirements: 1L NC  Current SpO2: 92%    Re-evaluation date: 6/29/18    Patient Education: Patient re rcat'd due to much improvement in BS. Will change to prn at this time.       See \"RT Assessments\" flow sheet for patient assessment scoring and Acuity Level Details.             "

## 2018-06-26 NOTE — PHARMACY-ADMISSION MEDICATION HISTORY
Admission medication history interview status for this patient is complete. See [facility] admission navigator for allergy information, prior to admission medications and immunization status.     Medication history interview source(s):none  Medication history resources (including written lists, pill bottles, clinic record):medlist from Roxannhector Perez [phone]    Changes made to PTA medication list:  Added: all  Deleted: none  Changed: none    Actions taken by pharmacist (provider contacted, etc):None     Additional medication history information:patient supplied last doses    Medication reconciliation/reorder completed by provider prior to medication history? No    For patients on insulin therapy: no (Yes/No)   Lantus/levemir/NPH/Mix 70/30 dose: ___ in AM/PM or twice daily   Sliding scale Novolog Y/N   If Yes, do you have a baseline novolog pre-meal dose: ______units with meals   Patients eat three meals a day: Y/N ---  How many episodes of hypoglycemia (low blood glucose) do you have weekly: ---   How many missed doses do you have a week: ---  How many times do you check your blood glucose per day: ---  Any Barriers to therapy: cost of medications/comfortable with giving injections (if applicable)/ comfortable and confident with current diabetes regimen ---      Prior to Admission medications    Medication Sig Last Dose Taking? Auth Provider   albuterol (PROAIR HFA/PROVENTIL HFA/VENTOLIN HFA) 108 (90 Base) MCG/ACT Inhaler Inhale 2 puffs into the lungs every 4 hours as needed for shortness of breath / dyspnea or wheezing  Yes Unknown, Entered By History   bismuth subsalicylate (PEPTO BISMOL) 262 MG chewable tablet Take 2 tablets by mouth every hour as needed for heartburn Max of 16 tabs per 24 hrs  Yes Unknown, Entered By History   calcium carbonate (OS-MARY GRACE 500 MG [place]) 500 MG tablet Take 2 tablets by mouth daily 6/25/2018 at Unknown time Yes Unknown, Entered By History   estradiol (ESTRACE) 0.1 MG/GM cream Place 2 g  vaginally twice a week Past Week at Unknown time Yes Unknown, Entered By History   fentaNYL (DURAGESIC) 12 mcg/hr 72 hr patch Place 1 patch onto the skin every 72 hours remove old patch. removed 6-24-18  Unknown, Entered By History   GABAPENTIN PO Take 300 mg by mouth 3 times daily 6/25/2018 at 1200 Yes Unknown, Entered By History   HYDROcodone-acetaminophen (NORCO) 5-325 MG per tablet Take 1 tablet by mouth every 6 hours as needed for severe pain  Yes Unknown, Entered By History   hydrocortisone 1 % CREA cream Apply topically daily as needed for itching  Yes Unknown, Entered By History   IBUPROFEN PO Take 400 mg by mouth every 6 hours as needed for moderate pain  Yes Unknown, Entered By History   latanoprost (XALATAN) 0.005 % ophthalmic solution Place 1 drop into both eyes At Bedtime 6/24/2018 at Unknown time Yes Unknown, Entered By History   Liniments (SALONPAS PAIN RELIEF PATCH EX) Externally apply 1 patch topically daily as needed 6/25/2018 at Unknown time Yes Unknown, Entered By History   Misc Natural Products (OSTEO BI-FLEX ADV DOUBLE ST PO) Take 1 tablet by mouth 2 times daily 6/25/2018 at am Yes Unknown, Entered By History

## 2018-06-26 NOTE — ED NOTES
[facility]  ED Nurse Handoff Report    Kelsey Wood is a 89 year old female   ED Chief complaint: Shortness of Breath  . ED Diagnosis:   Final diagnoses:   Mild persistent asthma with exacerbation   Hypoxia   Generalized weakness     Allergies: No Known Allergies    Code Status: Full Code  Activity level - Baseline/Home:  Independent. Activity Level - Current:   Stand with Assist. Lift room needed: No. Bariatric: No   Needed: No   Isolation: No. Infection: Not Applicable.     Vital Signs:   Vitals:    06/25/18 1945 06/25/18 2015 06/25/18 2030 06/25/18 2100   BP:  147/64 159/71    Resp:       Temp:       TempSrc:       SpO2: 91% 90%  93%       Cardiac Rhythm:  ,      Pain level: 0-10 Pain Scale: 8  Patient confused: No. Patient Falls Risk: Yes.   Elimination Status: Has voided   Patient Report - Initial Complaint: increased sob at home.  Post uti and pneumonia dx recently. Focused Assessment: sob with activity   Tests Performed: labs, x ray. Abnormal Results:   Labs Ordered and Resulted from Time of ED Arrival Up to the Time of Departure from the ED   CBC WITH PLATELETS DIFFERENTIAL - Abnormal; Notable for the following:        Result Value    MCHC 31.1 (*)     Absolute Eosinophils 0.8 (*)     All other components within normal limits   ROUTINE UA WITH MICROSCOPIC - Abnormal; Notable for the following:     Protein Albumin Urine 30 (*)     Leukocyte Esterase Urine Moderate (*)     WBC Urine 25 (*)     Bacteria Urine Many (*)     Squamous Epithelial /HPF Urine 81 (*)     All other components within normal limits   COMPREHENSIVE METABOLIC PANEL - Abnormal; Notable for the following:     Glucose 116 (*)     All other components within normal limits   INR   MAGNESIUM   LACTIC ACID WHOLE BLOOD   TROPONIN I   NT PROBNP INPATIENT   PULSE OXIMETRY NURSING   CARDIAC CONTINUOUS MONITORING   PERIPHERAL IV CATHETER     .   Treatments provided: nebs, solumedrol  Family Comments: present  OBS brochure/video  discussed/provided to patient:  Yes  ED Medications:   Medications   lidocaine 1 % 1 mL (not administered)   lidocaine (LMX4) kit (not administered)   sodium chloride (PF) 0.9% PF flush 3 mL (not administered)   sodium chloride (PF) 0.9% PF flush 3 mL (not administered)   lactated ringers BOLUS 1,000 mL (1,000 mLs Intravenous New Bag 6/25/18 1857)     Followed by   lactated ringers infusion (not administered)   ondansetron (ZOFRAN) injection 4 mg (not administered)   ipratropium - albuterol 0.5 mg/2.5 mg/3 mL (DUONEB) neb solution 3 mL (3 mLs Nebulization Given 6/25/18 1945)   methylPREDNISolone sodium succinate (solu-MEDROL) injection 125 mg (125 mg Intravenous Given 6/25/18 1856)     Drips infusing:  No  For the majority of the shift, the patient's behavior Green. Interventions performed were nebs.     Severe Sepsis OR Septic Shock Diagnosis Present: No      ED Nurse Name/Phone Number: Lewis Silva,   9:37 PM    RECEIVING UNIT ED HANDOFF REVIEW    Above ED Nurse Handoff Report was reviewed: Yes  Reviewed by: Mónica Us on June 25, 2018 at 11:42 PM

## 2018-06-27 ENCOUNTER — APPOINTMENT (OUTPATIENT)
Dept: PHYSICAL THERAPY | Facility: CLINIC | Age: 83
End: 2018-06-27
Payer: MEDICARE

## 2018-06-27 LAB
BACTERIA SPEC CULT: NORMAL
SPECIMEN SOURCE: NORMAL

## 2018-06-27 PROCEDURE — 97116 GAIT TRAINING THERAPY: CPT | Mod: GP,XU | Performed by: PHYSICAL THERAPIST

## 2018-06-27 PROCEDURE — 97530 THERAPEUTIC ACTIVITIES: CPT | Mod: GP | Performed by: PHYSICAL THERAPIST

## 2018-06-27 PROCEDURE — 25000125 ZZHC RX 250: Performed by: PHYSICIAN ASSISTANT

## 2018-06-27 PROCEDURE — 25000125 ZZHC RX 250: Performed by: HOSPITALIST

## 2018-06-27 PROCEDURE — 40000193 ZZH STATISTIC PT WARD VISIT: Performed by: PHYSICAL THERAPIST

## 2018-06-27 PROCEDURE — 25000132 ZZH RX MED GY IP 250 OP 250 PS 637: Mod: GY | Performed by: PHYSICIAN ASSISTANT

## 2018-06-27 PROCEDURE — G0378 HOSPITAL OBSERVATION PER HR: HCPCS

## 2018-06-27 PROCEDURE — 40000275 ZZH STATISTIC RCP TIME EA 10 MIN

## 2018-06-27 PROCEDURE — 94640 AIRWAY INHALATION TREATMENT: CPT

## 2018-06-27 PROCEDURE — A9270 NON-COVERED ITEM OR SERVICE: HCPCS | Mod: GY | Performed by: PHYSICIAN ASSISTANT

## 2018-06-27 PROCEDURE — 99225 ZZC SUBSEQUENT OBSERVATION CARE,LEVEL II: CPT | Performed by: PHYSICIAN ASSISTANT

## 2018-06-27 RX ORDER — IPRATROPIUM BROMIDE AND ALBUTEROL SULFATE 2.5; .5 MG/3ML; MG/3ML
3 SOLUTION RESPIRATORY (INHALATION) EVERY 4 HOURS PRN
Status: DISCONTINUED | OUTPATIENT
Start: 2018-06-27 | End: 2018-06-28 | Stop reason: HOSPADM

## 2018-06-27 RX ORDER — IPRATROPIUM BROMIDE AND ALBUTEROL SULFATE 2.5; .5 MG/3ML; MG/3ML
3 SOLUTION RESPIRATORY (INHALATION) ONCE
Status: COMPLETED | OUTPATIENT
Start: 2018-06-27 | End: 2018-06-27

## 2018-06-27 RX ORDER — SENNOSIDES 8.6 MG
1-2 TABLET ORAL 2 TIMES DAILY PRN
Status: DISCONTINUED | OUTPATIENT
Start: 2018-06-27 | End: 2018-06-28 | Stop reason: HOSPADM

## 2018-06-27 RX ORDER — IPRATROPIUM BROMIDE AND ALBUTEROL SULFATE 2.5; .5 MG/3ML; MG/3ML
3 SOLUTION RESPIRATORY (INHALATION)
Status: DISCONTINUED | OUTPATIENT
Start: 2018-06-27 | End: 2018-06-27

## 2018-06-27 RX ADMIN — LIDOCAINE 2 PATCH: 560 PATCH PERCUTANEOUS; TOPICAL; TRANSDERMAL at 14:18

## 2018-06-27 RX ADMIN — HYDROCODONE BITARTRATE AND ACETAMINOPHEN 1 TABLET: 5; 325 TABLET ORAL at 07:30

## 2018-06-27 RX ADMIN — IPRATROPIUM BROMIDE AND ALBUTEROL SULFATE 3 ML: .5; 3 SOLUTION RESPIRATORY (INHALATION) at 10:34

## 2018-06-27 RX ADMIN — GABAPENTIN 300 MG: 300 CAPSULE ORAL at 20:38

## 2018-06-27 RX ADMIN — GABAPENTIN 300 MG: 300 CAPSULE ORAL at 07:30

## 2018-06-27 RX ADMIN — GABAPENTIN 300 MG: 300 CAPSULE ORAL at 14:18

## 2018-06-27 RX ADMIN — PREDNISONE 40 MG: 20 TABLET ORAL at 07:30

## 2018-06-27 ASSESSMENT — PAIN DESCRIPTION - DESCRIPTORS: DESCRIPTORS: DISCOMFORT

## 2018-06-27 NOTE — CONSULTS
SWS  SW reviewed PT eval and discussed with Katheryn BROCK.  Pt is recommending rehab/TCU at this time.  SW reached out and spoke to son Sha.  Reviewed PT consult.  He is in agreement with rehab and requests referrals be sent.  He does not want [facility].  SW sent referrals for 5 TCUs and will await their review.  SW did discuss with Sha that rehab would be private pay as pt is OBS and does not have any qualifying stays to cover rehab.  Sha states his understanding and states his mother does have the funds to pay privatelyl  P:  SW following

## 2018-06-27 NOTE — PROGRESS NOTES
SWS  Pt has been accepted at [facility] and son Sha would like to accept that bed.  Message left for Adrianna at [facility].  PAS in process.  WINNIE spoke to Dasia Warren RN on OBS letting her know that son will pick pt up at 1300 tomorrow to transport pt.  P:  WINNIE will finalize d/c tomorrow

## 2018-06-27 NOTE — PLAN OF CARE
Problem: Patient Care Overview  Goal: Plan of Care/Patient Progress Review  PRIMARY DIAGNOSIS: ASTHMA/DECREASED MOBILITY  OUTPATIENT/OBSERVATION GOALS TO BE MET BEFORE DISCHARGE:  1. Vital signs stable: Yes, Temp: 96.2  F (35.7  C) Temp src: Oral BP: 118/43   Heart Rate: 71 Resp: 24 SpO2: 91 % O2 Device: None (Room air)     2. Improvement of peak flow to greater than 70% sustained off nebulizer for 4 hours: Yes    3. Dyspnea improved and O2 sats >88% at RA or at prior home O2 therapy level: Yes      SpO2: 91 %, O2 Device: None (Room air)    4. Short term supplemental O2 needed for use with activity at home: No    5. Tolerating adequate PO diet and medications: Yes    6. Return to near baseline physical activity: No, pt is requiring ax2 with walker     Pt is A&Ox4. VSS. Pt's asthma exacerbation symptoms have resolved at this time. Pt is on RA. Lung sounds clear. Pt has chronic hip pain. Pt's mobility is decreased and PT is recommending TCU if patient is agreeable. Pt has NORCO for pain if needed. Pt also has lidocaine patch in place and fentanyl patch as well. Will continue to monitor.     Discharge Planner Nurse   Safe discharge environment identified: No, PT and SW following   Barriers to discharge: Yes       Entered by: Erika Yeager 06/27/2018 2:07 AM     Please review provider order for any additional goals.   Nurse to notify provider when observation goals have been met and patient is ready for discharge.

## 2018-06-27 NOTE — PLAN OF CARE
Problem: Patient Care Overview  Goal: Plan of Care/Patient Progress Review  Outcome: Improving  PRIMARY DIAGNOSIS: GENERALIZED WEAKNESS    OUTPATIENT/OBSERVATION GOALS TO BE MET BEFORE DISCHARGE  1. Orthostatic performed: N/A    2. Tolerating PO medications: Yes    3. Return to near baseline physical activity: Yes    4. Cleared for discharge by consultants (if involved): No    Discharge Planner Nurse   Safe discharge environment identified: No  Barriers to discharge: Yes       Entered by: Veronica Martin 06/27/2018 7:39 AM     Please review provider order for any additional goals.   Nurse to notify provider when observation goals have been met and patient is ready for discharge.    Temp: 96.5  F (35.8  C) Temp src: Oral BP: 145/63   Heart Rate: 62 Resp: 20 SpO2: 92 % O2 Device: None (Room air)     A&Ox4, forgetful, reports better controlled pain with lidocaine patches, norco, fentanyl PIP, pain now in lower back from laying in bed, tolerating reg diet, LS clear, room air, 91-92% room air, denies SOB, BS A&Ax4, passing gas, IV S/L, pt to ambulate after breakfast, SW and PT following, will continue to monitor and provide supportive cares.

## 2018-06-27 NOTE — PLAN OF CARE
Problem: Patient Care Overview  Goal: Plan of Care/Patient Progress Review  PRIMARY DIAGNOSIS: ASTHMA/DECREASED MOBILITY  OUTPATIENT/OBSERVATION GOALS TO BE MET BEFORE DISCHARGE:  1. Vital signs stable: Yes, Temp: 96.3  F (35.7  C) Temp src: Oral BP: 133/54   Heart Rate: 68 Resp: 24 SpO2: 91 % O2 Device: None (Room air)      2. Improvement of peak flow to greater than 70% sustained off nebulizer for 4 hours: Yes     3. Dyspnea improved and O2 sats >88% at RA or at prior home O2 therapy level: Yes      SpO2: 91 %, O2 Device: None (Room air)     4. Short term supplemental O2 needed for use with activity at home: No     5. Tolerating adequate PO diet and medications: Yes     6. Return to near baseline physical activity: No, pt is requiring ax2 with walker      Pt is A&Ox4. VSS. Pt's asthma exacerbation symptoms have resolved at this time. Pt is on RA. Lung sounds clear. Pt has chronic hip pain. Pt's mobility is decreased and PT is recommending TCU if patient is agreeable. Pt has NORCO for pain if needed. Pt also has fentanyl patch for pain. Will continue to monitor.      Discharge Planner Nurse   Safe discharge environment identified: No, PT and SW following   Barriers to discharge: Yes       Entered by: Erika Yeager 06/27/2018 4:07 AM  Please review provider order for any additional goals.   Nurse to notify provider when observation goals have been met and patient is ready for discharge.

## 2018-06-27 NOTE — PLAN OF CARE
Problem: Patient Care Overview  Goal: Plan of Care/Patient Progress Review  PRIMARY DIAGNOSIS: GENERALIZED WEAKNESS    OUTPATIENT/OBSERVATION GOALS TO BE MET BEFORE DISCHARGE  1. Orthostatic performed: N/A    2. Tolerating PO medications: Yes    3. Return to near baseline physical activity: No    4. Cleared for discharge by consultants (if involved): Yes    Discharge Planner Nurse   Safe discharge environment identified: Yes  Barriers to discharge: Yes       Entered by: Hallie London 06/26/2018 10:04 PM     Please review provider order for any additional goals.   Nurse to notify provider when observation goals have been met and patient is ready for discharge.    Forgetful at times, right hip pain - fentanyl patch in place, norco given. High fall risk, PT recommend ongoing physical therapy. Will continue to monitor and provide supportive cares.

## 2018-06-27 NOTE — PROGRESS NOTES
Your information has been submitted on June 27th, 2018 at 04:14:52 PM CDT. The confirmation number is [id number]

## 2018-06-27 NOTE — PLAN OF CARE
"Problem: Patient Care Overview  Goal: Plan of Care/Patient Progress Review  From Previous PT note: \"PT: Patient seen by physical therapy for evaluation and treatment.  Patient with PMH including hx of glaucoma, asthma, arthritis, chronic back pain now admitted with acute asthma exacerbation (with hx of UTI and pneumonia within the last month).  Patient reports that she lives in [facility], receives meals delivered, medication management, weekly housecleaning/laundry.  Patient reports that she uses a 4WW at baseline for short distances, a wheelchair for longer distances.  Patient reports that she is unable to take a shower by herself at this time, patient reports that she prefers not to ask for assistance at [facility], but takes a sponge bath instead.\"      Discharge Planner PT   Patient plan for discharge: return to [facility] with previous level of assist  Current status: Pt sitting in bedside chair upon arrival. Pt reports that sister helped her into the chair. Educated pt and sister that the pt needs to call for assist for all mobility while IP, and that family cannot assist. Both pt and family report understanding. Pt completes sit<>stand with Paresh and multiple attempts to rise. Pt toilets during session and requires assist with jeny/doffing underwear and requires cues to change saturated pad, even after pt admits that she is wet. Pt ambulates with close CGA with FWW and multiple cues for walker management and proximity to walker. Pt able to adjust momentarily, but requires cues throughout. Pt does not remember being up in the chair at initiation of session, and requires reminder of beginning of PT session. Pt sitting in bedside chair at completion of session, all needs in reach.   Barriers to return to prior living situation: Lives alone, receives limited services from [facility]/refuses assist, high fall risk  Recommendations for discharge: TCU.   Rationale for recommendations: Pt is not currently at baseline for mobility, and is unsafe " to discharge home. With continued PT, both IP and after discharge, pt is likely to obtain mobility goals.  If patient declines TCU, will require min A x1 with all transfers and mobility at [facility], 24/7 supervision, and Home PT/OT       Entered by: Barb Mustafa 06/27/2018 3:27 PM

## 2018-06-27 NOTE — PROGRESS NOTES
RT- Patient given one time nebulizer. Patient reports no shortness of breath at this time. Breath sounds are clear, patient currently on room air with oxygen saturations at 92%.

## 2018-06-27 NOTE — PLAN OF CARE
Problem: Patient Care Overview  Goal: Plan of Care/Patient Progress Review  Outcome: No Change  PRIMARY DIAGNOSIS: ACUTE PAIN  OUTPATIENT/OBSERVATION GOALS TO BE MET BEFORE DISCHARGE:  1. Pain Status: Improved-controlled with oral pain medications.    2. Return to near baseline physical activity: Yes    3. Cleared for discharge by consultants (if involved): Yes    Discharge Planner Nurse   Safe discharge environment identified: Yes  Barriers to discharge: No       Entered by: Veronica Martin 06/27/2018 4:00PM     Please review provider order for any additional goals.   Nurse to notify provider when observation goals have been met and patient is ready for discharge.      Temp: 96.2  F (35.7  C) Temp src: Oral BP: 155/73 Pulse: 77 Heart Rate: 78 Resp: 20 SpO2: 93 % O2 Device: None (Room air)     Forgetful, reports improvement in pain management, fentanyl and lidocaine patches in place, PT recommending TCU, pt is hesitant and would like to return home to [facility] but staff has explained it is unsafe as she is a high fall risk, per SW note - has been accepted to [facility] - transport provided by toby Dalton at 1PM, will continue to monitor and provide supportive cares.

## 2018-06-27 NOTE — PLAN OF CARE
Problem: Patient Care Overview  Goal: Plan of Care/Patient Progress Review  Outcome: No Change  PRIMARY DIAGNOSIS: GENERALIZED WEAKNESS     OUTPATIENT/OBSERVATION GOALS TO BE MET BEFORE DISCHARGE  1. Orthostatic performed: N/A     2. Tolerating PO medications: Yes     3. Return to near baseline physical activity: Yes     4. Cleared for discharge by consultants (if involved): No     Discharge Planner Nurse   Safe discharge environment identified: No  Barriers to discharge: Yes       Entered by: Veronica Martin 06/27/2018   Please review provider order for any additional goals.   Nurse to notify provider when observation goals have been met and patient is ready for discharge.     Temp: 96.5  F (35.8  C) Temp src: Oral BP: 145/63   Heart Rate: 62 Resp: 20 SpO2: 92 % O2 Device: None (Room air)      A&Ox4, forgetful, reports better controlled pain with lidocaine patches, norco, fentanyl PIP, pain now in lower back from laying in bed, tolerating reg diet, LS clear, room air, 91-92% room air, denies SOB, IS education initiated, nebs x1, BS A&Ax4, passing gas, IV S/L, pt to ambulate after breakfast, SW and PT following, agreeable to TCU, referrals sent out, will continue to monitor and provide supportive cares.

## 2018-06-27 NOTE — PROGRESS NOTES
[facility]  Observation Unit  Progress Note    Date of Service: 6/27/2018    Patient: Kelsey Wood  MRN: [id number]  Admission Date: 6/25/2018  Hospital Day # 2    Assessment & Plan: Kelsey Wood is a 89 year old female with a history of Osteoarthritis, Osteoporosis, Back Pain, Asthma, Allergic Rhinitis who presented from [facility] 6/25 with shortness of breath and wheezing and found to have an Asthma Exacerbation.       Acute Asthma Exacerbation - noted to be hypoxic on admission.  S/p IV Solumedrol in the ED.  CXR with no acute infiltrate.  Symptoms improved and saturations 92% on room air.  Mild wheezing and rhonchi on my exam this morning.  Did not receive nebs since yesterday morning.  Received a duoneb, deep coughing and I instructed how to use IS and lung sounds then cleared.  - continue po Prednisone (day 2/5)  - duonebs QID, albuterol q2 hours prn  - IS encouraged.     Generalized Weakness - per report, patient ambulates at baseline with a walker and has meals delivered and medication set up.  Noted to be a assist of 2 on admission.  Likely has deconditioning from recent UTI 5/26 and Pneumonia earlier this month.  PT evaluation on 6/26 recommends TCU and if patient declines, will need assist of 1 with all transfers at her [facility] with home PT/OT.    - SW consulted for discharge planning     Chronic Pain - 2/2 chronic left knee OA and patient reports chronic right hip pain.  Home pain medications resumed yesterday and patient reports improved pain today.  - continue home Gabapentin, Ibuprofen prn, Norco prn, Fentanyl patch and lidocaine patch    # Pain Assessment:  Current Pain Score 6/27/2018   Patient currently in pain? -   Pain score (0-10) 4   Pain location -   Pain descriptors -   - Kelsey is experiencing pain due to chronic arthritis. Pain management was discussed and the plan was created in a collaborative fashion.  Kelsey's response to the current recommendations:  engaged  - Please see the plan for pain management as documented above    CODE: Full  DVT: SCD  Diet/fluids: regular      Katheryn Coleman MS, PA-C  Hospitalist Physician Assistant  [facility]  Pager: [phone]      Subjective & Interval Hx:    Patient reports breathing has improved since admission.  No abdominal pain, nausea.  Wants to have a BM.  Tolerating po well.  Left knee and right hip pain improved from admission.  Has chronic pain due to OA and receives left knee cortisone injections.      Last 24 hr care team notes reviewed.   ROS:  4 point ROS including Respiratory, CV, GI and , other than that noted in the HPI, is negative.    Physical Exam:    Blood pressure 159/69, pulse 77, temperature 96.6  F (35.9  C), temperature source Oral, resp. rate 20, SpO2 92 %. on room air  General: Alert, interactive, NAD, forgetful.  HEENT: AT/NC, sclera anicteric, PERRL, EOMI  Resp: mild wheezing and rhonchi, no accessory muscle use.  Cardiac: regular rate and rhythm, no murmur  Abdomen: Soft, nontender, nondistended. +BS.  No HSM or masses, no rebound or guarding.  Extremities: No LE edema  Skin: Warm and dry, no jaundice or rash  Neuro: Alert & oriented to hospital, family at the bedside and month/year, moves all extremities equally    Labs & Images:  Reviewed in Epic   Medications:    Current Facility-Administered Medications   Medication     acetaminophen (TYLENOL) tablet 650 mg     albuterol neb solution 2.5 mg     bisacodyl (DULCOLAX) Suppository 10 mg     fentaNYL (DURAGESIC) 12 mcg/hr 72 hr patch 1 patch     fentaNYL (DURAGESIC) Patch in Place     [START ON 6/29/2018] fentaNYL (DURAGESIC) patch REMOVAL     gabapentin (NEURONTIN) capsule 300 mg     HYDROcodone-acetaminophen (NORCO) 5-325 MG per tablet 1 tablet     ibuprofen (ADVIL/MOTRIN) tablet 400 mg     ipratropium - albuterol 0.5 mg/2.5 mg/3 mL (DUONEB) neb solution 3 mL     Lidocaine (LIDOCARE) 4 % Patch 1-2 patch    And     lidocaine patch  REMOVAL    And     lidocaine patch in PLACE     melatonin tablet 1 mg     naloxone (NARCAN) injection 0.1-0.4 mg     ondansetron (ZOFRAN-ODT) ODT tab 4 mg    Or     ondansetron (ZOFRAN) injection 4 mg     polyethylene glycol (MIRALAX/GLYCOLAX) Packet 17 g     predniSONE (DELTASONE) tablet 40 mg     prochlorperazine (COMPAZINE) injection 5 mg    Or     prochlorperazine (COMPAZINE) tablet 5 mg    Or     prochlorperazine (COMPAZINE) Suppository 12.5 mg

## 2018-06-28 VITALS
DIASTOLIC BLOOD PRESSURE: 61 MMHG | WEIGHT: 121.3 LBS | SYSTOLIC BLOOD PRESSURE: 150 MMHG | TEMPERATURE: 98.4 F | OXYGEN SATURATION: 93 % | HEART RATE: 77 BPM | BODY MASS INDEX: 22.32 KG/M2 | RESPIRATION RATE: 18 BRPM | HEIGHT: 62 IN

## 2018-06-28 PROCEDURE — 94640 AIRWAY INHALATION TREATMENT: CPT

## 2018-06-28 PROCEDURE — 25000132 ZZH RX MED GY IP 250 OP 250 PS 637: Mod: GY | Performed by: PHYSICIAN ASSISTANT

## 2018-06-28 PROCEDURE — 25000125 ZZHC RX 250: Performed by: HOSPITALIST

## 2018-06-28 PROCEDURE — G0378 HOSPITAL OBSERVATION PER HR: HCPCS

## 2018-06-28 PROCEDURE — 40000275 ZZH STATISTIC RCP TIME EA 10 MIN

## 2018-06-28 PROCEDURE — 25000125 ZZHC RX 250: Performed by: PHYSICIAN ASSISTANT

## 2018-06-28 PROCEDURE — 99217 ZZC OBSERVATION CARE DISCHARGE: CPT | Performed by: PHYSICIAN ASSISTANT

## 2018-06-28 PROCEDURE — A9270 NON-COVERED ITEM OR SERVICE: HCPCS | Mod: GY | Performed by: PHYSICIAN ASSISTANT

## 2018-06-28 RX ORDER — IPRATROPIUM BROMIDE AND ALBUTEROL SULFATE 2.5; .5 MG/3ML; MG/3ML
3 SOLUTION RESPIRATORY (INHALATION) ONCE
Status: COMPLETED | OUTPATIENT
Start: 2018-06-28 | End: 2018-06-28

## 2018-06-28 RX ORDER — HYDROCODONE BITARTRATE AND ACETAMINOPHEN 5; 325 MG/1; MG/1
1 TABLET ORAL EVERY 6 HOURS PRN
Qty: 20 TABLET | Refills: 0 | Status: SHIPPED | OUTPATIENT
Start: 2018-06-28 | End: 2018-09-21

## 2018-06-28 RX ORDER — ACETAMINOPHEN 325 MG/1
650 TABLET ORAL EVERY 4 HOURS PRN
Qty: 100 TABLET | Refills: 0 | COMMUNITY
Start: 2018-06-28 | End: 2018-09-23

## 2018-06-28 RX ORDER — SENNOSIDES 8.6 MG
1-2 TABLET ORAL 2 TIMES DAILY PRN
Qty: 30 EACH | Refills: 0 | COMMUNITY
Start: 2018-06-28 | End: 2018-07-09

## 2018-06-28 RX ORDER — FENTANYL 12.5 UG/1
1 PATCH TRANSDERMAL
Qty: 3 PATCH | Refills: 0 | Status: ON HOLD | OUTPATIENT
Start: 2018-06-29 | End: 2018-09-23

## 2018-06-28 RX ORDER — PREDNISONE 20 MG/1
40 TABLET ORAL DAILY
Qty: 4 TABLET | Refills: 0
Start: 2018-06-29 | End: 2018-06-29

## 2018-06-28 RX ORDER — IPRATROPIUM BROMIDE AND ALBUTEROL SULFATE 2.5; .5 MG/3ML; MG/3ML
3 SOLUTION RESPIRATORY (INHALATION) EVERY 4 HOURS PRN
Qty: 360 ML | Refills: 0
Start: 2018-06-28 | End: 2018-07-09

## 2018-06-28 RX ADMIN — IPRATROPIUM BROMIDE AND ALBUTEROL SULFATE 3 ML: .5; 3 SOLUTION RESPIRATORY (INHALATION) at 08:31

## 2018-06-28 RX ADMIN — HYDROCODONE BITARTRATE AND ACETAMINOPHEN 1 TABLET: 5; 325 TABLET ORAL at 11:42

## 2018-06-28 RX ADMIN — PREDNISONE 40 MG: 20 TABLET ORAL at 08:26

## 2018-06-28 RX ADMIN — GABAPENTIN 300 MG: 300 CAPSULE ORAL at 08:26

## 2018-06-28 NOTE — PLAN OF CARE
Problem: Patient Care Overview  Goal: Plan of Care/Patient Progress Review    Physical Therapy Discharge Summary    Reason for therapy discharge:    Discharged to transitional care facility.    Progress towards therapy goal(s). See goals on Care Plan in [facility] electronic health record for goal details.  Goals partially met.  Barriers to achieving goals:   discharge from facility.    Therapy recommendation(s):    Continued therapy is recommended.  Rationale/Recommendations:  TCU.     Note: Pt not seen by documenting PT on this date. Information obtained from chart review and discussion with PTA.

## 2018-06-28 NOTE — PROGRESS NOTES
Hand-off for Care Transitions to Next Level of Care Provider  Name: Kelsey Wood  : 1929  MRN #: [id number]  Reason for Hospitalization:  Mild persistent asthma with exacerbation [J45.31]  Hypoxia [R09.02]  Generalized weakness [R53.1]  Admit Date/Time: 2018  6:03 PM  Discharge Date: 2018    Reason for Communication Hand-off Referral: Admission diagnoses: COPD    Discharge Plan:  Discharged to: TCU: [facility]                   Patient agreeable to post-hospital support suggestions:  Yes    Patient is on new medications:   Yes    MTM follow up recommended: No    Tel-Assurance program:  Ineligible    Patient will receive  TCU  at:  Discharge to [facility]    Follow-up specialty is recommended: Yes. Follow up with Physical Therapy while at TCU.     Follow-up plan:  No future appointments.    Any outstanding tests or procedures:  No. Recommend give two-step Mantoux (PPD) Per Facility Policy       Referrals     Future Labs/Procedures    Physical Therapy Adult Consult     Comments:    Evaluate and treat as clinically indicated.    Reason:  Generalized weakness          Key Recommendations:   Pt is admitted with COPD exacerbation.  We discussed COPD action care plan.  Pt does complain of right leg/hip spasm, which is her main concern.  She said her lidoderm patch works well.  Recommendations are Physical Therapy while at TCU and f/u with PCP in 1 week.     Lashonda Troncoso    Communicated handoff via [place] [employer] to Dr. Arriaza'  CC at [phone].      Sent by Koki Garay RN, BSN, CTS  [facility]  [phone]

## 2018-06-28 NOTE — PLAN OF CARE
Problem: Patient Care Overview  Goal: Plan of Care/Patient Progress Review  Outcome: Adequate for Discharge Date Met: 06/28/18  Patient's After Visit Summary was reviewed with patient and/or son.   Patient verbalized understanding of After Visit Summary, recommended follow up and was given an opportunity to ask questions.   Discharge medications sent home with patient/family: No, scripts for Norco and fetanyl patch sent in packet to TCU   Discharged with son    OBSERVATION patient END time: 1330  Wheelchair ride provided. VSS. Packet given to son for TCU, hard scripts in packet.

## 2018-06-28 NOTE — PLAN OF CARE
Problem: Patient Care Overview  Goal: Plan of Care/Patient Progress Review  Outcome: Improving  PRIMARY DIAGNOSIS: ACUTE PAIN  OUTPATIENT/OBSERVATION GOALS TO BE MET BEFORE DISCHARGE:  1. Pain Status: Improved-controlled with oral pain medications.     2. Return to near baseline physical activity: No, needs Ax1-2 w/walker and gait belt for ambulation.     3. Cleared for discharge by consultants (if involved): Yes- d/c to [facility] TCU    Discharge Planner Nurse   Safe discharge environment identified: Yes  Barriers to discharge: No         Please review provider order for any additional goals.   Nurse to notify provider when observation goals have been met and patient is ready for discharge.     A&O x3, forgetful. VSS, except /72. Reports improvement in pain management, fentanyl and lidocaine patches in place, PT recommending TCU, pt is hesitant and would like to return home to [facility] but staff has explained it is unsafe as she is a high fall risk, per SW note - has been accepted to [facility] - transport provided by toby Dalton at 1PM, will continue to monitor and provide supportive cares.

## 2018-06-28 NOTE — PLAN OF CARE
Problem: Patient Care Overview  Goal: Plan of Care/Patient Progress Review  Outcome: Improving  Problem: Patient Care Overview  Goal: Plan of Care/Patient Progress Review  Outcome: Improving  PRIMARY DIAGNOSIS: GENERALIZED WEAKNESS and Asthma Exacerbation     OUTPATIENT/OBSERVATION GOALS TO BE MET BEFORE DISCHARGE  1. Orthostatic performed: No     2. Tolerating PO medications: Yes     3. Return to near baseline physical activity: No, A1 to A2     4. Cleared for discharge by consultants (if involved): Yes, to TCU at 1300 today     Discharge Planner Nurse   Safe discharge environment identified: Yes, TCU  Barriers to discharge: No       Entered by: Tara Manzanares 06/28/2018 8:00 a.m.  Please review provider order for any additional goals.   Nurse to notify provider when observation goals have been met and patient is ready for discharge.     Pt anxious to discharge. VSS. Fetanyl patch in place on L scapula.  Pt. Reporting mild pain in R hip.  PRN norco given with some relief. Discharge set for 1 pm to [facility] TCU. Son will be picking up.  Will continue to monitor.

## 2018-06-28 NOTE — PLAN OF CARE
Problem: Patient Care Overview  Goal: Plan of Care/Patient Progress Review  Outcome: Improving  PRIMARY DIAGNOSIS: GENERALIZED WEAKNESS and Asthma Exacerbation    OUTPATIENT/OBSERVATION GOALS TO BE MET BEFORE DISCHARGE  1. Orthostatic performed: No    2. Tolerating PO medications: Yes    3. Return to near baseline physical activity: No, A1 to A2    4. Cleared for discharge by consultants (if involved): Yes, to TCU at 1300 today    Discharge Planner Nurse   Safe discharge environment identified: Yes, TCU  Barriers to discharge: No       Entered by: Tara Manzanares 06/28/2018 8:00 a.m.     Please review provider order for any additional goals.   Nurse to notify provider when observation goals have been met and patient is ready for discharge.    Alert with confusion/forgetfullness.  Pt. Reports feeling very overwhelmed and anxious about discharge today.  Spent 1:1 time and explained POC.  No noted SOB, LS diminished o/w clear.  Duoneb administered per respiratory.  Pt. Continues on prednisone burst.  BP slightly elevated, provider aware and recommending follow up with PCP. Plan is to DC to TCU today, son is to transport.

## 2018-06-28 NOTE — PLAN OF CARE
Problem: Patient Care Overview  Goal: Plan of Care/Patient Progress Review  Outcome: Improving  PRIMARY DIAGNOSIS: ACUTE PAIN   OUTPATIENT/OBSERVATION GOALS TO BE MET BEFORE DISCHARGE:  1. Pain Status: Improved-controlled with oral pain medications.     2. Return to near baseline physical activity: No - Ax1-2     3. Cleared for discharge by consultants (if involved): Yes- plan for d/c in AM    Discharge Planner Nurse   Safe discharge environment identified: Yes  Barriers to discharge: No    Alert but forgetful, VSS. Pain to L knee and low back, declines any medications. Fentanyl and lidocaine patches in place. LS clear, denies any shortness of breath. Encouraging IS use. Pt able to be assist x1 to C with walker and gait belt, was a little unsteady/weak. Pt recommending TCU placement. Plan for pt to discharge to [facility], son to  pt around 1pm. Will continue to monitor.          Please review provider order for any additional goals.   Nurse to notify provider when observation goals have been met and patient is ready for discharge.

## 2018-06-28 NOTE — PROGRESS NOTES
Pt is ready to dc to [facility] TCU.  Adrianna in admissions said they only have a dbl room today, but can put her in a single room tomorrow. Updated Son and pt and they are agreeable.  Son Sha will provide transport at 1pm.  Dc orders faxed. Pt lives at [facility].  Jessica SUAZO [phone] was updated on dc planning as welll. Teena SUAZO CTS 1963

## 2018-06-28 NOTE — DISCHARGE SUMMARY
[facility] Observation Unit Discharge Summary          Kelesy Wood MRN# [id number]   Age: 89 year old YOB: 1929     Date of Admission:  6/25/2018  Date of Discharge::  6/28/2018  Admitting Physician:  Nilesh Madison, DO  Discharge Physician:  Katheryn Coleman PA-C  Primary Physician: Demetrio Arriaza     Primary Discharge Diagnoses:   Acute Asthma Exacerbation  Generalized Weakness     Secondary Discharge Diagnoses:   Chronic Pain  Osteoarthritis  Osteoporosis  Allergic Rhinitis     Hospital Course:   For detail history, please refer to H & P from 6/25/2018. In brief, this is an 89 year old female with a history of Osteoarthritis, Osteoporosis, Back Pain, Asthma, Allergic Rhinitis who presented from [facility] 6/25 with shortness of breath and wheezing and found to have an Asthma Exacerbation.      Acute Asthma Exacerbation - noted to be hypoxic on admission.  S/p IV Solumedrol in the ED.  CXR with no acute infiltrate.  Symptoms improved and saturations 92-95% on room air.  Patient was transitioned to po Prednisone 40mg daily.  She was discharged with 2 additional days to complete a total 5 day course.  Patient used duonebs prn while hospitalized with improvement.  She was discharged with prn Duonebs and to continue home Albuterol inhaler prn.    Generalized Weakness - per report, patient ambulates at baseline with a walker and has meals delivered and medication set up at her [facility] prior to admission.  Noted to be a assist of 2 on admission.  Likely has deconditioning from recent UTI 5/26 and Pneumonia earlier this month.  PT evaluation recommended TCU.  SW was consulted and patient was discharged to TCU.      Chronic Pain - 2/2 chronic left knee OA and patient reports chronic right hip pain.  Patient was continued on her pta Gabapentin, Ibuprofen prn, Norco prn, Fentanyl patch and lidocaine patch.    # Discharge Pain Plan: - During her hospitalization, Kelsey  "experienced pain due to chronic pain.  The pain plan for discharge was discussed with Kelsey and the plan was created in a collaborative fashion.    - she was resumed on her pta pain medications.    Procedures/Imaging:     Results for orders placed or performed during the hospital encounter of 06/25/18   XR Chest 2 Views    Narrative    XR CHEST 2 VW   6/25/2018 7:15 PM     HISTORY: cough;     COMPARISON: None.    FINDINGS: The heart is negative.  Linear platelike atelectasis is seen  at the right lung base.. The pulmonary vasculature is normal.  The  bones and soft tissues are unremarkable.      Impression    IMPRESSION: No focal alveolar-type infiltrates are seen.        RANDY VILLALPANDO MD       Consultations:   Social Work  Physical Therapy    Code Status:   Full    Allergies:   No Known Allergies     Subjective:   Patient reports she feels well today.  Denies chest pain, shortness of breath or wheezing.  Reports her chronic pains are at baseline.  She is making a list of things she needs to bring to TCU.    Physical Exam:   Blood pressure 158/72, pulse 77, temperature 97.4  F (36.3  C), temperature source Oral, resp. rate 18, height 1.575 m (5' 2\"), weight 55 kg (121 lb 4.8 oz), SpO2 93 %. on room air  General: Alert, interactive, NAD, sitting up in a chair on room air writing a list.  HEENT: AT/NC, sclera anicteric, PERRL, EOMi  Resp: clear to auscultation bilaterally, no crackles or wheezes  Cardiac: regular rate and rhythm, no murmur  Abdomen: Soft, nontender, nondistended. +BS.  No rebound or guarding.  Extremities: No LE edema  Skin: Warm and dry, no jaundice or rash  Neuro: Alert & oriented to hospital and year.  Seems to have some short term memory difficulty.  No focal deficits, moves all extremities equally     Discharge Medicatios:        Current Discharge Medication List      START taking these medications    Details   acetaminophen (TYLENOL) 325 MG tablet Take 2 tablets (650 mg) by mouth every 4 hours as " needed for mild pain  Qty: 100 tablet, Refills: 0    Associated Diagnoses: Other chronic pain      ipratropium - albuterol 0.5 mg/2.5 mg/3 mL (DUONEB) 0.5-2.5 (3) MG/3ML neb solution Take 1 vial (3 mLs) by nebulization every 4 hours as needed for wheezing  Qty: 360 mL, Refills: 0    Associated Diagnoses: Mild persistent asthma with exacerbation      predniSONE (DELTASONE) 20 MG tablet Take 2 tablets (40 mg) by mouth daily for 2 days  Qty: 4 tablet, Refills: 0    Associated Diagnoses: Mild persistent asthma with exacerbation      sennosides (SENOKOT) 8.6 MG tablet Take 1-2 tablets by mouth 2 times daily as needed for constipation  Qty: 30 each, Refills: 0    Associated Diagnoses: Constipation, unspecified constipation type         CONTINUE these medications which have CHANGED    Details   fentaNYL (DURAGESIC) 12 mcg/hr 72 hr patch Place 1 patch onto the skin every 72 hours remove old patch.  Qty: 3 patch, Refills: 0    Comments: Last placed on 6/26/18 at 1628  Associated Diagnoses: Other chronic pain      HYDROcodone-acetaminophen (NORCO) 5-325 MG per tablet Take 1 tablet by mouth every 6 hours as needed for severe pain  Qty: 20 tablet, Refills: 0    Associated Diagnoses: Other chronic pain         CONTINUE these medications which have NOT CHANGED    Details   albuterol (PROAIR HFA/PROVENTIL HFA/VENTOLIN HFA) 108 (90 Base) MCG/ACT Inhaler Inhale 2 puffs into the lungs every 4 hours as needed for shortness of breath / dyspnea or wheezing      bismuth subsalicylate (PEPTO BISMOL) 262 MG chewable tablet Take 2 tablets by mouth every hour as needed for heartburn Max of 16 tabs per 24 hrs      calcium carbonate (OS-MARY GRACE 500 MG [place]. [place]) 500 MG tablet Take 2 tablets by mouth daily      estradiol (ESTRACE) 0.1 MG/GM cream Place 2 g vaginally twice a week      GABAPENTIN PO Take 300 mg by mouth 3 times daily      hydrocortisone 1 % CREA cream Apply topically daily as needed for itching      IBUPROFEN PO Take 400 mg by mouth  every 6 hours as needed for moderate pain      latanoprost (XALATAN) 0.005 % ophthalmic solution Place 1 drop into both eyes At Bedtime      Liniments (SALONPAS PAIN RELIEF PATCH EX) Externally apply 1 patch topically daily as needed      Misc Natural Products (OSTEO BI-FLEX ADV DOUBLE ST PO) Take 1 tablet by mouth 2 times daily             Instructions Given to Patient as Discharge:     Discharge Procedure Orders  General info for SNF   Order Comments: Length of Stay Estimate: Short Term Care: Estimated # of Days <30  Condition at Discharge: Stable  Level of care:skilled   Rehabilitation Potential: Good  Admission H&P remains valid and up-to-date: Yes  Recent Chemotherapy: N/A  Use Nursing Home Standing Orders: Yes     Mantoux instructions   Order Comments: Give two-step Mantoux (PPD) Per Facility Policy Yes     Reason for your hospital stay   Order Comments: You were hospitalized with an Asthma Exacerbation with generalized weakness likely due to underlying chronic pain and deconditioning.     Additional Discharge Instructions   Order Comments: Requires min A x1 with all transfers     Activity - Up with assistive device   Order Comments: walker   Order Specific Question Answer Comments   Is discharge order? Yes      Activity - Up with nursing assistance   Order Specific Question Answer Comments   Is discharge order? Yes      Activity - Ambulate in hallway   Order Comments: Every shift   Order Specific Question Answer Comments   Is discharge order? Yes      Follow Up and recommended labs and tests   Order Comments: Follow up with PCP within one week.     Full Code     Physical Therapy Adult Consult   Order Comments: Evaluate and treat as clinically indicated.    Reason:  Generalized weakness     Fall precautions     Advance Diet as Tolerated   Order Comments: Follow this diet upon discharge: Orders Placed This Encounter     Regular Diet Adult   Order Specific Question Answer Comments   Is discharge order? Yes           Pending Tests at Discharge:   none    Discharge Disposition:   Discharged to short-term care facility     Katheryn Coleman MS, PA-C  Hospitalist Service  Pager [phone]    >30 minutes was spent in discharge planning, care coordination, physical examination and medication reconciliation on the date of discharge, 6/28/2018

## 2018-06-29 ENCOUNTER — NURSING HOME VISIT (OUTPATIENT)
Dept: GERIATRICS | Facility: CLINIC | Age: 83
End: 2018-06-29
Payer: COMMERCIAL

## 2018-06-29 VITALS
SYSTOLIC BLOOD PRESSURE: 160 MMHG | DIASTOLIC BLOOD PRESSURE: 84 MMHG | BODY MASS INDEX: 22.21 KG/M2 | HEART RATE: 83 BPM | HEIGHT: 62 IN | RESPIRATION RATE: 17 BRPM | OXYGEN SATURATION: 96 % | WEIGHT: 120.7 LBS | TEMPERATURE: 97.1 F

## 2018-06-29 DIAGNOSIS — G89.29 OTHER CHRONIC PAIN: ICD-10-CM

## 2018-06-29 DIAGNOSIS — R53.1 WEAKNESS: ICD-10-CM

## 2018-06-29 DIAGNOSIS — J45.901 MILD ASTHMA WITH EXACERBATION, UNSPECIFIED WHETHER PERSISTENT: Primary | ICD-10-CM

## 2018-06-29 PROCEDURE — 99310 SBSQ NF CARE HIGH MDM 45: CPT | Performed by: NURSE PRACTITIONER

## 2018-06-29 NOTE — PROGRESS NOTES
[facility]  PRIMARY CARE PROVIDER AND CLINIC:  Demetrio Arriaza PHYSICIAN [facility] [address] [facility] [address] / [place]  Chief Complaint   Patient presents with     Hospital F/U     [place] Medical Record Number:  [id number]    HPI:    Kelsey Wood is a 89 year old  (4/18/1929),admitted to the [facility].  Hospital stay 6/25/18 through 6/28/18.  Admitted to this facility for  rehab, medical management and nursing care.  HPI information obtained from: facility chart records, facility staff, patient report and [facility] chart review.      Patient Kelsey Wood is a 89 yr old female admitted to [facility] for rehabilitation s/p hospitalization [facility] 6/25/-6/28/18 for acute asthma exacerbation & weakness. 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 Patient lives in [facility]    Asthma   Was hypoxic on admission to TCU  Remains on prednisone 40mg x2 more days to complete 5 day course. Denies shortness of breath & vitals stable room air.   Started on Duonebs in hospital with improved respiratory function. On albuterol inhaler at home  Negative chest xray    Weakness/chronic pain/confusion  Ambulates with walker independently at baseline. Had been needing assist of 2 in hospital  At [facility] patient has meals delivered. Staff report at [facility] patient rarely leaves her room   Thought to be deconditioned also due to recent urinary tract infection 5/26 & pneumonia earlier in month  Patient eager to return to her [facility]  confusion noted at TCU, loses her remove several times already this AM and asking every staff member where it is despite just finding it  Chronic pain (artritic left knee & right hip & back pain)  On prior to hospital admission fentantyl patch 12mcg, as needed Norco 5-325mg every 6hr as needed for severe pain, as needed tylenol &ibuprofen, & schedule Neurontin along with topical Salon  patch      CODE STATUS/ADVANCE DIRECTIVES DISCUSSION:   CPR/Full code   Patient's living condition: lives in an assisted living facility    ALLERGIES:Review of patient's allergies indicates no known allergies.  PAST MEDICAL HISTORY:  has a past medical history of Asthma and Hard of hearing.  PAST SURGICAL HISTORY:  has a past surgical history that includes Cholecystectomy and Laparoscopic appendectomy.  FAMILY HISTORY: family history is not on file.  SOCIAL HISTORY:      Post Discharge Medication Reconciliation Status: discharge medications reconciled and changed, per note/orders (see AVS).  Current Outpatient Prescriptions   Medication Sig Dispense Refill     acetaminophen (TYLENOL) 325 MG tablet Take 2 tablets (650 mg) by mouth every 4 hours as needed for mild pain 100 tablet 0     albuterol (PROAIR HFA/PROVENTIL HFA/VENTOLIN HFA) 108 (90 Base) MCG/ACT Inhaler Inhale 2 puffs into the lungs every 4 hours as needed for shortness of breath / dyspnea or wheezing       bismuth subsalicylate (PEPTO BISMOL) 262 MG chewable tablet Take 2 tablets by mouth every hour as needed for heartburn Max of 16 tabs per 24 hrs       calcium carbonate (OS-MARY GRACE 500 MG [place]. CA) 500 MG tablet Take 2 tablets by mouth daily       estradiol (ESTRACE) 0.1 MG/GM cream Place 2 g vaginally twice a week       fentaNYL (DURAGESIC) 12 mcg/hr 72 hr patch Place 1 patch onto the skin every 72 hours remove old patch. 3 patch 0     GABAPENTIN PO Take 300 mg by mouth 3 times daily       HYDROcodone-acetaminophen (NORCO) 5-325 MG per tablet Take 1 tablet by mouth every 6 hours as needed for severe pain 20 tablet 0     hydrocortisone 1 % CREA cream Apply topically daily as needed for itching       IBUPROFEN PO Take 400 mg by mouth every 6 hours as needed for moderate pain       ipratropium - albuterol 0.5 mg/2.5 mg/3 mL (DUONEB) 0.5-2.5 (3) MG/3ML neb solution Take 1 vial (3 mLs) by nebulization every 4 hours as needed for wheezing 360 mL 0     latanoprost  "(XALATAN) 0.005 % ophthalmic solution Place 1 drop into both eyes At Bedtime       Liniments (SALONPAS PAIN RELIEF PATCH EX) Externally apply 1 patch topically daily as needed       Misc Natural Products (OSTEO BI-FLEX ADV DOUBLE ST PO) Take 1 tablet by mouth 2 times daily       PREDNISONE PO Take 40 mg by mouth daily       sennosides (SENOKOT) 8.6 MG tablet Take 1-2 tablets by mouth 2 times daily as needed for constipation 30 each 0       ROS:  10 point ROS of systems including Constitutional, Eyes, Respiratory, Cardiovascular, Gastroenterology, Genitourinary, Integumentary, Muscularskeletal, Psychiatric were all negative except for pertinent positives noted in my HPI.    Exam:  /84  Pulse 83  Temp 97.1  F (36.2  C)  Resp 17  Ht 5' 2\" (1.575 m)  Wt 120 lb 11.2 oz (54.7 kg)  SpO2 96%  BMI 22.08 kg/m2  GENERAL APPEARANCE:  Alert, in no distress  ENT:  Mouth and posterior oropharynx normal, moist mucous membranes  EYES:  EOM, conjunctivae, lids, pupils and irises normal  NECK:  No adenopathy,masses or thyromegaly  RESP:  respiratory effort and palpation of chest normal, lungs clear to auscultation , no respiratory distress  CV:  Palpation and auscultation of heart done , regular rate and rhythm, no murmur, rub, or gallop  ABDOMEN:  normal bowel sounds, soft, nontender, no hepatosplenomegaly or other masses  M/S:   lying in bed  SKIN:  Inspection of skin and subcutaneous tissue baseline  NEURO:   Cranial nerves 2-12 are normal tested and grossly at patient's baseline  PSYCH:  oriented X 3    BP Readings from Last 3 Encounters:   06/29/18 160/84   06/28/18 150/61   07/01/17 (!) 157/94     Pulse Readings from Last 4 Encounters:   06/29/18 83   06/27/18 77   07/01/17 78   11/24/13 89     Wt Readings from Last 5 Encounters:   06/29/18 120 lb 11.2 oz (54.7 kg)   06/27/18 121 lb 4.8 oz (55 kg)       Lab/Diagnostic data:  CBC RESULTS:   Recent Labs   Lab Test  06/25/18   1852   WBC  9.5   RBC  4.05   HGB  12.3 "   HCT  39.6   MCV  98   MCH  30.4   MCHC  31.1*   RDW  12.8   PLT  255       Last Basic Metabolic Panel:  Recent Labs   Lab Test  06/25/18   1852   NA  139   POTASSIUM  3.9   CHLORIDE  103   MARY GRACE  9.4   CO2  30   BUN  14   CR  0.60   GLC  116*       Liver Function Studies -   Recent Labs   Lab Test  06/25/18   1852   PROTTOTAL  7.4   ALBUMIN  3.5   BILITOTAL  0.2   ALKPHOS  80   AST  17   ALT  18           ASSESSMENT/PLAN:  Mild asthma with exacerbation, unspecified whether persistent  Vital stable room air, denies shortness of breath   Will continue on Prednisone taper 40mg 2 more days, monitor   Has as needed albuterol inhaler & duonebs    Other chronic pain  Weakness  Pain managed on prior to hospital admission pain medications fentanyl patch, Neurontin, as needed Norco, ibuprofen & tylenol & salonpas patch  Consider does reduction pain medications if able in future, patient not interested at this time & risk with ibuprofen & narcotics reviewed  Continue therapies, pending cognitive testing  Patient is 89 yr old. Lives in assistive living     blood pressure elevated, monitor trend         Total time spent with patient visit at the skilled nursing facility was 35 min including patient visit and review of past records. Greater than 50% of total time spent with counseling and coordinating care due to patient status with asthma, confusion,& chronic pain     Electronically signed by:  CAT Brennan CNP

## 2018-06-29 NOTE — LETTER
6/29/2018        RE: Kelsey Wood  [address]  [place] [address]        [facility]  PRIMARY CARE PROVIDER AND CLINIC:  Demetrio Arriaza PHYSICIAN [facility] [address] [address] / Wood*  Chief Complaint   Patient presents with     Hospital F/U     [facility] Medical Record Number:  [id number]    HPI:    Kelsey Wood is a 89 year old  (4/18/1929),admitted to the [facility] from [facility].  Hospital stay 6/25/18 through 6/28/18.  Admitted to this facility for  rehab, medical management and nursing care.  HPI information obtained from: facility chart records, facility staff, patient report and [facility] chart review.      Patient Kelsey Wood is a 89 yr old female admitted to [facility] for rehabilitation s/p hospitalization [facility] 6/25/-6/28/18 for acute asthma exacerbation & weakness. 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 Patient lives in [facility]    Asthma   Was hypoxic on admission to TCU  Remains on prednisone 40mg x2 more days to complete 5 day course. Denies shortness of breath & vitals stable room air.   Started on Duonebs in hospital with improved respiratory function. On albuterol inhaler at home  Negative chest xray    Weakness/chronic pain/confusion  Ambulates with walker independently at baseline. Had been needing assist of 2 in hospital  At [facility] patient has meals delivered. Staff report at [facility] patient rarely leaves her room   Thought to be deconditioned also due to recent urinary tract infection 5/26 & pneumonia earlier in month  Patient eager to return to her [facility]  confusion noted at TCU, loses her remove several times already this AM and asking every staff member where it is despite just finding it  Chronic pain (artritic left knee & right hip & back pain)  On prior to hospital admission fentantyl patch 12mcg, as needed Norco 5-325mg every 6hr as needed for severe  pain, as needed tylenol &ibuprofen, & schedule Neurontin along with topical Salon patch      CODE STATUS/ADVANCE DIRECTIVES DISCUSSION:   CPR/Full code   Patient's living condition: lives in an assisted living facility    ALLERGIES:Review of patient's allergies indicates no known allergies.  PAST MEDICAL HISTORY:  has a past medical history of Asthma and Hard of hearing.  PAST SURGICAL HISTORY:  has a past surgical history that includes Cholecystectomy and Laparoscopic appendectomy.  FAMILY HISTORY: family history is not on file.  SOCIAL HISTORY:      Post Discharge Medication Reconciliation Status: discharge medications reconciled and changed, per note/orders (see AVS).  Current Outpatient Prescriptions   Medication Sig Dispense Refill     acetaminophen (TYLENOL) 325 MG tablet Take 2 tablets (650 mg) by mouth every 4 hours as needed for mild pain 100 tablet 0     albuterol (PROAIR HFA/PROVENTIL HFA/VENTOLIN HFA) 108 (90 Base) MCG/ACT Inhaler Inhale 2 puffs into the lungs every 4 hours as needed for shortness of breath / dyspnea or wheezing       bismuth subsalicylate (PEPTO BISMOL) 262 MG chewable tablet Take 2 tablets by mouth every hour as needed for heartburn Max of 16 tabs per 24 hrs       calcium carbonate (OS-MARY GRACE 500 MG [place]. [place]) 500 MG tablet Take 2 tablets by mouth daily       estradiol (ESTRACE) 0.1 MG/GM cream Place 2 g vaginally twice a week       fentaNYL (DURAGESIC) 12 mcg/hr 72 hr patch Place 1 patch onto the skin every 72 hours remove old patch. 3 patch 0     GABAPENTIN PO Take 300 mg by mouth 3 times daily       HYDROcodone-acetaminophen (NORCO) 5-325 MG per tablet Take 1 tablet by mouth every 6 hours as needed for severe pain 20 tablet 0     hydrocortisone 1 % CREA cream Apply topically daily as needed for itching       IBUPROFEN PO Take 400 mg by mouth every 6 hours as needed for moderate pain       ipratropium - albuterol 0.5 mg/2.5 mg/3 mL (DUONEB) 0.5-2.5 (3) MG/3ML neb solution Take 1 vial (3  "mLs) by nebulization every 4 hours as needed for wheezing 360 mL 0     latanoprost (XALATAN) 0.005 % ophthalmic solution Place 1 drop into both eyes At Bedtime       Liniments (SALONPAS PAIN RELIEF PATCH EX) Externally apply 1 patch topically daily as needed       Misc Natural Products (OSTEO BI-FLEX ADV DOUBLE ST PO) Take 1 tablet by mouth 2 times daily       PREDNISONE PO Take 40 mg by mouth daily       sennosides (SENOKOT) 8.6 MG tablet Take 1-2 tablets by mouth 2 times daily as needed for constipation 30 each 0       ROS:  10 point ROS of systems including Constitutional, Eyes, Respiratory, Cardiovascular, Gastroenterology, Genitourinary, Integumentary, Muscularskeletal, Psychiatric were all negative except for pertinent positives noted in my HPI.    Exam:  /84  Pulse 83  Temp 97.1  F (36.2  C)  Resp 17  Ht 5' 2\" (1.575 m)  Wt 120 lb 11.2 oz (54.7 kg)  SpO2 96%  BMI 22.08 kg/m2  GENERAL APPEARANCE:  Alert, in no distress  ENT:  Mouth and posterior oropharynx normal, moist mucous membranes  EYES:  EOM, conjunctivae, lids, pupils and irises normal  NECK:  No adenopathy,masses or thyromegaly  RESP:  respiratory effort and palpation of chest normal, lungs clear to auscultation , no respiratory distress  CV:  Palpation and auscultation of heart done , regular rate and rhythm, no murmur, rub, or gallop  ABDOMEN:  normal bowel sounds, soft, nontender, no hepatosplenomegaly or other masses  M/S:   lying in bed  SKIN:  Inspection of skin and subcutaneous tissue baseline  NEURO:   Cranial nerves 2-12 are normal tested and grossly at patient's baseline  PSYCH:  oriented X 3    BP Readings from Last 3 Encounters:   06/29/18 160/84   06/28/18 150/61   07/01/17 (!) 157/94     Pulse Readings from Last 4 Encounters:   06/29/18 83   06/27/18 77   07/01/17 78   11/24/13 89     Wt Readings from Last 5 Encounters:   06/29/18 120 lb 11.2 oz (54.7 kg)   06/27/18 121 lb 4.8 oz (55 kg)       Lab/Diagnostic data:  CBC " RESULTS:   Recent Labs   Lab Test  06/25/18 1852   WBC  9.5   RBC  4.05   HGB  12.3   HCT  39.6   MCV  98   MCH  30.4   MCHC  31.1*   RDW  12.8   PLT  255       Last Basic Metabolic Panel:  Recent Labs   Lab Test  06/25/18 1852   NA  139   POTASSIUM  3.9   CHLORIDE  103   MARY GRACE  9.4   CO2  30   BUN  14   CR  0.60   GLC  116*       Liver Function Studies -   Recent Labs   Lab Test  06/25/18 1852   PROTTOTAL  7.4   ALBUMIN  3.5   BILITOTAL  0.2   ALKPHOS  80   AST  17   ALT  18           ASSESSMENT/PLAN:  Mild asthma with exacerbation, unspecified whether persistent  Vital stable room air, denies shortness of breath   Will continue on Prednisone taper 40mg 2 more days, monitor   Has as needed albuterol inhaler & duonebs    Other chronic pain  Weakness  Pain managed on prior to hospital admission pain medications fentanyl patch, Neurontin, as needed Norco, ibuprofen & tylenol & salonpas patch  Consider does reduction pain medications if able in future, patient not interested at this time & risk with ibuprofen & narcotics reviewed  Continue therapies, pending cognitive testing  Patient is 89 yr old. Lives in assistive living     blood pressure elevated, monitor trend         Total time spent with patient visit at the skilled nursing facility was 35 min including patient visit and review of past records. Greater than 50% of total time spent with counseling and coordinating care due to patient status with asthma, confusion,& chronic pain     Electronically signed by:  CAT Brennan CNP                    Sincerely,        CAT Brennan CNP

## 2018-07-02 ENCOUNTER — HOSPITAL LABORATORY (OUTPATIENT)
Dept: OTHER | Facility: CLINIC | Age: 83
End: 2018-07-02

## 2018-07-02 LAB
ANION GAP SERPL CALCULATED.3IONS-SCNC: 7 MMOL/L (ref 3–14)
BUN SERPL-MCNC: 15 MG/DL (ref 7–30)
CALCIUM SERPL-MCNC: 8.5 MG/DL (ref 8.5–10.1)
CHLORIDE SERPL-SCNC: 106 MMOL/L (ref 94–109)
CO2 SERPL-SCNC: 30 MMOL/L (ref 20–32)
CREAT SERPL-MCNC: 0.55 MG/DL (ref 0.52–1.04)
ERYTHROCYTE [DISTWIDTH] IN BLOOD BY AUTOMATED COUNT: 13.2 % (ref 10–15)
GFR SERPL CREATININE-BSD FRML MDRD: >90 ML/MIN/1.7M2
GLUCOSE SERPL-MCNC: 76 MG/DL (ref 70–99)
HCT VFR BLD AUTO: 38.7 % (ref 35–47)
HGB BLD-MCNC: 12.3 G/DL (ref 11.7–15.7)
MCH RBC QN AUTO: 29.9 PG (ref 26.5–33)
MCHC RBC AUTO-ENTMCNC: 31.8 G/DL (ref 31.5–36.5)
MCV RBC AUTO: 94 FL (ref 78–100)
PLATELET # BLD AUTO: 315 10E9/L (ref 150–450)
POTASSIUM SERPL-SCNC: 3.4 MMOL/L (ref 3.4–5.3)
RBC # BLD AUTO: 4.11 10E12/L (ref 3.8–5.2)
SODIUM SERPL-SCNC: 143 MMOL/L (ref 133–144)
WBC # BLD AUTO: 8.3 10E9/L (ref 4–11)

## 2018-07-04 ENCOUNTER — NURSING HOME VISIT (OUTPATIENT)
Dept: GERIATRICS | Facility: CLINIC | Age: 83
End: 2018-07-04
Payer: COMMERCIAL

## 2018-07-04 DIAGNOSIS — J45.901 MILD ASTHMA WITH EXACERBATION, UNSPECIFIED WHETHER PERSISTENT: Primary | ICD-10-CM

## 2018-07-04 PROCEDURE — 99305 1ST NF CARE MODERATE MDM 35: CPT | Performed by: INTERNAL MEDICINE

## 2018-07-04 NOTE — MR AVS SNAPSHOT
"              After Visit Summary   2018    Kelsey Wood    MRN: [id number]           Patient Information     Date Of Birth          1929        Visit Information        Provider Department      2018 4:36 PM Gustavo Parker MD [facility]        Today's Diagnoses     Mild asthma with exacerbation, unspecified whether persistent    -  1       Follow-ups after your visit        Who to contact     If you have questions or need follow up information about today's clinic visit or your schedule please contact G[facility] directly at [phone].  Normal or non-critical lab and imaging results will be communicated to you by [place], letter or phone within 4 business days after the clinic has received the results. If you do not hear from us within 7 days, please contact the clinic through [facility] or phone. If you have a critical or abnormal lab result, we will notify you by phone as soon as possible.  Submit refill requests through [facility] or call your pharmacy and they will forward the refill request to us. Please allow 3 business days for your refill to be completed.          Additional Information About Your Visit        MyChart Information     [facility] lets you send messages to your doctor, view your test results, renew your prescriptions, schedule appointments and more. To sign up, go to [email or web address] . Click on \"Log in\" on the left side of the screen, which will take you to the Welcome page. Then click on \"Sign up Now\" on the right side of the page.     You will be asked to enter the access code listed below, as well as some personal information. Please follow the directions to create your username and password.     Your access code is: [email or web address]  Expires: 2018 10:12 PM     Your access code will  in 90 days. If you need help or a new code, please call your [place] clinic or [phone].        Care EveryWhere ID     This is your Care " EveryWhere ID. This could be used by other organizations to access your [facility] medical records  [id number]         Blood Pressure from Last 3 Encounters:   07/05/18 122/75   07/02/18 161/84   06/29/18 160/84    Weight from Last 3 Encounters:   07/05/18 121 lb 4.8 oz (55 kg)   07/02/18 121 lb 4.8 oz (55 kg)   06/29/18 120 lb 11.2 oz (54.7 kg)              Today, you had the following     No orders found for display       Primary Care Provider Office Phone # Fax #    Demetrio Arriaza [phone]       [facility] PHYSICIAN [facility] [address]  [facility] [address]        Equal Access to Services     JONATHAN KELLEY : Park gateso Soprince, waaxda luqadaha, qaybta kaalmada adeegyada, saqib finnegan . So [facility] [phone].    ATENCIÓN: Si habla español, tiene a barry disposición servicios gratuitos de asistencia lingüística. [facility] [phone].    We comply with applicable federal civil rights laws and [place] laws. We do not discriminate on the basis of race, color, national origin, age, disability, sex, sexual orientation, or gender identity.            Thank you!     Thank you for choosing [facility]  for your care. Our goal is always to provide you with excellent care. Hearing back from our patients is one way we can continue to improve our services. Please take a few minutes to complete the written survey that you may receive in the mail after your visit with us. Thank you!             Your Updated Medication List - Protect others around you: Learn how to safely use, store and throw away your medicines at www.disposemymeds.org.          This list is accurate as of 7/4/18 11:59 PM.  Always use your most recent med list.                   Brand Name Dispense Instructions for use Diagnosis    acetaminophen 325 MG tablet    TYLENOL    100 tablet    Take 2 tablets (650 mg) by mouth every 4 hours as needed for mild pain    Other chronic pain        albuterol 108 (90 Base) MCG/ACT Inhaler    PROAIR HFA/PROVENTIL HFA/VENTOLIN HFA     Inhale 2 puffs into the lungs every 4 hours as needed for shortness of breath / dyspnea or wheezing        bismuth subsalicylate 262 MG chewable tablet    PEPTO BISMOL     Take 2 tablets by mouth every hour as needed for heartburn Max of 16 tabs per 24 hrs        calcium carbonate 500 MG tablet    OS-MARY GRACE 500 mg [place]. Ca     Take 2 tablets by mouth daily        estradiol 0.1 MG/GM cream    ESTRACE     Place 2 g vaginally twice a week        fentaNYL 12 mcg/hr 72 hr patch    DURAGESIC    3 patch    Place 1 patch onto the skin every 72 hours remove old patch.    Other chronic pain       GABAPENTIN PO      Take 300 mg by mouth 3 times daily        HYDROcodone-acetaminophen 5-325 MG per tablet    NORCO    20 tablet    Take 1 tablet by mouth every 6 hours as needed for severe pain    Other chronic pain       hydrocortisone 1 % Crea cream      Apply topically daily as needed for itching        IBUPROFEN PO      Take 400 mg by mouth every 6 hours as needed for moderate pain        ipratropium - albuterol 0.5 mg/2.5 mg/3 mL 0.5-2.5 (3) MG/3ML neb solution    DUONEB    360 mL    Take 1 vial (3 mLs) by nebulization every 4 hours as needed for wheezing    Mild persistent asthma with exacerbation       latanoprost 0.005 % ophthalmic solution    XALATAN     Place 1 drop into both eyes At Bedtime        OSTEO BI-FLEX ADV DOUBLE ST PO      Take 1 tablet by mouth daily        SALONPAS PAIN RELIEF PATCH EX      Externally apply 1 patch topically daily as needed        sennosides 8.6 MG tablet    SENOKOT    30 each    Take 1-2 tablets by mouth 2 times daily as needed for constipation    Constipation, unspecified constipation type

## 2018-07-05 ENCOUNTER — NURSING HOME VISIT (OUTPATIENT)
Dept: GERIATRICS | Facility: CLINIC | Age: 83
End: 2018-07-05
Payer: COMMERCIAL

## 2018-07-05 VITALS
RESPIRATION RATE: 18 BRPM | HEIGHT: 62 IN | SYSTOLIC BLOOD PRESSURE: 122 MMHG | TEMPERATURE: 98 F | WEIGHT: 121.3 LBS | HEART RATE: 78 BPM | DIASTOLIC BLOOD PRESSURE: 75 MMHG | BODY MASS INDEX: 22.32 KG/M2 | OXYGEN SATURATION: 99 %

## 2018-07-05 DIAGNOSIS — M17.0 OSTEOARTHRITIS OF BOTH KNEES, UNSPECIFIED OSTEOARTHRITIS TYPE: ICD-10-CM

## 2018-07-05 DIAGNOSIS — R53.81 PHYSICAL DECONDITIONING: ICD-10-CM

## 2018-07-05 DIAGNOSIS — G89.29 OTHER CHRONIC PAIN: ICD-10-CM

## 2018-07-05 DIAGNOSIS — J45.901 MILD ASTHMA WITH EXACERBATION, UNSPECIFIED WHETHER PERSISTENT: Primary | ICD-10-CM

## 2018-07-05 PROCEDURE — 99309 SBSQ NF CARE MODERATE MDM 30: CPT | Performed by: NURSE PRACTITIONER

## 2018-07-05 NOTE — PROGRESS NOTES
[facility]  PRIMARY CARE PROVIDER AND CLINIC:  [facility] [place] [facility] [address] / [place]*      Pt was seen by Dr Parker on 7/4/18 for an initial TCU vist      HPI:    Kelsey Wood is a 89 year old  (4/18/1929),admitted to the [facility].  Hospital stay 6/25/18 through 6/28/18 for the treatment of an acute asthma exacerbation     Admitted to this facility for  rehab, medical management and nursing care.      Hospital course reviewed by me, is as per the hospital d/c summary and NP note    Pt presented to the hospital with SOB and weakness. Improved with oral steroids and neb tx  CXR neg for acute process    Pt states is feeling stronger, is walking with walker in therapy  She denies cough, chest pain, fevers  She c/o L knee pain (chronic)    CODE STATUS/ADVANCE DIRECTIVES DISCUSSION:   CPR/Full code   Patient's living condition: lives in an assisted living facility    ALLERGIES:Review of patient's allergies indicates no known allergies.  PAST MEDICAL HISTORY:  has a past medical history of Asthma and Hard of hearing.  PAST SURGICAL HISTORY:  has a past surgical history that includes Cholecystectomy and Laparoscopic appendectomy.  FAMILY HISTORY: family history is not on file.  SOCIAL HISTORY:          Post Discharge Medication Reconciliation Status:   .  Current Outpatient Prescriptions   Medication Sig Dispense Refill     acetaminophen (TYLENOL) 325 MG tablet Take 2 tablets (650 mg) by mouth every 4 hours as needed for mild pain 100 tablet 0     albuterol (PROAIR HFA/PROVENTIL HFA/VENTOLIN HFA) 108 (90 Base) MCG/ACT Inhaler Inhale 2 puffs into the lungs every 4 hours as needed for shortness of breath / dyspnea or wheezing       bismuth subsalicylate (PEPTO BISMOL) 262 MG chewable tablet Take 2 tablets by mouth every hour as needed for heartburn Max of 16 tabs per 24 hrs       calcium carbonate (OS-MARY GRACE 500 MG [place].  CA) 500 MG tablet Take 2 tablets by mouth daily       estradiol (ESTRACE) 0.1 MG/GM cream Place 2 g vaginally twice a week       fentaNYL (DURAGESIC) 12 mcg/hr 72 hr patch Place 1 patch onto the skin every 72 hours remove old patch. 3 patch 0     GABAPENTIN PO Take 300 mg by mouth 3 times daily       HYDROcodone-acetaminophen (NORCO) 5-325 MG per tablet Take 1 tablet by mouth every 6 hours as needed for severe pain 20 tablet 0     hydrocortisone 1 % CREA cream Apply topically daily as needed for itching       IBUPROFEN PO Take 400 mg by mouth every 6 hours as needed for moderate pain       ipratropium - albuterol 0.5 mg/2.5 mg/3 mL (DUONEB) 0.5-2.5 (3) MG/3ML neb solution Take 1 vial (3 mLs) by nebulization every 4 hours as needed for wheezing 360 mL 0     latanoprost (XALATAN) 0.005 % ophthalmic solution Place 1 drop into both eyes At Bedtime       Liniments (SALONPAS PAIN RELIEF PATCH EX) Externally apply 1 patch topically daily as needed       Misc Natural Products (OSTEO BI-FLEX ADV DOUBLE ST PO) Take 1 tablet by mouth daily        sennosides (SENOKOT) 8.6 MG tablet Take 1-2 tablets by mouth 2 times daily as needed for constipation 30 each 0       ROS:  10 point ROS neg except as noted above    Exam:    GENERAL APPEARANCE:  Alert, in no distress, appears well  ENT:  Mouth and posterior oropharynx normal, moist mucous membranes  EYES:  EOM, conjunctivae, lids, pupils and irises normal  NECK:  neck  RESP:  RR 12, lungs clear  CV:  rrr no M  ABDOMEN: soft, non-tender  M/S:  No LE edema. L knee without warmth or edema  SKIN:  No rash  NEURO:   Alert, oriented X 3, mild confusion, facies symmetric. No focal weaknes  PSYCH:  Pleasant, in good spirits    BP Readings from Last 3 Encounters:   07/05/18 122/75   07/02/18 161/84   06/29/18 160/84     Pulse Readings from Last 4 Encounters:   07/05/18 78   07/02/18 69   06/29/18 83   06/27/18 77     Wt Readings from Last 5 Encounters:   07/05/18 121 lb 4.8 oz (55 kg)    07/02/18 121 lb 4.8 oz (55 kg)   06/29/18 120 lb 11.2 oz (54.7 kg)   06/27/18 121 lb 4.8 oz (55 kg)       Lab/Diagnostic data:  CBC RESULTS:   Recent Labs   Lab Test  06/25/18 1852   WBC  9.5   RBC  4.05   HGB  12.3   HCT  39.6   MCV  98   MCH  30.4   MCHC  31.1*   RDW  12.8   PLT  255       Last Basic Metabolic Panel:  Recent Labs   Lab Test  06/25/18 1852   NA  139   POTASSIUM  3.9   CHLORIDE  103   MARY GRACE  9.4   CO2  30   BUN  14   CR  0.60   GLC  116*       Liver Function Studies -   Recent Labs   Lab Test  06/25/18 1852   PROTTOTAL  7.4   ALBUMIN  3.5   BILITOTAL  0.2   ALKPHOS  80   AST  17   ALT  18           ASSESSMENT/PLAN:    Mild asthma with exacerbation, unspecified whether persistent  Pt is doing well, has completed course of prednisone  Plan continue MDI and nebs, monitor resp status, continue PT/OT    Chronic pain, secondary to OA, involving knees primarily  Plan continue therapies, Fentanyl patch, gabapentin      Gustavo Parker MD

## 2018-07-05 NOTE — LETTER
7/5/2018        RE: Kelsey Wood  [address]  [place] [address]        [facility]    Chief Complaint   Patient presents with     assisted Acute       [place] Medical Record Number:  [id number]    HPI:    Kelsey Wood is a 89 year old  (4/18/1929), who is being seen today for an episodic care visit at [facility].  HPI information obtained from: facility chart records, facility staff, patient report and [facility] chart review.    Today's concern is:    Mild asthma with exacerbation, unspecified whether persistent   Patient admitted to [facility] 6/25-6/28 d/t asthma exacerbation. Was treated with IV solumedrol and transitioned to prednisone taper x 5 days. CXR without acute infiltrates. Currently taking albuterol inhaler prn and duonebs prn. During exam, denies SOB at rest. Admits to mild SOB with activity. O2 sats stable on RA.     Other chronic pain  Osteoarthritis of both knees, unspecified osteoarthritis type  Has hx chronic pain secondary to bilateral knee osteoarthritis and chronic right hip pain. Currently taking gabapentin, ibuprofen prn, norco prn, and fentanyl patches. During exam, patient reports pain controlled with current regimen.     Physical deconditioning  PTA lives in [facility]. Patient is actively participating in therapy sessions. Ambulates short distances with walker. Requires assistance with transfers and ADLs. SLUMS 24/30. SW following for discharge planning, patient's goal is to discharge back to [facility].           ALLERGIES: Review of patient's allergies indicates no known allergies.  Past Medical, Surgical, Family and Social History reviewed and updated in [facility].    Current Outpatient Prescriptions   Medication Sig Dispense Refill     acetaminophen (TYLENOL) 325 MG tablet Take 2 tablets (650 mg) by mouth every 4 hours as needed for mild pain 100 tablet 0     albuterol (PROAIR HFA/PROVENTIL HFA/VENTOLIN HFA) 108 (90 Base) MCG/ACT Inhaler Inhale 2  puffs into the lungs every 4 hours as needed for shortness of breath / dyspnea or wheezing       bismuth subsalicylate (PEPTO BISMOL) 262 MG chewable tablet Take 2 tablets by mouth every hour as needed for heartburn Max of 16 tabs per 24 hrs       calcium carbonate (OS-MARY GRACE 500 MG [place]. CA) 500 MG tablet Take 2 tablets by mouth daily       estradiol (ESTRACE) 0.1 MG/GM cream Place 2 g vaginally twice a week       fentaNYL (DURAGESIC) 12 mcg/hr 72 hr patch Place 1 patch onto the skin every 72 hours remove old patch. 3 patch 0     GABAPENTIN PO Take 300 mg by mouth 3 times daily       HYDROcodone-acetaminophen (NORCO) 5-325 MG per tablet Take 1 tablet by mouth every 6 hours as needed for severe pain 20 tablet 0     hydrocortisone 1 % CREA cream Apply topically daily as needed for itching       IBUPROFEN PO Take 400 mg by mouth every 6 hours as needed for moderate pain       ipratropium - albuterol 0.5 mg/2.5 mg/3 mL (DUONEB) 0.5-2.5 (3) MG/3ML neb solution Take 1 vial (3 mLs) by nebulization every 4 hours as needed for wheezing 360 mL 0     latanoprost (XALATAN) 0.005 % ophthalmic solution Place 1 drop into both eyes At Bedtime       Liniments (SALONPAS PAIN RELIEF PATCH EX) Externally apply 1 patch topically daily as needed       Misc Natural Products (OSTEO BI-FLEX ADV DOUBLE ST PO) Take 1 tablet by mouth daily        sennosides (SENOKOT) 8.6 MG tablet Take 1-2 tablets by mouth 2 times daily as needed for constipation 30 each 0     Medications reviewed:  Medications reconciled to facility chart and changes were made to reflect current medications as identified as above med list. Below are the changes that were made:   Medications stopped since last EPIC medication reconciliation:   There are no discontinued medications.    Medications started since last [facility] medication reconciliation:  No orders of the defined types were placed in this encounter.        REVIEW OF SYSTEMS:  4 point ROS including Respiratory, CV, GI  "and , other than that noted in the HPI,  is negative      Physical Exam:  /75  Pulse 78  Temp 98  F (36.7  C)  Resp 18  Ht 5' 2\" (1.575 m)  Wt 121 lb 4.8 oz (55 kg)  SpO2 99%  BMI 22.19 kg/m2  GENERAL APPEARANCE:  Alert, in no distress, appears healthy, oriented, cooperative  ENT:  Mouth and posterior oropharynx normal, moist mucous membranes, [place]  EYES:  EOM, conjunctivae, lids, pupils and irises normal, PERRL  NECK:  No adenopathy,masses or thyromegaly  RESP:  respiratory effort and palpation of chest normal, lungs clear to auscultation , no respiratory distress, diminished breath sounds at bases  CV:  Palpation and auscultation of heart done , regular rate and rhythm, no murmur, rub, or gallop, no edema, +2 pedal pulses  ABDOMEN:  normal bowel sounds, soft, nontender, no hepatosplenomegaly or other masses, no guarding or rebound  M/S:   Gait and station abnormal, requires assistance with activity, uses walker. Digits and nails normal  SKIN:  Inspection of skin and subcutaneous tissue baseline, Palpation of skin and subcutaneous tissue baseline  NEURO:   Cranial nerves 2-12 are normal tested and grossly at patient's baseline, Examination of sensation by touch normal  PSYCH:  Oriented X 3, cognitive testing SLUMS 24/30      BP Readings from Last 3 Encounters:   07/05/18 122/75   07/02/18 161/84   06/29/18 160/84     Pulse Readings from Last 4 Encounters:   07/05/18 78   07/02/18 69   06/29/18 83   06/27/18 77     Wt Readings from Last 5 Encounters:   07/05/18 121 lb 4.8 oz (55 kg)   07/02/18 121 lb 4.8 oz (55 kg)   06/29/18 120 lb 11.2 oz (54.7 kg)   06/27/18 121 lb 4.8 oz (55 kg)       Recent Labs:   CBC RESULTS:   Recent Labs   Lab Test  07/02/18   0448  06/25/18   1852   WBC  8.3  9.5   RBC  4.11  4.05   HGB  12.3  12.3   HCT  38.7  39.6   MCV  94  98   MCH  29.9  30.4   MCHC  31.8  31.1*   RDW  13.2  12.8   PLT  315  255       Last Basic Metabolic Panel:  Recent Labs   Lab Test  07/02/18   0448  " 06/25/18   1852   NA  143  139   POTASSIUM  3.4  3.9   CHLORIDE  106  103   MARY GRACE  8.5  9.4   CO2  30  30   BUN  15  14   CR  0.55  0.60   GLC  76  116*       Liver Function Studies -   Recent Labs   Lab Test  06/25/18   1852   PROTTOTAL  7.4   ALBUMIN  3.5   BILITOTAL  0.2   ALKPHOS  80   AST  17   ALT  18               Assessment/Plan:    (J45.901) Mild asthma with exacerbation, unspecified whether persistent  (primary encounter diagnosis)  Comment: Resolved  Plan: Discontinue duoneb prn. Monitor O2 sats at rest and with activity, keep O2 > 90%.     (G89.29) Other chronic pain  (M17.0) Osteoarthritis of both knees, unspecified osteoarthritis type  Comment: Controlled  Plan: Continue plan of care. Monitor pain.     (R53.81) Physical deconditioning  Comment: Ongoing  Plan: Encourage active participation in therapy session to increase strength and promote independence in activities and ADLs. SW following for discharge planning.               Electronically signed by  CAT Sanchez CNP                      Sincerely,        CAT Sanchez CNP

## 2018-07-05 NOTE — PROGRESS NOTES
[facility]    Chief Complaint   Patient presents with     shelter Acute       [facility] Medical Record Number:  [id number]    HPI:    Kelsey Wood is a 89 year old  (4/18/1929), who is being seen today for an episodic care visit at [facility].  HPI information obtained from: facility chart records, facility staff, patient report and [facility] chart review.    Today's concern is:    Mild asthma with exacerbation, unspecified whether persistent   Patient admitted to [facility] 6/25-6/28 d/t asthma exacerbation. Was treated with IV solumedrol and transitioned to prednisone taper x 5 days. CXR without acute infiltrates. Currently taking albuterol inhaler prn and duonebs prn. During exam, denies SOB at rest. Admits to mild SOB with activity. O2 sats stable on RA.     Other chronic pain  Osteoarthritis of both knees, unspecified osteoarthritis type  Has hx chronic pain secondary to bilateral knee osteoarthritis and chronic right hip pain. Currently taking gabapentin, ibuprofen prn, norco prn, and fentanyl patches. During exam, patient reports pain controlled with current regimen.     Physical deconditioning  PTA lives in [facility]. Patient is actively participating in therapy sessions. Ambulates short distances with walker. Requires assistance with transfers and ADLs. SLUMS 24/30. SW following for discharge planning, patient's goal is to discharge back to [facility].           ALLERGIES: Review of patient's allergies indicates no known allergies.  Past Medical, Surgical, Family and Social History reviewed and updated in [facility].    Current Outpatient Prescriptions   Medication Sig Dispense Refill     acetaminophen (TYLENOL) 325 MG tablet Take 2 tablets (650 mg) by mouth every 4 hours as needed for mild pain 100 tablet 0     albuterol (PROAIR HFA/PROVENTIL HFA/VENTOLIN HFA) 108 (90 Base) MCG/ACT Inhaler Inhale 2 puffs into the lungs every 4 hours as needed for shortness of breath / dyspnea or wheezing        bismuth subsalicylate (PEPTO BISMOL) 262 MG chewable tablet Take 2 tablets by mouth every hour as needed for heartburn Max of 16 tabs per 24 hrs       calcium carbonate (OS-MARY GRACE 500 MG [place]. CA) 500 MG tablet Take 2 tablets by mouth daily       estradiol (ESTRACE) 0.1 MG/GM cream Place 2 g vaginally twice a week       fentaNYL (DURAGESIC) 12 mcg/hr 72 hr patch Place 1 patch onto the skin every 72 hours remove old patch. 3 patch 0     GABAPENTIN PO Take 300 mg by mouth 3 times daily       HYDROcodone-acetaminophen (NORCO) 5-325 MG per tablet Take 1 tablet by mouth every 6 hours as needed for severe pain 20 tablet 0     hydrocortisone 1 % CREA cream Apply topically daily as needed for itching       IBUPROFEN PO Take 400 mg by mouth every 6 hours as needed for moderate pain       ipratropium - albuterol 0.5 mg/2.5 mg/3 mL (DUONEB) 0.5-2.5 (3) MG/3ML neb solution Take 1 vial (3 mLs) by nebulization every 4 hours as needed for wheezing 360 mL 0     latanoprost (XALATAN) 0.005 % ophthalmic solution Place 1 drop into both eyes At Bedtime       Liniments (SALONPAS PAIN RELIEF PATCH EX) Externally apply 1 patch topically daily as needed       Misc Natural Products (OSTEO BI-FLEX ADV DOUBLE ST PO) Take 1 tablet by mouth daily        sennosides (SENOKOT) 8.6 MG tablet Take 1-2 tablets by mouth 2 times daily as needed for constipation 30 each 0     Medications reviewed:  Medications reconciled to facility chart and changes were made to reflect current medications as identified as above med list. Below are the changes that were made:   Medications stopped since last EPIC medication reconciliation:   There are no discontinued medications.    Medications started since last [facility] medication reconciliation:  No orders of the defined types were placed in this encounter.        REVIEW OF SYSTEMS:  4 point ROS including Respiratory, CV, GI and , other than that noted in the HPI,  is negative      Physical Exam:  /75  Pulse  "78  Temp 98  F (36.7  C)  Resp 18  Ht 5' 2\" (1.575 m)  Wt 121 lb 4.8 oz (55 kg)  SpO2 99%  BMI 22.19 kg/m2  GENERAL APPEARANCE:  Alert, in no distress, appears healthy, oriented, cooperative  ENT:  Mouth and posterior oropharynx normal, moist mucous membranes, [place]  EYES:  EOM, conjunctivae, lids, pupils and irises normal, PERRL  NECK:  No adenopathy,masses or thyromegaly  RESP:  respiratory effort and palpation of chest normal, lungs clear to auscultation , no respiratory distress, diminished breath sounds at bases  CV:  Palpation and auscultation of heart done , regular rate and rhythm, no murmur, rub, or gallop, no edema, +2 pedal pulses  ABDOMEN:  normal bowel sounds, soft, nontender, no hepatosplenomegaly or other masses, no guarding or rebound  M/S:   Gait and station abnormal, requires assistance with activity, uses walker. Digits and nails normal  SKIN:  Inspection of skin and subcutaneous tissue baseline, Palpation of skin and subcutaneous tissue baseline  NEURO:   Cranial nerves 2-12 are normal tested and grossly at patient's baseline, Examination of sensation by touch normal  PSYCH:  Oriented X 3, cognitive testing SLUMS 24/30      BP Readings from Last 3 Encounters:   07/05/18 122/75   07/02/18 161/84   06/29/18 160/84     Pulse Readings from Last 4 Encounters:   07/05/18 78   07/02/18 69   06/29/18 83   06/27/18 77     Wt Readings from Last 5 Encounters:   07/05/18 121 lb 4.8 oz (55 kg)   07/02/18 121 lb 4.8 oz (55 kg)   06/29/18 120 lb 11.2 oz (54.7 kg)   06/27/18 121 lb 4.8 oz (55 kg)       Recent Labs:   CBC RESULTS:   Recent Labs   Lab Test  07/02/18   0448  06/25/18   1852   WBC  8.3  9.5   RBC  4.11  4.05   HGB  12.3  12.3   HCT  38.7  39.6   MCV  94  98   MCH  29.9  30.4   MCHC  31.8  31.1*   RDW  13.2  12.8   PLT  315  255       Last Basic Metabolic Panel:  Recent Labs   Lab Test  07/02/18   0448  06/25/18   1852   NA  143  139   POTASSIUM  3.4  3.9   CHLORIDE  106  103   MARY GRACE  8.5  9.4 "   CO2  30  30   BUN  15  14   CR  0.55  0.60   GLC  76  116*       Liver Function Studies -   Recent Labs   Lab Test  06/25/18   1852   PROTTOTAL  7.4   ALBUMIN  3.5   BILITOTAL  0.2   ALKPHOS  80   AST  17   ALT  18               Assessment/Plan:    (J45.901) Mild asthma with exacerbation, unspecified whether persistent  (primary encounter diagnosis)  Comment: Resolved  Plan: Discontinue duoneb prn. Monitor O2 sats at rest and with activity, keep O2 > 90%.     (G89.29) Other chronic pain  (M17.0) Osteoarthritis of both knees, unspecified osteoarthritis type  Comment: Controlled  Plan: Continue plan of care. Monitor pain.     (R53.81) Physical deconditioning  Comment: Ongoing  Plan: Encourage active participation in therapy session to increase strength and promote independence in activities and ADLs. SW following for discharge planning.               Electronically signed by  CAT Sanchez CNP

## 2018-07-09 ENCOUNTER — DISCHARGE SUMMARY NURSING HOME (OUTPATIENT)
Dept: GERIATRICS | Facility: CLINIC | Age: 83
End: 2018-07-09
Payer: COMMERCIAL

## 2018-07-09 VITALS
HEART RATE: 76 BPM | OXYGEN SATURATION: 94 % | HEIGHT: 62 IN | RESPIRATION RATE: 16 BRPM | DIASTOLIC BLOOD PRESSURE: 74 MMHG | WEIGHT: 121.3 LBS | BODY MASS INDEX: 22.32 KG/M2 | TEMPERATURE: 98.7 F | SYSTOLIC BLOOD PRESSURE: 133 MMHG

## 2018-07-09 DIAGNOSIS — J45.20 MILD INTERMITTENT ASTHMA WITHOUT COMPLICATION: Primary | ICD-10-CM

## 2018-07-09 DIAGNOSIS — G89.29 OTHER CHRONIC PAIN: ICD-10-CM

## 2018-07-09 DIAGNOSIS — M17.0 OSTEOARTHRITIS OF BOTH KNEES, UNSPECIFIED OSTEOARTHRITIS TYPE: ICD-10-CM

## 2018-07-09 DIAGNOSIS — R53.81 PHYSICAL DECONDITIONING: ICD-10-CM

## 2018-07-09 PROCEDURE — 99316 NF DSCHRG MGMT 30 MIN+: CPT | Performed by: NURSE PRACTITIONER

## 2018-07-09 NOTE — PROGRESS NOTES
[facility] DISCHARGE SUMMARY    PATIENT'S NAME: Kelsey Wood  YOB: 1929  MEDICAL RECORD NUMBER:  [id number]    PRIMARY CARE PROVIDER AND CLINIC RESPONSIBLE AFTER TRANSFER: Demetrio Arriaza [facility] PHYSICIAN [facility] [address] [facility] [address] / [place]    CODE STATUS/ADVANCE DIRECTIVES DISCUSSION:   CPR/Full code      No Known Allergies    TRANSFERRING PROVIDERS: CAT Sanchez CNP; Gustavo Parker MD  DATE OF SNF ADMISSION:  June / 28 / 2018  DATE OF SNF (anticipated) DISCHARGE: July / 10 / 2018  DISCHARGE DISPOSITION: Assisted Living: [facility]   Nursing Facility: [facility] stay 6/25/18 to 6/28/18.     Condition on Discharge:  Improving.  Function:  Ambulates 150ft with walker  Cognitive Scores: SLUMS 24/30    Equipment: walker      DISCHARGE DIAGNOSIS:   1. Mild intermittent asthma without complication    2. Physical deconditioning    3. Other chronic pain    4. Osteoarthritis of both knees, unspecified osteoarthritis type        HPI Nursing Facility Course:  HPI information obtained from: facility chart records, facility staff, patient report and [facility] chart review.    Mild intermittent asthma without complication  Patient admitted to [facility] 6/25-6/28 d/t asthma exacerbation. Was treated with IV solumedrol and transitioned to prednisone taper x 5 days. CXR without acute infiltrates. During exam, denies SOB at rest. Admits to mild SOB with activity. O2 sats stable on RA. Currently taking albuterol inhaler prn, has not used in the past week.     Physical deconditioning  PTA lives in [facility]. Patient is actively participating in therapy sessions. Ambulates 150ft with walker. Requires assistance with transfers and ADLs. SLUMS 24/30. SW following for discharge planning, patient discharging back to previous [facility] with  home care services, including home PT, OT, RN, HHA.     Other chronic pain  Osteoarthritis of both  knees, unspecified osteoarthritis type  Has hx chronic pain secondary to bilateral knee osteoarthritis and chronic right hip pain. Currently taking gabapentin, ibuprofen prn, norco TID prn, and fentanyl patches. During exam, patient reports pain controlled with current regimen. Patient states she is followed by Ortho every 3 months, patient to f/u as directed.           PAST MEDICAL HISTORY:  has a past medical history of Asthma and Hard of hearing.    DISCHARGE MEDICATIONS:  Current Outpatient Prescriptions   Medication Sig Dispense Refill     acetaminophen (TYLENOL) 325 MG tablet Take 2 tablets (650 mg) by mouth every 4 hours as needed for mild pain 100 tablet 0     albuterol (PROAIR HFA/PROVENTIL HFA/VENTOLIN HFA) 108 (90 Base) MCG/ACT Inhaler Inhale 2 puffs into the lungs every 4 hours as needed for shortness of breath / dyspnea or wheezing       bismuth subsalicylate (PEPTO BISMOL) 262 MG chewable tablet Take 2 tablets by mouth every hour as needed for heartburn Max of 16 tabs per 24 hrs       calcium carbonate (OS-MARY GRACE 500 MG [place]. CA) 500 MG tablet Take 2 tablets by mouth daily       estradiol (ESTRACE) 0.1 MG/GM cream Place 2 g vaginally twice a week       fentaNYL (DURAGESIC) 12 mcg/hr 72 hr patch Place 1 patch onto the skin every 72 hours remove old patch. 3 patch 0     GABAPENTIN PO Take 300 mg by mouth 3 times daily       HYDROcodone-acetaminophen (NORCO) 5-325 MG per tablet Take 1 tablet by mouth every 6 hours as needed for severe pain 20 tablet 0     hydrocortisone 1 % CREA cream Apply topically daily as needed for itching       IBUPROFEN PO Take 400 mg by mouth every 6 hours as needed for moderate pain       latanoprost (XALATAN) 0.005 % ophthalmic solution Place 1 drop into both eyes At Bedtime       Liniments (SALONPAS PAIN RELIEF PATCH EX) Externally apply 1 patch topically daily as needed       Misc Natural Products (OSTEO BI-FLEX ADV DOUBLE ST PO) Take 1 tablet by mouth daily          MEDICATION  "CHANGES/RATIONALE:   Discontinue duoneb prn   Discontinue senna prn   Completed prednisone taper    Controlled medications sent with patient:   Medication: Norco, 15 tabs given to patient at the time of discharge to take home  Patient reports having adequate supply of fentanyl patches at [facility]  Future refill to be provided by PCP              ROS:    10 point ROS of systems including Constitutional, Eyes, Respiratory, Cardiovascular, Gastroenterology, Genitourinary, Integumentary, Muscularskeletal, Psychiatric were all negative except for pertinent positives noted in my HPI.         Physical Exam:   Vitals: /74  Pulse 76  Temp 98.7  F (37.1  C)  Resp 16  Ht 5' 2\" (1.575 m)  Wt 121 lb 4.8 oz (55 kg)  SpO2 94%  BMI 22.19 kg/m2  BMI= Body mass index is 22.19 kg/(m^2).  GENERAL APPEARANCE:  Alert, in no distress, appears healthy, oriented, cooperative  ENT:  Mouth and posterior oropharynx normal, moist mucous membranes, [place]  EYES:  EOM, conjunctivae, lids, pupils and irises normal, PERRL  NECK:  No adenopathy,masses or thyromegaly  RESP:  respiratory effort and palpation of chest normal, lungs clear to auscultation , no respiratory distress, diminished breath sounds at bases  CV:  Palpation and auscultation of heart done , regular rate and rhythm, no murmur, rub, or gallop, no edema, +2 pedal pulses  ABDOMEN:  normal bowel sounds, soft, nontender, no hepatosplenomegaly or other masses, no guarding or rebound  M/S:   Gait and station abnormal, requires assistance with activity, uses walker. Digits and nails normal  SKIN:  Inspection of skin and subcutaneous tissue baseline, Palpation of skin and subcutaneous tissue baseline  NEURO:   Cranial nerves 2-12 are normal tested and grossly at patient's baseline, Examination of sensation by touch normal  PSYCH:  Oriented X 3, cognitive testing SLUMS 24/30           DISCHARGE PLAN:  Occupational Therapy, Physical Therapy, Registered Nurse, Home Health Aide and From:  " [facility]  Patient instructed to follow-up with:  PCP in 7 days      Current [facility] scheduled appointments:  1. Patient to follow-up with PCP within 7 days of discharge from TCU.  2. Patient to follow-up with Ortho as directed    MTM referral needed and placed by this provider: No          Pending labs: None    SNF labs   CBC RESULTS:   Recent Labs   Lab Test  07/02/18   0448   WBC  8.3   RBC  4.11   HGB  12.3   HCT  38.7   MCV  94   MCH  29.9   MCHC  31.8   RDW  13.2   PLT  315     Last Basic Metabolic Panel:  Lab Results   Component Value Date     07/02/2018      Lab Results   Component Value Date    POTASSIUM 3.4 07/02/2018     Lab Results   Component Value Date    CHLORIDE 106 07/02/2018     Lab Results   Component Value Date    MARY GRACE 8.5 07/02/2018     Lab Results   Component Value Date    CO2 30 07/02/2018     Lab Results   Component Value Date    BUN 15 07/02/2018     Lab Results   Component Value Date    CR 0.55 07/02/2018     Lab Results   Component Value Date    GLC 76 07/02/2018               TOTAL DISCHARGE TIME:   Greater than 30 minutes        Electronically signed by:  CAT Sanchez CNP

## 2018-07-09 NOTE — LETTER
7/9/2018        RE: Kelsey TAPIA Israel  [address]  [place] [address]          [facility] DISCHARGE SUMMARY    PATIENT'S NAME: Kelsey Wood  YOB: 1929  MEDICAL RECORD NUMBER:  [id number]    PRIMARY CARE PROVIDER AND CLINIC RESPONSIBLE AFTER TRANSFER: [facility] [place] [facility] [address] / [place]    CODE STATUS/ADVANCE DIRECTIVES DISCUSSION:   CPR/Full code      No Known Allergies    TRANSFERRING PROVIDERS: CAT Sanchez CNP; Gustavo Parker MD  DATE OF SNF ADMISSION:  June / 28 / 2018  DATE OF SNF (anticipated) DISCHARGE: July / 10 / 2018  DISCHARGE DISPOSITION: Assisted Living: [facility] [place]   Nursing Facility: [facility] stay 6/25/18 to 6/28/18.     Condition on Discharge:  Improving.  Function:  Ambulates 150ft with walker  Cognitive Scores: SLUMS 24/30    Equipment: walker      DISCHARGE DIAGNOSIS:   1. Mild intermittent asthma without complication    2. Physical deconditioning    3. Other chronic pain    4. Osteoarthritis of both knees, unspecified osteoarthritis type        HPI Nursing Facility Course:  HPI information obtained from: facility chart records, facility staff, patient report and [facility] chart review.    Mild intermittent asthma without complication  Patient admitted to [facility] 6/25-6/28 d/t asthma exacerbation. Was treated with IV solumedrol and transitioned to prednisone taper x 5 days. CXR without acute infiltrates. During exam, denies SOB at rest. Admits to mild SOB with activity. O2 sats stable on RA. Currently taking albuterol inhaler prn, has not used in the past week.     Physical deconditioning  PTA lives in [facility]. Patient is actively participating in therapy sessions. Ambulates 150ft with walker. Requires assistance with transfers and ADLs. SLUMS 24/30. SW following for discharge planning, patient discharging back to previous [facility] with  home care  Problem: Patient Care Overview (Adult)  Goal: Plan of Care Review  Outcome: Ongoing (interventions implemented as appropriate)    11/14/17 0307   Coping/Psychosocial Response Interventions   Plan Of Care Reviewed With patient   Patient Care Overview   Progress no change   Outcome Evaluation   Outcome Summary/Follow up Plan Kidney functions results more elevated, VSS, a/o, for placement of dialysis cath today, consent to be signed-- patient still have questions about procedure and would like to talk to doctor, sonya pigtail flushing fine with blood return present, no pain complaints, will continue to monitor       Goal: Adult Individualization and Mutuality  Outcome: Ongoing (interventions implemented as appropriate)    11/14/17 0307   Individualization   Patient Specific Goals monitor labs, maintained on bed alarm, monitor for signs of bleeding   Patient Specific Interventions labs ordered, bed alarm on, rounded most often         Problem: Fall Risk (Adult)  Goal: Identify Related Risk Factors and Signs and Symptoms  Outcome: Ongoing (interventions implemented as appropriate)    11/14/17 0307   Fall Risk   Fall Risk: Related Risk Factors gait/mobility problems;environment unfamiliar;fatigue/slow reaction   Fall Risk: Signs and Symptoms presence of risk factors       Goal: Absence of Falls  Outcome: Ongoing (interventions implemented as appropriate)    11/14/17 0307   Fall Risk (Adult)   Absence of Falls unable to achieve outcome         Problem: Renal Failure/Kidney Injury, Acute (Adult)  Goal: Signs and Symptoms of Listed Potential Problems Will be Absent or Manageable (Renal Failure/Kidney Injury, Acute)  Outcome: Ongoing (interventions implemented as appropriate)    11/14/17 0307   Renal Failure/Kidney Injury, Acute   Problems Assessed (Acute Renal Failure/Kidney Injury) all   Problems Present (Acute Renal Failure/Kidney Injury) drug/nephrotoxicity;electrolyte imbalance;situational response;uremic syndrome          Problem: Fluid Volume Deficit (Adult)  Goal: Fluid/Electrolyte Balance  Outcome: Ongoing (interventions implemented as appropriate)    11/14/17 0307   Fluid Volume Deficit (Adult)   Fluid/Electrolyte Balance making progress toward outcome       Goal: Comfort/Well Being  Outcome: Ongoing (interventions implemented as appropriate)    11/14/17 0307   Fluid Volume Deficit (Adult)   Comfort/Well Being making progress toward outcome            services, including home PT, OT, RN, HHA.     Other chronic pain  Osteoarthritis of both knees, unspecified osteoarthritis type  Has hx chronic pain secondary to bilateral knee osteoarthritis and chronic right hip pain. Currently taking gabapentin, ibuprofen prn, norco TID prn, and fentanyl patches. During exam, patient reports pain controlled with current regimen. Patient states she is followed by Ortho every 3 months, patient to f/u as directed.           PAST MEDICAL HISTORY:  has a past medical history of Asthma and Hard of hearing.    DISCHARGE MEDICATIONS:  Current Outpatient Prescriptions   Medication Sig Dispense Refill     acetaminophen (TYLENOL) 325 MG tablet Take 2 tablets (650 mg) by mouth every 4 hours as needed for mild pain 100 tablet 0     albuterol (PROAIR HFA/PROVENTIL HFA/VENTOLIN HFA) 108 (90 Base) MCG/ACT Inhaler Inhale 2 puffs into the lungs every 4 hours as needed for shortness of breath / dyspnea or wheezing       bismuth subsalicylate (PEPTO BISMOL) 262 MG chewable tablet Take 2 tablets by mouth every hour as needed for heartburn Max of 16 tabs per 24 hrs       calcium carbonate (OS-MARY GRACE 500 MG [place]. [place]) 500 MG tablet Take 2 tablets by mouth daily       estradiol (ESTRACE) 0.1 MG/GM cream Place 2 g vaginally twice a week       fentaNYL (DURAGESIC) 12 mcg/hr 72 hr patch Place 1 patch onto the skin every 72 hours remove old patch. 3 patch 0     GABAPENTIN PO Take 300 mg by mouth 3 times daily       HYDROcodone-acetaminophen (NORCO) 5-325 MG per tablet Take 1 tablet by mouth every 6 hours as needed for severe pain 20 tablet 0     hydrocortisone 1 % CREA cream Apply topically daily as needed for itching       IBUPROFEN PO Take 400 mg by mouth every 6 hours as needed for moderate pain       latanoprost (XALATAN) 0.005 % ophthalmic solution Place 1 drop into both eyes At Bedtime       Liniments (SALONPAS PAIN RELIEF PATCH EX) Externally apply 1 patch topically daily as needed       Misc Natural  "Products (OSTEO BI-FLEX ADV DOUBLE ST PO) Take 1 tablet by mouth daily          MEDICATION CHANGES/RATIONALE:   Discontinue duoneb prn   Discontinue senna prn   Completed prednisone taper    Controlled medications sent with patient:   Medication: Norco, 15 tabs given to patient at the time of discharge to take home  Patient reports having adequate supply of fentanyl patches at [facility]  Future refill to be provided by PCP              ROS:    10 point ROS of systems including Constitutional, Eyes, Respiratory, Cardiovascular, Gastroenterology, Genitourinary, Integumentary, Muscularskeletal, Psychiatric were all negative except for pertinent positives noted in my HPI.         Physical Exam:   Vitals: /74  Pulse 76  Temp 98.7  F (37.1  C)  Resp 16  Ht 5' 2\" (1.575 m)  Wt 121 lb 4.8 oz (55 kg)  SpO2 94%  BMI 22.19 kg/m2  BMI= Body mass index is 22.19 kg/(m^2).  GENERAL APPEARANCE:  Alert, in no distress, appears healthy, oriented, cooperative  ENT:  Mouth and posterior oropharynx normal, moist mucous membranes, Craig  EYES:  EOM, conjunctivae, lids, pupils and irises normal, PERRL  NECK:  No adenopathy,masses or thyromegaly  RESP:  respiratory effort and palpation of chest normal, lungs clear to auscultation , no respiratory distress, diminished breath sounds at bases  CV:  Palpation and auscultation of heart done , regular rate and rhythm, no murmur, rub, or gallop, no edema, +2 pedal pulses  ABDOMEN:  normal bowel sounds, soft, nontender, no hepatosplenomegaly or other masses, no guarding or rebound  M/S:   Gait and station abnormal, requires assistance with activity, uses walker. Digits and nails normal  SKIN:  Inspection of skin and subcutaneous tissue baseline, Palpation of skin and subcutaneous tissue baseline  NEURO:   Cranial nerves 2-12 are normal tested and grossly at patient's baseline, Examination of sensation by touch normal  PSYCH:  Oriented X 3, cognitive testing SLUMS 24/30           DISCHARGE " PLAN:  Occupational Therapy, Physical Therapy, Registered Nurse, Home Health Aide and From:  [facility]  Patient instructed to follow-up with:  PCP in 7 days      Current [place] scheduled appointments:  1. Patient to follow-up with PCP within 7 days of discharge from TCU.  2. Patient to follow-up with Ortho as directed    MTM referral needed and placed by this provider: No          Pending labs: None    SNF labs   CBC RESULTS:   Recent Labs   Lab Test  07/02/18   0448   WBC  8.3   RBC  4.11   HGB  12.3   HCT  38.7   MCV  94   MCH  29.9   MCHC  31.8   RDW  13.2   PLT  315     Last Basic Metabolic Panel:  Lab Results   Component Value Date     07/02/2018      Lab Results   Component Value Date    POTASSIUM 3.4 07/02/2018     Lab Results   Component Value Date    CHLORIDE 106 07/02/2018     Lab Results   Component Value Date    MARY GRACE 8.5 07/02/2018     Lab Results   Component Value Date    CO2 30 07/02/2018     Lab Results   Component Value Date    BUN 15 07/02/2018     Lab Results   Component Value Date    CR 0.55 07/02/2018     Lab Results   Component Value Date    GLC 76 07/02/2018               TOTAL DISCHARGE TIME:   Greater than 30 minutes        Electronically signed by:  CAT Sanchez CNP      Sincerely,        CAT Sanchez CNP

## 2018-07-10 NOTE — PATIENT INSTRUCTIONS
[facility] Discharge Orders    Name: Kelsey Wood  : 1929  Planned Discharge Date: 7/10/18  Discharged to: previous assisted living    MEDICAL FOLLOW UP  Follow up with PCP in 1-2 weeks   Follow up with Specialists: Orthopedist      FUTURE LABS: No labs orders/due    ORDER CHANGES:  Discontinue duoneb prn   Discontinue senna prn   Completed prednisone taper  DISCHARGE MEDICATIONS:  The patient s pharmacy is authorized to dispense a 30-day supply of medications. Refill requests should be directed to the primary provider, Demetrio Arriaza     Medication: Norco, 15 tabs given to patient at the time of discharge to take home  Patient reports having adequate supply of fentanyl patches at [facility]  Future refill to be provided by PCP    Current Outpatient Prescriptions   Medication Sig Dispense Refill     acetaminophen (TYLENOL) 325 MG tablet Take 2 tablets (650 mg) by mouth every 4 hours as needed for mild pain 100 tablet 0     albuterol (PROAIR HFA/PROVENTIL HFA/VENTOLIN HFA) 108 (90 Base) MCG/ACT Inhaler Inhale 2 puffs into the lungs every 4 hours as needed for shortness of breath / dyspnea or wheezing       bismuth subsalicylate (PEPTO BISMOL) 262 MG chewable tablet Take 2 tablets by mouth every hour as needed for heartburn Max of 16 tabs per 24 hrs       calcium carbonate (OS-MARY GRACE 500 MG [place]. [place]) 500 MG tablet Take 2 tablets by mouth daily       estradiol (ESTRACE) 0.1 MG/GM cream Place 2 g vaginally twice a week       fentaNYL (DURAGESIC) 12 mcg/hr 72 hr patch Place 1 patch onto the skin every 72 hours remove old patch. 3 patch 0     GABAPENTIN PO Take 300 mg by mouth 3 times daily       HYDROcodone-acetaminophen (NORCO) 5-325 MG per tablet Take 1 tablet by mouth every 6 hours as needed for severe pain 20 tablet 0     hydrocortisone 1 % CREA cream Apply topically daily as needed for itching       IBUPROFEN PO Take 400 mg by mouth every 6 hours as needed for moderate pain       latanoprost (XALATAN)  0.005 % ophthalmic solution Place 1 drop into both eyes At Bedtime       Liniments (SALONPAS PAIN RELIEF PATCH EX) Externally apply 1 patch topically daily as needed       Misc Natural Products (OSTEO BI-FLEX ADV DOUBLE ST PO) Take 1 tablet by mouth daily          SERVICES:Home Care:  Occupational Therapy, Physical Therapy, Registered Nurse, Home Health Aide and From:  [facility]        Hali Boss, CAT CNP  This document was electronically signed on July 9, 2018

## 2018-07-23 ENCOUNTER — RECORDS - HEALTHEAST (OUTPATIENT)
Dept: LAB | Facility: CLINIC | Age: 83
End: 2018-07-23

## 2018-07-23 LAB
ALBUMIN UR-MCNC: ABNORMAL MG/DL
AMORPH CRY #/AREA URNS HPF: ABNORMAL /[HPF]
APPEARANCE UR: ABNORMAL
BACTERIA #/AREA URNS HPF: ABNORMAL HPF
BILIRUB UR QL STRIP: NEGATIVE
COLOR UR AUTO: YELLOW
GLUCOSE UR STRIP-MCNC: NEGATIVE MG/DL
HGB UR QL STRIP: NEGATIVE
HYALINE CASTS #/AREA URNS LPF: ABNORMAL LPF
KETONES UR STRIP-MCNC: NEGATIVE MG/DL
LEUKOCYTE ESTERASE UR QL STRIP: ABNORMAL
NITRATE UR QL: NEGATIVE
PH UR STRIP: 7 [PH] (ref 4.5–8)
RBC #/AREA URNS AUTO: ABNORMAL HPF
SP GR UR STRIP: 1.02 (ref 1–1.03)
SQUAMOUS #/AREA URNS AUTO: ABNORMAL LPF
UROBILINOGEN UR STRIP-ACNC: ABNORMAL
WBC #/AREA URNS AUTO: ABNORMAL HPF

## 2018-07-25 LAB — BACTERIA SPEC CULT: NORMAL

## 2018-09-05 ENCOUNTER — RECORDS - HEALTHEAST (OUTPATIENT)
Dept: LAB | Facility: CLINIC | Age: 83
End: 2018-09-05

## 2018-09-05 LAB
ALBUMIN UR-MCNC: ABNORMAL MG/DL
APPEARANCE UR: ABNORMAL
BACTERIA #/AREA URNS HPF: ABNORMAL HPF
BILIRUB UR QL STRIP: NEGATIVE
CAOX CRY #/AREA URNS HPF: PRESENT /[HPF]
COLOR UR AUTO: YELLOW
GLUCOSE UR STRIP-MCNC: NEGATIVE MG/DL
HGB UR QL STRIP: NEGATIVE
KETONES UR STRIP-MCNC: NEGATIVE MG/DL
LEUKOCYTE ESTERASE UR QL STRIP: ABNORMAL
NITRATE UR QL: NEGATIVE
PH UR STRIP: 6 [PH] (ref 4.5–8)
RBC #/AREA URNS AUTO: ABNORMAL HPF
SP GR UR STRIP: 1.02 (ref 1–1.03)
SQUAMOUS #/AREA URNS AUTO: >100 LPF
UROBILINOGEN UR STRIP-ACNC: ABNORMAL
WBC #/AREA URNS AUTO: ABNORMAL HPF

## 2018-09-06 LAB — BACTERIA SPEC CULT: NO GROWTH

## 2018-09-21 ENCOUNTER — APPOINTMENT (OUTPATIENT)
Dept: ULTRASOUND IMAGING | Facility: CLINIC | Age: 83
End: 2018-09-21
Attending: EMERGENCY MEDICINE
Payer: MEDICARE

## 2018-09-21 ENCOUNTER — APPOINTMENT (OUTPATIENT)
Dept: GENERAL RADIOLOGY | Facility: CLINIC | Age: 83
End: 2018-09-21
Attending: EMERGENCY MEDICINE
Payer: MEDICARE

## 2018-09-21 ENCOUNTER — HOSPITAL ENCOUNTER (OUTPATIENT)
Facility: CLINIC | Age: 83
Setting detail: OBSERVATION
Discharge: HOME | End: 2018-09-23
Attending: EMERGENCY MEDICINE | Admitting: INTERNAL MEDICINE
Payer: MEDICARE

## 2018-09-21 DIAGNOSIS — L03.116 CELLULITIS OF LEFT LOWER EXTREMITY: ICD-10-CM

## 2018-09-21 DIAGNOSIS — G89.29 OTHER CHRONIC PAIN: ICD-10-CM

## 2018-09-21 DIAGNOSIS — M79.605 PAIN OF LEFT LOWER EXTREMITY: Primary | ICD-10-CM

## 2018-09-21 PROBLEM — M79.606 LEG PAIN: Status: ACTIVE | Noted: 2018-09-21

## 2018-09-21 PROCEDURE — G0378 HOSPITAL OBSERVATION PER HR: HCPCS

## 2018-09-21 PROCEDURE — 99285 EMERGENCY DEPT VISIT HI MDM: CPT | Mod: 25

## 2018-09-21 PROCEDURE — 25000128 H RX IP 250 OP 636: Performed by: EMERGENCY MEDICINE

## 2018-09-21 PROCEDURE — A9270 NON-COVERED ITEM OR SERVICE: HCPCS | Mod: GY | Performed by: INTERNAL MEDICINE

## 2018-09-21 PROCEDURE — 99207 ZZC CDG-HISTORY COMP: MEETS EXP. PROB FOCUSED- DOWN CODED LACK OF PFSH: CPT | Performed by: INTERNAL MEDICINE

## 2018-09-21 PROCEDURE — A9270 NON-COVERED ITEM OR SERVICE: HCPCS | Mod: GY | Performed by: EMERGENCY MEDICINE

## 2018-09-21 PROCEDURE — 25000132 ZZH RX MED GY IP 250 OP 250 PS 637: Mod: GY | Performed by: INTERNAL MEDICINE

## 2018-09-21 PROCEDURE — 96366 THER/PROPH/DIAG IV INF ADDON: CPT

## 2018-09-21 PROCEDURE — 93971 EXTREMITY STUDY: CPT | Mod: LT

## 2018-09-21 PROCEDURE — 25000132 ZZH RX MED GY IP 250 OP 250 PS 637: Mod: GY | Performed by: EMERGENCY MEDICINE

## 2018-09-21 PROCEDURE — 99207 ZZC CDG-CODE CATEGORY CHANGED: CPT | Performed by: INTERNAL MEDICINE

## 2018-09-21 PROCEDURE — 96365 THER/PROPH/DIAG IV INF INIT: CPT

## 2018-09-21 PROCEDURE — 73590 X-RAY EXAM OF LOWER LEG: CPT | Mod: LT

## 2018-09-21 PROCEDURE — 99218 ZZC INITIAL OBSERVATION CARE,LEVL I: CPT | Performed by: INTERNAL MEDICINE

## 2018-09-21 RX ORDER — HYDROMORPHONE HYDROCHLORIDE 1 MG/ML
0.2 INJECTION, SOLUTION INTRAMUSCULAR; INTRAVENOUS; SUBCUTANEOUS
Status: DISCONTINUED | OUTPATIENT
Start: 2018-09-21 | End: 2018-09-22

## 2018-09-21 RX ORDER — POLYETHYLENE GLYCOL 3350 17 G/17G
17 POWDER, FOR SOLUTION ORAL DAILY PRN
Status: DISCONTINUED | OUTPATIENT
Start: 2018-09-21 | End: 2018-09-23 | Stop reason: HOSPADM

## 2018-09-21 RX ORDER — CEPHALEXIN 500 MG/1
500 CAPSULE ORAL EVERY 6 HOURS SCHEDULED
Status: DISCONTINUED | OUTPATIENT
Start: 2018-09-22 | End: 2018-09-22

## 2018-09-21 RX ORDER — BISACODYL 5 MG
15 TABLET, DELAYED RELEASE (ENTERIC COATED) ORAL DAILY PRN
Status: DISCONTINUED | OUTPATIENT
Start: 2018-09-21 | End: 2018-09-23 | Stop reason: HOSPADM

## 2018-09-21 RX ORDER — ACETAMINOPHEN 500 MG
1000 TABLET ORAL ONCE
Status: COMPLETED | OUTPATIENT
Start: 2018-09-21 | End: 2018-09-21

## 2018-09-21 RX ORDER — NALOXONE HYDROCHLORIDE 0.4 MG/ML
.1-.4 INJECTION, SOLUTION INTRAMUSCULAR; INTRAVENOUS; SUBCUTANEOUS
Status: DISCONTINUED | OUTPATIENT
Start: 2018-09-21 | End: 2018-09-23 | Stop reason: HOSPADM

## 2018-09-21 RX ORDER — ALBUTEROL SULFATE 90 UG/1
2 AEROSOL, METERED RESPIRATORY (INHALATION) EVERY 4 HOURS PRN
Status: DISCONTINUED | OUTPATIENT
Start: 2018-09-21 | End: 2018-09-23 | Stop reason: HOSPADM

## 2018-09-21 RX ORDER — HYDROCODONE BITARTRATE AND ACETAMINOPHEN 5; 325 MG/1; MG/1
1 TABLET ORAL 3 TIMES DAILY
Status: ON HOLD | COMMUNITY
End: 2018-09-23

## 2018-09-21 RX ORDER — BISACODYL 5 MG
10 TABLET, DELAYED RELEASE (ENTERIC COATED) ORAL DAILY PRN
Status: DISCONTINUED | OUTPATIENT
Start: 2018-09-21 | End: 2018-09-23 | Stop reason: HOSPADM

## 2018-09-21 RX ORDER — CEFAZOLIN SODIUM 2 G/100ML
2 INJECTION, SOLUTION INTRAVENOUS ONCE
Status: COMPLETED | OUTPATIENT
Start: 2018-09-21 | End: 2018-09-21

## 2018-09-21 RX ORDER — ACETAMINOPHEN 325 MG/1
650 TABLET ORAL EVERY 4 HOURS PRN
Status: DISCONTINUED | OUTPATIENT
Start: 2018-09-21 | End: 2018-09-22

## 2018-09-21 RX ORDER — PROCHLORPERAZINE MALEATE 5 MG
5 TABLET ORAL EVERY 6 HOURS PRN
Status: DISCONTINUED | OUTPATIENT
Start: 2018-09-21 | End: 2018-09-23 | Stop reason: HOSPADM

## 2018-09-21 RX ORDER — ONDANSETRON 4 MG/1
4 TABLET, ORALLY DISINTEGRATING ORAL EVERY 6 HOURS PRN
Status: DISCONTINUED | OUTPATIENT
Start: 2018-09-21 | End: 2018-09-23 | Stop reason: HOSPADM

## 2018-09-21 RX ORDER — LATANOPROST 50 UG/ML
1 SOLUTION/ DROPS OPHTHALMIC AT BEDTIME
Status: DISCONTINUED | OUTPATIENT
Start: 2018-09-21 | End: 2018-09-23 | Stop reason: HOSPADM

## 2018-09-21 RX ORDER — IBUPROFEN 400 MG/1
400 TABLET, FILM COATED ORAL EVERY 6 HOURS PRN
Status: DISCONTINUED | OUTPATIENT
Start: 2018-09-21 | End: 2018-09-23 | Stop reason: HOSPADM

## 2018-09-21 RX ORDER — PROCHLORPERAZINE 25 MG
12.5 SUPPOSITORY, RECTAL RECTAL EVERY 12 HOURS PRN
Status: DISCONTINUED | OUTPATIENT
Start: 2018-09-21 | End: 2018-09-23 | Stop reason: HOSPADM

## 2018-09-21 RX ORDER — BISACODYL 5 MG
5 TABLET, DELAYED RELEASE (ENTERIC COATED) ORAL DAILY PRN
Status: DISCONTINUED | OUTPATIENT
Start: 2018-09-21 | End: 2018-09-23 | Stop reason: HOSPADM

## 2018-09-21 RX ORDER — HYDROCODONE BITARTRATE AND ACETAMINOPHEN 5; 325 MG/1; MG/1
1 TABLET ORAL EVERY 4 HOURS PRN
Status: DISCONTINUED | OUTPATIENT
Start: 2018-09-21 | End: 2018-09-23 | Stop reason: HOSPADM

## 2018-09-21 RX ORDER — ACETAMINOPHEN 650 MG/1
650 SUPPOSITORY RECTAL EVERY 4 HOURS PRN
Status: DISCONTINUED | OUTPATIENT
Start: 2018-09-21 | End: 2018-09-23 | Stop reason: HOSPADM

## 2018-09-21 RX ORDER — AMOXICILLIN 250 MG
2 CAPSULE ORAL 2 TIMES DAILY
Status: DISCONTINUED | OUTPATIENT
Start: 2018-09-21 | End: 2018-09-23 | Stop reason: HOSPADM

## 2018-09-21 RX ORDER — ONDANSETRON 2 MG/ML
4 INJECTION INTRAMUSCULAR; INTRAVENOUS EVERY 6 HOURS PRN
Status: DISCONTINUED | OUTPATIENT
Start: 2018-09-21 | End: 2018-09-23 | Stop reason: HOSPADM

## 2018-09-21 RX ORDER — AMOXICILLIN 250 MG
1 CAPSULE ORAL 2 TIMES DAILY
Status: DISCONTINUED | OUTPATIENT
Start: 2018-09-21 | End: 2018-09-23 | Stop reason: HOSPADM

## 2018-09-21 RX ADMIN — IBUPROFEN 400 MG: 400 TABLET, FILM COATED ORAL at 23:38

## 2018-09-21 RX ADMIN — CEPHALEXIN 500 MG: 500 CAPSULE ORAL at 23:38

## 2018-09-21 RX ADMIN — LATANOPROST 1 DROP: 50 SOLUTION/ DROPS OPHTHALMIC at 22:54

## 2018-09-21 RX ADMIN — ACETAMINOPHEN 1000 MG: 500 TABLET, FILM COATED ORAL at 19:19

## 2018-09-21 RX ADMIN — ACETAMINOPHEN 650 MG: 325 TABLET, FILM COATED ORAL at 22:54

## 2018-09-21 RX ADMIN — CEFAZOLIN SODIUM 2 G: 2 INJECTION, SOLUTION INTRAVENOUS at 20:00

## 2018-09-21 ASSESSMENT — PAIN DESCRIPTION - DESCRIPTORS
DESCRIPTORS: DISCOMFORT
DESCRIPTORS: ACHING

## 2018-09-21 NOTE — ED PROVIDER NOTES
"  History     Chief Complaint:  Leg Pain     HPI   Kelsey Wood is a 89 year old female, with a history of osteoarthritis, who presents with left leg pain. The patient states that she has had left leg pain ongoing in the past week, but today, notes that her left leg was more sore and \"heard a crack,\" prompting her ED visit. Here, the patient states that she does have chronic left knee pain and has noticed redness on the anterior surface of her left shin. She notes that she has been able to bear weight on the left leg, but with increased pain. The patient is not on any anticoagulates.     Allergies:  NKDA     Medications:    Albuterol  Estradiol  Gabapentin  Xalatan    Past Medical History:    Asthma  Hard of Hearing  Osteoarthritis    Past Surgical History:    Cholecystectomy  Laparoscopic appendectomy    Family History:    No past pertinent family history.     Social History:  Presents with her son.   Negative for alcohol use.  Negative for tobacco use.   Marital Status:   [5]     Review of Systems   Musculoskeletal:        Left leg pain   Skin: Positive for rash.   All other systems reviewed and are negative.      Physical Exam   First Vitals:  BP: 157/77  Pulse: 71  Heart Rate: 71  Temp: 98  F (36.7  C)  Resp: 16  SpO2: 99 %    Physical Exam  General: Laying in gurney, comfortable.  HEENT:   The scalp and head appear normal    The pupils are equal, round, and reactive to light    Extraocular muscles are intact.    The nose is normal.    The oropharynx is normal.      Uvula is in the midline.    Neck:  Normal range of motion.    Lungs:  Clear.      No rales, no wheezing.      There is no tachypnea.  Non-labored.  Cardiac: Regular rate.      Normal S1 and S2.      No pathological murmur/rub    Abdomen: Soft. No distension, no localized tenderness or rebound.  MS:  Normal tone. Normal movement of all extremities. Mild appearing erythema, distal third of tibial region and medially. 6 cm long by 4 cm wide area " of redness. Slightly increased warmth to touch and definite tenderness. No posterior erythema or tenderness. No effusions of the left ankle or knee.   Neurologic:     Normal mentation.  No cranial nerve deficits.  No focal motor or sensory                            changes.      Speech normal.  Psych:  Awake.     Alert.      Normal affect.      Appropriate interactions.  Skin:  No rash.      No lesions.    Emergency Department Course   Imaging:  Radiographic findings were communicated with the patient who voiced understanding of the findings.  XR Tibia and Fibula, Left, 2 views:   No evidence of acute fracture or malalignment.  As per radiology.     US Lower Extremity Venous Duplex, Left:  Negative. As per radiology.     Interventions:  1919 Tylenol, 1000 mg, PO  2000 Ancef, 2 g, IV     Emergency Department Course:  Nursing notes and vitals reviewed.     1905  I performed an exam of the patient as documented above.     IV inserted. Medicine administered as documented above.     The patient was sent for a tibia and fibula XR and LE US while in the emergency department, findings above.     2007  I consulted with Dr. Rasheed of the hospitalist services. They are in agreement to accept the patient for admission.    Findings and plan explained to the Patient who consents to admission. Discussed the patient with Dr. Rasheed, who will admit the patient to an observation bed for further monitoring, evaluation, and treatment.     Impression & Plan      Medical Decision Making:  Kelsey Wood is a 89 year old female who presents with left leg pain. She felt that something had snapped today. An XR was done which showed no bony abnormality. Examination reveals tenderness in the distal third. This is discussed in the physical exam of the left lower leg. There is some erythema present where she is tender consistent with cellulitis. Duplex Doppler was negative, although, there is no posterior calf tenderness. She was told that she  might have a blood clot and was expecting a Doppler study. She has intact pulses distally. I told her that we will start antibiotics and have her follow up. I highlighted the red area with a marker, but she said that it was too painful to bear weight. For that reason, she is being admitted for IV antibiotics.       Diagnosis:    ICD-10-CM   1. Cellulitis of left lower extremity L03.116       Disposition:  Admitted to Dr. Wili VANESSA, Lashonda Hanna, am serving as a scribe on 9/21/2018 at 7:05 PM to personally document services performed by Mello Crane MD based on my observations and the provider's statements to me.      Lashonda Hanna  9/21/2018   [facility] EMERGENCY DEPARTMENT       Mello Crane MD  09/22/18 0235

## 2018-09-21 NOTE — IP AVS SNAPSHOT
[facility] Observation Department    [address]    [facility] [address]    Phone:  [phone]                                       After Visit Summary   9/21/2018    Kelsey Wood    MRN: [id number]           After Visit Summary Signature Page     I have received my discharge instructions, and my questions have been answered. I have discussed any challenges I see with this plan with the nurse or doctor.    ..........................................................................................................................................  Patient/Patient Representative Signature      ..........................................................................................................................................  Patient Representative Print Name and Relationship to Patient    ..................................................               ................................................  Date                                   Time    ..........................................................................................................................................  Reviewed by Signature/Title    ...................................................              ..............................................  Date                                               Time          22EPIC Rev 08/18

## 2018-09-21 NOTE — IP AVS SNAPSHOT
Kelsey Wood #[id number] (CSN:[id number])  (89 year old F)  (Adm: 18)     [id number]               [facility] OBSERVATION DEPARTMENT: [phone]            Patient Demographics     Patient Name Sex          Age SSN Address Phone    IsraelKelsey Female 1929 (89 year old) [id number]   [facility] [address] [phone] (Home)  [phone] (Mobile)      Emergency Contact(s)     Name Relation Home Work Mobile    Sha Wood [phone]      Admission Information     Attending Provider Admitting Provider Admission Type Admission Date/Time    Dash Rasheed MD Oljira, Amanuel Giragn, MD Emergency 18  1747    Discharge Date Hospital Service Auth/Cert Status Service Area     Observation Incomplete [facility] SERVICES    Unit Room/Bed Admission Status        OBSERVATION DEPT  Admission (Confirmed)       Admission     Complaint    Leg pain      Hospital Account     Name Acct ID Class Status Primary Coverage    Kelsey Wood [id number] Observation Open MEDICARE - MEDICARE FOR HB SUPPLEMENT            Guarantor Account (for Hospital Account #[id number])     Name Relation to Pt Service Area Active? Acct Type    Kelsey Wood Self FCS Yes Personal/Family    Address Phone          [address]   [place] [address] [phone](H)              Coverage Information (for Hospital Account #[id number])     1. MEDICARE/MEDICARE FOR HB SUPPLEMENT     F/O Payor/Plan Precert #    MEDICARE/MEDICARE FOR HB SUPPLEMENT     Subscriber Subscriber #    Kelsey Wood [id number]    Address Phone    ATTN CLAIMS  [address]  [facility] [place] [address] [phone]          2. HEALTH[employer]ERS/HE[employer] FREEDOM PLAN     F/O Payor/Plan Precert #    HE[employer]NERS/HE[employer] FREEDOM PLAN     Subscriber Subscriber #    Kelsey Wood [id number]    Address Phone    [address]  [place] [address] [phone]     "                                                  INTERAGENCY TRANSFER FORM - PHYSICIAN ORDERS   9/21/2018                       [facility] OBSERVATION DEPARTMENT: [phone]            Attending Provider: Dash Rasheed MD     Allergies:  No Known Allergies    Infection:  None   Service:  Observation    Ht:  1.575 m (5' 2\")   Wt:  57.2 kg (126 lb 3.2 oz)   Admission Wt:  57.2 kg (126 lb 3.2 oz)    BMI:  23.08 kg/m 2   BSA:  1.58 m 2            ED Clinical Impression     Diagnosis Description Comment Added By Time Added    Cellulitis of left lower extremity [L03.116] Cellulitis of left lower extremity [L03.116]  Mello Crane MD 9/21/2018  7:12 PM      Hospital Problems as of 9/23/2018     None      Non-Hospital Problems as of 9/23/2018              Priority Class Noted    Mild intermittent asthma without complication Medium  6/26/2018    Other chronic pain Medium  6/29/2018    Weakness Medium  6/29/2018    Physical deconditioning Medium  7/9/2018    Osteoarthritis of both knees, unspecified osteoarthritis type Medium  7/9/2018      Code Status History     Date Active Date Inactive Code Status Order ID Comments User Context    6/28/2018  8:28 AM 9/21/2018 10:00 PM Full Code [id number]  Katheryn Coleman PA-C Outpatient    6/26/2018 12:10 AM 6/28/2018  8:28 AM Full Code [id number]  Nilesh Madison DO Inpatient      Current Code Status     Date Active Code Status Order ID Comments User Context       9/21/2018 10:00 PM DNR/DNI [id number]  Dash Rasheed MD Inpatient       Questions for Current Code Status     Question Answer Comment    Code status determined by: Discussion with patient/legal decision maker       Summary of Visit     Reason for your hospital stay       You were admitted for concerns of left leg pain. Your work up did not reveal presence of acute infection or blood clot. Left leg appearance is consistent with a bruise. Ok to use scheduled tylenol for pain and keep " left leg elevated. Ok to use ace wrap for support.                Medication Review      START taking        Dose / Directions Comments    senna-docusate 8.6-50 MG per tablet   Commonly known as:  SENOKOT-S;PERICOLACE   Used for:  Other chronic pain        Dose:  1 tablet   Take 1 tablet by mouth 2 times daily   Quantity:  100 tablet   Refills:  0          CONTINUE these medications which may have CHANGED, or have new prescriptions. If we are uncertain of the size of tablets/capsules you have at home, strength may be listed as something that might have changed.        Dose / Directions Comments    * acetaminophen 325 MG tablet   Commonly known as:  TYLENOL   This may have changed:  Another medication with the same name was added. Make sure you understand how and when to take each.   Used for:  Other chronic pain        Dose:  650 mg   Take 2 tablets (650 mg) by mouth every 4 hours as needed for mild pain   Quantity:  100 tablet   Refills:  0        * acetaminophen 325 MG tablet   Commonly known as:  TYLENOL   This may have changed:  You were already taking a medication with the same name, and this prescription was added. Make sure you understand how and when to take each.        Dose:  975 mg   Take 3 tablets (975 mg) by mouth every 8 hours   Quantity:  100 tablet   Refills:  0        ibuprofen 200 MG tablet   Commonly known as:  ADVIL/MOTRIN   This may have changed:    - medication strength  - how much to take  - when to take this   Used for:  Other chronic pain        Dose:  200 mg   Take 1 tablet (200 mg) by mouth daily as needed for moderate pain   Quantity:  100 tablet   Refills:  0        * Notice:  This list has 2 medication(s) that are the same as other medications prescribed for you. Read the directions carefully, and ask your doctor or other care provider to review them with you.      CONTINUE these medications which have NOT CHANGED        Dose / Directions Comments    albuterol 108 (90 Base) MCG/ACT  inhaler   Commonly known as:  PROAIR HFA/PROVENTIL HFA/VENTOLIN HFA        Dose:  2 puff   Inhale 2 puffs into the lungs every 4 hours as needed for shortness of breath / dyspnea or wheezing   Refills:  0        bismuth subsalicylate 262 MG chewable tablet   Commonly known as:  PEPTO BISMOL        Dose:  2 tablet   Take 2 tablets by mouth every hour as needed for heartburn Max of 16 tabs per 24 hrs   Refills:  0        calcium carbonate 500 mg {elemental} 500 MG tablet   Commonly known as:  OS-MARY GRACE        Dose:  1 tablet   Take 1 tablet by mouth daily   Refills:  0        estradiol 0.1 MG/GM cream   Commonly known as:  ESTRACE        Dose:  2 g   Place 2 g vaginally twice a week   Refills:  0        fentaNYL 12 mcg/hr 72 hr patch   Commonly known as:  DURAGESIC   Used for:  Other chronic pain        Dose:  1 patch   Place 1 patch onto the skin every 72 hours remove old patch.   Quantity:  3 patch   Refills:  0        GABAPENTIN PO        Dose:  300 mg   Take 300 mg by mouth 3 times daily   Refills:  0        hydrocortisone 1 % Crea cream        Apply topically daily as needed for itching   Refills:  0        latanoprost 0.005 % ophthalmic solution   Commonly known as:  XALATAN        Dose:  1 drop   Place 1 drop into both eyes At Bedtime   Refills:  0        OSTEO BI-FLEX ADV DOUBLE ST PO        Dose:  1 tablet   Take 1 tablet by mouth 2 times daily   Refills:  0        SALONPAS PAIN RELIEF PATCH EX        Dose:  1 patch   Externally apply 1 patch topically daily as needed   Refills:  0          STOP taking     HYDROcodone-acetaminophen 5-325 MG per tablet   Commonly known as:  NORCO                   After Care     Activity - Up with assistive device           Advance Diet as Tolerated       Follow this diet upon discharge:     Regular Diet Adult       Fall precautions           General info for SNF       Length of Stay Estimate: Short Term Care: Estimated # of Days <30  Condition at Discharge: Improving  Level of  "care:skilled   Rehabilitation Potential: Fair  Admission H&P remains valid and up-to-date: Yes  Recent Chemotherapy: N/A  Use Nursing Home Standing Orders: N/A       Mantoux instructions       Give two-step Mantoux (PPD) Per Facility Policy Yes             Referrals     Occupational Therapy Adult Consult       Evaluate and treat as clinically indicated.    Reason:  Assess adls and discharge needs       Physical Therapy Adult Consult       Evaluate and treat as clinically indicated.    Reason:  Generalized weakness             Follow-Up Appointment Instructions     Follow Up and recommended labs and tests       Follow up with Nursing home physician.  No follow up labs or test are needed.             Statement of Approval     Ordered          09/23/18 1106  I have reviewed and agree with all the recommendations and orders detailed in this document.  EFFECTIVE NOW     Approved and electronically signed by:  Julieth Keys PA-C                                                 INTERAGENCY TRANSFER FORM - NURSING   9/21/2018                       [facility] OBSERVATION DEPARTMENT: [phone]            Attending Provider: Dash Rasheed MD     Allergies:  No Known Allergies    Infection:  None   Service:  Observation    Ht:  1.575 m (5' 2\")   Wt:  57.2 kg (126 lb 3.2 oz)   Admission Wt:  57.2 kg (126 lb 3.2 oz)    BMI:  23.08 kg/m 2   BSA:  1.58 m 2            Advance Directives        Scanned docmt in ACP Activity?           No scanned doc        Immunizations     None      ASSESSMENT     Discharge Profile Flowsheet     EXPECTED DISCHARGE     FINAL RESOURCES      Expected Discharge Date  09/22/18 (/[facility]) 09/22/18 0842   Resources List  Skilled Nursing Facility 09/23/18 0959    DISCHARGE NEEDS ASSESSMENT     Assisted Living  -- ([phone], [phone]) 09/22/18 1145    Equipment Currently Used at Home  grab bar;walker, rolling 09/22/18 1145   Home " "[facility] [phone], Fax: [phone] 09/22/18 1145    # of Referrals Placed by CTS  Communication hand-offs to next level of Care Providers 06/28/18 1312   Skilled Nursing Facility  [facility] [phone], Fax: [phone] 09/23/18 0959    Primary Care Clinic Name  [facility] 09/22/18 1145   PAS Number  [id number] 06/27/18 1615    Primary Care MD Name  Dr Demetrio Arriaza 09/22/18 1145   SKIN      ASSESSMENT OF FUNCTIONAL STATUS     Inspection of bony prominences  Full 09/23/18 0124    Prior to admission patient needed assistance with:  Medications;Meal preparation;Laundry/Housekeeping;Transportation 09/22/18 1145   Full except areas not inspected   Buttock, left;Buttock, right;Sacrum;Coccyx 09/22/18 2006    Assesssment of Functional Status  Not at baseline with ADL Functioning 09/22/18 1145   Skin WDL  ex 09/23/18 0124    GASTROINTESTINAL (ADULT,PEDIATRIC,OB)     Skin Color/Characteristics  bruised (ecchymotic);redness blanchable 09/23/18 0124    GI WDL  WDL 09/23/18 0124   Inspection under devices  Full except (identify device(s) not inspected) 09/23/18 0124    Last Bowel Movement  09/22/18 09/23/18 0124   Not Inspected under devices  Other (under ace wrap) 09/23/18 0124    Passing flatus  yes 09/23/18 0124   SAFETY      COMMUNICATION ASSESSMENT     Safety WDL  WDL 09/23/18 0124    Patient's communication style  spoken language (English or Bilingual) 09/21/18 2212   All Alarms  alarm(s) activated and audible 09/23/18 0124                 Assessment WDL (Within Defined Limits) Definitions           Safety WDL     Effective: 09/28/15    Row Information: <b>WDL Definition:</b> Bed in low position, wheels locked; call light in reach; upper side rails up x 2; ID band on<br> <font color=\"gray\"><i>Item=AS safety wdl>>List=AS safety wdl>>Version=F14</i></font>      Skin WDL     Effective: 09/28/15    Row Information: <b>WDL Definition:</b> Warm; dry; intact; elastic; without " "discoloration; pressure points without redness<br> <font color=\"gray\"><i>Item=AS skin wdl>>List=AS skin wdl>>Version=F14</i></font>      Vitals     Vital Signs Flowsheet     VITAL SIGNS     Side Effects Monitoring: Respiratory Quality  R 09/23/18 0009    Temp  97.8  F (36.6  C) 09/23/18 1322   Side Effects Monitoring: Respiratory Depth  N 09/23/18 0009    Temp src  Oral 09/23/18 1322   Side Effects Monitoring: Sedation Level  S 09/23/18 0009    Resp  16 09/23/18 1322   MANJIT COMA SCALE      Pulse  84 09/22/18 1948   Best Eye Response  4-->(E4) spontaneous 09/22/18 1021    Heart Rate  85 09/23/18 1322   Best Motor Response  6-->(M6) obeys commands 09/22/18 1021    Pulse/Heart Rate Source  Monitor 09/23/18 1322   Best Verbal Response  5-->(V5) oriented 09/22/18 1021    BP  156/56 09/23/18 1322   Manjit Coma Scale Score  15 09/22/18 1021    BP Location  Right arm 09/23/18 1322   HEIGHT AND WEIGHT      OXYGEN THERAPY     Height  1.575 m (5' 2\") 09/21/18 2230    SpO2  93 % 09/23/18 1322   Height Method  Stated 09/21/18 2230    O2 Device  None (Room air) 09/23/18 1322   Weight  57.2 kg (126 lb 3.2 oz) 09/21/18 2230    Oxygen Delivery  2 LPM 09/23/18 0419   Weight Method  Bed scale 09/21/18 2230    PAIN/COMFORT     BSA (Calculated - sq m)  1.58 09/21/18 2230    Patient Currently in Pain  denies 09/23/18 1322   BMI (Calculated)  23.13 09/21/18 2230    Preferred Pain Scale  number (Numeric Rating Pain Scale) 09/22/18 1942   POSITIONING      Patient's Stated Pain Goal  3 09/22/18 1942   Body Position  independently positioning 09/23/18 0418    0-10 Pain Scale  4 09/22/18 1942   Head of Bed (HOB)  HOB at 15 degrees 09/23/18 0124    Pain Location  Leg 09/22/18 1942   Positioning/Transfer Devices  pillows 09/23/18 0124    Pain Orientation  Right 09/22/18 1942   DAILY CARE      Pain Descriptors  Aching;Spasm 09/22/18 1942   Activity Management  activity adjusted per tolerance 09/23/18 0124    Pain Intervention(s)  Medication " (See eMAR) 09/22/18 1942   Activity Assistance Provided  assistance, 2 people 09/23/18 0124    Response to Interventions  Decrease in pain 09/22/18 1638   Assistive Device Utilized  other (see comments) (debbie rodriguez, 2 assisst) 09/22/18 2006    ANALGESIA SIDE EFFECTS MONITORING     Additional Documentation  Activity Device Assistance (Row) 09/22/18 0424            Patient Lines/Drains/Airways Status    Active LINES/DRAINS/AIRWAYS     Name: Placement date: Placement time: Site: Days: Last dressing change:    External Catheter 09/23/18 0023 09/23/18   0023    less than 1             Patient Lines/Drains/Airways Status    Active PICC/CVC     None            Intake/Output Detail Report     Date Intake   Output Net    Shift P.O. IV Piggyback Total Urine Total       Noc 09/21/18 2300 - 09/22/18 0659 240 -- 240 -- -- 240    Day 09/22/18 0700 - 09/22/18 1459 -- -- -- -- -- 0    Rosario 09/22/18 1500 - 09/22/18 2259 -- -- -- -- -- 0    Noc 09/22/18 2300 - 09/23/18 0659 -- -- -- -- -- 0    Day 09/23/18 0700 - 09/23/18 1459 -- -- -- -- -- 0      Last Void/BM       Most Recent Value    Urine Occurrence 1 at 09/23/2018 1103    Stool Occurrence 1 at 09/23/2018 1103      Case Management/Discharge Planning     Case Management/Discharge Planning Flowsheet     REFERRAL INFORMATION     Support Assessment  Adequate family and caregiver support;Adequate social supports 09/22/18 1145    Did the Initial Social Work Assessment result in a Social Work Case?  Yes 09/22/18 1145   ASSESSMENT OF FUNCTIONAL STATUS      Admission Type  observation 09/22/18 1145   Prior to admission patient needed assistance with:  Medications;Meal preparation;Laundry/Housekeeping;Transportation 09/22/18 1145    Arrived From  home or self-care 09/22/18 1145   Assesssment of Functional Status  Not at baseline with ADL Functioning 09/22/18 1145    Referral Source  physician 09/22/18 1145   COPING/STRESS      # of Referrals Placed by CTS  Communication hand-offs to next  level of Care Providers 06/28/18 1312   Major Change/Loss/Stressor  none 06/26/18 0014    Reason For Consult  discharge planning 09/22/18 1145   EXPECTED DISCHARGE      CTS Assigned to Case  Milad 09/22/18 1145   Expected Discharge Date  09/22/18 (/[facility]enson[facility]) 09/22/18 0842    Primary Care Clinic Name  [facility] 09/22/18 1145   FINAL RESOURCES      Primary Care MD Name  Dr Demetrio Arriaza 09/22/18 1145   Equipment Currently Used at Home  grab bar;walker, rolling 09/22/18 1145    LIVING ENVIRONMENT     Resources List  Skilled Nursing Facility 09/23/18 0959    Lives With  alone 09/22/18 1145   Assisted Living  -- ([phone], [phone]) 09/22/18 1145    Living Arrangements  assisted living 09/22/18 1145   Home Care  [facility] [phone], Fax: [phone] 09/22/18 1145    Provides Primary Care For  no one 09/22/18 1145   Skilled Nursing Facility  [facility] [place] [phone], Fax: [phone] 09/23/18 0959    Quality Of Family Relationships  supportive;involved 09/22/18 1145   PAS Number  [id number] 06/27/18 1615    Able to Return to Prior Living Arrangements  yes 09/22/18 1145   ABUSE RISK SCREEN      HOME SAFETY     QUESTION TO PATIENT:  Has a member of your family or a partner(now or in the past) intimidated, hurt, manipulated, or controlled you in any way?  no 09/21/18 1751    Patient Feels Safe Living in Home?  yes 09/22/18 1145   QUESTION TO PATIENT: Do you feel safe going back to the place where you are living?  yes 09/21/18 1751    ASSESSMENT OF FAMILY/SOCIAL SUPPORT     OBSERVATION: Is there reason to believe there has been maltreatment of a vulnerable adult (ie. Physical/Sexual/Emotional abuse, self neglect, lack of adequate food, shelter, medical care, or financial exploitation)?  no 09/21/18 1751    Marital Status   09/22/18 1145   HOMICIDE RISK      Who is your support system?  Children;Sibling(s);Facility resident(s)/Staff  09/22/18 1145   Feels Like Hurting Others  no 09/21/18 1751    Description of Support System  Supportive;Involved 09/22/18 1145                       [facility] OBSERVATION DEPARTMENT: [phone]            Medication Administration Report for Kelsey Wood as of 09/23/18 1359   Legend:    Given Hold Not Given Due Canceled Entry Other Actions    Time Time (Time) Time  Time-Action       Inactive    Active    Linked        Medications 09/17/18 09/18/18 09/19/18 09/20/18 09/21/18 09/22/18 09/23/18    acetaminophen (TYLENOL) Suppository 650 mg  Dose: 650 mg  Freq: EVERY 4 HOURS PRN Route: RE  PRN Reason: mild pain  Start: 09/21/18 2200   Admin Instructions: Alternate ibuprofen (if ordered) with acetaminophen.  Maximum acetaminophen dose from all sources = 75 mg/kg/day not to exceed 4 grams/day.    Admin. Amount: 1 suppository (1 × 650 mg suppository)  Dispense Loc: [facility]               acetaminophen (TYLENOL) tablet 975 mg  Dose: 975 mg  Freq: EVERY 8 HOURS Route: PO  Start: 09/22/18 1231   Admin Instructions: Alternate ibuprofen (if ordered) with acetaminophen.  Maximum acetaminophen dose from all sources = 75 mg/kg/day not to exceed 4 grams/day.    Admin. Amount: 3 tablet (3 × 325 mg tablet)  Last Admin: 09/23/18 0421  Dispense Loc:  [facility]          1354 (975 mg)-Given       1943 (975 mg)-Given        0421 (975 mg)-Given       [ ] 1231       [ ] 2031           albuterol (PROAIR HFA/PROVENTIL HFA/VENTOLIN HFA) 108 (90 Base) MCG/ACT inhaler 2 puff  Dose: 2 puff  Freq: EVERY 4 HOURS PRN Route: IN  PRN Reasons: shortness of breath / dyspnea,wheezing  Start: 09/21/18 2200   Admin. Amount: 2 puff  Dispense Loc: [facility]               bisacodyl (DULCOLAX) EC tablet 5 mg  Dose: 5 mg  Freq: DAILY PRN Route: PO  PRN Reason: constipation  Start: 09/21/18 2200   Admin Instructions: Do not crush or chew. Swallow whole. May titrate 1 tablet each day until response. Maximum of 3 tablets  daily. Hold for loose stools. This is the first step of a three step constipation treatment.    Admin. Amount: 1 tablet (1 × 5 mg tablet)  Dispense Loc: [facility]              Or  bisacodyl (DULCOLAX) EC tablet 10 mg  Dose: 10 mg  Freq: DAILY PRN Route: PO  PRN Reason: constipation  Start: 09/21/18 2200   Admin Instructions: Do not crush or chew. Swallow whole. May titrate 1 tablet each day until response. Maximum of 3 tablets daily. Hold for loose stools. This is the first step of a three step constipation treatment.    Admin. Amount: 2 tablet (2 × 5 mg tablet)  Dispense Loc: [facility]              Or  bisacodyl (DULCOLAX) EC tablet 15 mg  Dose: 15 mg  Freq: DAILY PRN Route: PO  PRN Reason: constipation  Start: 09/21/18 2200   Admin Instructions: Do not crush or chew. Swallow whole. May titrate 1 tablet each day until response. Maximum of 3 tablets daily. Hold for loose stools. This is the first step of a three step constipation treatment.    Admin. Amount: 3 tablet (3 × 5 mg tablet)  Dispense Loc:  [facility]               fentaNYL (DURAGESIC) 12 mcg/hr 72 hr patch 1 patch  Dose: 12 mcg  Freq: EVERY 72 HOURS Route: TD  Start: 09/23/18 0900   Admin Instructions: Last dose was 9/20/18 per pharmacy  Used fentaNYL patches must be disposed of by sticking sides together and flushing down a toilet.    Admin. Amount: 1 patch  Last Admin: 09/23/18 0848  Dispense Loc:  [facility]           0848 (1 patch)-Given           fentaNYL (DURAGESIC) Patch in Place  Freq: EVERY 8 HOURS Route: TD  Start: 09/22/18 1002   Admin Instructions: Chart every shift, confirming that patch is still in place on patient (no barcode scan needed). See patch order for dose information.    Last Admin: 09/23/18 0938  Dispense Loc:  Main Pharmacy          1005 ( )-Patch in Place       (1754)-Not Given [C]        (0421)-Not Given [C]       0938 ( )-Patch in Place       [ ] 1803           fentaNYL (DURAGESIC) patch REMOVAL  Freq:  EVERY 72 HOURS Route: TD  Start: 09/23/18 0900   Admin Instructions: Remove patch when new patch is applied or patch is discontinued.  Used fentaNYL patches must be disposed of by sticking sides together and flushing down a toilet.    Dispense Loc:  Main Pharmacy           0849 ( )-Patch Removed           gabapentin (NEURONTIN) capsule 300 mg  Dose: 300 mg  Freq: 3 TIMES DAILY Route: PO  Start: 09/22/18 0902   Admin. Amount: 1 capsule (1 × 300 mg capsule)  Last Admin: 09/23/18 0848  Dispense Loc:  [facility]          0928 (300 mg)-Given       1354 (300 mg)-Given       1943 (300 mg)-Given        0848 (300 mg)-Given       [ ] 1400       [ ] 2000           HYDROcodone-acetaminophen (NORCO) 5-325 MG per tablet 1 tablet  Dose: 1 tablet  Freq: EVERY 4 HOURS PRN Route: PO  PRN Reasons: moderate to severe pain,other  PRN Comment: pain control or improvement in physical function.  Start: 09/21/18 2200   Admin Instructions: Start with the lowest dose.    May adjust dose by 1 tablet every 4 hours as needed for pain control or improvement in physical function.  Hold dose for analgesic side effects.  Notify provider to assess for uncontrolled pain or analgesic side effects.  Maximum acetaminophen dose from all sources= 75 mg/kg/day not to exceed 4 grams    Admin. Amount: 1 tablet  Last Admin: 09/22/18 1424  Dispense Loc:  [facility]          0635 (1 tablet)-Given       1424 (1 tablet)-Given            ibuprofen (ADVIL/MOTRIN) tablet 400 mg  Dose: 400 mg  Freq: EVERY 6 HOURS PRN Route: PO  PRN Reason: moderate pain  Start: 09/21/18 2200   Admin. Amount: 1 tablet (1 × 400 mg tablet)  Last Admin: 09/22/18 1759  Dispense Loc:  [facility]         2338 (400 mg)-Given        1759 (400 mg)-Given            ipratropium - albuterol 0.5 mg/2.5 mg/3 mL (DUONEB) neb solution 3 mL  Dose: 3 mL  Freq: EVERY 2 HOURS PRN Route: NEBULIZATION  PRN Reason: wheezing  Start: 09/23/18 1023   Admin. Amount: 3 mL  Dispense Loc:  [facility]  OBS  Volume: 3 mL               latanoprost (XALATAN) 0.005 % ophthalmic solution 1 drop  Dose: 1 drop  Freq: AT BEDTIME Route: Both Eyes  Start: 09/21/18 2201   Admin Instructions: Refrigerated product.    Admin. Amount: 1 drop  Last Admin: 09/22/18 2231  Dispense Loc:  Main Pharmacy  Volume: 2.5 mL         2254 (1 drop)-Given        2231 (1 drop)-Given        [ ] 2200           melatonin tablet 1 mg  Dose: 1 mg  Freq: AT BEDTIME PRN Route: PO  PRN Reason: sleep  Start: 09/21/18 2200   Admin Instructions: Do not give unless at least 6 hours of uninterrupted sleep is expected.    Admin. Amount: 1 tablet (1 × 1 mg tablet)  Dispense Loc:  ADS MS2B OBS               naloxone (NARCAN) injection 0.1-0.4 mg  Dose: 0.1-0.4 mg  Freq: EVERY 2 MIN PRN Route: IV  PRN Reason: opioid reversal  Start: 09/21/18 2200   Admin Instructions: For respiratory rate LESS than or EQUAL to 8.  Partial reversal dose:  0.1 mg titrated q 2 minutes for Analgesia Side Effects Monitoring Sedation Level of 3 (frequently drowsy, arousable, drifts to sleep during conversation).Full reversal dose:  0.4 mg bolus for Analgesia Side Effects Monitoring Sedation Level of 4 (somnolent, minimal or no response to stimulation).  For ordered IV doses 0.1-2mg give IVP. Give each 0.4mg over 15 seconds in emergency situations. For non-emergent situations further dilute in 9mL of NS to facilitate titration of response.    Admin. Amount: 0.1-0.4 mg = 0.25-1 mL Conc: 0.4 mg/mL  Dispense Loc:  ADS MS2B OBS  Volume: 1 mL               ondansetron (ZOFRAN-ODT) ODT tab 4 mg  Dose: 4 mg  Freq: EVERY 6 HOURS PRN Route: PO  PRN Reasons: nausea,vomiting  Start: 09/21/18 2200   Admin Instructions: This is Step 1 of nausea and vomiting management.  If nausea not resolved in 15 minutes, go to Step 2 prochlorperazine (COMPAZINE). Do not push through foil backing. Peel back foil and gently remove. Place on tongue immediately. Administration with liquid unnecessary  With  dry hands, peel back foil backing and gently remove tablet; do not push oral disintegrating tablet through foil backing; administer immediately on tongue and oral disintegrating tablet dissolves in seconds; then swallow with saliva; liquid not required.    Admin. Amount: 1 tablet (1 × 4 mg tablet)  Dispense Loc: RH ADS MS2B OBS              Or  ondansetron (ZOFRAN) injection 4 mg  Dose: 4 mg  Freq: EVERY 6 HOURS PRN Route: IV  PRN Reasons: nausea,vomiting  Start: 09/21/18 2200   Admin Instructions: This is Step 1 of nausea and vomiting management.  If nausea not resolved in 15 minutes, go to Step 2 prochlorperazine (COMPAZINE).  Irritant. For ordered IV doses 0.1-4 mg, give IV Push undiluted over 2-5 minutes.    Admin. Amount: 4 mg = 2 mL Conc: 4 mg/2 mL  Dispense Loc: RH ADS MS2B OBS  Infused Over: 2-5 Minutes  Volume: 2 mL               polyethylene glycol (MIRALAX/GLYCOLAX) Packet 17 g  Dose: 17 g  Freq: DAILY PRN Route: PO  PRN Reason: constipation  Start: 09/21/18 2200   Admin Instructions: Give in 8oz of  water, juice, or soda. Hold for loose stools.  This is the second step of a three step constipation treatment.  1 Packet = 17 grams. Mixed prescribed dose in 8 ounces of water. Follow with 8 oz. of water.    Admin. Amount: 17 g  Dispense Loc: RH ADS MS2B OBS               prochlorperazine (COMPAZINE) injection 5 mg  Dose: 5 mg  Freq: EVERY 6 HOURS PRN Route: IV  PRN Reasons: nausea,vomiting  Start: 09/21/18 2200   Admin Instructions: This is Step 2 of nausea and vomiting management. Give if nausea not resolved 15 minutes after giving ondansetron (ZOFRAN). If nausea not resolved in 15 minutes, go to Step 3 metoclopramide (REGLAN), if ordered.  For ordered IV doses 0.1-10 mg, give IV Push undiluted. Each 5mg over 1 minute.    Admin. Amount: 5 mg = 1 mL Conc: 5 mg/mL  Dispense Loc: RH ADS MS2B OBS  Infused Over: 1-2 Minutes  Volume: 1 mL              Or  prochlorperazine (COMPAZINE) tablet 5 mg  Dose: 5  mg  Freq: EVERY 6 HOURS PRN Route: PO  PRN Reason: vomiting  Start: 09/21/18 2200   Admin Instructions: This is Step 2 of nausea and vomiting management. Give if nausea not resolved 15 minutes after giving ondansetron (ZOFRAN). If nausea not resolved in 15 minutes, go to Step 3 metoclopramide (REGLAN), if ordered.    Admin. Amount: 1 tablet (1 × 5 mg tablet)  Dispense Loc: RH ADS MS2B OBS              Or  prochlorperazine (COMPAZINE) Suppository 12.5 mg  Dose: 12.5 mg  Freq: EVERY 12 HOURS PRN Route: RE  PRN Reasons: nausea,vomiting  Start: 09/21/18 2200   Admin Instructions: This is Step 2 of nausea and vomiting management. Give if nausea not resolved 15 minutes after giving ondansetron (ZOFRAN). If nausea not resolved in 15 minutes, go to Step 3 metoclopramide (REGLAN), if ordered.    Admin. Amount: 0.5 suppository (0.5 × 25 mg suppository)  Dispense Loc: RH ADS MS2B OBS               senna-docusate (SENOKOT-S;PERICOLACE) 8.6-50 MG per tablet 1 tablet  Dose: 1 tablet  Freq: 2 TIMES DAILY Route: PO  Start: 09/21/18 2201   Admin Instructions: If no bowel movement in 24 hours, increase to 2 tablets PO.  Hold for loose stools.    Admin. Amount: 1 tablet  Last Admin: 09/23/18 0848  Dispense Loc: RH ADS MS2B OBS         (2256)-Not Given        (0927)-Not Given       1944 (1 tablet)-Given        0848 (1 tablet)-Given       [ ] 2000          Or  senna-docusate (SENOKOT-S;PERICOLACE) 8.6-50 MG per tablet 2 tablet  Dose: 2 tablet  Freq: 2 TIMES DAILY Route: PO  Start: 09/21/18 2201   Admin Instructions: Hold for loose stools.    Admin. Amount: 2 tablet  Dispense Loc: RH ADS MS2B OBS                                       [ ] 2000          Completed Medications  Medications 09/17/18 09/18/18 09/19/18 09/20/18 09/21/18 09/22/18 09/23/18         Dose: 1,000 mg  Freq: ONCE Route: PO  Start: 09/21/18 1912   End: 09/21/18 1919   Admin Instructions: Maximum acetaminophen dose from all sources = 75 mg/kg/day not to exceed 4 gram     Admin. Amount: 2 tablet (2 × 500 mg tablet)  Last Admin: 18  Dispense Loc:  ADS ERA  Administrations Remainin          (1,000 mg)-Given               Dose: 2 g  Freq: ONCE Route: IV  Indications of Use: SKIN AND SOFT TISSUE INFECTION  Start: 18   End: 18   Admin. Amount: 2 g = 100 mL Conc: 2 g/100 mL  Last Admin: 18  Dispense Loc:  Main Pharmacy  Infused Over: 30 Minutes  Administrations Remainin  Volume: 100 mL          (2 g)-Given            Discontinued Medications  Medications 18         Dose: 650 mg  Freq: EVERY 4 HOURS PRN Route: PO  PRN Reason: mild pain  Start: 18   End: 18 0859   Admin Instructions: Alternate ibuprofen (if ordered) with acetaminophen.  Maximum acetaminophen dose from all sources = 75 mg/kg/day not to exceed 4 grams/day.    Admin. Amount: 2 tablet (2 × 325 mg tablet)  Last Admin: 18 043  Dispense Loc:  ADS MS2B OBS         2254 (650 mg)-Given        0431 (650 mg)-Given       0859-Med Discontinued          Dose: 500 mg  Freq: EVERY 6 HOURS SCHEDULED Route: PO  Indications of Use: SKIN AND SOFT TISSUE INFECTION  Start: 18 0000   End: 18 0856   Admin. Amount: 1 capsule (1 × 500 mg capsule)  Last Admin: 18 0558  Dispense Loc:  ADS MS2B OBS         2338 (500 mg)-Given        0558 (500 mg)-Given       0856-Med Discontinued          Dose: 12 mcg  Freq: EVERY 72 HOURS Route: TD  Start: 18 0900   End: 18   Admin Instructions: Last dose was 18 per pharmacy  Used fentaNYL patches must be disposed of by sticking sides together and flushing down a toilet.    Admin. Amount: 1 patch  Dispense Loc:  ADS MS2B OBS          0934-Med Discontinued          Freq: EVERY 8 HOURS Route: TD  Start: 18   End: 18   Admin Instructions: Chart every shift, confirming that patch is still in place on patient  (no barcode scan needed). See patch order for dose information.    Dispense Loc:  Main Pharmacy                 0934-Med Discontinued          Freq: EVERY 72 HOURS Route: TD  Start: 09/23/18 0900   End: 09/22/18 0934   Admin Instructions: Remove patch when new patch is applied or patch is discontinued.  Used fentaNYL patches must be disposed of by sticking sides together and flushing down a toilet.    Dispense Loc:  Main Pharmacy          0934-Med Discontinued          Dose: 0.2 mg  Freq: EVERY 2 HOURS PRN Route: IV  PRN Reason: moderate to severe pain  PRN Comment: IF unable to take PO. Pain control or improvement in physical function.  Start: 09/21/18 2200   End: 09/22/18 0901   Admin Instructions: Hold dose for analgesic side effects.  If needing IV pain meds for 2 or more doses call provider to have oral medications adjusted to get pain controlled on oral regimen prior to discharge.  For ordered IV doses 0.1-4 mg give IV Push undiluted. Administer each 2mg over 2-5 minutes.    Admin. Amount: 0.2 mg  Dispense Loc:  ADS MS2B OBS          0901-Med Discontinued     Medications 09/17/18 09/18/18 09/19/18 09/20/18 09/21/18 09/22/18 09/23/18               INTERAGENCY TRANSFER FORM - NOTES (H&P, Discharge Summary, Consults, Procedures, Therapies)   9/21/2018                       [facility] OBSERVATION DEPARTMENT: [phone]            History & Physicals     No notes of this type exist for this encounter.      Discharge Summaries     No notes of this type exist for this encounter.         Consult Notes      Consults by Mark Juarez BSW at 9/22/2018 12:05 PM     Author:  Mark Juarez BSW Service:  (none) Author Type:      Filed:  9/22/2018  3:19 PM Date of Service:  9/22/2018 12:05 PM Creation Time:  9/22/2018 11:45 AM    Status:  Addendum :  Mark Juarez BSW ()         Care Transition Initial Assessment -   Reason For Consult: discharge  planning  Met with: Patient    Active Problems:    Leg pain       DATA  Lives With: alone  Living Arrangements: assisted living-[facility].  Description of Support System: Supportive, Involved  Who is your support system?: Children, Sibling(s), Facility resident(s)/Staff. Pt. Reports her sister can come and assist her at discharge if need. Son works full time, but is supportive.  Support Assessment: Adequate family and caregiver support, Adequate social supports.  Pt. Has medication set up, meals delivered to her and has pendent to call for help and gets weekly housekeeping. Pt., is independent with walker at baseline. Pt., can get help with bathing if needed.  Identified issues/concerns regarding health management:  Pt. is admitted due to increased pain on leg. Nurse reports pt. Can probably walk, but is afraid of standing on leg due to pain.      Resources List: Assisted [phone], [phone].   Home Care-has used [facility] RN/PT/OT in the past, but not currently open.     Quality Of Family Relationships: supportive, involved     ASSESSMENT  Cognitive Status:  awake, alert and, but stubborn and very clear on her plan to return home. Pt. Is hard of hearing.  Concerns to be addressed: spoke with Pt.  About concerns regarding current level of care. Pt. Is an assist of 2 with walker due to leg pain per nursing reports. Pt. Is afraid to ambulate on her leg. Pt. Will not consider going to TCU and she does not recognize [facility] might not be able to provide current level of support for mobility. Attempted to reach [facility] staff with out success. Left voice mail message for toby Dalton.  Waiting for PT cassidy and may need to have a discussion with son and Pt. About needs for discharge planning support.  Pt. Has been to TCU in the past with [facility] with private pay.   PLAN  Sw will follow up with pt. Once PT jenniferal has made recommendations. Waiting for return call from son.[AK1.1]    Spoke with Pt. And son. Pt.  Agreed to TCU referral/placement after talking with son. Discharge expected on 9/23/18 to [facility].[AK1.2]     Revision History        User Key Date/Time User Provider Type Action    > AK1.2 9/22/2018  3:19 PM Mark Juarez BSW  Addend     AK1.1 9/22/2018 12:05 PM Mark Juarez BSW  Sign                     Progress Notes - Physician (Notes for yesterday and today)      Progress Notes by Lucy Bernal PA-C at 9/22/2018 10:13 AM     Author:  Lucy Bernal PA-C Service:  Hospitalist Author Type:  Physician Assistant    Filed:  9/22/2018 10:30 AM Date of Service:  9/22/2018 10:13 AM Creation Time:  9/22/2018 10:13 AM    Status:  Signed :  Lucy Bernal PA-C (Physician Assistant)         [facility]  Hospitalist Progress Note  Lucy Bernal PA-C, PA-C 09/22/2018    Reason for Stay (Diagnosis): left leg pain         Assessment and Plan:      Summary of Stay: Kelsey Wood is a 89 year old female with past medical history significant for arthritis, asthma, glaucoma, and chronic back pain who presented to the emergency room on 9/21/18 with left leg pain. She does not recall any specific injury but noted pain in her left lower anterior leg when standing yesterday. When she looked at the area she noted that it was red, which worried her so she presented to the ER for evaluation. ER evaluation included XR of the left leg and US which were both negative. She was given a dose of IV ancef in case of possible cellulitis as well and registered to observation for further monitoring.     Problem List:   1. Left Leg Pain - Based on her exam this is most consistent with a bruise. The area is a mix of dark red, purple and yellowish discoloration. There is no warmth or edema. She has focal tenderness over the area with the most concentrated coloring. She has full ROM of her ankle and knee. I suspect that she bumped her left  "anterior lower leg and developed a small area of ecchymosis that is causing pain. She has been afebrile this admission and again her exam is not consistent with cellulitis so I will discharge her Keflex. I will have the nurse wrap the leg in an ACE wrap for comfort and apply ice to the area. We will start scheduled Tylenol as well. She is quite nervous/hesitant to stand on the leg so we will control her discomfort and continue to provide encouragement with ambulation. Physical Therapy to assess later today. Plan to discharge back home today or tomorrow pending course.   2. Chronic Pain - patient with chronic back, hip and knee pain. Will resume her PTA fentanyl patch (was last placed 9/20 and due for replacement on 9/23), gabapentin and as needed norco. She uses Salonpas as well, will use menthol patches while she is here.   3. Asthma - stable, albuterol nebs if needed.   DVT Prophylaxis: Low Risk/Ambulatory with no VTE prophylaxis indicated  Code Status: DNR / DNI  Discharge Dispo: to [facility] ([facility])  Estimated Disch Date / # of Days until Disch: today or tomorrow         Interval History (Subjective):      Leg is still painful but a bit better today. Hurts mainly when she touches it. Is very hesitant and worried about standing on the leg so has not wanted to get up and move too much so far.                   Physical Exam:      Last Vital Signs:  /60 (BP Location: Right arm)  Pulse 82  Temp 97.1  F (36.2  C) (Oral)  Resp 20  Ht 1.575 m (5' 2\")  Wt 57.2 kg (126 lb 3.2 oz)  SpO2 92%  BMI 23.08 kg/m2[JG1.1]    Wt Readings from Last 1 Encounters:   09/21/18 57.2 kg (126 lb 3.2 oz)[JG1.2]       Constitutional: Awake, alert, cooperative, no apparent distress   Respiratory: Clear to auscultation bilaterally, no crackles or wheezing   Cardiovascular: Regular rate and rhythm, normal S1 and S2, and no murmur noted   Abdomen: Normal bowel sounds, soft, non-distended, non-tender   Skin: There is a roughly " 5x10cm area of ecchymosis on the left anterior lower leg. No edema, no warmth, no streaking.    Neuro: Alert and oriented x3, no weakness, numbness, memory loss   Extremities: No edema, normal range of motion of LLE   Other(s):        All other systems: Negative          Medications:      All current medications were reviewed with changes reflected in problem list.         Data:      All new lab and imaging data was reviewed.   Labs:[JG1.1]  No results for input(s): WBC, HGB, HCT, MCV, PLT in the last 168 hours.[JG1.3]   Imaging:[JG1.1]   Recent Results (from the past 48 hour(s))   XR Tibia & Fibula Left 2 Views    Narrative    XR TIBIA & FIBULA LT 2 VW 9/21/2018 6:55 PM    HISTORY: Pain.    COMPARISON: None.      Impression    IMPRESSION: No evidence of acute fracture or malalignment.     MOY NATARAJAN MD   US Lower Extremity Venous Duplex Left    Narrative    US LOWER EXTREMITY VENOUS DUPLEX LEFT 9/21/2018 7:57 PM    HISTORY: Left leg pain.    TECHNIQUE: Imaged deep venous structures of the left lower extremity  include the left common femoral vein, femoral vein, popliteal vein,  and visualized posterior deep calf veins.  Color flow and spectral  Doppler with waveform analysis performed.    COMPARISON: None.    FINDINGS: No DVT is demonstrated.      Impression    IMPRESSION: Negative.     MOY NATARAJAN MD[JG1.3]          Revision History        User Key Date/Time User Provider Type Action    > JG1.3 9/22/2018 10:30 AM Lucy Bernal PA-C Physician Assistant Sign     JG1.2 9/22/2018 10:29 AM Lucy Bernal PA-C Physician Assistant      JG1.1 9/22/2018 10:13 AM Lucy Bernal PA-C Physician Assistant             ED Provider Notes by Mello Crane MD at 9/21/2018  5:47 PM     Author:  Mello Crane MD Service:  Emergency Medicine Author Type:  Physician    Filed:  9/22/2018  2:30 AM Date of Service:  9/21/2018  5:47 PM Creation Time:  9/21/2018  5:57 PM    Status:  Signed  ":  Mello Crane MD (Physician)           History     Chief Complaint:[RF1.1]  Leg Pain[RF1.2]     HPI   Kelsey Wood is a 89 year old female[RF1.1], with a history of osteoarthritis,[RF1.3] who presents with[RF1.1] left leg pain. The patient states that she has had left leg pain ongoing in the past week, but today, notes that her left leg was more sore and \"heard a crack,\" prompting her ED visit. Here, the patient states that she does have chronic left knee pain and has noticed redness on the anterior surface of her left shin. She notes that she has been able to bear weight on the left leg, but with increased pain. The patient is not on any anticoagulates.[RF1.3]     Allergies:[RF1.1]  NKDA[RF1.2]     Medications:[RF1.1]    Albuterol  Estradiol  Gabapentin  Xalatan[RF1.2]    Past Medical History:[RF1.1]    Asthma  Hard of Hearing  Osteoarthritis[RF1.2]    Past Surgical History:[RF1.1]    Cholecystectomy  Laparoscopic appendectomy[RF1.2]    Family History:[RF1.1]    No past pertinent family history.[RF1.2]     Social History:[RF1.1]  Presents with[RF1.2] her son.[RF1.3]   Negative for alcohol use.  Negative for tobacco use.[RF1.2]   Marital Status:   [5]     Review of Systems   Musculoskeletal:[RF1.1]        Left leg pain[RF1.3]   Skin: Positive for[RF1.1] rash[RF1.3].[RF1.1]   All other systems reviewed and are negative[RF1.3].      Physical Exam   First Vitals:[RF1.1]  BP: 157/77  Pulse: 71  Heart Rate: 71  Temp: 98  F (36.7  C)  Resp: 16  SpO2: 99 %[RF1.4]    Physical Exam[RF1.1]  General: Laying in gurney, comfortable.  HEENT:   The scalp and head appear normal    The pupils are equal, round, and reactive to light    Extraocular muscles are intact.    The nose is normal.    The oropharynx is normal.      Uvula is in the midline.    Neck:  Normal range of motion.    Lungs:  Clear.      No rales, no wheezing.      There is no tachypnea.  Non-labored.  Cardiac: Regular rate.      Normal S1 and S2.  "     No pathological murmur/rub    Abdomen: Soft. No distension, no localized tenderness or rebound.  MS:  Normal tone. Normal movement of all extremities.[RF1.3] Mild appearing[RF1.5] erythema, distal third of tibial region and medially.[RF1.3] 6 cm long by 4 cm wide area of redness. Slightly increased warmth to touch and definite tenderness. No posterior erythema or tenderness.[RF1.5] No effusions of the left ankle or knee.   Neurologic:     Normal mentation.  No cranial nerve deficits.  No focal motor or sensory                            changes.      Speech normal.  Psych:  Awake.     Alert.      Normal affect.      Appropriate interactions.  Skin:  No rash.      No lesions.[RF1.3]    Emergency Department Course   Imaging:[RF1.1]  Radiographic findings were communicated with the patient who voiced understanding of the findings.  XR Tibia and Fibula, Left, 2 views:[RF1.6]   No evidence of acute fracture or malalignment.[RF1.3]  As per radiology.[RF1.6]     US Lower Extremity Venous Duplex, Left:  Negative. As per radiology.[RF1.7]     Interventions:[RF1.1]  1919 Tylenol, 1000 mg, PO  2000 Ancef, 2 g, IV[RF1.7]     Emergency Department Course:[RF1.1]  Nursing notes and vitals reviewed.[RF1.6]     1905[RF1.3]  I performed an exam of the patient as documented above.     IV inserted. Medicine administered as documented above.     The patient was sent for a[RF1.6] tibia and fibula XR[RF1.3] and LE US[RF1.7] while in the emergency department, findings above.[RF1.6]     2007  I consulted with Dr. Rasheed of the hospitalist services. They are in agreement to accept the patient for admission.    Findings and plan explained to the Patient who consents to admission. Discussed the patient with [RF1.8] Wili[RF1.7], who will admit the patient to a[RF1.8]n observation[RF1.7] bed for further monitoring, evaluation, and treatment.[RF1.8]     Impression & Plan      Medical Decision Making:[RF1.1]  Kelsey Wood is a 89 year old  female who presents with left leg pain. She felt that something had snapped today. An XR was done which showed no bony abnormality. Examination reveals tenderness in the distal third. This is discussed in the physical exam of the left lower leg. There is some erythema present where she is tender consistent with cellulitis. Duplex Doppler was negative, although, there is no posterior calf tenderness. She was told that she might have a blood clot and was expecting a Doppler study. She has intact pulses distally. I told her that we will start antibiotics and have her follow up. I highlighted the red area with a marker, but she said that it was too painful to bear weight. For that reason, she is being admitted for IV antibiotics.[RF1.9]       Diagnosis:[RF1.1]    ICD-10-CM   1. Cellulitis of left lower extremity L03.116[RF1.10]       Disposition:[RF1.1]  Admitted to[RF1.8] Dr. Rasheed[RF1.7]    I, Lashonda Hanna, am serving as a scribe on 9/21/2018 at 7:05 PM to personally document services performed by Mello Crane MD based on my observations and the provider's statements to me.[RF1.3]      Lashonda Floden  9/21/2018   [facility] EMERGENCY DEPARTMENT[RF1.1]       Mello Crane MD  09/22/18 0230  [TE1.1]     Revision History        User Key Date/Time User Provider Type Action    > TE1.1 9/22/2018  2:30 AM Mello Crane MD Physician Sign     RF1.9 9/22/2018 12:24 AM Rustyden, Lashonda Scribe Share     RF1.10 9/21/2018  8:18 PM Floden, Lashonda Scribe Share     RF1.7 9/21/2018  8:16 PM Floden, Lashonda Scribe Share     RF1.8 9/21/2018  8:15 PM Floden, Lashonda Scribe      RF1.5 9/21/2018  7:12 PM Floden, Lashonda Scribe Share     RF1.3 9/21/2018  7:10 PM Floden, Lashonda Scribe Share     RF1.4 9/21/2018  6:24 PM Floden, Lashonda Scribe Share     RF1.6 9/21/2018  6:23 PM Jacques, Lashonda Ma      RF1.2 9/21/2018  6:05 PM Jacques, Lashonda Alarcon     RF1.1 9/21/2018  5:57 PM Jacques, Lashonda Alarcon                   Procedure Notes     No notes of this type exist for this encounter.      Progress Notes - Therapies (Notes from 09/20/18 through 09/23/18)     No notes of this type exist for this encounter.                                          INTERAGENCY TRANSFER FORM - LAB / IMAGING / EKG / EMG RESULTS   9/21/2018                       [facility] OBSERVATION DEPARTMENT: [phone]            Unresulted Labs     None         Imaging Results - 3 Days      US Lower Extremity Venous Duplex Left [[id number]]  Resulted: 09/21/18 2007, Result status: Final result    Ordering provider: Mello Crane MD  09/21/18 1911 Resulted by: Brock Natarajan MD    Performed: 09/21/18 1941 - 09/21/18 1957 Resulting lab: RADIOLOGY RESULTS    Narrative:       US LOWER EXTREMITY VENOUS DUPLEX LEFT 9/21/2018 7:57 PM    HISTORY: Left leg pain.    TECHNIQUE: Imaged deep venous structures of the left lower extremity  include the left common femoral vein, femoral vein, popliteal vein,  and visualized posterior deep calf veins.  Color flow and spectral  Doppler with waveform analysis performed.    COMPARISON: None.    FINDINGS: No DVT is demonstrated.      Impression:       IMPRESSION: Negative.     BROCK NATARAJAN MD      XR Tibia & Fibula Left 2 Views [[id number]]  Resulted: 09/21/18 1858, Result status: Final result    Ordering provider: Shawn Luong MD  09/21/18 1759 Resulted by: Brock Natarajan MD    Performed: 09/21/18 1846 - 09/21/18 1855 Resulting lab: RADIOLOGY RESULTS    Narrative:       XR TIBIA & FIBULA LT 2 VW 9/21/2018 6:55 PM    HISTORY: Pain.    COMPARISON: None.      Impression:       IMPRESSION: No evidence of acute fracture or malalignment.     BROCK NATARAJAN MD      Testing Performed By     Lab - Abbreviation Name Director Address Valid Date Range    104 - Rad Rslts RADIOLOGY RESULTS Unknown Unknown 02/16/05 1553 - Present            Encounter-Level Documents:     There are no encounter-level  documents.      Order-Level Documents:     There are no order-level documents.

## 2018-09-21 NOTE — ED TRIAGE NOTES
"Pt presents to ED via EMS for leg pain. Pt reports the pain has been in her left shin for a couple of days and today while walking she \"heard and felt a crack,\" and now the pain is worse and is not able to tolerate ambulating due to the pain.   "

## 2018-09-21 NOTE — IP AVS SNAPSHOT
MRN:[id number]                      After Visit Summary   9/21/2018    Kelsey Wood    MRN: [id number]           Thank you!     Thank you for choosing [facility] for your care. Our goal is always to provide you with excellent care. Hearing back from our patients is one way we can continue to improve our services. Please take a few minutes to complete the written survey that you may receive in the mail after you visit. If you would like to speak to someone directly about your visit please contact Patient Relations at [phone]. Thank you!          Patient Information     Date Of Birth          4/18/1929        About your hospital stay     You were admitted on:  September 21, 2018 You last received care in the:  [facility] Observation Department    You were discharged on:  September 23, 2018        Reason for your hospital stay       You were admitted for concerns of left leg pain. Your work up did not reveal presence of acute infection or blood clot. Left leg appearance is consistent with a bruise. Ok to use scheduled tylenol for pain and keep left leg elevated. Ok to use ace wrap for support.                  Who to Call     For medical emergencies, please call 911.  For non-urgent questions about your medical care, please call your primary care provider or clinic, [phone]          Attending Provider     Provider Specialty    Mello Crane MD Emergency Medicine    [facility] Internal Medicine       Primary Care Provider Office Phone # Fax #    Demetrio Arriaza [phone]      After Care Instructions     Activity - Up with assistive device           Advance Diet as Tolerated       Follow this diet upon discharge:     Regular Diet Adult            Fall precautions           General info for SNF       Length of Stay Estimate: Short Term Care: Estimated # of Days <30  Condition at Discharge: Improving  Level of care:skilled  "  Rehabilitation Potential: Fair  Admission H&P remains valid and up-to-date: Yes  Recent Chemotherapy: N/A  Use Nursing Home Standing Orders: N/A            Mantoux instructions       Give two-step Mantoux (PPD) Per Facility Policy Yes                  Follow-up Appointments     Follow Up and recommended labs and tests       Follow up with Nursing home physician.  No follow up labs or test are needed.                  Additional Services     Occupational Therapy Adult Consult       Evaluate and treat as clinically indicated.    Reason:  Assess adls and discharge needs            Physical Therapy Adult Consult       Evaluate and treat as clinically indicated.    Reason:  Generalized weakness                             Pending Results     No orders found from 9/19/2018 to 9/22/2018.            Statement of Approval     Ordered          09/23/18 1106  I have reviewed and agree with all the recommendations and orders detailed in this document.  EFFECTIVE NOW     Approved and electronically signed by:  Julieth Keys PA-C             Admission Information     Date & Time Provider Department Dept. Phone    9/21/2018 Dash Rasheed MD [facility] [place] [facility] Observation Department [phone]      Your Vitals Were     Blood Pressure Pulse Temperature Respirations Height Weight    173/76 (BP Location: Right arm) 84 97.8  F (36.6  C) (Oral) 16 1.575 m (5' 2\") 57.2 kg (126 lb 3.2 oz)    Pulse Oximetry BMI (Body Mass Index)                93% 23.08 kg/m2          [employer] Information     [employer] lets you send messages to your doctor, view your test results, renew your prescriptions, schedule appointments and more. To sign up, go to [email or web address] . Click on \"Log in\" on the left side of the screen, which will take you to the Welcome page. Then click on \"Sign up Now\" on the right side of the page.     You will be asked to enter the access code listed below, as well as some personal information. Please " follow the directions to create your username and password.     Your access code is: [email or web address]  Expires: 2018 10:12 PM     Your access code will  in 90 days. If you need help or a new code, please call your [place] clinic or [phone].        Care EveryWhere ID     This is your Care EveryWhere ID. This could be used by other organizations to access your [place] medical records  [id number]        Equal Access to Services     ALESSANDRO KELLEY : Hadii aad ku hadasho Soomaali, waaxda luqadaha, qaybta kaalmada adeegyada, waxay marielyin hayzoien christiana dougherty. So [facility] [phone].    ATENCIÓN: Si habla español, tiene a barry disposición servicios gratuitos de asistencia lingüística. [facility] [phone].    We comply with applicable federal civil rights laws and [place] laws. We do not discriminate on the basis of race, color, national origin, age, disability, sex, sexual orientation, or gender identity.               Review of your medicines      START taking        Dose / Directions    senna-docusate 8.6-50 MG per tablet   Commonly known as:  SENOKOT-S;PERICOLACE   Used for:  Other chronic pain        Dose:  1 tablet   Take 1 tablet by mouth 2 times daily   Quantity:  100 tablet   Refills:  0         CONTINUE these medicines which may have CHANGED, or have new prescriptions. If we are uncertain of the size of tablets/capsules you have at home, strength may be listed as something that might have changed.        Dose / Directions    * acetaminophen 325 MG tablet   Commonly known as:  TYLENOL   This may have changed:  Another medication with the same name was added. Make sure you understand how and when to take each.   Used for:  Other chronic pain        Dose:  650 mg   Take 2 tablets (650 mg) by mouth every 4 hours as needed for mild pain   Quantity:  100 tablet   Refills:  0       * acetaminophen 325 MG tablet   Commonly known as:  TYLENOL   This may have changed:  You were already taking a  medication with the same name, and this prescription was added. Make sure you understand how and when to take each.        Dose:  975 mg   Take 3 tablets (975 mg) by mouth every 8 hours   Quantity:  100 tablet   Refills:  0       ibuprofen 200 MG tablet   Commonly known as:  ADVIL/MOTRIN   This may have changed:    - medication strength  - how much to take  - when to take this   Used for:  Other chronic pain        Dose:  200 mg   Take 1 tablet (200 mg) by mouth daily as needed for moderate pain   Quantity:  100 tablet   Refills:  0       * Notice:  This list has 2 medication(s) that are the same as other medications prescribed for you. Read the directions carefully, and ask your doctor or other care provider to review them with you.      CONTINUE these medicines which have NOT CHANGED        Dose / Directions    albuterol 108 (90 Base) MCG/ACT inhaler   Commonly known as:  PROAIR HFA/PROVENTIL HFA/VENTOLIN HFA        Dose:  2 puff   Inhale 2 puffs into the lungs every 4 hours as needed for shortness of breath / dyspnea or wheezing   Refills:  0       bismuth subsalicylate 262 MG chewable tablet   Commonly known as:  PEPTO BISMOL        Dose:  2 tablet   Take 2 tablets by mouth every hour as needed for heartburn Max of 16 tabs per 24 hrs   Refills:  0       calcium carbonate 500 mg {elemental} 500 MG tablet   Commonly known as:  OS-MARY GRACE        Dose:  1 tablet   Take 1 tablet by mouth daily   Refills:  0       estradiol 0.1 MG/GM cream   Commonly known as:  ESTRACE        Dose:  2 g   Place 2 g vaginally twice a week   Refills:  0       fentaNYL 12 mcg/hr 72 hr patch   Commonly known as:  DURAGESIC   Used for:  Other chronic pain        Dose:  1 patch   Place 1 patch onto the skin every 72 hours remove old patch.   Quantity:  3 patch   Refills:  0       GABAPENTIN PO        Dose:  300 mg   Take 300 mg by mouth 3 times daily   Refills:  0       hydrocortisone 1 % Crea cream        Apply topically daily as needed for  itching   Refills:  0       latanoprost 0.005 % ophthalmic solution   Commonly known as:  XALATAN        Dose:  1 drop   Place 1 drop into both eyes At Bedtime   Refills:  0       OSTEO BI-FLEX ADV DOUBLE ST PO        Dose:  1 tablet   Take 1 tablet by mouth 2 times daily   Refills:  0       SALONPAS PAIN RELIEF PATCH EX        Dose:  1 patch   Externally apply 1 patch topically daily as needed   Refills:  0         STOP taking     HYDROcodone-acetaminophen 5-325 MG per tablet   Commonly known as:  NORCO                Where to get your medicines      Some of these will need a paper prescription and others can be bought over the counter. Ask your nurse if you have questions.     Bring a paper prescription for each of these medications     fentaNYL 12 mcg/hr 72 hr patch       You don't need a prescription for these medications     acetaminophen 325 MG tablet    ibuprofen 200 MG tablet    senna-docusate 8.6-50 MG per tablet                Protect others around you: Learn how to safely use, store and throw away your medicines at www.disposemymeds.org.        Information about OPIOIDS     PRESCRIPTION OPIOIDS: WHAT YOU NEED TO KNOW   We gave you an opioid (narcotic) pain medicine. It is important to manage your pain, but opioids are not always the best choice. You should first try all the other options your care team gave you. Take this medicine for as short a time (and as few doses) as possible.    Some activities can increase your pain, such as bandage changes or therapy sessions. It may help to take your pain medicine 30 to 60 minutes before these activities. Reduce your stress by getting enough sleep, working on hobbies you enjoy and practicing relaxation or meditation. Talk to your care team about ways to manage your pain beyond prescription opioids.    These medicines have risks:    DO NOT drive when on new or higher doses of pain medicine. These medicines can affect your alertness and reaction times, and you  could be arrested for driving under the influence (DUI). If you need to use opioids long-term, talk to your care team about driving.    DO NOT operate heavy machinery    DO NOT do any other dangerous activities while taking these medicines.    DO NOT drink any alcohol while taking these medicines.     If the opioid prescribed includes acetaminophen, DO NOT take with any other medicines that contain acetaminophen. Read all labels carefully. Look for the word  acetaminophen  or  Tylenol.  Ask your pharmacist if you have questions or are unsure.    You can get addicted to pain medicines, especially if you have a history of addiction (chemical, alcohol or substance dependence). Talk to your care team about ways to reduce this risk.    All opioids tend to cause constipation. Drink plenty of water and eat foods that have a lot of fiber, such as fruits, vegetables, prune juice, apple juice and high-fiber cereal. Take a laxative (Miralax, milk of magnesia, Colace, Senna) if you don t move your bowels at least every other day. Other side effects include upset stomach, sleepiness, dizziness, throwing up, tolerance (needing more of the medicine to have the same effect), physical dependence and slowed breathing.    Store your pills in a secure place, locked if possible. We will not replace any lost or stolen medicine. If you don t finish your medicine, please throw away (dispose) as directed by your pharmacist. The [place] Pollution Control Agency has more information about safe disposal: https://www.pca.[facility].mn.us/living-green/managing-unwanted-medications             Medication List: This is a list of all your medications and when to take them. Check marks below indicate your daily home schedule. Keep this list as a reference.      Medications           Morning Afternoon Evening Bedtime As Needed    * acetaminophen 325 MG tablet   Commonly known as:  TYLENOL   Take 2 tablets (650 mg) by mouth every 4 hours as needed for  mild pain   Last time this was given:  975 mg on 9/23/2018  4:21 AM                                * acetaminophen 325 MG tablet   Commonly known as:  TYLENOL   Take 3 tablets (975 mg) by mouth every 8 hours   Last time this was given:  975 mg on 9/23/2018  4:21 AM                                albuterol 108 (90 Base) MCG/ACT inhaler   Commonly known as:  PROAIR HFA/PROVENTIL HFA/VENTOLIN HFA   Inhale 2 puffs into the lungs every 4 hours as needed for shortness of breath / dyspnea or wheezing                                bismuth subsalicylate 262 MG chewable tablet   Commonly known as:  PEPTO BISMOL   Take 2 tablets by mouth every hour as needed for heartburn Max of 16 tabs per 24 hrs                                calcium carbonate 500 mg {elemental} 500 MG tablet   Commonly known as:  OS-MARY GRACE   Take 1 tablet by mouth daily                                estradiol 0.1 MG/GM cream   Commonly known as:  ESTRACE   Place 2 g vaginally twice a week                                fentaNYL 12 mcg/hr 72 hr patch   Commonly known as:  DURAGESIC   Place 1 patch onto the skin every 72 hours remove old patch.   Last time this was given:  1 patch on 9/23/2018  8:48 AM                                GABAPENTIN PO   Take 300 mg by mouth 3 times daily   Last time this was given:  300 mg on 9/23/2018  8:48 AM                                hydrocortisone 1 % Crea cream   Apply topically daily as needed for itching                                ibuprofen 200 MG tablet   Commonly known as:  ADVIL/MOTRIN   Take 1 tablet (200 mg) by mouth daily as needed for moderate pain   Last time this was given:  400 mg on 9/22/2018  5:59 PM                                latanoprost 0.005 % ophthalmic solution   Commonly known as:  XALATAN   Place 1 drop into both eyes At Bedtime   Last time this was given:  1 drop on 9/22/2018 10:31 PM                                OSTEO BI-FLEX ADV DOUBLE ST PO   Take 1 tablet by mouth 2 times daily                                 SALONPAS PAIN RELIEF PATCH EX   Externally apply 1 patch topically daily as needed                                senna-docusate 8.6-50 MG per tablet   Commonly known as:  SENOKOT-S;PERICOLACE   Take 1 tablet by mouth 2 times daily   Last time this was given:  1 tablet on 9/23/2018  8:48 AM                                * Notice:  This list has 2 medication(s) that are the same as other medications prescribed for you. Read the directions carefully, and ask your doctor or other care provider to review them with you.

## 2018-09-22 PROCEDURE — 99225 ZZC SUBSEQUENT OBSERVATION CARE,LEVEL II: CPT | Performed by: PHYSICIAN ASSISTANT

## 2018-09-22 PROCEDURE — G0378 HOSPITAL OBSERVATION PER HR: HCPCS

## 2018-09-22 PROCEDURE — A9270 NON-COVERED ITEM OR SERVICE: HCPCS | Mod: GY | Performed by: PHYSICIAN ASSISTANT

## 2018-09-22 PROCEDURE — 99207 ZZC CDG-MDM COMPONENT: MEETS LOW - DOWN CODED: CPT | Performed by: PHYSICIAN ASSISTANT

## 2018-09-22 PROCEDURE — A9270 NON-COVERED ITEM OR SERVICE: HCPCS | Mod: GY | Performed by: INTERNAL MEDICINE

## 2018-09-22 PROCEDURE — 25000132 ZZH RX MED GY IP 250 OP 250 PS 637: Mod: GY | Performed by: PHYSICIAN ASSISTANT

## 2018-09-22 PROCEDURE — 25000132 ZZH RX MED GY IP 250 OP 250 PS 637: Mod: GY | Performed by: INTERNAL MEDICINE

## 2018-09-22 PROCEDURE — 40000894 ZZH STATISTIC OT IP EVAL DEFER: Performed by: REHABILITATION PRACTITIONER

## 2018-09-22 RX ORDER — FENTANYL 12.5 UG/1
12 PATCH TRANSDERMAL
Status: DISCONTINUED | OUTPATIENT
Start: 2018-09-23 | End: 2018-09-22

## 2018-09-22 RX ORDER — FENTANYL 12.5 UG/1
12 PATCH TRANSDERMAL
Status: DISCONTINUED | OUTPATIENT
Start: 2018-09-23 | End: 2018-09-23 | Stop reason: HOSPADM

## 2018-09-22 RX ORDER — GABAPENTIN 300 MG/1
300 CAPSULE ORAL 3 TIMES DAILY
Status: DISCONTINUED | OUTPATIENT
Start: 2018-09-22 | End: 2018-09-23 | Stop reason: HOSPADM

## 2018-09-22 RX ORDER — ACETAMINOPHEN 325 MG/1
975 TABLET ORAL EVERY 8 HOURS
Status: DISCONTINUED | OUTPATIENT
Start: 2018-09-22 | End: 2018-09-23 | Stop reason: HOSPADM

## 2018-09-22 RX ADMIN — ACETAMINOPHEN 650 MG: 325 TABLET, FILM COATED ORAL at 04:31

## 2018-09-22 RX ADMIN — CEPHALEXIN 500 MG: 500 CAPSULE ORAL at 05:58

## 2018-09-22 RX ADMIN — ACETAMINOPHEN 975 MG: 325 TABLET, FILM COATED ORAL at 13:54

## 2018-09-22 RX ADMIN — ACETAMINOPHEN 975 MG: 325 TABLET, FILM COATED ORAL at 19:43

## 2018-09-22 RX ADMIN — GABAPENTIN 300 MG: 300 CAPSULE ORAL at 09:28

## 2018-09-22 RX ADMIN — HYDROCODONE BITARTRATE AND ACETAMINOPHEN 1 TABLET: 5; 325 TABLET ORAL at 06:35

## 2018-09-22 RX ADMIN — GABAPENTIN 300 MG: 300 CAPSULE ORAL at 19:43

## 2018-09-22 RX ADMIN — GABAPENTIN 300 MG: 300 CAPSULE ORAL at 13:54

## 2018-09-22 RX ADMIN — HYDROCODONE BITARTRATE AND ACETAMINOPHEN 1 TABLET: 5; 325 TABLET ORAL at 14:24

## 2018-09-22 RX ADMIN — LATANOPROST 1 DROP: 50 SOLUTION/ DROPS OPHTHALMIC at 22:31

## 2018-09-22 RX ADMIN — SENNOSIDES AND DOCUSATE SODIUM 1 TABLET: 8.6; 5 TABLET ORAL at 19:44

## 2018-09-22 RX ADMIN — IBUPROFEN 400 MG: 400 TABLET, FILM COATED ORAL at 17:59

## 2018-09-22 ASSESSMENT — PAIN DESCRIPTION - DESCRIPTORS
DESCRIPTORS: ACHING
DESCRIPTORS: ACHING

## 2018-09-22 NOTE — PLAN OF CARE
Problem: Patient Care Overview  Goal: Plan of Care/Patient Progress Review  ROOM # 222    Living Situation (if not independent, order SW consult):Independent in Assisted Living  Facility name:[facility]  : Sha Wood son [phone]    Activity level at baseline: Independent  Activity level on admit: Assist x 2      Patient registered to observation; given Patient Bill of Rights; given the opportunity to ask questions about observation status and their plan of care.  Patient has been oriented to the observation room, bathroom and call light is in place.    Discussed discharge goals and expectations with patient/family.

## 2018-09-22 NOTE — PLAN OF CARE
"Problem: Patient Care Overview  Goal: Plan of Care/Patient Progress Review  Outcome: No Change  PRIMARY DIAGNOSIS: ACUTE PAIN  OUTPATIENT/OBSERVATION GOALS TO BE MET BEFORE DISCHARGE:  1. Pain Status: Improved-controlled with oral pain medications.     2. Return to near baseline physical activity: Yes     3. Cleared for discharge by consultants (if involved): No     Discharge Planner Nurse   Safe discharge environment identified: Yes  Barriers to discharge: No       Entered by: Mariel Lyles 09/22/2018      /64 (BP Location: Right arm)  Pulse 82  Temp 97.4  F (36.3  C) (Oral)  Resp 20  Ht 1.575 m (5' 2\")  Wt 57.2 kg (126 lb 3.2 oz)  SpO2 92%  BMI 23.08 kg/m2     Plan: Patient is stable and on room air.  Assist of  Two with walker and gait belt, on a regular diet. Patient says pain is a 5/10 but can not accomplish the task of moving her foot once she is up. Tylenol given and Norco given. Will try to ambulate again.  Will continue to monitor and provide cares.         Please review provider order for any additional goals. Nurse to notify provider when observation goals have been met and patient is ready for discharge.      "

## 2018-09-22 NOTE — PLAN OF CARE
Discharge Planner OT   Patient plan for discharge: return to detention  Current status: Pt is an 89 year old female with past medical history significant for arthritis, asthma, glaucoma, and chronic back pain who presented to the emergency room on 9/21/18 with left leg pain. She does not recall any specific injury but noted pain in her left lower anterior leg when standing yesterday. When she looked at the area she noted that it was red, which worried her so she presented to the ER for evaluation. ER evaluation included XR of the left leg and US which were both negative. She was given a dose of IV ancef in case of possible cellulitis as well and registered to observation for further monitoring.  Barriers to return to prior living situation: none  Recommendations for discharge:  SHILOH with assist for med management, meals, homemaking and bathing  Rationale for recommendations: Per MD and PT, no skilled OT needs identified at this time and order completed.       Entered by: Elisabeth Gipson 09/23/2018 7:29 AM

## 2018-09-22 NOTE — PHARMACY-ADMISSION MEDICATION HISTORY
Admission medication history interview status for this patient is complete. See [facility] admission navigator for allergy information, prior to admission medications and immunization status.     Medication history interview source(s):Patient and Family  Medication history resources (including written lists, pill bottles, clinic record):med list    Changes made to PTA medication list:  Added: none  Deleted: none  Changed: Osteo bi-flex, Norco from prn to TID    Medication reconciliation/reorder completed by provider prior to medication history? No    Do you take OTC medications (eg tylenol, ibuprofen, fish oil, eye/ear drops, etc)? Y(Y/N)    For patients on insulin therapy: N (Y/N)  Lantus/levemir/NPH/Mix 70/30 dose:   (Y/N) (see Med list for doses)   Sliding scale Novolog Y/N  If Yes, do you have a baseline novolog pre-meal dose:  units with meals  Patients eat three meals a day:   Y/N    How many episodes of hypoglycemia do you have per week: _______  How many missed doses do you have per week: ______  How many times do you check your blood glucose per day: _______   Any Barriers to therapy - Be specific :  cost of medications, comfortable with giving injections (if applicable), comfortable and confident with current diabetes regimen: Y/N ______________      Prior to Admission medications    Medication Sig Last Dose Taking? Auth Provider   acetaminophen (TYLENOL) 325 MG tablet Take 2 tablets (650 mg) by mouth every 4 hours as needed for mild pain  Yes Katheryn Coleman, VINOD   albuterol (PROAIR HFA/PROVENTIL HFA/VENTOLIN HFA) 108 (90 Base) MCG/ACT Inhaler Inhale 2 puffs into the lungs every 4 hours as needed for shortness of breath / dyspnea or wheezing  Yes Unknown, Entered By History   bismuth subsalicylate (PEPTO BISMOL) 262 MG chewable tablet Take 2 tablets by mouth every hour as needed for heartburn Max of 16 tabs per 24 hrs  Yes Unknown, Entered By History   calcium carbonate (OS-MARY GRACE 500 MG [place]. [place]) 500 MG tablet  Take 1 tablet by mouth daily  9/21/2018 at Unknown time Yes Unknown, Entered By History   estradiol (ESTRACE) 0.1 MG/GM cream Place 2 g vaginally twice a week 9/19/2018 Yes Unknown, Entered By History   fentaNYL (DURAGESIC) 12 mcg/hr 72 hr patch Place 1 patch onto the skin every 72 hours remove old patch. 9/20/2018 at Unknown time Yes Katheryn Coleman, PA-C   GABAPENTIN PO Take 300 mg by mouth 3 times daily 9/21/2018 at Unknown time Yes Unknown, Entered By History   HYDROcodone-acetaminophen (NORCO) 5-325 MG per tablet Take 1 tablet by mouth 3 times daily 9/21/2018 at Unknown time Yes Unknown, Entered By History   hydrocortisone 1 % CREA cream Apply topically daily as needed for itching  Yes Unknown, Entered By History   IBUPROFEN PO Take 400 mg by mouth every 6 hours as needed for moderate pain  Yes Unknown, Entered By History   latanoprost (XALATAN) 0.005 % ophthalmic solution Place 1 drop into both eyes At Bedtime 9/20/2018 at Unknown time Yes Unknown, Entered By History   Liniments (SALONPAS PAIN RELIEF PATCH EX) Externally apply 1 patch topically daily as needed  Yes Unknown, Entered By History   Misc Natural Products (OSTEO BI-FLEX ADV DOUBLE ST PO) Take 1 tablet by mouth 2 times daily   Yes Unknown, Entered By History

## 2018-09-22 NOTE — PLAN OF CARE
Problem: Patient Care Overview  Goal: Plan of Care/Patient Progress Review  Outcome: No Change  PRIMARY DIAGNOSIS: Cellulitis of LLE  OUTPATIENT/OBSERVATION GOALS TO BE MET BEFORE DISCHARGE:  1. Vitals sign stable or return to baseline: Yes    2. Tolerating oral antibiotics or has home infusion set up if applicable: Yes    3. Pain status: Improved-controlled with oral pain medications.    4. Return to near baseline physical activity: No, ast 2 with walker    Discharge Planner Nurse   Safe discharge environment identified: Yes  Barriers to discharge: No       Entered by: Trudy Arredondo 09/22/2018 6:37 AM     Please review provider order for any additional goals.     Nurse to notify provider when observation goals have been met and patient is ready for discharge.    A&Ox4. Forgetful. VSS. Temp: 96.1  F (35.6  C) Temp src: Oral BP: 165/68 Pulse: 82 Heart Rate: 69 Resp: 20 SpO2: 97 % O2 Device: None (Room air)   pain 6/10 in back. Ibuprofen given x1. t-pump ordered. 2/10 in LLE, inc pain with movement. LLE blanchable redness that had not spread since marked on previous shift. Pt reports weakness. 2ast pivot to commode. Regular diet. Plan: monitor pain. Keflex q6. Monitor LLE.

## 2018-09-22 NOTE — PROGRESS NOTES
[facility]  Hospitalist Progress Note  Lucy Bernal PA-C, VINOD 09/22/2018    Reason for Stay (Diagnosis): left leg pain         Assessment and Plan:      Summary of Stay: Kelsey Wood is a 89 year old female with past medical history significant for arthritis, asthma, glaucoma, and chronic back pain who presented to the emergency room on 9/21/18 with left leg pain. She does not recall any specific injury but noted pain in her left lower anterior leg when standing yesterday. When she looked at the area she noted that it was red, which worried her so she presented to the ER for evaluation. ER evaluation included XR of the left leg and US which were both negative. She was given a dose of IV ancef in case of possible cellulitis as well and registered to observation for further monitoring.     Problem List:   1. Left Leg Pain - Based on her exam this is most consistent with a bruise. The area is a mix of dark red, purple and yellowish discoloration. There is no warmth or edema. She has focal tenderness over the area with the most concentrated coloring. She has full ROM of her ankle and knee. I suspect that she bumped her left anterior lower leg and developed a small area of ecchymosis that is causing pain. She has been afebrile this admission and again her exam is not consistent with cellulitis so I will discharge her Keflex. I will have the nurse wrap the leg in an ACE wrap for comfort and apply ice to the area. We will start scheduled Tylenol as well. She is quite nervous/hesitant to stand on the leg so we will control her discomfort and continue to provide encouragement with ambulation. Physical Therapy to assess later today. Plan to discharge back home today or tomorrow pending course.   2. Chronic Pain - patient with chronic back, hip and knee pain. Will resume her PTA fentanyl patch (was last placed 9/20 and due for replacement on 9/23), gabapentin and as needed norco. She uses Salonpas as  "well, will use menthol patches while she is here.   3. Asthma - stable, albuterol nebs if needed.   DVT Prophylaxis: Low Risk/Ambulatory with no VTE prophylaxis indicated  Code Status: DNR / DNI  Discharge Dispo: to [facility] ([facility])  Estimated Disch Date / # of Days until Disch: today or tomorrow         Interval History (Subjective):      Leg is still painful but a bit better today. Hurts mainly when she touches it. Is very hesitant and worried about standing on the leg so has not wanted to get up and move too much so far.                   Physical Exam:      Last Vital Signs:  /60 (BP Location: Right arm)  Pulse 82  Temp 97.1  F (36.2  C) (Oral)  Resp 20  Ht 1.575 m (5' 2\")  Wt 57.2 kg (126 lb 3.2 oz)  SpO2 92%  BMI 23.08 kg/m2    Wt Readings from Last 1 Encounters:   09/21/18 57.2 kg (126 lb 3.2 oz)       Constitutional: Awake, alert, cooperative, no apparent distress   Respiratory: Clear to auscultation bilaterally, no crackles or wheezing   Cardiovascular: Regular rate and rhythm, normal S1 and S2, and no murmur noted   Abdomen: Normal bowel sounds, soft, non-distended, non-tender   Skin: There is a roughly 5x10cm area of ecchymosis on the left anterior lower leg. No edema, no warmth, no streaking.    Neuro: Alert and oriented x3, no weakness, numbness, memory loss   Extremities: No edema, normal range of motion of LLE   Other(s):        All other systems: Negative          Medications:      All current medications were reviewed with changes reflected in problem list.         Data:      All new lab and imaging data was reviewed.   Labs:  No results for input(s): WBC, HGB, HCT, MCV, PLT in the last 168 hours.   Imaging:   Recent Results (from the past 48 hour(s))   XR Tibia & Fibula Left 2 Views    Narrative    XR TIBIA & FIBULA LT 2 VW 9/21/2018 6:55 PM    HISTORY: Pain.    COMPARISON: None.      Impression    IMPRESSION: No evidence of acute fracture or malalignment.     MOY NATARAJAN MD   US " Lower Extremity Venous Duplex Left    Narrative    US LOWER EXTREMITY VENOUS DUPLEX LEFT 9/21/2018 7:57 PM    HISTORY: Left leg pain.    TECHNIQUE: Imaged deep venous structures of the left lower extremity  include the left common femoral vein, femoral vein, popliteal vein,  and visualized posterior deep calf veins.  Color flow and spectral  Doppler with waveform analysis performed.    COMPARISON: None.    FINDINGS: No DVT is demonstrated.      Impression    IMPRESSION: Negative.     MOY NATARAJAN MD

## 2018-09-22 NOTE — PLAN OF CARE
Problem: Patient Care Overview  Goal: Plan of Care/Patient Progress Review  Outcome: No Change  Problem: Patient Care Overview  Goal: Plan of Care/Patient Progress Review  Outcome: No Change  PRIMARY DIAGNOSIS: Cellulitis of LLE  OUTPATIENT/OBSERVATION GOALS TO BE MET BEFORE DISCHARGE:  1. Vitals sign stable or return to baseline: Yes     2. Tolerating oral antibiotics or has home infusion set up if applicable: Yes     3. Pain status: Improved-controlled with oral pain medications.     4. Return to near baseline physical activity: No, ast 2 with walker     Discharge Planner Nurse   Safe discharge environment identified: Yes  Barriers to discharge: No       Entered by: Trudy Arredondo 09/22/2018 6:37 AM  Please review provider order for any additional goals.      Nurse to notify provider when observation goals have been met and patient is ready for discharge.     A&Ox4. Forgetful. VSS. Temp: 96.1  F (35.6  C) Temp src: Oral BP: 165/68 Pulse: 82 Heart Rate: 69 Resp: 20 SpO2: 97 % O2 Device: None (Room air)   pain 6/10 in back. Ibuprofen given x1, norco given x1. t-pump placed on lower back. Lidocaine patch in place on L hip.  2/10 in LLE, inc pain with movement. LLE blanchable redness that had not spread since marked on previous shift. Pt reports weakness. 2ast pivot to commode. Regular diet. Plan: monitor pain. Keflex q6. Monitor LLE.

## 2018-09-22 NOTE — ED NOTES
"[facility]  ED Nurse Handoff Report    Kelsey Wood is a 89 year old female   ED Chief complaint: Leg Pain  . ED Diagnosis:   Final diagnoses:   Cellulitis of left lower extremity     Allergies: No Known Allergies    Code Status: Full Code  Activity level - Baseline/Home:  Independent. Activity Level - Current:   Stand with Assist of 2. Lift room needed: No. Bariatric: No   Needed: No   Isolation: No. Infection: Not Applicable.     Vital Signs:   Vitals:    09/21/18 1754 09/21/18 2003 09/21/18 2004   BP: 157/77 171/86    Pulse: 71     Resp: 16     Temp: 98  F (36.7  C)     TempSrc: Oral     SpO2: 99%  95%       Cardiac Rhythm:  ,      Pain level: 0-10 Pain Scale: 4  Patient confused: No. Patient Falls Risk: Yes.   Elimination Status: Has voided   Patient Report - Initial Complaint: Pt presents to ED via EMS for leg pain. Pt reports the pain has been in her left shin for a couple of days and today while walking she \"heard and felt a crack,\" and now the pain is worse and is not able to tolerate ambulating due to the pain.  Focused Assessment: Musculoskeletal - Musculoskeletal WDL:  WDL except Musculoskeletal Comment: Left shin pain, bruising noted.  Tests Performed: X-ray, US. Abnormal Results:   US Lower Extremity Venous Duplex Left   Final Result   IMPRESSION: Negative.       MOY NATARAJAN MD      XR Tibia & Fibula Left 2 Views   Final Result   IMPRESSION: No evidence of acute fracture or malalignment.       MOY NATARAJAN MD      .   Treatments provided: Antibiotic, Tylenol  Family Comments: Son at bedside  OBS brochure/video discussed/provided to patient:  Yes  ED Medications:   Medications   ceFAZolin (ANCEF) intermittent infusion 2 g in 100 mL dextrose PRE-MIX (2 g Intravenous Given 9/21/18 2000)   acetaminophen (TYLENOL) tablet 1,000 mg (1,000 mg Oral Given 9/21/18 1919)     Drips infusing:  Yes  For the majority of the shift, the patient's behavior Green. Interventions performed were " None.     Severe Sepsis OR Septic Shock Diagnosis Present: No      ED Nurse Name/Phone Number: Binu Duarte,   8:07 PM  RECEIVING UNIT ED HANDOFF REVIEW    Above ED Nurse Handoff Report was reviewed: Yes  Reviewed by: Talia Grimes on September 21, 2018 at 9:16 PM

## 2018-09-23 VITALS
SYSTOLIC BLOOD PRESSURE: 155 MMHG | TEMPERATURE: 98.1 F | WEIGHT: 126.2 LBS | OXYGEN SATURATION: 94 % | BODY MASS INDEX: 23.22 KG/M2 | HEIGHT: 62 IN | HEART RATE: 84 BPM | DIASTOLIC BLOOD PRESSURE: 71 MMHG | RESPIRATION RATE: 16 BRPM

## 2018-09-23 PROBLEM — M79.606 LEG PAIN: Status: RESOLVED | Noted: 2018-09-21 | Resolved: 2018-09-23

## 2018-09-23 PROCEDURE — 25000132 ZZH RX MED GY IP 250 OP 250 PS 637: Mod: GY | Performed by: INTERNAL MEDICINE

## 2018-09-23 PROCEDURE — A9270 NON-COVERED ITEM OR SERVICE: HCPCS | Mod: GY | Performed by: INTERNAL MEDICINE

## 2018-09-23 PROCEDURE — 25000132 ZZH RX MED GY IP 250 OP 250 PS 637: Mod: GY | Performed by: PHYSICIAN ASSISTANT

## 2018-09-23 PROCEDURE — G0378 HOSPITAL OBSERVATION PER HR: HCPCS

## 2018-09-23 PROCEDURE — 99217 ZZC OBSERVATION CARE DISCHARGE: CPT | Performed by: PHYSICIAN ASSISTANT

## 2018-09-23 PROCEDURE — A9270 NON-COVERED ITEM OR SERVICE: HCPCS | Mod: GY | Performed by: PHYSICIAN ASSISTANT

## 2018-09-23 RX ORDER — ACETAMINOPHEN 325 MG/1
975 TABLET ORAL EVERY 8 HOURS
Qty: 100 TABLET | Refills: 0 | DISCHARGE
Start: 2018-09-23 | End: 2018-09-23

## 2018-09-23 RX ORDER — IPRATROPIUM BROMIDE AND ALBUTEROL SULFATE 2.5; .5 MG/3ML; MG/3ML
3 SOLUTION RESPIRATORY (INHALATION)
Status: DISCONTINUED | OUTPATIENT
Start: 2018-09-23 | End: 2018-09-23 | Stop reason: HOSPADM

## 2018-09-23 RX ORDER — FENTANYL 12.5 UG/1
1 PATCH TRANSDERMAL
Qty: 3 PATCH | Refills: 0 | Status: ON HOLD | OUTPATIENT
Start: 2018-09-23 | End: 2020-02-27

## 2018-09-23 RX ORDER — IBUPROFEN 200 MG
200 TABLET ORAL DAILY PRN
Qty: 100 TABLET | Refills: 0 | DISCHARGE
Start: 2018-09-23 | End: 2018-10-15

## 2018-09-23 RX ORDER — AMOXICILLIN 250 MG
1 CAPSULE ORAL 2 TIMES DAILY
Qty: 100 TABLET | Refills: 0 | DISCHARGE
Start: 2018-09-23 | End: 2018-09-24

## 2018-09-23 RX ADMIN — FENTANYL 1 PATCH: 12.5 PATCH TRANSDERMAL at 08:48

## 2018-09-23 RX ADMIN — GABAPENTIN 300 MG: 300 CAPSULE ORAL at 08:48

## 2018-09-23 RX ADMIN — ACETAMINOPHEN 975 MG: 325 TABLET, FILM COATED ORAL at 04:21

## 2018-09-23 RX ADMIN — SENNOSIDES AND DOCUSATE SODIUM 1 TABLET: 8.6; 5 TABLET ORAL at 08:48

## 2018-09-23 NOTE — PLAN OF CARE
Problem: Patient Care Overview  Goal: Plan of Care/Patient Progress Review  Outcome: Adequate for Discharge Date Met: 09/23/18  Patient's After Visit Summary was reviewed with patient and/or spouse.   Patient verbalized understanding of After Visit Summary, recommended follow up and was given an opportunity to ask questions.   Discharge medications sent home with patient/family: Not applicable,  [facility]   Discharged with transport tech    OBSERVATION patient END time: 3:02 PM

## 2018-09-23 NOTE — DISCHARGE SUMMARY
[facility]  Outpatient/Observation Unit  Discharge Summary        Patient ID:  Kelsey Wood  [id number]  89 year old  4/18/1929    Admit date: 9/21/2018    Discharge date and time: 9/23/2018 me    Admitting Provider: Dash Rasheed MD    Discharge Provider: Julieth REEDC     Admission Diagnoses:    1. Possible LLE cellulitis  2. Left lower leg bruise  3. Acute on chronic pain exacerbated by #2    Discharge Diagnoses:   1. Left lower leg bruise (etiology unclear likely mild trauma)-no fracture  2. Acute on chronic pain    Admission Condition: good    Discharged Condition: good    Hospital Course:    Reason for your hospital stay       You were admitted for concerns of left leg pain. Your work up did not   reveal presence of acute infection or blood clot. Left leg appearance is   consistent with a bruise. Ok to use scheduled tylenol for pain and keep   left leg elevated. Ok to use ace wrap for support.         Kelsey Wood is a 89 year old female with past medical history significant for arthritis, asthma, glaucoma, and chronic back pain on narcotics who presented to the emergency room on 9/21/18 with left leg pain. She does not recall any specific injury but noted pain in her left lower anterior leg when standing yesterday. When she looked at the area she noted that it was red, which worried her so she presented to the ER for evaluation. ER evaluation included XR of the left leg and US which were both negative. She was given a dose of IV ancef in case of possible cellulitis as well and registered to observation for further monitoring.      Problem List:   1. Left Leg Pain - Based on her exam this is most consistent with a bruise. The area is a mix of dark red, purple and yellowish discoloration. There is no warmth or edema. She has focal tenderness over the area with the most concentrated coloring. She has full ROM of her ankle and knee. I suspect that she bumped her left anterior lower  leg and developed a small area of ecchymosis that is causing pain. She has been afebrile this admission and again her exam is not consistent with cellulitis so Keflex was discontinued. She has acce bandage for support.   2. Chronic Pain - patient with chronic back, hip and knee pain. Will resume her PTA fentanyl patch (was last placed 9/20 and due for replacement on 9/23), gabapentin. She can also have scheduled tylenol for pain, not safe to get additional Norco evgeny given her advanced age. Follow up with PCP in 1 week.   3. Asthma - stable, albuterol nebs if needed.       Consults: PT/SW    Significant Diagnostic Studies:     Results for orders placed or performed during the hospital encounter of 09/21/18   XR Tibia & Fibula Left 2 Views    Narrative    XR TIBIA & FIBULA LT 2 VW 9/21/2018 6:55 PM    HISTORY: Pain.    COMPARISON: None.      Impression    IMPRESSION: No evidence of acute fracture or malalignment.     MOY NATARAJAN MD   US Lower Extremity Venous Duplex Left    Narrative    US LOWER EXTREMITY VENOUS DUPLEX LEFT 9/21/2018 7:57 PM    HISTORY: Left leg pain.    TECHNIQUE: Imaged deep venous structures of the left lower extremity  include the left common femoral vein, femoral vein, popliteal vein,  and visualized posterior deep calf veins.  Color flow and spectral  Doppler with waveform analysis performed.    COMPARISON: None.    FINDINGS: No DVT is demonstrated.      Impression    IMPRESSION: Negative.     MOY NATARAJAN MD       Treatments: IV hydration and therapies: PT and SW    Discharge Exam:    B/P: 137/59, T: 97.6, P: 84, R: 16       Constitutional: Awake, alert, cooperative, no apparent distress   Respiratory: Clear to auscultation bilaterally, no crackles or wheezing   Cardiovascular: Regular rate and rhythm, normal S1 and S2, and no murmur noted   Abdomen: Normal bowel sounds, soft, non-distended, non-tender   Skin: There is a roughly 5x10cm area of ecchymosis on the left anterior lower leg. No  edema, no warmth, no streaking. another area of purplish ecchymosis over the left lateral ankle but full ROM in ankle, no significant swelling, no warmth or pain.    Neuro: Alert and oriented x3, no weakness, numbness, memory loss   Extremities: No edema, normal range of motion of LLE   Other(s):           All other systems: Negative         Pending Studies:    Unresulted Labs Ordered in the Past 30 Days of this Admission     No orders found from 7/23/2018 to 9/22/2018.           Disposition: TCC    Patient Instructions:        Review of your medicines      START taking       Dose / Directions    senna-docusate 8.6-50 MG per tablet   Commonly known as:  SENOKOT-S;PERICOLACE   Used for:  Other chronic pain        Dose:  1 tablet   Take 1 tablet by mouth 2 times daily   Quantity:  100 tablet   Refills:  0         CONTINUE these medicines which may have CHANGED, or have new prescriptions. If we are uncertain of the size of tablets/capsules you have at home, strength may be listed as something that might have changed.       Dose / Directions         * acetaminophen 325 MG tablet   Commonly known as:  TYLENOL   This may have changed:  You were already taking a medication with the same name, and this prescription was added. Make sure you understand how and when to take each.        Dose:  975 mg   Take 3 tablets (975 mg) by mouth every 8 hours   Quantity:  100 tablet   Refills:  0       ibuprofen 200 MG tablet   Commonly known as:  ADVIL/MOTRIN   This may have changed:    - medication strength  - how much to take  - when to take this   Used for:  Other chronic pain        Dose:  200 mg   Take 1 tablet (200 mg) by mouth daily as needed for moderate pain   Quantity:  100 tablet   Refills:  0       * Notice:  This list has 2 medication(s) that are the same as other medications prescribed for you. Read the directions carefully, and ask your doctor or other care provider to review them with you.      CONTINUE these medicines  which have NOT CHANGED       Dose / Directions    albuterol 108 (90 Base) MCG/ACT inhaler   Commonly known as:  PROAIR HFA/PROVENTIL HFA/VENTOLIN HFA        Dose:  2 puff   Inhale 2 puffs into the lungs every 4 hours as needed for shortness of breath / dyspnea or wheezing   Refills:  0       bismuth subsalicylate 262 MG chewable tablet   Commonly known as:  PEPTO BISMOL        Dose:  2 tablet   Take 2 tablets by mouth every hour as needed for heartburn Max of 16 tabs per 24 hrs   Refills:  0       calcium carbonate 500 mg  500 MG tablet   Commonly known as:  OS-MARY GRACE        Dose:  1 tablet   Take 1 tablet by mouth daily   Refills:  0       estradiol 0.1 MG/GM cream   Commonly known as:  ESTRACE        Dose:  2 g   Place 2 g vaginally twice a week   Refills:  0       fentaNYL 12 mcg/hr 72 hr patch   Commonly known as:  DURAGESIC   Used for:  Other chronic pain        Dose:  1 patch   Place 1 patch onto the skin every 72 hours remove old patch.   Quantity:  3 patch   Refills:  0       GABAPENTIN PO        Dose:  300 mg   Take 300 mg by mouth 3 times daily   Refills:  0       hydrocortisone 1 % Crea cream        Apply topically daily as needed for itching   Refills:  0       latanoprost 0.005 % ophthalmic solution   Commonly known as:  XALATAN        Dose:  1 drop   Place 1 drop into both eyes At Bedtime   Refills:  0       OSTEO BI-FLEX ADV DOUBLE ST PO        Dose:  1 tablet   Take 1 tablet by mouth 2 times daily   Refills:  0       SALONPAS PAIN RELIEF PATCH EX        Dose:  1 patch   Externally apply 1 patch topically daily as needed   Refills:  0         STOP taking          HYDROcodone-acetaminophen 5-325 MG per tablet   Commonly known as:  NORCO                Where to get your medicines      Some of these will need a paper prescription and others can be bought over the counter. Ask your nurse if you have questions.     Bring a paper prescription for each of these medications      fentaNYL 12 mcg/hr 72 hr patch        You don't need a prescription for these medications      acetaminophen 325 MG tablet     ibuprofen 200 MG tablet     senna-docusate 8.6-50 MG per tablet                 Activity: activity as tolerated    Active Diet Order      Regular Diet Adult      Advance Diet as Tolerated    Follow-up with PCP in 1 week.    Signed:  Julieth Keys

## 2018-09-23 NOTE — PLAN OF CARE
Problem: Patient Care Overview  Goal: Plan of Care/Patient Progress Review  Outcome: No Change  PRIMARY DIAGNOSIS: ACUTE PAIN  OUTPATIENT/OBSERVATION GOALS TO BE MET BEFORE DISCHARGE:  1. Pain Status: Improved-controlled with oral pain medications.  Taking scheduled tylenol, ice applied has PRN Norco    2. Return to near baseline physical activity: No, up 2 assist with debbie rodriguez    3. Cleared for discharge by consultants (if involved): No, PT to see patient    Discharge Planner Nurse   Safe discharge environment identified: Yes, Discharge to [facility] possible 9/23/18.  Barriers to discharge: Yes, PT to see PT       Entered by: Brigitte Ibarra 09/22/2018 8:22 PM     Please review provider order for any additional goals.   Nurse to notify provider when observation goals have been met and patient is ready for discharge.    09/22/18 8:24 PM  Pt ALFONSO&PARESH Griffin, reports 4/10 right hip thigh spasms, was given scheduled tylenol and ice.  Lungs dim t/o.  +4 BS, last BM 09/22/18.  Tolerating regular diet.  Can be incontinent of urine, wearing brief.  Left ankle wrapped in ace wrap, elevated on pillow.  Denies numbness and tingling.  Up 2 A with debbie steady.  Plan:  Pain control, PT, discharge to TCU[facility] possibly 9/23/18.  Will continue to monitor this patient.

## 2018-09-23 NOTE — PROVIDER NOTIFICATION
Discharge     09/23/18 0900   [facility] [phone], Fax: [phone]     Spoke with Pt. She is aware of discharge plans. Pt., and family are aware of [facility] transportation arrangement and cost.   Spoke with [facility]. They are aware of discharge plans and time.    [facility] Transportation on 2:45PM.

## 2018-09-23 NOTE — PLAN OF CARE
Problem: Patient Care Overview  Goal: Plan of Care/Patient Progress Review  Outcome: No Change  PRIMARY DIAGNOSIS: LLE pain  OUTPATIENT/OBSERVATION GOALS TO BE MET BEFORE DISCHARGE:  1. ADLs back to baseline: No    2. Activity and level of assistance: Up with maximum assistance. 2ast. With debbie steady. SW following.     3. Pain status: Pain free.    4. Return to near baseline physical activity: No plan to do to TCU      Discharge Planner Nurse   Safe discharge environment identified: Yes  Barriers to discharge: No       Entered by: Trudy Arredondo 09/23/2018 2:34 AM     Please review provider order for any additional goals.   Nurse to notify provider when observation goals have been met and patient is ready for discharge.    A&Ox4. Forgetful. VSS. Temp: 97.5  F (36.4  C) Temp src: Oral BP: 145/64 Pulse: 84 Heart Rate: 68 Resp: 16 SpO2: 92 % O2 Device: None (Room air)  Pt was sleeping and was unable to assess pain. UTV ankle under the ACE wrap. Plan: discontinue tomorrow to [facility].

## 2018-09-23 NOTE — PLAN OF CARE
Problem: Patient Care Overview  Goal: Plan of Care/Patient Progress Review  Outcome: No Change  Problem: Patient Care Overview  Goal: Plan of Care/Patient Progress Review  Outcome: No Change  PRIMARY DIAGNOSIS: LLE pain  OUTPATIENT/OBSERVATION GOALS TO BE MET BEFORE DISCHARGE:  1. ADLs back to baseline: No     2. Activity and level of assistance: Up with maximum assistance. 2ast. With debbie steady. SW following.      3. Pain status: Pain free.     4. Return to near baseline physical activity: No plan to do to TCU      Discharge Planner Nurse   Safe discharge environment identified: Yes  Barriers to discharge: No       Entered by: Trudy Arredondo 09/23/2018 2:34 AM  Please review provider order for any additional goals.   Nurse to notify provider when observation goals have been met and patient is ready for discharge.     A&Ox4. Forgetful. VSS. Temp: 95.8  F (35.4  C) Temp src: Axillary BP: 175/68 Pulse: 84 Heart Rate: 75 Resp: 16 SpO2: 92 % O2 Device: Nasal cannula Oxygen Delivery: 2 LPM  Pt denied pain. UTV ankle under the ACE wrap. Pt O2 sat were high 80s while sleeping and O2 at 2L was applied. O2 sats increased. Plan: discontinue tomorrow to [facility].

## 2018-09-23 NOTE — PLAN OF CARE
"Problem: Patient Care Overview  Goal: Plan of Care/Patient Progress Review  Outcome: No Change  PRIMARY DIAGNOSIS: LLE pain  OUTPATIENT/OBSERVATION GOALS TO BE MET BEFORE DISCHARGE:  1. ADLs back to baseline: No      2. Activity and level of assistance: Up with maximum assistance. 2ast. With debbie steady. SW following.       3. Pain status: Pain free.      4. Return to near baseline physical activity: No plan to do to TCU      Discharge Planner Nurse   Safe discharge environment identified: Yes  Barriers to discharge: No       Entered by: Mariel Lyles  09/23/2018     Please review provider order for any additional goals. Nurse to notify provider when observation goals have been met and patient is ready for discharge.      A&Ox4. Forgetful. VSS. /59 (BP Location: Right arm)  Pulse 84  Temp 97.6  F (36.4  C) (Oral)  Resp 16  Ht 1.575 m (5' 2\")  Wt 57.2 kg (126 lb 3.2 oz)  SpO2 90%  BMI 23.08 kg/m2 Pt denied pain. UTV ankle under the ACE wrap. Pt O2 sat were high 80s while sleeping and O2 at 2L was applied. O2 sats increased. Plan: discontinue tomorrow to [facility].           "

## 2018-09-24 ENCOUNTER — NURSING HOME VISIT (OUTPATIENT)
Dept: GERIATRICS | Facility: CLINIC | Age: 83
End: 2018-09-24
Payer: COMMERCIAL

## 2018-09-24 VITALS
HEIGHT: 62 IN | TEMPERATURE: 98 F | SYSTOLIC BLOOD PRESSURE: 146 MMHG | OXYGEN SATURATION: 94 % | RESPIRATION RATE: 20 BRPM | HEART RATE: 84 BPM | DIASTOLIC BLOOD PRESSURE: 82 MMHG | BODY MASS INDEX: 22.5 KG/M2 | WEIGHT: 122.3 LBS

## 2018-09-24 DIAGNOSIS — G89.29 OTHER CHRONIC PAIN: ICD-10-CM

## 2018-09-24 DIAGNOSIS — S80.12XD: Primary | ICD-10-CM

## 2018-09-24 DIAGNOSIS — M79.662 PAIN OF LEFT LOWER LEG: ICD-10-CM

## 2018-09-24 DIAGNOSIS — J45.20 MILD INTERMITTENT ASTHMA WITHOUT COMPLICATION: ICD-10-CM

## 2018-09-24 DIAGNOSIS — M17.0 OSTEOARTHRITIS OF BOTH KNEES, UNSPECIFIED OSTEOARTHRITIS TYPE: ICD-10-CM

## 2018-09-24 DIAGNOSIS — R53.81 PHYSICAL DECONDITIONING: ICD-10-CM

## 2018-09-24 PROCEDURE — 99310 SBSQ NF CARE HIGH MDM 45: CPT | Performed by: NURSE PRACTITIONER

## 2018-09-24 RX ORDER — AMOXICILLIN 250 MG
1 CAPSULE ORAL DAILY
COMMUNITY

## 2018-09-24 RX ORDER — ACETAMINOPHEN 325 MG/1
650 TABLET ORAL EVERY 4 HOURS PRN
Status: ON HOLD | COMMUNITY
End: 2021-02-06

## 2018-09-24 NOTE — PROGRESS NOTES
[facility]  PRIMARY CARE PROVIDER AND CLINIC:  Demetrio Arriaza [facility] PHYSICIAN [facility] [address] [facility]  Chief Complaint   Patient presents with     Hospital F/U     [facility] Medical Record Number:  [id number]    HPI:    Kelsey Wood is a 89 year old  (4/18/1929),admitted to the [facility] SNF  from [facility].  Hospital stay 9/21/18 through 9/23/18.  Admitted to this facility for  rehab, medical management and nursing care.  HPI information obtained from: facility chart records, facility staff, patient report and [facility] chart review.      Current issues are:        Traumatic ecchymosis of lower leg, left, subsequent encounter  Pain of left lower leg  Patient admitted to [facility] 9/21-9/23 d/t left leg pain with possible cellulitis. No reports of specific trauma/injury to LLE per patient. XR L leg and LLE venous US 9/21 negative for DVT or acute fractures. Was given IV ancef x 1 dose, then transitioned to keflex. Abx has since been dc'd as cellulitis less likely. Per hospital notes, bruise to LLE is likely r/t injury. During exam, admits to pain 2/10 at rest and increases with activity. Patient repots pain to LLE is improving since admission to hospital, states a few days ago was unable to bear weight to LLE and now able to bear some weight without increased pain. Wears ACE bandage for support, patient reports ACE bandage too tight and wants it removed.     Other chronic pain  Osteoarthritis of both knees, unspecified osteoarthritis type  Has hx chronic pain secondary to bilateral knee osteoarthritis and chronic right hip pain. Currently taking gabapentin, ibuprofen prn, norco TID prn, and fentanyl patches. During exam, patient reports chronic pain controlled with current regimen. Denies constipation, taking senna-s 1 tab every day. Patient states she is followed by Ortho every 3 months, patient to f/u as directed.     Mild intermittent  asthma without complication  Currently taking albuterol inhaler prn. Denies SOB.    Physical deconditioning  PTA lives in [facility]. Patient is actively participating in therapy sessions. Ambulates with walker. Requires assistance with transfers and ADLs. SLUMS 24/30 (07/2018). SW following for discharge planning, patient's goal is to discharge back to [facility].          CODE STATUS/ADVANCE DIRECTIVES DISCUSSION:   CPR/Full code   Patient's living condition: lives in an assisted living facility    ALLERGIES:Review of patient's allergies indicates no known allergies.  PAST MEDICAL HISTORY:  has a past medical history of Asthma and Hard of hearing.  PAST SURGICAL HISTORY:  has a past surgical history that includes Cholecystectomy and Laparoscopic appendectomy.  FAMILY HISTORY: family history is not on file.  SOCIAL HISTORY:      Post Discharge Medication Reconciliation Status: discharge medications reconciled and changed, per note/orders (see AVS).  Current Outpatient Prescriptions   Medication Sig Dispense Refill     ACETAMINOPHEN PO Take 650 mg by mouth every 4 hours as needed for pain       albuterol (PROAIR HFA/PROVENTIL HFA/VENTOLIN HFA) 108 (90 Base) MCG/ACT Inhaler Inhale 2 puffs into the lungs every 4 hours as needed for shortness of breath / dyspnea or wheezing       bismuth subsalicylate (PEPTO BISMOL) 262 MG chewable tablet Take 2 tablets by mouth every hour as needed for heartburn Max of 16 tabs per 24 hrs       calcium carbonate (OS-MARY GRACE 500 MG [place]. [place]) 500 MG tablet Take 1 tablet by mouth daily        estradiol (ESTRACE) 0.1 MG/GM cream Place 2 g vaginally twice a week       fentaNYL (DURAGESIC) 12 mcg/hr 72 hr patch Place 1 patch onto the skin every 72 hours remove old patch. 3 patch 0     GABAPENTIN PO Take 300 mg by mouth 3 times daily       hydrocortisone 1 % CREA cream Apply topically daily as needed for itching       ibuprofen (ADVIL/MOTRIN) 200 MG tablet Take 1 tablet (200 mg) by mouth daily as needed for  "moderate pain 100 tablet 0     latanoprost (XALATAN) 0.005 % ophthalmic solution Place 1 drop into both eyes At Bedtime       senna-docusate (SENOKOT-S;PERICOLACE) 8.6-50 MG per tablet Take 1 tablet by mouth daily       Mis Natural Products (OSTEO BI-FLEX ADV DOUBLE ST PO) Take 1 tablet by mouth 2 times daily ON HOLD           ROS:  10 point ROS of systems including Constitutional, Eyes, Respiratory, Cardiovascular, Gastroenterology, Genitourinary, Integumentary, Muscularskeletal, Psychiatric were all negative except for pertinent positives noted in my HPI.      Exam:  /82  Pulse 84  Temp 98  F (36.7  C)  Resp 20  Ht 5' 2\" (1.575 m)  Wt 122 lb 4.8 oz (55.5 kg)  SpO2 94%  BMI 22.37 kg/m2  GENERAL APPEARANCE:  Alert, in no distress, appears healthy, oriented, cooperative  ENT:  Mouth and posterior oropharynx normal, moist mucous membranes, [place]  EYES:  EOM, conjunctivae, lids, pupils and irises normal, PERRL  NECK:  No adenopathy,masses or thyromegaly  RESP:  respiratory effort and palpation of chest normal, lungs clear to auscultation , no respiratory distress  CV:  Palpation and auscultation of heart done , regular rate and rhythm, no murmur, rub, or gallop, +2 pedal pulses, mild edema BLE  ABDOMEN:  normal bowel sounds, soft, nontender, no hepatosplenomegaly or other masses, no guarding or reboundnormal bowel sounds, soft, nontender, no hepatosplenomegaly or other masses  M/S:   Gait and station abnormal, requires assistance with activity and ADLs. Digits and nails normal  SKIN:  Inspection of skin and subcutaneous tissue baseline, Palpation of skin and subcutaneous tissue baseline. LLE bruising, appears to be at various stages of healing; mild edema, no warmth or surrounding cellulitis.  NEURO:   Cranial nerves 2-12 are normal tested and grossly at patient's baseline, Examination of sensation by touch normal  PSYCH:  oriented X 3, affect and mood normal, cognitive testing to be done in " therapy        BP Readings from Last 3 Encounters:   09/24/18 146/82   09/23/18 155/71   07/09/18 133/74     Pulse Readings from Last 4 Encounters:   09/24/18 84   09/22/18 84   07/09/18 76   07/05/18 78     Wt Readings from Last 5 Encounters:   09/24/18 122 lb 4.8 oz (55.5 kg)   09/21/18 126 lb 3.2 oz (57.2 kg)   07/09/18 121 lb 4.8 oz (55 kg)   07/05/18 121 lb 4.8 oz (55 kg)   07/02/18 121 lb 4.8 oz (55 kg)       Lab/Diagnostic data:  CBC RESULTS:   Recent Labs   Lab Test  07/02/18 0448 06/25/18   1852   WBC  8.3  9.5   RBC  4.11  4.05   HGB  12.3  12.3   HCT  38.7  39.6   MCV  94  98   MCH  29.9  30.4   MCHC  31.8  31.1*   RDW  13.2  12.8   PLT  315  255       Last Basic Metabolic Panel:  Recent Labs   Lab Test  07/02/18 0448  06/25/18   1852   NA  143  139   POTASSIUM  3.4  3.9   CHLORIDE  106  103   MARY GRACE  8.5  9.4   CO2  30  30   BUN  15  14   CR  0.55  0.60   GLC  76  116*       Liver Function Studies -   Recent Labs   Lab Test  06/25/18   1852   PROTTOTAL  7.4   ALBUMIN  3.5   BILITOTAL  0.2   ALKPHOS  80   AST  17   ALT  18             ASSESSMENT/PLAN:    (S80.12XD) Traumatic ecchymosis of lower leg, left, subsequent encounter  (primary encounter diagnosis)  (M79.662) Pain of left lower leg  Comment: LLE bruise r/t trauma. Pain to LLE improving.   Plan: Discontinue tylenol 975mg q8hrs prn; continue tylenol 650mg q4hrs prn. Monitor LLE bruising qshift. Therapy to eval/treat edema. Elevate LLE on pillow while on bed. Check CBC & BMP 9/25.     (G89.29) Other chronic pain  (M17.0) Osteoarthritis of both knees, unspecified osteoarthritis type  Comment: Chronic, controlled.  Plan: Decrease senna-s to 1 tab every day. Ok to use topical salonpas patch every day for pain to R hip. Continue pain management, monitor for side effects. Patient to follow-up with Ortho as directed.    (J45.20) Mild intermittent asthma without complication  Comment: Controlled  Plan: Continue plan of care. Monitor respiratory status.      (R53.81) Physical deconditioning  Comment: Ongoing  Plan: Encourage active participation in therapy session to increase strength and promote independence in activities and ADLs. Cognitive testing to be done in therapy. SW following for discharge planning, patient's goal is to discharge back to [facility].               Total time spent with patient visit at the skilled nursing facility was 35 min including patient visit and review of past records. Greater than 50% of total time spent with counseling and coordinating care due to hospital f/u    Electronically signed by:  CAT Sanchez CNP

## 2018-09-24 NOTE — LETTER
9/24/2018        RE: Kelsey Wood  [address]  [place] [address]        [facility]  PRIMARY CARE PROVIDER AND CLINIC:  Demetrio Arriaza PHYSICIAN SR[facility] [address] [address] / [place]  Chief Complaint   Patient presents with     Hospital F/U     [facility] Medical Record Number:  [id number]    HPI:    Kelsey Wood is a 89 year old  (4/18/1929),admitted to the [facility] SNF  from [facility].  Hospital stay 9/21/18 through 9/23/18.  Admitted to this facility for  rehab, medical management and nursing care.  HPI information obtained from: facility chart records, facility staff, patient report and [facility] chart review.      Current issues are:        Traumatic ecchymosis of lower leg, left, subsequent encounter  Pain of left lower leg  Patient admitted to [facility] 9/21-9/23 d/t left leg pain with possible cellulitis. No reports of specific trauma/injury to LLE per patient. XR L leg and LLE venous US 9/21 negative for DVT or acute fractures. Was given IV ancef x 1 dose, then transitioned to keflex. Abx has since been dc'd as cellulitis less likely. Per hospital notes, bruise to LLE is likely r/t injury. During exam, admits to pain 2/10 at rest and increases with activity. Patient repots pain to LLE is improving since admission to hospital, states a few days ago was unable to bear weight to LLE and now able to bear some weight without increased pain. Wears ACE bandage for support, patient reports ACE bandage too tight and wants it removed.     Other chronic pain  Osteoarthritis of both knees, unspecified osteoarthritis type  Has hx chronic pain secondary to bilateral knee osteoarthritis and chronic right hip pain. Currently taking gabapentin, ibuprofen prn, norco TID prn, and fentanyl patches. During exam, patient reports chronic pain controlled with current regimen. Denies constipation, taking senna-s 1 tab every day. Patient  states she is followed by Ortho every 3 months, patient to f/u as directed.     Mild intermittent asthma without complication  Currently taking albuterol inhaler prn. Denies SOB.    Physical deconditioning  [place]A [place]. Patient is actively participating in therapy sessions. Ambulates with walker. Requires assistance with transfers and ADLs. SLUMS 24/30 (07/2018). SW following for discharge planning, patient's goal is to discharge back to [facility].          CODE STATUS/ADVANCE DIRECTIVES DISCUSSION:   CPR/Full code   Patient's living condition: lives in an assisted living facility    ALLERGIES:Review of patient's allergies indicates no known allergies.  PAST MEDICAL HISTORY:  has a past medical history of Asthma and Hard of hearing.  PAST SURGICAL HISTORY:  has a past surgical history that includes Cholecystectomy and Laparoscopic appendectomy.  FAMILY HISTORY: family history is not on file.  SOCIAL HISTORY:      Post Discharge Medication Reconciliation Status: discharge medications reconciled and changed, per note/orders (see AVS).  Current Outpatient Prescriptions   Medication Sig Dispense Refill     ACETAMINOPHEN PO Take 650 mg by mouth every 4 hours as needed for pain       albuterol (PROAIR HFA/PROVENTIL HFA/VENTOLIN HFA) 108 (90 Base) MCG/ACT Inhaler Inhale 2 puffs into the lungs every 4 hours as needed for shortness of breath / dyspnea or wheezing       bismuth subsalicylate (PEPTO BISMOL) 262 MG chewable tablet Take 2 tablets by mouth every hour as needed for heartburn Max of 16 tabs per 24 hrs       calcium carbonate (OS-MARY GRACE 500 MG [place]. [place]) 500 MG tablet Take 1 tablet by mouth daily        estradiol (ESTRACE) 0.1 MG/GM cream Place 2 g vaginally twice a week       fentaNYL (DURAGESIC) 12 mcg/hr 72 hr patch Place 1 patch onto the skin every 72 hours remove old patch. 3 patch 0     GABAPENTIN PO Take 300 mg by mouth 3 times daily       hydrocortisone 1 % CREA cream Apply topically daily as needed for itching    "    ibuprofen (ADVIL/MOTRIN) 200 MG tablet Take 1 tablet (200 mg) by mouth daily as needed for moderate pain 100 tablet 0     latanoprost (XALATAN) 0.005 % ophthalmic solution Place 1 drop into both eyes At Bedtime       senna-docusate (SENOKOT-S;PERICOLACE) 8.6-50 MG per tablet Take 1 tablet by mouth daily       Misc Natural Products (OSTEO BI-FLEX ADV DOUBLE ST PO) Take 1 tablet by mouth 2 times daily ON HOLD           ROS:  10 point ROS of systems including Constitutional, Eyes, Respiratory, Cardiovascular, Gastroenterology, Genitourinary, Integumentary, Muscularskeletal, Psychiatric were all negative except for pertinent positives noted in my HPI.      Exam:  /82  Pulse 84  Temp 98  F (36.7  C)  Resp 20  Ht 5' 2\" (1.575 m)  Wt 122 lb 4.8 oz (55.5 kg)  SpO2 94%  BMI 22.37 kg/m2  GENERAL APPEARANCE:  Alert, in no distress, appears healthy, oriented, cooperative  ENT:  Mouth and posterior oropharynx normal, moist mucous membranes, [place]  EYES:  EOM, conjunctivae, lids, pupils and irises normal, PERRL  NECK:  No adenopathy,masses or thyromegaly  RESP:  respiratory effort and palpation of chest normal, lungs clear to auscultation , no respiratory distress  CV:  Palpation and auscultation of heart done , regular rate and rhythm, no murmur, rub, or gallop, +2 pedal pulses, mild edema BLE  ABDOMEN:  normal bowel sounds, soft, nontender, no hepatosplenomegaly or other masses, no guarding or reboundnormal bowel sounds, soft, nontender, no hepatosplenomegaly or other masses  M/S:   Gait and station abnormal, requires assistance with activity and ADLs. Digits and nails normal  SKIN:  Inspection of skin and subcutaneous tissue baseline, Palpation of skin and subcutaneous tissue baseline. LLE bruising, appears to be at various stages of healing; mild edema, no warmth or surrounding cellulitis.  NEURO:   Cranial nerves 2-12 are normal tested and grossly at patient's baseline, Examination of sensation by touch " normal  PSYCH:  oriented X 3, affect and mood normal, cognitive testing to be done in therapy        BP Readings from Last 3 Encounters:   09/24/18 146/82   09/23/18 155/71   07/09/18 133/74     Pulse Readings from Last 4 Encounters:   09/24/18 84   09/22/18 84   07/09/18 76   07/05/18 78     Wt Readings from Last 5 Encounters:   09/24/18 122 lb 4.8 oz (55.5 kg)   09/21/18 126 lb 3.2 oz (57.2 kg)   07/09/18 121 lb 4.8 oz (55 kg)   07/05/18 121 lb 4.8 oz (55 kg)   07/02/18 121 lb 4.8 oz (55 kg)       Lab/Diagnostic data:  CBC RESULTS:   Recent Labs   Lab Test  07/02/18   0448  06/25/18   1852   WBC  8.3  9.5   RBC  4.11  4.05   HGB  12.3  12.3   HCT  38.7  39.6   MCV  94  98   MCH  29.9  30.4   MCHC  31.8  31.1*   RDW  13.2  12.8   PLT  315  255       Last Basic Metabolic Panel:  Recent Labs   Lab Test  07/02/18   0448  06/25/18   1852   NA  143  139   POTASSIUM  3.4  3.9   CHLORIDE  106  103   MARY GRACE  8.5  9.4   CO2  30  30   BUN  15  14   CR  0.55  0.60   GLC  76  116*       Liver Function Studies -   Recent Labs   Lab Test  06/25/18   1852   PROTTOTAL  7.4   ALBUMIN  3.5   BILITOTAL  0.2   ALKPHOS  80   AST  17   ALT  18             ASSESSMENT/PLAN:    (S80.12XD) Traumatic ecchymosis of lower leg, left, subsequent encounter  (primary encounter diagnosis)  (M79.662) Pain of left lower leg  Comment: LLE bruise r/t trauma. Pain to LLE improving.   Plan: Discontinue tylenol 975mg q8hrs prn; continue tylenol 650mg q4hrs prn. Monitor LLE bruising qshift. Therapy to eval/treat edema. Elevate LLE on pillow while on bed. Check CBC & BMP 9/25.     (G89.29) Other chronic pain  (M17.0) Osteoarthritis of both knees, unspecified osteoarthritis type  Comment: Chronic, controlled.  Plan: Decrease senna-s to 1 tab every day. Ok to use topical salonpas patch every day for pain to R hip. Continue pain management, monitor for side effects. Patient to follow-up with Ortho as directed.    (J45.20) Mild intermittent asthma without  complication  Comment: Controlled  Plan: Continue plan of care. Monitor respiratory status.     (R53.81) Physical deconditioning  Comment: Ongoing  Plan: Encourage active participation in therapy session to increase strength and promote independence in activities and ADLs. Cognitive testing to be done in therapy. SW following for discharge planning, [facility]               Total time spent with patient visit at the skilled nursing facility was 35 min including patient visit and review of past records. Greater than 50% of total time spent with counseling and coordinating care due to hospital f/u    Electronically signed by:  CAT Sanchez CNP                          Sincerely,        CAT Sanchez CNP

## 2018-09-25 ENCOUNTER — HOSPITAL LABORATORY (OUTPATIENT)
Dept: OTHER | Facility: CLINIC | Age: 83
End: 2018-09-25

## 2018-09-25 LAB
ANION GAP SERPL CALCULATED.3IONS-SCNC: 8 MMOL/L (ref 3–14)
BASOPHILS # BLD AUTO: 0.1 10E9/L (ref 0–0.2)
BASOPHILS NFR BLD AUTO: 1 %
BUN SERPL-MCNC: 16 MG/DL (ref 7–30)
CALCIUM SERPL-MCNC: 8.9 MG/DL (ref 8.5–10.1)
CHLORIDE SERPL-SCNC: 105 MMOL/L (ref 94–109)
CO2 SERPL-SCNC: 29 MMOL/L (ref 20–32)
CREAT SERPL-MCNC: 0.52 MG/DL (ref 0.52–1.04)
DIFFERENTIAL METHOD BLD: NORMAL
EOSINOPHIL # BLD AUTO: 0.6 10E9/L (ref 0–0.7)
EOSINOPHIL NFR BLD AUTO: 7.3 %
ERYTHROCYTE [DISTWIDTH] IN BLOOD BY AUTOMATED COUNT: 13.2 % (ref 10–15)
GFR SERPL CREATININE-BSD FRML MDRD: >90 ML/MIN/1.7M2
GLUCOSE SERPL-MCNC: 88 MG/DL (ref 70–99)
HCT VFR BLD AUTO: 41.9 % (ref 35–47)
HGB BLD-MCNC: 13.4 G/DL (ref 11.7–15.7)
IMM GRANULOCYTES # BLD: 0 10E9/L (ref 0–0.4)
IMM GRANULOCYTES NFR BLD: 0.2 %
LYMPHOCYTES # BLD AUTO: 2.1 10E9/L (ref 0.8–5.3)
LYMPHOCYTES NFR BLD AUTO: 25.2 %
MCH RBC QN AUTO: 30.4 PG (ref 26.5–33)
MCHC RBC AUTO-ENTMCNC: 32 G/DL (ref 31.5–36.5)
MCV RBC AUTO: 95 FL (ref 78–100)
MONOCYTES # BLD AUTO: 0.8 10E9/L (ref 0–1.3)
MONOCYTES NFR BLD AUTO: 9.1 %
NEUTROPHILS # BLD AUTO: 4.7 10E9/L (ref 1.6–8.3)
NEUTROPHILS NFR BLD AUTO: 57.2 %
NRBC # BLD AUTO: 0 10*3/UL
NRBC BLD AUTO-RTO: 0 /100
PLATELET # BLD AUTO: 313 10E9/L (ref 150–450)
POTASSIUM SERPL-SCNC: 3.1 MMOL/L (ref 3.4–5.3)
RBC # BLD AUTO: 4.41 10E12/L (ref 3.8–5.2)
SODIUM SERPL-SCNC: 142 MMOL/L (ref 133–144)
WBC # BLD AUTO: 8.2 10E9/L (ref 4–11)

## 2018-09-27 ENCOUNTER — NURSING HOME VISIT (OUTPATIENT)
Dept: GERIATRICS | Facility: CLINIC | Age: 83
End: 2018-09-27
Payer: COMMERCIAL

## 2018-09-27 ENCOUNTER — HOSPITAL LABORATORY (OUTPATIENT)
Dept: OTHER | Facility: CLINIC | Age: 83
End: 2018-09-27

## 2018-09-27 DIAGNOSIS — J45.20 MILD INTERMITTENT ASTHMA WITHOUT COMPLICATION: ICD-10-CM

## 2018-09-27 DIAGNOSIS — S80.12XD: Primary | ICD-10-CM

## 2018-09-27 LAB
ALBUMIN SERPL-MCNC: 3.3 G/DL (ref 3.4–5)
ALP SERPL-CCNC: 68 U/L (ref 40–150)
ALT SERPL W P-5'-P-CCNC: 19 U/L (ref 0–50)
ANION GAP SERPL CALCULATED.3IONS-SCNC: 4 MMOL/L (ref 3–14)
AST SERPL W P-5'-P-CCNC: 16 U/L (ref 0–45)
BASOPHILS # BLD AUTO: 0.1 10E9/L (ref 0–0.2)
BASOPHILS NFR BLD AUTO: 0.9 %
BILIRUB SERPL-MCNC: 0.4 MG/DL (ref 0.2–1.3)
BUN SERPL-MCNC: 16 MG/DL (ref 7–30)
CALCIUM SERPL-MCNC: 9.3 MG/DL (ref 8.5–10.1)
CHLORIDE SERPL-SCNC: 105 MMOL/L (ref 94–109)
CO2 SERPL-SCNC: 33 MMOL/L (ref 20–32)
CREAT SERPL-MCNC: 0.48 MG/DL (ref 0.52–1.04)
CRP SERPL-MCNC: <2.9 MG/L (ref 0–8)
DIFFERENTIAL METHOD BLD: ABNORMAL
EOSINOPHIL # BLD AUTO: 0.5 10E9/L (ref 0–0.7)
EOSINOPHIL NFR BLD AUTO: 6.7 %
ERYTHROCYTE [DISTWIDTH] IN BLOOD BY AUTOMATED COUNT: 13.2 % (ref 10–15)
ERYTHROCYTE [SEDIMENTATION RATE] IN BLOOD BY WESTERGREN METHOD: 11 MM/H (ref 0–30)
GFR SERPL CREATININE-BSD FRML MDRD: >90 ML/MIN/1.7M2
GLUCOSE SERPL-MCNC: 89 MG/DL (ref 70–99)
HCT VFR BLD AUTO: 42.3 % (ref 35–47)
HGB BLD-MCNC: 13.3 G/DL (ref 11.7–15.7)
IMM GRANULOCYTES # BLD: 0 10E9/L (ref 0–0.4)
IMM GRANULOCYTES NFR BLD: 0.4 %
LYMPHOCYTES # BLD AUTO: 1.8 10E9/L (ref 0.8–5.3)
LYMPHOCYTES NFR BLD AUTO: 23.6 %
MCH RBC QN AUTO: 30 PG (ref 26.5–33)
MCHC RBC AUTO-ENTMCNC: 31.4 G/DL (ref 31.5–36.5)
MCV RBC AUTO: 95 FL (ref 78–100)
MONOCYTES # BLD AUTO: 1 10E9/L (ref 0–1.3)
MONOCYTES NFR BLD AUTO: 12.7 %
NEUTROPHILS # BLD AUTO: 4.2 10E9/L (ref 1.6–8.3)
NEUTROPHILS NFR BLD AUTO: 55.7 %
NRBC # BLD AUTO: 0 10*3/UL
NRBC BLD AUTO-RTO: 0 /100
PLATELET # BLD AUTO: 316 10E9/L (ref 150–450)
POTASSIUM SERPL-SCNC: 3.3 MMOL/L (ref 3.4–5.3)
PROT SERPL-MCNC: 6.9 G/DL (ref 6.8–8.8)
RBC # BLD AUTO: 4.44 10E12/L (ref 3.8–5.2)
SODIUM SERPL-SCNC: 142 MMOL/L (ref 133–144)
WBC # BLD AUTO: 7.5 10E9/L (ref 4–11)

## 2018-09-27 PROCEDURE — 99308 SBSQ NF CARE LOW MDM 20: CPT | Performed by: INTERNAL MEDICINE

## 2018-09-28 ENCOUNTER — NURSING HOME VISIT (OUTPATIENT)
Dept: GERIATRICS | Facility: CLINIC | Age: 83
End: 2018-09-28
Payer: COMMERCIAL

## 2018-09-28 ENCOUNTER — HOSPITAL LABORATORY (OUTPATIENT)
Dept: OTHER | Facility: CLINIC | Age: 83
End: 2018-09-28

## 2018-09-28 VITALS
OXYGEN SATURATION: 93 % | BODY MASS INDEX: 22.52 KG/M2 | HEIGHT: 62 IN | RESPIRATION RATE: 16 BRPM | DIASTOLIC BLOOD PRESSURE: 81 MMHG | HEART RATE: 80 BPM | TEMPERATURE: 98.5 F | SYSTOLIC BLOOD PRESSURE: 153 MMHG | WEIGHT: 122.4 LBS

## 2018-09-28 DIAGNOSIS — S80.12XD: Primary | ICD-10-CM

## 2018-09-28 DIAGNOSIS — E87.6 HYPOKALEMIA: ICD-10-CM

## 2018-09-28 DIAGNOSIS — R60.0 EDEMA OF LEFT FOOT: ICD-10-CM

## 2018-09-28 DIAGNOSIS — E87.0 HYPERNATREMIA: ICD-10-CM

## 2018-09-28 LAB
ANION GAP SERPL CALCULATED.3IONS-SCNC: 7 MMOL/L (ref 3–14)
BUN SERPL-MCNC: 19 MG/DL (ref 7–30)
CALCIUM SERPL-MCNC: 9.6 MG/DL (ref 8.5–10.1)
CHLORIDE SERPL-SCNC: 106 MMOL/L (ref 94–109)
CO2 SERPL-SCNC: 32 MMOL/L (ref 20–32)
CREAT SERPL-MCNC: 0.55 MG/DL (ref 0.52–1.04)
GFR SERPL CREATININE-BSD FRML MDRD: >90 ML/MIN/1.7M2
GLUCOSE SERPL-MCNC: 87 MG/DL (ref 70–99)
POTASSIUM SERPL-SCNC: 3.8 MMOL/L (ref 3.4–5.3)
SODIUM SERPL-SCNC: 145 MMOL/L (ref 133–144)

## 2018-09-28 PROCEDURE — 99309 SBSQ NF CARE MODERATE MDM 30: CPT | Performed by: NURSE PRACTITIONER

## 2018-09-28 NOTE — PROGRESS NOTES
[place] [facility]    Chief Complaint   Patient presents with     SUKHI       [place] Medical Record Number:  [id number]    HPI:    Kelsey Wood is a 89 year old  (4/18/1929), who is being seen today for an episodic care visit at [facility] .  HPI information obtained from: facility chart records, facility staff, patient report and [facility] chart review.    Today's concern is:    Traumatic ecchymosis of lower leg, left, subsequent encounter  Edema of left foot  Patient admitted to [facility] 9/21-9/23 d/t left leg pain with possible cellulitis. No reports of specific trauma/injury to LLE per patient. XR L tib/fib and LLE venous US 9/21 negative for DVT or acute fractures. Was given IV ancef x 1 dose, then transitioned to keflex. Abx has since been dc'd as cellulitis less likely. Per hospital notes, bruise to LLE is likely r/t injury. During exam, patient does not recall trauma to LLE that would cause bruising. Admits to pain 2/10 at rest and increases with activity. Patient repots pain to LLE is improving since admission to hospital, states a few days ago was unable to bear weight to LLE and now able to bear some weight without increased pain.  Was started on keflex 9/26 d/t concern for increased pain, swelling, erythema and low grade fever on; today, no sx of erythema, fever or increased tenderness to palpation. Currently taking tylenol TID and fentanyl patch for pain management. Has been attempting to elevate LLE while in bed.    Hypokalemia  Last K 3.8 on 9/28. Currently taking KCl supplements.     Hypernatremia  Last Na 145 on 9/28. Appetite has been variable, RN staff have reported % intake w/meals.           ALLERGIES: Review of patient's allergies indicates no known allergies.  Past Medical, Surgical, Family and Social History reviewed and updated in [facility].    Current Outpatient Prescriptions   Medication Sig Dispense Refill     ACETAMINOPHEN PO Take 650 mg by mouth 3  times daily And q4h prn       albuterol (PROAIR HFA/PROVENTIL HFA/VENTOLIN HFA) 108 (90 Base) MCG/ACT Inhaler Inhale 2 puffs into the lungs every 4 hours as needed for shortness of breath / dyspnea or wheezing       bismuth subsalicylate (PEPTO BISMOL) 262 MG chewable tablet Take 2 tablets by mouth every hour as needed for heartburn Max of 16 tabs per 24 hrs       calcium carbonate (OS-MARY GRACE 500 MG [place]. [place]) 500 MG tablet Take 1 tablet by mouth daily        estradiol (ESTRACE) 0.1 MG/GM cream Place 2 g vaginally twice a week       fentaNYL (DURAGESIC) 12 mcg/hr 72 hr patch Place 1 patch onto the skin every 72 hours remove old patch. 3 patch 0     GABAPENTIN PO Take 300 mg by mouth 3 times daily       hydrocortisone 1 % CREA cream Apply topically daily as needed for itching       ibuprofen (ADVIL/MOTRIN) 200 MG tablet Take 1 tablet (200 mg) by mouth daily as needed for moderate pain 100 tablet 0     latanoprost (XALATAN) 0.005 % ophthalmic solution Place 1 drop into both eyes At Bedtime       Liniments (SALONPAS PAIN RELIEF PATCH EX) Externally apply 1 patch topically daily as needed       POTASSIUM CHLORIDE PO Take 20 mEq by mouth daily       senna-docusate (SENOKOT-S;PERICOLACE) 8.6-50 MG per tablet Take 1 tablet by mouth daily       Misc Natural Products (OSTEO BI-FLEX ADV DOUBLE ST PO) Take 1 tablet by mouth 2 times daily ON HOLD       Medications reviewed:  Medications reconciled to facility chart and changes were made to reflect current medications as identified as above med list. Below are the changes that were made:   Medications stopped since last EPIC medication reconciliation:   There are no discontinued medications.    Medications started since last [facility] medication reconciliation:  No orders of the defined types were placed in this encounter.          REVIEW OF SYSTEMS:  4 point ROS including Respiratory, CV, GI and , other than that noted in the HPI,  is negative    Physical Exam:  /81  Pulse 80   "Temp 98.5  F (36.9  C)  Resp 16  Ht 5' 2\" (1.575 m)  Wt 122 lb 6.4 oz (55.5 kg)  SpO2 93%  BMI 22.39 kg/m2  GENERAL APPEARANCE:  Alert, in no distress, appears healthy, oriented, cooperative  RESP:  respiratory effort and palpation of chest normal, lungs clear to auscultation , no respiratory distress  CV:  Palpation and auscultation of heart done , regular rate and rhythm, no murmur, rub, or gallop, +2 pedal pulses, mild edema BLE  ABDOMEN:  normal bowel sounds, soft, nontender, no hepatosplenomegaly or other masses, no guarding or reboundnormal bowel sounds, soft, nontender, no hepatosplenomegaly or other masses  M/S:   Gait and station abnormal, requires assistance with activity and ADLs. Digits and nails normal  SKIN:  Inspection of skin and subcutaneous tissue baseline, Palpation of skin and subcutaneous tissue baseline. LLE bruising extending from mid-L shin to foot, appears to be at various stages of healing; edema +2, no warmth or surrounding cellulitis.  NEURO:   Cranial nerves 2-12 are normal tested and grossly at patient's baseline, Examination of sensation by touch normal  PSYCH:  Oriented X 3, affect and mood normal, cognitive testing SLUMS 15/30        BP Readings from Last 3 Encounters:   10/03/18 152/82   09/28/18 153/81   09/24/18 146/82     Pulse Readings from Last 4 Encounters:   10/03/18 78   09/28/18 80   09/24/18 84   09/22/18 84     Wt Readings from Last 5 Encounters:   10/03/18 123 lb 8 oz (56 kg)   09/28/18 122 lb 6.4 oz (55.5 kg)   09/24/18 122 lb 4.8 oz (55.5 kg)   09/21/18 126 lb 3.2 oz (57.2 kg)   07/09/18 121 lb 4.8 oz (55 kg)         Recent Labs:   CBC RESULTS:   Recent Labs   Lab Test  09/27/18   0556  09/25/18   0610   WBC  7.5  8.2   RBC  4.44  4.41   HGB  13.3  13.4   HCT  42.3  41.9   MCV  95  95   MCH  30.0  30.4   MCHC  31.4*  32.0   RDW  13.2  13.2   PLT  316  313       Last Basic Metabolic Panel:  Recent Labs   Lab Test  09/28/18   0645  09/27/18   0556   NA  145*  142 "   POTASSIUM  3.8  3.3*   CHLORIDE  106  105   MARY GRACE  9.6  9.3   CO2  32  33*   BUN  19  16   CR  0.55  0.48*   GLC  87  89       Liver Function Studies -   Recent Labs   Lab Test  09/27/18   0556  06/25/18   1852   PROTTOTAL  6.9  7.4   ALBUMIN  3.3*  3.5   BILITOTAL  0.4  0.2   ALKPHOS  68  80   AST  16  17   ALT  19  18             Assessment/Plan:    (S80.12XD) Traumatic ecchymosis of lower leg, left, subsequent encounter  (primary encounter diagnosis)  (R60.0) Edema of left foot  Comment: XR L tib/fib negative on 9/21. WBC, ESR, CRP normal on 9/27. LLE bruise r/t trauma. Pain to LLE improving.   Plan: Check CBC & BMP 10/1. XR L ankle, foot 9/28 dx L ankle pain. Continue tylenol TID. Apply ice to L foot 20 min QID. Elevate L foot on 2 pillows while in bed. Therapy to eval/treat edema.  Currently on keflex x 7 days: creat clearance 61 on 9/28; decrease keflex to 500mg BID through 10/3.   UPDATE: XR L ankle/foot 9/28 negative for acute findings.    (E87.6) Hypokalemia  Comment: Controlled  Plan: Hypokalemia, continue potassium chloride 20meq every day.     (E87.0) Hypernatremia  Comment: Na slightly elevated  Plan: Encourage fluids. Check Na 10/1.               Total time spent with patient visit at the skilled nursing facility was 25 mins including patient visit and review of past records. Greater than 50% of total time spent with counseling and coordinating care due to LLE bruising w/edema.       Electronically signed by  CAT Sanchez CNP

## 2018-09-28 NOTE — PROGRESS NOTES
"[place] [facility]  PRIMARY CARE PROVIDER AND CLINIC:  Demetrio Arriaza [facility] PHYSIC[facility] [address] / [place]    Pt was seen for an extended hospital follow up visit    Pt is known to me from 7/18 TCU stay for asthma exacerbation    HPI:    Kelsey Wood is a 89 year old  (4/18/1929) who was admitted to the [facility]F  from [facility].  Hospital stay 9/21/18 through 9/23/18 for the management of L lower leg pain      Admitted to this facility for  rehab, medical management and nursing care.     Hospital course reviewed by me, is as per the hospital discharge summary and NP note.    Patient was admitted to [facility] ([place], [place]) on 9/21/18 for the evaluation of L ankle pain and erythema, without clear trauma. Pt was initially treated with Ancef followed by keflex for possible cellulitis, however antibiotics were subsequently discontinued, as cellulitis seemed clinically unlikely  Xray and US were neg    Pt was discharged from the  hospital off antibiotics  She initially noted improvement in pain, however over the last 2 days, has noted increased pain and swelling  She was subsequently restarted on keflex yesterday  She remains on PTA Fentanyl patch, norco and Ibuprofen for chronic B knee and R hip pain. She is walking short distances with walker  She denies fevers, chills, cough, SOB    Pt states she continues to have L ankle pain at rest and with activity  She insists, \"I have cellulitis, it's not a bruise\"      CODE STATUS/ADVANCE DIRECTIVES DISCUSSION:   CPR/Full code   Patient's living condition: lives in an assisted living facility    ALLERGIES:Review of patient's allergies indicates no known allergies.  PAST MEDICAL HISTORY:  has a past medical history of Asthma and Hard of hearing.  PAST SURGICAL HISTORY:  has a past surgical history that includes Cholecystectomy and Laparoscopic appendectomy.  FAMILY HISTORY: family history is not on file.  SOCIAL " HISTORY:      Post Discharge Medication Reconciliation Status:   .  Current Outpatient Prescriptions   Medication Sig Dispense Refill     ACETAMINOPHEN PO Take 650 mg by mouth every 4 hours as needed for pain       albuterol (PROAIR HFA/PROVENTIL HFA/VENTOLIN HFA) 108 (90 Base) MCG/ACT Inhaler Inhale 2 puffs into the lungs every 4 hours as needed for shortness of breath / dyspnea or wheezing       bismuth subsalicylate (PEPTO BISMOL) 262 MG chewable tablet Take 2 tablets by mouth every hour as needed for heartburn Max of 16 tabs per 24 hrs       calcium carbonate (OS-MARY GRACE 500 MG [place]. [place]) 500 MG tablet Take 1 tablet by mouth daily        estradiol (ESTRACE) 0.1 MG/GM cream Place 2 g vaginally twice a week       fentaNYL (DURAGESIC) 12 mcg/hr 72 hr patch Place 1 patch onto the skin every 72 hours remove old patch. 3 patch 0     GABAPENTIN PO Take 300 mg by mouth 3 times daily       hydrocortisone 1 % CREA cream Apply topically daily as needed for itching       ibuprofen (ADVIL/MOTRIN) 200 MG tablet Take 1 tablet (200 mg) by mouth daily as needed for moderate pain 100 tablet 0     latanoprost (XALATAN) 0.005 % ophthalmic solution Place 1 drop into both eyes At Bedtime       Misc Natural Products (OSTEO BI-FLEX ADV DOUBLE ST PO) Take 1 tablet by mouth 2 times daily ON HOLD       senna-docusate (SENOKOT-S;PERICOLACE) 8.6-50 MG per tablet Take 1 tablet by mouth daily           ROS:  10 point ROS neg except as noted above      Exam:    GENERAL APPEARANCE  Well appearing, lying in bed  EYES:  EOM, conjunctivae, lids nl  NECK: supple  RESP:  Lungs clear  CV:  RRR no M  ABDOMEN: soft, non-tender  M/S: bruising L medial ankle, appears to be fading, no warmth or erythema. Mild pain with ROM L ankle  No calf tenderness. No fluctuance  NEURO:   Alert, fully oriented. Facies symmetric        BP Readings from Last 3 Encounters:   09/24/18 146/82   09/23/18 155/71   07/09/18 133/74     Pulse Readings from Last 4 Encounters:    09/24/18 84   09/22/18 84   07/09/18 76   07/05/18 78     Wt Readings from Last 5 Encounters:   09/24/18 122 lb 4.8 oz (55.5 kg)   09/21/18 126 lb 3.2 oz (57.2 kg)   07/09/18 121 lb 4.8 oz (55 kg)   07/05/18 121 lb 4.8 oz (55 kg)   07/02/18 121 lb 4.8 oz (55 kg)       Lab/Diagnostic data:  CBC RESULTS:   Recent Labs   Lab Test  07/02/18   0448  06/25/18   1852   WBC  8.3  9.5   RBC  4.11  4.05   HGB  12.3  12.3   HCT  38.7  39.6   MCV  94  98   MCH  29.9  30.4   MCHC  31.8  31.1*   RDW  13.2  12.8   PLT  315  255       Last Basic Metabolic Panel:  Recent Labs   Lab Test  07/02/18   0448  06/25/18   1852   NA  143  139   POTASSIUM  3.4  3.9   CHLORIDE  106  103   MARY GRACE  8.5  9.4   CO2  30  30   BUN  15  14   CR  0.55  0.60   GLC  76  116*       Liver Function Studies -   Recent Labs   Lab Test  06/25/18   1852   PROTTOTAL  7.4   ALBUMIN  3.5   BILITOTAL  0.2   ALKPHOS  80   AST  17   ALT  18             ASSESSMENT/PLAN:    (S80.12XD) Traumatic ecchymosis of lower leg, left, subsequent encounter  (primary encounter diagnosis)  (M79.662) Pain of left lower leg  Picture remains most c/w contusion, less likely cellulitis  Keflex has been restarted in view of Pt report of increase pain  Plan continue to monitor exam, vitals. CRP ordered today  PT/OT. WBAT      (J45.20) Mild intermittent asthma without complication  Comment: Controlled  Plan: Continue plan of care.       Gustavo Parker MD

## 2018-09-28 NOTE — LETTER
9/28/2018        RE: Kelsey Wood  [address]  [place] [address]        [place] [facility]    Chief Complaint   Patient presents with     RECHECK       [place] Medical Record Number:  [id number]    HPI:    Kelsey Wood is a 89 year old  (4/18/1929), who is being seen today for an episodic care visit at [facility] .  HPI information obtained from: facility chart records, facility staff, patient report and [facility] chart review.    Today's concern is:    Traumatic ecchymosis of lower leg, left, subsequent encounter  Edema of left foot  Patient admitted to [facility] 9/21-9/23 d/t left leg pain with possible cellulitis. No reports of specific trauma/injury to LLE per patient. XR L tib/fib and LLE venous US 9/21 negative for DVT or acute fractures. Was given IV ancef x 1 dose, then transitioned to keflex. Abx has since been dc'd as cellulitis less likely. Per hospital notes, bruise to LLE is likely r/t injury. During exam, patient does not recall trauma to LLE that would cause bruising. Admits to pain 2/10 at rest and increases with activity. Patient repots pain to LLE is improving since admission to hospital, states a few days ago was unable to bear weight to LLE and now able to bear some weight without increased pain.  Was started on keflex 9/26 d/t concern for increased pain, swelling, erythema and low grade fever on; today, no sx of erythema, fever or increased tenderness to palpation. Currently taking tylenol TID and fentanyl patch for pain management. Has been attempting to elevate LLE while in bed.    Hypokalemia  Last K 3.8 on 9/28. Currently taking KCl supplements.     Hypernatremia  Last Na 145 on 9/28. Appetite has been variable, RN staff have reported % intake w/meals.           ALLERGIES: Review of patient's allergies indicates no known allergies.  Past Medical, Surgical, Family and Social History reviewed and updated in [facility].    Current Outpatient  Prescriptions   Medication Sig Dispense Refill     ACETAMINOPHEN PO Take 650 mg by mouth 3 times daily And q4h prn       albuterol (PROAIR HFA/PROVENTIL HFA/VENTOLIN HFA) 108 (90 Base) MCG/ACT Inhaler Inhale 2 puffs into the lungs every 4 hours as needed for shortness of breath / dyspnea or wheezing       bismuth subsalicylate (PEPTO BISMOL) 262 MG chewable tablet Take 2 tablets by mouth every hour as needed for heartburn Max of 16 tabs per 24 hrs       calcium carbonate (OS-MARY GRACE 500 MG [place]. [place]) 500 MG tablet Take 1 tablet by mouth daily        estradiol (ESTRACE) 0.1 MG/GM cream Place 2 g vaginally twice a week       fentaNYL (DURAGESIC) 12 mcg/hr 72 hr patch Place 1 patch onto the skin every 72 hours remove old patch. 3 patch 0     GABAPENTIN PO Take 300 mg by mouth 3 times daily       hydrocortisone 1 % CREA cream Apply topically daily as needed for itching       ibuprofen (ADVIL/MOTRIN) 200 MG tablet Take 1 tablet (200 mg) by mouth daily as needed for moderate pain 100 tablet 0     latanoprost (XALATAN) 0.005 % ophthalmic solution Place 1 drop into both eyes At Bedtime       Liniments (SALONPAS PAIN RELIEF PATCH EX) Externally apply 1 patch topically daily as needed       POTASSIUM CHLORIDE PO Take 20 mEq by mouth daily       senna-docusate (SENOKOT-S;PERICOLACE) 8.6-50 MG per tablet Take 1 tablet by mouth daily       Misc Natural Products (OSTEO BI-FLEX ADV DOUBLE ST PO) Take 1 tablet by mouth 2 times daily ON HOLD       Medications reviewed:  Medications reconciled to facility chart and changes were made to reflect current medications as identified as above med list. Below are the changes that were made:   Medications stopped since last EPIC medication reconciliation:   There are no discontinued medications.    Medications started since last [facility] medication reconciliation:  No orders of the defined types were placed in this encounter.          REVIEW OF SYSTEMS:  4 point ROS including Respiratory, CV, GI and  ", other than that noted in the HPI,  is negative    Physical Exam:  /81  Pulse 80  Temp 98.5  F (36.9  C)  Resp 16  Ht 5' 2\" (1.575 m)  Wt 122 lb 6.4 oz (55.5 kg)  SpO2 93%  BMI 22.39 kg/m2  GENERAL APPEARANCE:  Alert, in no distress, appears healthy, oriented, cooperative  RESP:  respiratory effort and palpation of chest normal, lungs clear to auscultation , no respiratory distress  CV:  Palpation and auscultation of heart done , regular rate and rhythm, no murmur, rub, or gallop, +2 pedal pulses, mild edema BLE  ABDOMEN:  normal bowel sounds, soft, nontender, no hepatosplenomegaly or other masses, no guarding or reboundnormal bowel sounds, soft, nontender, no hepatosplenomegaly or other masses  M/S:   Gait and station abnormal, requires assistance with activity and ADLs. Digits and nails normal  SKIN:  Inspection of skin and subcutaneous tissue baseline, Palpation of skin and subcutaneous tissue baseline. LLE bruising extending from mid-L shin to foot, appears to be at various stages of healing; edema +2, no warmth or surrounding cellulitis.  NEURO:   Cranial nerves 2-12 are normal tested and grossly at patient's baseline, Examination of sensation by touch normal  PSYCH:  Oriented X 3, affect and mood normal, cognitive testing SLUMS 15/30        BP Readings from Last 3 Encounters:   10/03/18 152/82   09/28/18 153/81   09/24/18 146/82     Pulse Readings from Last 4 Encounters:   10/03/18 78   09/28/18 80   09/24/18 84   09/22/18 84     Wt Readings from Last 5 Encounters:   10/03/18 123 lb 8 oz (56 kg)   09/28/18 122 lb 6.4 oz (55.5 kg)   09/24/18 122 lb 4.8 oz (55.5 kg)   09/21/18 126 lb 3.2 oz (57.2 kg)   07/09/18 121 lb 4.8 oz (55 kg)         Recent Labs:   CBC RESULTS:   Recent Labs   Lab Test  09/27/18   0556  09/25/18   0610   WBC  7.5  8.2   RBC  4.44  4.41   HGB  13.3  13.4   HCT  42.3  41.9   MCV  95  95   MCH  30.0  30.4   MCHC  31.4*  32.0   RDW  13.2  13.2   PLT  316  313       Last Basic " Metabolic Panel:  Recent Labs   Lab Test  09/28/18   0645  09/27/18   0556   NA  145*  142   POTASSIUM  3.8  3.3*   CHLORIDE  106  105   MARY GRACE  9.6  9.3   CO2  32  33*   BUN  19  16   CR  0.55  0.48*   GLC  87  89       Liver Function Studies -   Recent Labs   Lab Test  09/27/18   0556  06/25/18   1852   PROTTOTAL  6.9  7.4   ALBUMIN  3.3*  3.5   BILITOTAL  0.4  0.2   ALKPHOS  68  80   AST  16  17   ALT  19  18             Assessment/Plan:    (S80.12XD) Traumatic ecchymosis of lower leg, left, subsequent encounter  (primary encounter diagnosis)  (R60.0) Edema of left foot  Comment: XR L tib/fib negative on 9/21. WBC, ESR, CRP normal on 9/27. LLE bruise r/t trauma. Pain to LLE improving.   Plan: Check CBC & BMP 10/1. XR L ankle, foot 9/28 dx L ankle pain. Continue tylenol TID. Apply ice to L foot 20 min QID. Elevate L foot on 2 pillows while in bed. Therapy to eval/treat edema.  Currently on keflex x 7 days: creat clearance 61 on 9/28; decrease keflex to 500mg BID through 10/3.   UPDATE: XR L ankle/foot 9/28 negative for acute findings.    (E87.6) Hypokalemia  Comment: Controlled  Plan: Hypokalemia, continue potassium chloride 20meq every day.     (E87.0) Hypernatremia  Comment: Na slightly elevated  Plan: Encourage fluids. Check Na 10/1.               Total time spent with patient visit at the skilled nursing facility was 25 mins including patient visit and review of past records. Greater than 50% of total time spent with counseling and coordinating care due to LLE bruising w/edema.       Electronically signed by  CAT Sanchez CNP                      Sincerely,        CAT Sanchez CNP

## 2018-10-01 ENCOUNTER — HOSPITAL LABORATORY (OUTPATIENT)
Dept: OTHER | Facility: CLINIC | Age: 83
End: 2018-10-01

## 2018-10-01 LAB
ANION GAP SERPL CALCULATED.3IONS-SCNC: 7 MMOL/L (ref 3–14)
BUN SERPL-MCNC: 13 MG/DL (ref 7–30)
CALCIUM SERPL-MCNC: 10.4 MG/DL (ref 8.5–10.1)
CHLORIDE SERPL-SCNC: 102 MMOL/L (ref 94–109)
CO2 SERPL-SCNC: 33 MMOL/L (ref 20–32)
CREAT SERPL-MCNC: 0.43 MG/DL (ref 0.52–1.04)
ERYTHROCYTE [DISTWIDTH] IN BLOOD BY AUTOMATED COUNT: 13.2 % (ref 10–15)
GFR SERPL CREATININE-BSD FRML MDRD: >90 ML/MIN/1.7M2
GLUCOSE SERPL-MCNC: 80 MG/DL (ref 70–99)
HCT VFR BLD AUTO: 43.1 % (ref 35–47)
HGB BLD-MCNC: 13.5 G/DL (ref 11.7–15.7)
MCH RBC QN AUTO: 30.3 PG (ref 26.5–33)
MCHC RBC AUTO-ENTMCNC: 31.3 G/DL (ref 31.5–36.5)
MCV RBC AUTO: 97 FL (ref 78–100)
PLATELET # BLD AUTO: 322 10E9/L (ref 150–450)
POTASSIUM SERPL-SCNC: 3.5 MMOL/L (ref 3.4–5.3)
RBC # BLD AUTO: 4.46 10E12/L (ref 3.8–5.2)
SODIUM SERPL-SCNC: 142 MMOL/L (ref 133–144)
WBC # BLD AUTO: 8.5 10E9/L (ref 4–11)

## 2018-10-03 ENCOUNTER — NURSING HOME VISIT (OUTPATIENT)
Dept: GERIATRICS | Facility: CLINIC | Age: 83
End: 2018-10-03
Payer: COMMERCIAL

## 2018-10-03 VITALS
SYSTOLIC BLOOD PRESSURE: 152 MMHG | DIASTOLIC BLOOD PRESSURE: 82 MMHG | WEIGHT: 123.5 LBS | OXYGEN SATURATION: 93 % | TEMPERATURE: 97.7 F | HEIGHT: 62 IN | RESPIRATION RATE: 18 BRPM | BODY MASS INDEX: 22.73 KG/M2 | HEART RATE: 78 BPM

## 2018-10-03 DIAGNOSIS — S80.12XD: Primary | ICD-10-CM

## 2018-10-03 DIAGNOSIS — M17.0 OSTEOARTHRITIS OF BOTH KNEES, UNSPECIFIED OSTEOARTHRITIS TYPE: ICD-10-CM

## 2018-10-03 DIAGNOSIS — R53.81 PHYSICAL DECONDITIONING: ICD-10-CM

## 2018-10-03 DIAGNOSIS — R60.0 EDEMA OF LEFT FOOT: ICD-10-CM

## 2018-10-03 PROCEDURE — 99309 SBSQ NF CARE MODERATE MDM 30: CPT | Performed by: NURSE PRACTITIONER

## 2018-10-03 NOTE — PROGRESS NOTES
[place] [facility]    Chief Complaint   Patient presents with     [facility] Acute       [place] Medical Record Number:  [id number]    HPI:    Kelsey Wood is a 89 year old  (4/18/1929), who is being seen today for an episodic care visit at [facility] .  HPI information obtained from: facility chart records, facility staff, patient report and [facility] chart review.    Today's concern is:    Traumatic ecchymosis of lower leg, left, subsequent encounter  Edema of left foot  Patient admitted to [facility] 9/21-9/23 d/t left leg pain with possible cellulitis. No reports of specific trauma/injury to LLE per patient. XR L tib/fib and LLE venous US 9/21 negative for DVT or acute fractures. Was given IV ancef x 1 dose, then transitioned to keflex. Abx has since been dc'd as cellulitis less likely. Per hospital notes, bruise to LLE is likely r/t injury. During exam, patient does not recall trauma to LLE that would cause bruising. Admits to pain 2/10 at rest and increases with activity. Patient repots pain to LLE is improving since admission to hospital, states a few days ago was unable to bear weight to LLE and now able to bear some weight without increased pain.  Was started on keflex 9/26 d/t concern for increased pain, swelling, erythema and low grade fever on; today, no sx of erythema, fever or increased tenderness to palpation. Course of abx to be completed 10/3. XR L ankle/foot 9/28 negative for acute fracture. Currently taking tylenol TID and fentanyl patch for pain management. Has been attempting to elevate LLE while in bed.    Osteoarthritis of both knees, unspecified osteoarthritis type  Has hx chronic pain secondary to bilateral knee osteoarthritis and chronic right hip pain. Currently taking gabapentin, ibuprofen prn, and fentanyl patches. During exam, patient reports chronic pain controlled with current regimen. Denies constipation, taking senna-s 1 tab every day. Patient states  she is followed by Ortho every 3 months, patient to f/u as directed.      Physical deconditioning  PTA lives in [facility]. Patient is actively participating in therapy sessions. Ambulates 8ft with walker; pain has been a limiting factor per therapy. Requires moderate-max assistance with transfers moderate assistance with ADLs. SLUMS 24/30 (07/2018); repeat SLUMS 15/30 (10/2018). SW following for discharge planning, patient's goal is to discharge back to [facility].                ALLERGIES: Review of patient's allergies indicates no known allergies.  Past Medical, Surgical, Family and Social History reviewed and updated in [facility].    Current Outpatient Prescriptions   Medication Sig Dispense Refill     ACETAMINOPHEN PO Take 650 mg by mouth 3 times daily And q4h prn       albuterol (PROAIR HFA/PROVENTIL HFA/VENTOLIN HFA) 108 (90 Base) MCG/ACT Inhaler Inhale 2 puffs into the lungs every 4 hours as needed for shortness of breath / dyspnea or wheezing       bismuth subsalicylate (PEPTO BISMOL) 262 MG chewable tablet Take 2 tablets by mouth every hour as needed for heartburn Max of 16 tabs per 24 hrs       calcium carbonate (OS-MARY GRACE 500 MG [place]. [place]) 500 MG tablet Take 1 tablet by mouth daily        estradiol (ESTRACE) 0.1 MG/GM cream Place 2 g vaginally twice a week       fentaNYL (DURAGESIC) 12 mcg/hr 72 hr patch Place 1 patch onto the skin every 72 hours remove old patch. 3 patch 0     GABAPENTIN PO Take 300 mg by mouth 3 times daily       hydrocortisone 1 % CREA cream Apply topically daily as needed for itching       ibuprofen (ADVIL/MOTRIN) 200 MG tablet Take 1 tablet (200 mg) by mouth daily as needed for moderate pain 100 tablet 0     latanoprost (XALATAN) 0.005 % ophthalmic solution Place 1 drop into both eyes At Bedtime       Liniments (SALONPAS PAIN RELIEF PATCH EX) Externally apply 1 patch topically daily as needed       Misc Natural Products (OSTEO BI-FLEX ADV DOUBLE ST PO) Take 1 tablet by mouth 2 times daily ON HOLD        "POTASSIUM CHLORIDE PO Take 20 mEq by mouth daily       senna-docusate (SENOKOT-S;PERICOLACE) 8.6-50 MG per tablet Take 1 tablet by mouth daily       Medications reviewed:  Medications reconciled to facility chart and changes were made to reflect current medications as identified as above med list. Below are the changes that were made:   Medications stopped since last EPIC medication reconciliation:   Medications Discontinued During This Encounter   Medication Reason     Cephalexin (KEFLEX PO)        Medications started since last [facility] medication reconciliation:  No orders of the defined types were placed in this encounter.        REVIEW OF SYSTEMS:  4 point ROS including Respiratory, CV, GI and , other than that noted in the HPI,  is negative      Physical Exam:  /82  Pulse 78  Temp 97.7  F (36.5  C)  Resp 18  Ht 5' 2\" (1.575 m)  Wt 123 lb 8 oz (56 kg)  SpO2 93%  BMI 22.59 kg/m2  GENERAL APPEARANCE:  Alert, in no distress, appears healthy, oriented, cooperative  RESP:  respiratory effort and palpation of chest normal, lungs clear to auscultation , no respiratory distress  CV:  Palpation and auscultation of heart done , regular rate and rhythm, no murmur, rub, or gallop, +2 pedal pulses, mild edema BLE  ABDOMEN:  normal bowel sounds, soft, nontender, no hepatosplenomegaly or other masses, no guarding or reboundnormal bowel sounds, soft, nontender, no hepatosplenomegaly or other masses  M/S:   Gait and station abnormal, requires assistance with activity and ADLs. Digits and nails normal  SKIN:  Inspection of skin and subcutaneous tissue baseline, Palpation of skin and subcutaneous tissue baseline. LLE bruising, appears to be at various stages of healing; mild edema, no warmth or surrounding cellulitis. Appears to be bow-legged while standing.   NEURO:   Cranial nerves 2-12 are normal tested and grossly at patient's baseline, Examination of sensation by touch normal  PSYCH:  Oriented X 3, affect and mood " normal, cognitive testing SLUMS 15/30      BP Readings from Last 3 Encounters:   10/03/18 152/82   09/28/18 153/81   09/24/18 146/82     Pulse Readings from Last 4 Encounters:   10/03/18 78   09/28/18 80   09/24/18 84   09/22/18 84     Wt Readings from Last 5 Encounters:   10/03/18 123 lb 8 oz (56 kg)   09/28/18 122 lb 6.4 oz (55.5 kg)   09/24/18 122 lb 4.8 oz (55.5 kg)   09/21/18 126 lb 3.2 oz (57.2 kg)   07/09/18 121 lb 4.8 oz (55 kg)         Recent Labs:   CBC RESULTS:   Recent Labs   Lab Test  10/01/18   0549  09/27/18   0556   WBC  8.5  7.5   RBC  4.46  4.44   HGB  13.5  13.3   HCT  43.1  42.3   MCV  97  95   MCH  30.3  30.0   MCHC  31.3*  31.4*   RDW  13.2  13.2   PLT  322  316       Last Basic Metabolic Panel:  Recent Labs   Lab Test  10/01/18   0549  09/28/18   0645   NA  142  145*   POTASSIUM  3.5  3.8   CHLORIDE  102  106   MARY GRACE  10.4*  9.6   CO2  33*  32   BUN  13  19   CR  0.43*  0.55   GLC  80  87       Liver Function Studies -   Recent Labs   Lab Test  09/27/18   0556  06/25/18   1852   PROTTOTAL  6.9  7.4   ALBUMIN  3.3*  3.5   BILITOTAL  0.4  0.2   ALKPHOS  68  80   AST  16  17   ALT  19  18             Assessment/Plan:    (S80.12XD) Traumatic ecchymosis of lower leg, left, subsequent encounter  (primary encounter diagnosis)  (R60.0) Edema of left foot  Comment: LLE bruise w/edema r/t trauma.   Plan: Continue pain management, ice, and to elevate LLE while in bed. Discussed patient status with Frances Wolfe. Hx L knee plateau fracture 7/2017. States patient would likely need a L knee replacement in order for pain and balance to improve. Patient to follow-up with Ortho as directed for recommendations.      (M17.0) Osteoarthritis of both knees, unspecified osteoarthritis type  Comment: Chronic pain  Plan: Continue plan of care.    (R53.81) Physical deconditioning  Comment: Ongoing  Plan: Encourage active participation in therapy session to increase strength and promote independence in activities and  ADLs. SW following for discharge planning.           Electronically signed by  CAT Sanchez CNP

## 2018-10-03 NOTE — LETTER
10/3/2018        RE: Kelsey Wood  [address]  [place] [address]        [place] [facility]    Chief Complaint   Patient presents with     care home Acute       [place] Medical Record Number:  [id number]    HPI:    Kelsey Wood is a 89 year old  (4/18/1929), who is being seen today for an episodic care visit at [facility] .  HPI information obtained from: facility chart records, facility staff, patient report and [facility] chart review.    Today's concern is:    Traumatic ecchymosis of lower leg, left, subsequent encounter  Edema of left foot  Patient admitted to [facility] 9/21-9/23 d/t left leg pain with possible cellulitis. No reports of specific trauma/injury to LLE per patient. XR L tib/fib and LLE venous US 9/21 negative for DVT or acute fractures. Was given IV ancef x 1 dose, then transitioned to keflex. Abx has since been dc'd as cellulitis less likely. Per hospital notes, bruise to LLE is likely r/t injury. During exam, patient does not recall trauma to LLE that would cause bruising. Admits to pain 2/10 at rest and increases with activity. Patient repots pain to LLE is improving since admission to hospital, states a few days ago was unable to bear weight to LLE and now able to bear some weight without increased pain.  Was started on keflex 9/26 d/t concern for increased pain, swelling, erythema and low grade fever on; today, no sx of erythema, fever or increased tenderness to palpation. Course of abx to be completed 10/3. Currently taking tylenol TID and fentanyl patch for pain management. Has been attempting to elevate LLE while in bed.    Osteoarthritis of both knees, unspecified osteoarthritis type  Has hx chronic pain secondary to bilateral knee osteoarthritis and chronic right hip pain. Currently taking gabapentin, ibuprofen prn, and fentanyl patches. During exam, patient reports chronic pain controlled with current regimen. Denies constipation,  taking senna-s 1 tab every day. Patient states she is followed by Ortho every 3 months, patient to f/u as directed.      Physical deconditioning  PTA lives in [facility]. Patient is actively participating in therapy sessions. Ambulates 8ft with walker; pain has been a limiting factor per therapy. Requires moderate-max assistance with transfers moderate assistance with ADLs. SLUMS 24/30 (07/2018); repeat SLUMS 15/30 (10/2018). SW following for discharge planning, patient's goal is to discharge back to [facility].                ALLERGIES: Review of patient's allergies indicates no known allergies.  Past Medical, Surgical, Family and Social History reviewed and updated in [facility].    Current Outpatient Prescriptions   Medication Sig Dispense Refill     ACETAMINOPHEN PO Take 650 mg by mouth 3 times daily And q4h prn       albuterol (PROAIR HFA/PROVENTIL HFA/VENTOLIN HFA) 108 (90 Base) MCG/ACT Inhaler Inhale 2 puffs into the lungs every 4 hours as needed for shortness of breath / dyspnea or wheezing       bismuth subsalicylate (PEPTO BISMOL) 262 MG chewable tablet Take 2 tablets by mouth every hour as needed for heartburn Max of 16 tabs per 24 hrs       calcium carbonate (OS-MARY GRACE 500 MG [place]. [place]) 500 MG tablet Take 1 tablet by mouth daily        estradiol (ESTRACE) 0.1 MG/GM cream Place 2 g vaginally twice a week       fentaNYL (DURAGESIC) 12 mcg/hr 72 hr patch Place 1 patch onto the skin every 72 hours remove old patch. 3 patch 0     GABAPENTIN PO Take 300 mg by mouth 3 times daily       hydrocortisone 1 % CREA cream Apply topically daily as needed for itching       ibuprofen (ADVIL/MOTRIN) 200 MG tablet Take 1 tablet (200 mg) by mouth daily as needed for moderate pain 100 tablet 0     latanoprost (XALATAN) 0.005 % ophthalmic solution Place 1 drop into both eyes At Bedtime       Liniments (SALONPAS PAIN RELIEF PATCH EX) Externally apply 1 patch topically daily as needed       Misc Natural Products (OSTEO BI-FLEX ADV DOUBLE ST PO) Take 1  "tablet by mouth 2 times daily ON HOLD       POTASSIUM CHLORIDE PO Take 20 mEq by mouth daily       senna-docusate (SENOKOT-S;PERICOLACE) 8.6-50 MG per tablet Take 1 tablet by mouth daily       Medications reviewed:  Medications reconciled to facility chart and changes were made to reflect current medications as identified as above med list. Below are the changes that were made:   Medications stopped since last EPIC medication reconciliation:   Medications Discontinued During This Encounter   Medication Reason     Cephalexin (KEFLEX PO)        Medications started since last [facility] medication reconciliation:  No orders of the defined types were placed in this encounter.        REVIEW OF SYSTEMS:  4 point ROS including Respiratory, CV, GI and , other than that noted in the HPI,  is negative      Physical Exam:  /82  Pulse 78  Temp 97.7  F (36.5  C)  Resp 18  Ht 5' 2\" (1.575 m)  Wt 123 lb 8 oz (56 kg)  SpO2 93%  BMI 22.59 kg/m2  GENERAL APPEARANCE:  Alert, in no distress, appears healthy, oriented, cooperative  RESP:  respiratory effort and palpation of chest normal, lungs clear to auscultation , no respiratory distress  CV:  Palpation and auscultation of heart done , regular rate and rhythm, no murmur, rub, or gallop, +2 pedal pulses, mild edema BLE  ABDOMEN:  normal bowel sounds, soft, nontender, no hepatosplenomegaly or other masses, no guarding or reboundnormal bowel sounds, soft, nontender, no hepatosplenomegaly or other masses  M/S:   Gait and station abnormal, requires assistance with activity and ADLs. Digits and nails normal  SKIN:  Inspection of skin and subcutaneous tissue baseline, Palpation of skin and subcutaneous tissue baseline. LLE bruising, appears to be at various stages of healing; mild edema, no warmth or surrounding cellulitis. Appears to be bow-legged while standing.   NEURO:   Cranial nerves 2-12 are normal tested and grossly at patient's baseline, Examination of sensation by touch " normal  PSYCH:  Oriented X 3, affect and mood normal, cognitive testing SLUMS 15/30      BP Readings from Last 3 Encounters:   10/03/18 152/82   09/28/18 153/81   09/24/18 146/82     Pulse Readings from Last 4 Encounters:   10/03/18 78   09/28/18 80   09/24/18 84   09/22/18 84     Wt Readings from Last 5 Encounters:   10/03/18 123 lb 8 oz (56 kg)   09/28/18 122 lb 6.4 oz (55.5 kg)   09/24/18 122 lb 4.8 oz (55.5 kg)   09/21/18 126 lb 3.2 oz (57.2 kg)   07/09/18 121 lb 4.8 oz (55 kg)         Recent Labs:   CBC RESULTS:   Recent Labs   Lab Test  10/01/18   0549  09/27/18   0556   WBC  8.5  7.5   RBC  4.46  4.44   HGB  13.5  13.3   HCT  43.1  42.3   MCV  97  95   MCH  30.3  30.0   MCHC  31.3*  31.4*   RDW  13.2  13.2   PLT  322  316       Last Basic Metabolic Panel:  Recent Labs   Lab Test  10/01/18   0549  09/28/18   0645   NA  142  145*   POTASSIUM  3.5  3.8   CHLORIDE  102  106   MARY GRACE  10.4*  9.6   CO2  33*  32   BUN  13  19   CR  0.43*  0.55   GLC  80  87       Liver Function Studies -   Recent Labs   Lab Test  09/27/18   0556  06/25/18   1852   PROTTOTAL  6.9  7.4   ALBUMIN  3.3*  3.5   BILITOTAL  0.4  0.2   ALKPHOS  68  80   AST  16  17   ALT  19  18             Assessment/Plan:    (S80.12XD) Traumatic ecchymosis of lower leg, left, subsequent encounter  (primary encounter diagnosis)  (R60.0) Edema of left foot  Comment: LLE bruise w/edema r/t trauma.   Plan: Continue pain management, ice, and to elevate LLE while in bed. Discussed patient status with RE[facility]. Hx L knee plateau fracture 7/2017. States patient would likely need a L knee replacement in order for pain and balance to improve. Patient to follow-up with Ortho as directed for recommendations.      (M17.0) Osteoarthritis of both knees, unspecified osteoarthritis type  Comment: Chronic pain  Plan: Continue plan of care.    (R53.81) Physical deconditioning  Comment: Ongoing  Plan: Encourage active participation in therapy session to increase  strength and promote independence in activities and ADLs. SW following for discharge planning.           Electronically signed by  CAT Sanchez CNP                      Sincerely,        CAT Sanchez CNP

## 2018-10-09 ENCOUNTER — NURSING HOME VISIT (OUTPATIENT)
Dept: GERIATRICS | Facility: CLINIC | Age: 83
End: 2018-10-09
Payer: COMMERCIAL

## 2018-10-09 VITALS
BODY MASS INDEX: 22.45 KG/M2 | HEART RATE: 86 BPM | TEMPERATURE: 98.9 F | WEIGHT: 122 LBS | DIASTOLIC BLOOD PRESSURE: 76 MMHG | OXYGEN SATURATION: 93 % | HEIGHT: 62 IN | SYSTOLIC BLOOD PRESSURE: 132 MMHG | RESPIRATION RATE: 16 BRPM

## 2018-10-09 DIAGNOSIS — J20.9 ACUTE BRONCHITIS, UNSPECIFIED ORGANISM: ICD-10-CM

## 2018-10-09 DIAGNOSIS — R60.0 EDEMA OF LEFT FOOT: ICD-10-CM

## 2018-10-09 DIAGNOSIS — R41.89 COGNITIVE IMPAIRMENT: ICD-10-CM

## 2018-10-09 DIAGNOSIS — R53.81 PHYSICAL DECONDITIONING: ICD-10-CM

## 2018-10-09 DIAGNOSIS — S80.12XD: Primary | ICD-10-CM

## 2018-10-09 PROCEDURE — 99309 SBSQ NF CARE MODERATE MDM 30: CPT | Performed by: NURSE PRACTITIONER

## 2018-10-09 NOTE — PROGRESS NOTES
[place] [facility]    Chief Complaint   Patient presents with     [facility] Acute       [place] Medical Record Number:  [id number]  Place of Service where encounter took place:  [facility] [[id number]]    HPI:    Kelsey Wood is a 89 year old  (4/18/1929), who is being seen today for an episodic care visit.  HPI information obtained from: facility chart records, facility staff, patient report and [facility] chart review.    Today's concern is:    Traumatic ecchymosis of lower leg, left, subsequent encounter  Edema of left foot  Noted to have chronic bilateral knee pain with hx L knee plateau fracture 07/2017. Patient admitted to [facility] 9/21-9/23 d/t left leg pain with possible cellulitis. No reports of specific trauma/injury to LLE per patient. XR L tib/fib 9/21 negative for acute fractures and LLE venous US 9/21 negative for DVT. Was given IV ancef x 1 dose, then transitioned to keflex. Abx has since been dc'd as cellulitis less likely. Per hospital notes, bruise to LLE is likely r/t injury. During exam, patient does not recall trauma to LLE that would cause bruising. Admits to pain 2/10 at rest and increases with activity. XR L ankle/foot 9/28 negative for acute fracture. Currently taking tylenol TID and fentanyl patch for pain management. Has been attempting to elevate LLE while in bed.    Acute bronchitis  Patient reports coughing and chest congestion started over the weekend. Denies SOB or audible wheezing. Afebrile.     Physical deconditioning  Cognitive impairment  PTA lives in [facility]. Patient is actively participating in therapy sessions. Ambulates 8ft with walker; pain has been a limiting factor per therapy. Requires moderate-max assistance with transfers moderate assistance with ADLs. SLUMS 24/30 (07/2018); repeat SLUMS 15/30 (10/2018). SW following for discharge planning, patient's goal is to discharge back to [facility].            ALLERGIES: Review of patient's  allergies indicates no known allergies.  Past Medical, Surgical, Family and Social History reviewed and updated in [facility].    Current Outpatient Prescriptions   Medication Sig Dispense Refill     ACETAMINOPHEN PO Take 650 mg by mouth 3 times daily And q4h prn       albuterol (PROAIR HFA/PROVENTIL HFA/VENTOLIN HFA) 108 (90 Base) MCG/ACT Inhaler Inhale 2 puffs into the lungs every 4 hours as needed for shortness of breath / dyspnea or wheezing       bismuth subsalicylate (PEPTO BISMOL) 262 MG chewable tablet Take 2 tablets by mouth every hour as needed for heartburn Max of 16 tabs per 24 hrs       calcium carbonate (OS-MARY GRACE 500 MG [place]. [place]) 500 MG tablet Take 1 tablet by mouth daily        estradiol (ESTRACE) 0.1 MG/GM cream Place 2 g vaginally twice a week       fentaNYL (DURAGESIC) 12 mcg/hr 72 hr patch Place 1 patch onto the skin every 72 hours remove old patch. 3 patch 0     Fluticasone Propionate (FLONASE NA) Spray 2 sprays into both nostrils daily       GABAPENTIN PO Take 300 mg by mouth 3 times daily       hydrocortisone 1 % CREA cream Apply topically daily as needed for itching       ibuprofen (ADVIL/MOTRIN) 200 MG tablet Take 1 tablet (200 mg) by mouth daily as needed for moderate pain 100 tablet 0     latanoprost (XALATAN) 0.005 % ophthalmic solution Place 1 drop into both eyes At Bedtime       Liniments (SALONPAS PAIN RELIEF PATCH EX) Externally apply 1 patch topically daily as needed       Misc Natural Products (OSTEO BI-FLEX ADV DOUBLE ST PO) Take 1 tablet by mouth 2 times daily ON HOLD       POTASSIUM CHLORIDE PO Take 20 mEq by mouth daily       senna-docusate (SENOKOT-S;PERICOLACE) 8.6-50 MG per tablet Take 1 tablet by mouth daily       Medications reviewed:  Medications reconciled to facility chart and changes were made to reflect current medications as identified as above med list. Below are the changes that were made:   Medications stopped since last EPIC medication reconciliation:   There are no  "discontinued medications.    Medications started since last [facility] medication reconciliation:  Orders Placed This Encounter   Medications     Fluticasone Propionate (FLONASE NA)     Sig: Spray 2 sprays into both nostrils daily           REVIEW OF SYSTEMS:  4 point ROS including Respiratory, CV, GI and , other than that noted in the HPI,  is negative      Physical Exam:  /76  Pulse 86  Temp 98.9  F (37.2  C)  Resp 16  Ht 5' 2\" (1.575 m)  Wt 122 lb (55.3 kg)  SpO2 93%  BMI 22.31 kg/m2  GENERAL APPEARANCE:  Alert, in no distress, appears healthy, oriented, cooperative  RESP:  respiratory effort and palpation of chest normal, lungs with expiratory wheeze upper lungs and diminished lung sounds at bases, no respiratory distress  CV:  Palpation and auscultation of heart done, regular rate and rhythm, no murmur, rub, or gallop, +2 pedal pulses, mild edema BLE  ABDOMEN:  normal bowel sounds, soft, nontender, no hepatosplenomegaly or other masses, no guarding or reboundnormal bowel sounds, soft, nontender, no hepatosplenomegaly or other masses  M/S:   Gait and station abnormal, requires assistance with activity and ADLs. Digits and nails normal  SKIN:  Inspection of skin and subcutaneous tissue baseline, Palpation of skin and subcutaneous tissue baseline. LLE bruising, appears to be at various stages of healing; mild edema, no warmth or surrounding cellulitis. Appears to be bow-legged while standing.   NEURO:   Cranial nerves 2-12 are normal tested and grossly at patient's baseline, Examination of sensation by touch normal  PSYCH:  Oriented X 3, affect and mood normal, cognitive testing SLUMS 15/30 (retested)      BP Readings from Last 3 Encounters:   10/09/18 132/76   10/03/18 152/82   09/28/18 153/81     Pulse Readings from Last 4 Encounters:   10/09/18 86   10/03/18 78   09/28/18 80   09/24/18 84     Wt Readings from Last 5 Encounters:   10/09/18 122 lb (55.3 kg)   10/03/18 123 lb 8 oz (56 kg)   09/28/18 122 " lb 6.4 oz (55.5 kg)   09/24/18 122 lb 4.8 oz (55.5 kg)   09/21/18 126 lb 3.2 oz (57.2 kg)         Recent Labs:   CBC RESULTS:   Recent Labs   Lab Test  10/01/18   0549  09/27/18   0556   WBC  8.5  7.5   RBC  4.46  4.44   HGB  13.5  13.3   HCT  43.1  42.3   MCV  97  95   MCH  30.3  30.0   MCHC  31.3*  31.4*   RDW  13.2  13.2   PLT  322  316       Last Basic Metabolic Panel:  Recent Labs   Lab Test  10/01/18   0549  09/28/18   0645   NA  142  145*   POTASSIUM  3.5  3.8   CHLORIDE  102  106   MARY GRACE  10.4*  9.6   CO2  33*  32   BUN  13  19   CR  0.43*  0.55   GLC  80  87       Liver Function Studies -   Recent Labs   Lab Test  09/27/18   0556  06/25/18   1852   PROTTOTAL  6.9  7.4   ALBUMIN  3.3*  3.5   BILITOTAL  0.4  0.2   ALKPHOS  68  80   AST  16  17   ALT  19  18           Assessment/Plan:    (S80.12XD) Traumatic ecchymosis of lower leg, left, subsequent encounter  (primary encounter diagnosis)  (R60.0) Edema of left foot  Comment: LLE bruise w/edema likely r/t trauma, bruising appears to be improving. Pain moderately controlled.    Plan: Check CBC w/diff & BMP 10/10. Discontinue ibuprofen. Continue pain management. Patient to follow-up with Dr. Buster Armando, Orth[facility], discussed recommendation with patient and patient's son.    (J20.9) Acute bronchitis, unspecified organism  Comment: Acute bronchitis r/t viral vs seasonal allergies.   Plan: Add guaifenesin 400mg tabs QID x 7 days. Add duonebs TID x 7 days. Continue flonase qAM. Monitor respiratory status.     (R53.81) Physical deconditioning  (R41.89) Cognitive impairment  Comment: Ongoing  Plan: Encourage active participation in therapy session to increase strength and promote independence in activities and ADLs. SW following for discharge planning.             Electronically signed by  CAT Sanchez CNP

## 2018-10-10 ENCOUNTER — HOSPITAL LABORATORY (OUTPATIENT)
Dept: OTHER | Facility: CLINIC | Age: 83
End: 2018-10-10

## 2018-10-10 LAB
ANION GAP SERPL CALCULATED.3IONS-SCNC: 5 MMOL/L (ref 3–14)
BASOPHILS # BLD AUTO: 0.1 10E9/L (ref 0–0.2)
BASOPHILS NFR BLD AUTO: 0.6 %
BUN SERPL-MCNC: 17 MG/DL (ref 7–30)
CALCIUM SERPL-MCNC: 10.8 MG/DL (ref 8.5–10.1)
CHLORIDE SERPL-SCNC: 106 MMOL/L (ref 94–109)
CO2 SERPL-SCNC: 35 MMOL/L (ref 20–32)
CREAT SERPL-MCNC: 0.64 MG/DL (ref 0.52–1.04)
DIFFERENTIAL METHOD BLD: ABNORMAL
EOSINOPHIL # BLD AUTO: 0.7 10E9/L (ref 0–0.7)
EOSINOPHIL NFR BLD AUTO: 8.9 %
ERYTHROCYTE [DISTWIDTH] IN BLOOD BY AUTOMATED COUNT: 13.1 % (ref 10–15)
GFR SERPL CREATININE-BSD FRML MDRD: 88 ML/MIN/1.7M2
GLUCOSE SERPL-MCNC: 84 MG/DL (ref 70–99)
HCT VFR BLD AUTO: 42 % (ref 35–47)
HGB BLD-MCNC: 13 G/DL (ref 11.7–15.7)
IMM GRANULOCYTES # BLD: 0 10E9/L (ref 0–0.4)
IMM GRANULOCYTES NFR BLD: 0.4 %
LYMPHOCYTES # BLD AUTO: 1.6 10E9/L (ref 0.8–5.3)
LYMPHOCYTES NFR BLD AUTO: 20.4 %
MCH RBC QN AUTO: 30.2 PG (ref 26.5–33)
MCHC RBC AUTO-ENTMCNC: 31 G/DL (ref 31.5–36.5)
MCV RBC AUTO: 97 FL (ref 78–100)
MONOCYTES # BLD AUTO: 0.8 10E9/L (ref 0–1.3)
MONOCYTES NFR BLD AUTO: 10.2 %
NEUTROPHILS # BLD AUTO: 4.7 10E9/L (ref 1.6–8.3)
NEUTROPHILS NFR BLD AUTO: 59.5 %
NRBC # BLD AUTO: 0 10*3/UL
NRBC BLD AUTO-RTO: 0 /100
PLATELET # BLD AUTO: 294 10E9/L (ref 150–450)
POTASSIUM SERPL-SCNC: 3.2 MMOL/L (ref 3.4–5.3)
RBC # BLD AUTO: 4.31 10E12/L (ref 3.8–5.2)
SODIUM SERPL-SCNC: 146 MMOL/L (ref 133–144)
WBC # BLD AUTO: 8 10E9/L (ref 4–11)

## 2018-10-12 ENCOUNTER — HOSPITAL LABORATORY (OUTPATIENT)
Dept: OTHER | Facility: CLINIC | Age: 83
End: 2018-10-12

## 2018-10-12 LAB
ALBUMIN SERPL-MCNC: 3.7 G/DL (ref 3.4–5)
ANION GAP SERPL CALCULATED.3IONS-SCNC: 4 MMOL/L (ref 3–14)
BUN SERPL-MCNC: 17 MG/DL (ref 7–30)
CALCIUM SERPL-MCNC: 9.5 MG/DL (ref 8.5–10.1)
CHLORIDE SERPL-SCNC: 106 MMOL/L (ref 94–109)
CO2 SERPL-SCNC: 31 MMOL/L (ref 20–32)
CREAT SERPL-MCNC: 0.6 MG/DL (ref 0.52–1.04)
GFR SERPL CREATININE-BSD FRML MDRD: >90 ML/MIN/1.7M2
GLUCOSE SERPL-MCNC: 72 MG/DL (ref 70–99)
POTASSIUM SERPL-SCNC: 3.7 MMOL/L (ref 3.4–5.3)
SODIUM SERPL-SCNC: 141 MMOL/L (ref 133–144)

## 2018-10-15 VITALS
SYSTOLIC BLOOD PRESSURE: 121 MMHG | BODY MASS INDEX: 22.98 KG/M2 | HEIGHT: 62 IN | HEART RATE: 86 BPM | DIASTOLIC BLOOD PRESSURE: 74 MMHG | WEIGHT: 124.9 LBS | RESPIRATION RATE: 18 BRPM | OXYGEN SATURATION: 94 % | TEMPERATURE: 98.6 F

## 2018-10-15 RX ORDER — HYDROCODONE BITARTRATE AND ACETAMINOPHEN 5; 325 MG/1; MG/1
1 TABLET ORAL EVERY 6 HOURS PRN
Status: ON HOLD | COMMUNITY
End: 2020-02-27

## 2018-10-16 ENCOUNTER — TELEPHONE (OUTPATIENT)
Dept: INTERNAL MEDICINE | Facility: CLINIC | Age: 83
End: 2018-10-16

## 2018-10-16 ENCOUNTER — NURSING HOME VISIT (OUTPATIENT)
Dept: GERIATRICS | Facility: CLINIC | Age: 83
End: 2018-10-16
Payer: COMMERCIAL

## 2018-10-16 DIAGNOSIS — J20.9 ACUTE BRONCHITIS, UNSPECIFIED ORGANISM: ICD-10-CM

## 2018-10-16 DIAGNOSIS — M79.605 LEG PAIN, MEDIAL, LEFT: Primary | ICD-10-CM

## 2018-10-16 DIAGNOSIS — R60.0 EDEMA OF LEFT FOOT: ICD-10-CM

## 2018-10-16 PROCEDURE — 99310 SBSQ NF CARE HIGH MDM 45: CPT | Performed by: NURSE PRACTITIONER

## 2018-10-16 RX ORDER — GUAIFENESIN 200 MG/1
400 TABLET ORAL 2 TIMES DAILY PRN
COMMUNITY
End: 2020-03-17

## 2018-10-16 RX ORDER — IPRATROPIUM BROMIDE AND ALBUTEROL SULFATE 2.5; .5 MG/3ML; MG/3ML
1 SOLUTION RESPIRATORY (INHALATION) 3 TIMES DAILY
COMMUNITY
Start: 2018-10-25 | End: 2018-10-31

## 2018-10-16 RX ORDER — IPRATROPIUM BROMIDE AND ALBUTEROL SULFATE 2.5; .5 MG/3ML; MG/3ML
1 SOLUTION RESPIRATORY (INHALATION) 4 TIMES DAILY
COMMUNITY
Start: 2018-10-16 | End: 2018-10-23

## 2018-10-16 NOTE — PROGRESS NOTES
[facility]    Chief Complaint   Patient presents with     SUKHI       [facility] Medical Record Number:  [id number]  Place of Service where encounter took place:  [facility]) [[id number]]    HPI:    Kelsey Wood is a 89 year old  (4/18/1929), who is being seen today for an episodic care visit.  HPI information obtained from: facility chart records, facility staff, patient report and [facility] chart review.    Today's concern is:    Leg pain, medial, left  Edema of left foot  Noted to have chronic bilateral knee pain with hx L knee plateau fracture 07/2017. Patient admitted to [facility] 9/21-9/23 d/t left leg pain with possible cellulitis. No reports of specific trauma/injury to LLE per patient. XR L tib/fib 9/21 negative for acute fractures and LLE venous US 9/21 negative for DVT. Was given IV ancef x 1 dose, then transitioned to keflex. Abx has since been dc'd as cellulitis less likely. Per hospital notes, bruise to LLE is likely r/t injury.  During exam, patient does not recall trauma/injury to LLE that would cause bruising or increased pain. Today, admits to pain 2/10 at rest and increased pain with activity 6-8/10. XR L ankle/foot 9/28 negative for acute fracture. Currently taking tylenol TID, norco prn, and fentanyl patch for pain management. Has been attempting to elevate LLE while in bed. Patient reports has appt with Ortho 10/17.     Acute bronchitis, unspecified organism  Patient has had intermittent cough x 1 week, as started on scheduled duonebs TID, guaifenesin QID, and flonase qd. Patient seen following therapy session and has noticeable auditory exp wheezing. Denies SOB. Afebrile.           ALLERGIES: Review of patient's allergies indicates no known allergies.  Past Medical, Surgical, Family and Social History reviewed and updated in [facility].    Current Outpatient Prescriptions   Medication Sig Dispense Refill     ACETAMINOPHEN PO Take 650 mg by mouth 3 times  daily And q4h prn       albuterol (PROAIR HFA/PROVENTIL HFA/VENTOLIN HFA) 108 (90 Base) MCG/ACT Inhaler Inhale 2 puffs into the lungs every 4 hours as needed for shortness of breath / dyspnea or wheezing       bismuth subsalicylate (PEPTO BISMOL) 262 MG chewable tablet Take 2 tablets by mouth every hour as needed for heartburn Max of 16 tabs per 24 hrs       calcium carbonate (OS-MARY GRACE 500 MG [place]. CA) 500 MG tablet Take 1 tablet by mouth daily        estradiol (ESTRACE) 0.1 MG/GM cream Place 2 g vaginally twice a week       fentaNYL (DURAGESIC) 12 mcg/hr 72 hr patch Place 1 patch onto the skin every 72 hours remove old patch. 3 patch 0     Fluticasone Propionate (FLONASE NA) Spray 2 sprays into both nostrils daily       GABAPENTIN PO Take 300 mg by mouth 3 times daily       HYDROcodone-acetaminophen (NORCO) 5-325 MG per tablet Take 1 tablet by mouth every 6 hours as needed for severe pain       hydrocortisone 1 % CREA cream Apply topically daily as needed for itching       latanoprost (XALATAN) 0.005 % ophthalmic solution Place 1 drop into both eyes At Bedtime       Liniments (SALONPAS PAIN RELIEF PATCH EX) Externally apply 1 patch topically daily as needed       POTASSIUM CHLORIDE PO Take 20 mEq by mouth daily       senna-docusate (SENOKOT-S;PERICOLACE) 8.6-50 MG per tablet Take 1 tablet by mouth daily       Misc Natural Products (OSTEO BI-FLEX ADV DOUBLE ST PO) Take 1 tablet by mouth 2 times daily ON HOLD       Medications reviewed:  Medications reconciled to facility chart and changes were made to reflect current medications as identified as above med list. Below are the changes that were made:   Medications stopped since last EPIC medication reconciliation:   Medications Discontinued During This Encounter   Medication Reason     ibuprofen (ADVIL/MOTRIN) 200 MG tablet        Medications started since last [facility] medication reconciliation:  Orders Placed This Encounter   Medications     HYDROcodone-acetaminophen  "(NORCO) 5-325 MG per tablet     Sig: Take 1 tablet by mouth every 6 hours as needed for severe pain           REVIEW OF SYSTEMS:  4 point ROS including Respiratory, CV, GI and , other than that noted in the HPI,  is negative      Physical Exam:  /74  Pulse 86  Temp 98.6  F (37  C)  Resp 18  Ht 5' 2\" (1.575 m)  Wt 124 lb 14.4 oz (56.7 kg)  SpO2 94%  BMI 22.84 kg/m2  GENERAL APPEARANCE:  Alert, in no distress, appears healthy, oriented, cooperative  RESP:  respiratory effort and palpation of chest normal, lungs wheezing throughout, diminished at bases, no respiratory distress  CV:  Palpation and auscultation of heart done , regular rate and rhythm, no murmur, rub, or gallop, +2 pedal pulses, mild edema BLE  ABDOMEN:  normal bowel sounds, soft, nontender, no hepatosplenomegaly or other masses, no guarding or reboundnormal bowel sounds, soft, nontender, no hepatosplenomegaly or other masses  M/S:   Gait and station abnormal, requires assistance with activity and ADLs. Digits and nails normal  SKIN:  Inspection of skin and subcutaneous tissue baseline, Palpation of skin and subcutaneous tissue baseline. LLE bruising, appears to be at end stages of healing; mild edema to mid-lower tibia/foot, no warmth or surrounding cellulitis. Tenderness to medial aspect of mid-lower tibia.  NEURO:   Cranial nerves 2-12 are normal tested and grossly at patient's baseline, Examination of sensation by touch normal  PSYCH:  Oriented X 3, affect and mood normal, cognitive testing SLUMS 15/30      BP Readings from Last 3 Encounters:   10/15/18 121/74   10/09/18 132/76   10/03/18 152/82     Pulse Readings from Last 4 Encounters:   10/15/18 86   10/09/18 86   10/03/18 78   09/28/18 80     Wt Readings from Last 5 Encounters:   10/15/18 124 lb 14.4 oz (56.7 kg)   10/09/18 122 lb (55.3 kg)   10/03/18 123 lb 8 oz (56 kg)   09/28/18 122 lb 6.4 oz (55.5 kg)   09/24/18 122 lb 4.8 oz (55.5 kg)         Recent Labs:   CBC RESULTS: "   Recent Labs   Lab Test  10/10/18   0633  10/01/18   0549   WBC  8.0  8.5   RBC  4.31  4.46   HGB  13.0  13.5   HCT  42.0  43.1   MCV  97  97   MCH  30.2  30.3   MCHC  31.0*  31.3*   RDW  13.1  13.2   PLT  294  322       Last Basic Metabolic Panel:  Recent Labs   Lab Test  10/12/18   0726  10/10/18   0633   NA  141  146*   POTASSIUM  3.7  3.2*   CHLORIDE  106  106   MARY GRACE  9.5  10.8*   CO2  31  35*   BUN  17  17   CR  0.60  0.64   GLC  72  84       Liver Function Studies -   Recent Labs   Lab Test  10/12/18   0726  09/27/18   0556  06/25/18   1852   PROTTOTAL   --   6.9  7.4   ALBUMIN  3.7  3.3*  3.5   BILITOTAL   --   0.4  0.2   ALKPHOS   --   68  80   AST   --   16  17   ALT   --   19  18             Assessment/Plan:    (M79.605) Leg pain, medial, left  (primary encounter diagnosis)  (R60.0) Edema of left foot  Comment: Ongoing pain/swelling to LLE, tenderness to medial aspect of mid-lower tibia.   Plan: STAT XR L tib/fibula 10/16. Continue pain management. Patient reports has follow-up appt with Ortho on 10/17.     (J20.9) Acute bronchitis, unspecified organism  Comment: Increased expiratory wheezing with coughing. Denies SOB, no respiratory distress.   Plan: STAT CXR 10/16 dx wheezing. Check CBC & BMP 10/17. Increase scheduled duonebs QID x 7 days and add duonebs q4hrs prn. Add guaifenesin 400mg tabs q4hrs prn dx coughing. Add Azithromycin 500mg day 1, 250mg every day days 2-5.               Total time spent with patient visit at the skilled nursing facility was 36 mins including patient visit and review of past records. Greater than 50% of total time spent with counseling and coordinating care due to acute bronchitis with worsening expiratory wheezing and continued pain to LLE with mild bruising/edema to medial aspect of mid-lower tibia.       Electronically signed by  CAT Sanchez CNP

## 2018-10-16 NOTE — LETTER
10/16/2018        RE: Kelsey Wood  [address]  [place] [address]        [facility]    Chief Complaint   Patient presents with     RECHECK       [facility] Medical Record Number:  [id number]  Place of Service where encounter took place:  [facility] ([facility]) [[id number]]    HPI:    Kelsey Wood is a 89 year old  (4/18/1929), who is being seen today for an episodic care visit.  HPI information obtained from: facility chart records, facility staff, patient report and [facility] chart review.    Today's concern is:    Leg pain, medial, left  Edema of left foot  Noted to have chronic bilateral knee pain with hx L knee plateau fracture 07/2017. Patient admitted to [facility] 9/21-9/23 d/t left leg pain with possible cellulitis. No reports of specific trauma/injury to LLE per patient. XR L tib/fib 9/21 negative for acute fractures and LLE venous US 9/21 negative for DVT. Was given IV ancef x 1 dose, then transitioned to keflex. Abx has since been dc'd as cellulitis less likely. Per hospital notes, bruise to LLE is likely r/t injury.  During exam, patient does not recall trauma/injury to LLE that would cause bruising or increased pain. Today, admits to pain 2/10 at rest and increased pain with activity 6-8/10. XR L ankle/foot 9/28 negative for acute fracture. Currently taking tylenol TID, norco prn, and fentanyl patch for pain management. Has been attempting to elevate LLE while in bed. Patient reports has appt with Ortho 10/17.     Acute bronchitis, unspecified organism  Patient has had intermittent cough x 1 week, as started on scheduled duonebs TID, guaifenesin QID, and flonase qd. Patient seen following therapy session and has noticeable auditory exp wheezing. Denies SOB. Afebrile.           ALLERGIES: Review of patient's allergies indicates no known allergies.  Past Medical, Surgical, Family and Social History reviewed and updated in [facility].    Current Outpatient  Prescriptions   Medication Sig Dispense Refill     ACETAMINOPHEN PO Take 650 mg by mouth 3 times daily And q4h prn       albuterol (PROAIR HFA/PROVENTIL HFA/VENTOLIN HFA) 108 (90 Base) MCG/ACT Inhaler Inhale 2 puffs into the lungs every 4 hours as needed for shortness of breath / dyspnea or wheezing       bismuth subsalicylate (PEPTO BISMOL) 262 MG chewable tablet Take 2 tablets by mouth every hour as needed for heartburn Max of 16 tabs per 24 hrs       calcium carbonate (OS-MARY GRACE 500 MG [place]. [place]) 500 MG tablet Take 1 tablet by mouth daily        estradiol (ESTRACE) 0.1 MG/GM cream Place 2 g vaginally twice a week       fentaNYL (DURAGESIC) 12 mcg/hr 72 hr patch Place 1 patch onto the skin every 72 hours remove old patch. 3 patch 0     Fluticasone Propionate (FLONASE NA) Spray 2 sprays into both nostrils daily       GABAPENTIN PO Take 300 mg by mouth 3 times daily       HYDROcodone-acetaminophen (NORCO) 5-325 MG per tablet Take 1 tablet by mouth every 6 hours as needed for severe pain       hydrocortisone 1 % CREA cream Apply topically daily as needed for itching       latanoprost (XALATAN) 0.005 % ophthalmic solution Place 1 drop into both eyes At Bedtime       Liniments (SALONPAS PAIN RELIEF PATCH EX) Externally apply 1 patch topically daily as needed       POTASSIUM CHLORIDE PO Take 20 mEq by mouth daily       senna-docusate (SENOKOT-S;PERICOLACE) 8.6-50 MG per tablet Take 1 tablet by mouth daily       Misc Natural Products (OSTEO BI-FLEX ADV DOUBLE ST PO) Take 1 tablet by mouth 2 times daily ON HOLD       Medications reviewed:  Medications reconciled to facility chart and changes were made to reflect current medications as identified as above med list. Below are the changes that were made:   Medications stopped since last EPIC medication reconciliation:   Medications Discontinued During This Encounter   Medication Reason     ibuprofen (ADVIL/MOTRIN) 200 MG tablet        Medications started since last EPIC  "medication reconciliation:  Orders Placed This Encounter   Medications     HYDROcodone-acetaminophen (NORCO) 5-325 MG per tablet     Sig: Take 1 tablet by mouth every 6 hours as needed for severe pain           REVIEW OF SYSTEMS:  4 point ROS including Respiratory, CV, GI and , other than that noted in the HPI,  is negative      Physical Exam:  /74  Pulse 86  Temp 98.6  F (37  C)  Resp 18  Ht 5' 2\" (1.575 m)  Wt 124 lb 14.4 oz (56.7 kg)  SpO2 94%  BMI 22.84 kg/m2  GENERAL APPEARANCE:  Alert, in no distress, appears healthy, oriented, cooperative  RESP:  respiratory effort and palpation of chest normal, lungs wheezing throughout, diminished at bases, no respiratory distress  CV:  Palpation and auscultation of heart done , regular rate and rhythm, no murmur, rub, or gallop, +2 pedal pulses, mild edema BLE  ABDOMEN:  normal bowel sounds, soft, nontender, no hepatosplenomegaly or other masses, no guarding or reboundnormal bowel sounds, soft, nontender, no hepatosplenomegaly or other masses  M/S:   Gait and station abnormal, requires assistance with activity and ADLs. Digits and nails normal  SKIN:  Inspection of skin and subcutaneous tissue baseline, Palpation of skin and subcutaneous tissue baseline. LLE bruising, appears to be at end stages of healing; mild edema to mid-lower tibia/foot, no warmth or surrounding cellulitis. Tenderness to medial aspect of mid-lower tibia.  NEURO:   Cranial nerves 2-12 are normal tested and grossly at patient's baseline, Examination of sensation by touch normal  PSYCH:  Oriented X 3, affect and mood normal, cognitive testing SLUMS 15/30      BP Readings from Last 3 Encounters:   10/15/18 121/74   10/09/18 132/76   10/03/18 152/82     Pulse Readings from Last 4 Encounters:   10/15/18 86   10/09/18 86   10/03/18 78   09/28/18 80     Wt Readings from Last 5 Encounters:   10/15/18 124 lb 14.4 oz (56.7 kg)   10/09/18 122 lb (55.3 kg)   10/03/18 123 lb 8 oz (56 kg)   09/28/18 " 122 lb 6.4 oz (55.5 kg)   09/24/18 122 lb 4.8 oz (55.5 kg)         Recent Labs:   CBC RESULTS:   Recent Labs   Lab Test  10/10/18   0633  10/01/18   0549   WBC  8.0  8.5   RBC  4.31  4.46   HGB  13.0  13.5   HCT  42.0  43.1   MCV  97  97   MCH  30.2  30.3   MCHC  31.0*  31.3*   RDW  13.1  13.2   PLT  294  322       Last Basic Metabolic Panel:  Recent Labs   Lab Test  10/12/18   0726  10/10/18   0633   NA  141  146*   POTASSIUM  3.7  3.2*   CHLORIDE  106  106   MARY GRACE  9.5  10.8*   CO2  31  35*   BUN  17  17   CR  0.60  0.64   GLC  72  84       Liver Function Studies -   Recent Labs   Lab Test  10/12/18   0726  09/27/18   0556  06/25/18   1852   PROTTOTAL   --   6.9  7.4   ALBUMIN  3.7  3.3*  3.5   BILITOTAL   --   0.4  0.2   ALKPHOS   --   68  80   AST   --   16  17   ALT   --   19  18             Assessment/Plan:    (M79.605) Leg pain, medial, left  (primary encounter diagnosis)  (R60.0) Edema of left foot  Comment: Ongoing pain/swelling to LLE, tenderness to medial aspect of mid-lower tibia.   Plan: STAT XR L tib/fibula 10/16. Continue pain management. Patient reports has follow-up appt with Ortho on 10/17.     (J20.9) Acute bronchitis, unspecified organism  Comment: Increased expiratory wheezing with coughing. Denies SOB, no respiratory distress.   Plan: STAT CXR 10/16 dx wheezing. Check CBC & BMP 10/17. Increase scheduled duonebs QID x 7 days and add duonebs q4hrs prn. Add guaifenesin 400mg tabs q4hrs prn dx coughing. Add Azithromycin 500mg day 1, 250mg every day days 2-5.               Total time spent with patient visit at the [facility] nursing facility was 36 mins including patient visit and review of past records. Greater than 50% of total time spent with counseling and coordinating care due to acute bronchitis with worsening expiratory wheezing and continued pain to LLE with mild bruising/edema to medial aspect of mid-lower tibia.       Electronically signed by  CAT Sanchez  CNP                      Sincerely,        Hali Boss APRN CNP

## 2018-10-17 ENCOUNTER — TELEPHONE (OUTPATIENT)
Dept: GERIATRICS | Facility: CLINIC | Age: 83
End: 2018-10-17

## 2018-10-17 ENCOUNTER — HOSPITAL LABORATORY (OUTPATIENT)
Dept: OTHER | Facility: CLINIC | Age: 83
End: 2018-10-17

## 2018-10-17 ENCOUNTER — NURSING HOME VISIT (OUTPATIENT)
Dept: GERIATRICS | Facility: CLINIC | Age: 83
End: 2018-10-17
Payer: COMMERCIAL

## 2018-10-17 VITALS
OXYGEN SATURATION: 92 % | DIASTOLIC BLOOD PRESSURE: 74 MMHG | HEART RATE: 86 BPM | WEIGHT: 124.9 LBS | TEMPERATURE: 98.1 F | BODY MASS INDEX: 22.98 KG/M2 | RESPIRATION RATE: 18 BRPM | HEIGHT: 62 IN | SYSTOLIC BLOOD PRESSURE: 121 MMHG

## 2018-10-17 DIAGNOSIS — J20.9 ACUTE BRONCHITIS, UNSPECIFIED ORGANISM: ICD-10-CM

## 2018-10-17 DIAGNOSIS — M79.662 PAIN OF LEFT LOWER LEG: ICD-10-CM

## 2018-10-17 DIAGNOSIS — S82.235D CLOSED NONDISPLACED OBLIQUE FRACTURE OF SHAFT OF LEFT TIBIA WITH ROUTINE HEALING, SUBSEQUENT ENCOUNTER: Primary | ICD-10-CM

## 2018-10-17 LAB
ANION GAP SERPL CALCULATED.3IONS-SCNC: 6 MMOL/L (ref 3–14)
BUN SERPL-MCNC: 14 MG/DL (ref 7–30)
CALCIUM SERPL-MCNC: 9 MG/DL (ref 8.5–10.1)
CHLORIDE SERPL-SCNC: 106 MMOL/L (ref 94–109)
CO2 SERPL-SCNC: 29 MMOL/L (ref 20–32)
CREAT SERPL-MCNC: 0.48 MG/DL (ref 0.52–1.04)
ERYTHROCYTE [DISTWIDTH] IN BLOOD BY AUTOMATED COUNT: 13.3 % (ref 10–15)
GFR SERPL CREATININE-BSD FRML MDRD: >90 ML/MIN/1.7M2
GLUCOSE SERPL-MCNC: 85 MG/DL (ref 70–99)
HCT VFR BLD AUTO: 37.4 % (ref 35–47)
HGB BLD-MCNC: 11.6 G/DL (ref 11.7–15.7)
MCH RBC QN AUTO: 30.3 PG (ref 26.5–33)
MCHC RBC AUTO-ENTMCNC: 31 G/DL (ref 31.5–36.5)
MCV RBC AUTO: 98 FL (ref 78–100)
PLATELET # BLD AUTO: 312 10E9/L (ref 150–450)
POTASSIUM SERPL-SCNC: 3.9 MMOL/L (ref 3.4–5.3)
RBC # BLD AUTO: 3.83 10E12/L (ref 3.8–5.2)
SODIUM SERPL-SCNC: 141 MMOL/L (ref 133–144)
WBC # BLD AUTO: 7.2 10E9/L (ref 4–11)

## 2018-10-17 PROCEDURE — 99310 SBSQ NF CARE HIGH MDM 45: CPT | Performed by: NURSE PRACTITIONER

## 2018-10-17 NOTE — TELEPHONE ENCOUNTER
RN called for left leg xray result which shows non displaced distal tibial oblique fx.   Analgesia optimal.

## 2018-10-17 NOTE — PROGRESS NOTES
[facility]    Chief Complaint   Patient presents with     care home Acute       [place] Medical Record Number:  [id number]  Place of Service where encounter took place:  [facility] ([facility]) [[id number]]    HPI:    Kelsey Wood is a 89 year old  (4/18/1929), who is being seen today for an episodic care visit.  HPI information obtained from: facility chart records, facility staff, patient report and [facility] chart review.    Today's concern is:    Closed nondisplaced oblique fracture of shaft of left tibia with routine healing, subsequent encounter  Pain of left lower leg  Patient was admitted to [facility] 9/21-9/23 for left leg pain and possible cellulitis. XR of tib/fib on 9/21 was negative for acute fractures and LLE venous US 9/21 negative for DVT. Patient briefly treated for cellulitis with 1 dose of IV ancef and a short regimen of oral keflex, but this was discontinued as cellulitis was less likely. Patient has had continued pain in LLE since [facility]. During exam on 10/16 patient was noted to have pain in her L leg at 6-8/10 with ambulation. She was noted to have difficulty participating in therapy. Significant ecchymosis was also noted in the LLE. Repeat XR of L tib/fib ordered 10/16 which revealed  a nondisplaced oblique fracture in the distal left tibia. During exam, patient reports mild-moderate pain with activity. Has been taking norco prn, tylenol, and fentanyl patch for pain management. Patient has follow up appt with Dr. Buster Armando, at [facility] on 10/17.     Acute bronchitis, unspecified organism  Patient reported cough, chest congestion and dyspnea starting over the weekend of 10/12-10/14 and was started on duo nebs, guaifenesin and flonase. Patient was noted to have audible wheezing and increased dyspnea when working with therapy on 10/16 and a 5 day regimen of azithromycin was added. Patient has remained afebrile. On exam today, patient no longer reports  dyspnea and denies SOB or a cough. She reports a significant improvement in her symptoms. Her only complaint is continued sinus congestion. She is afebrile and vitals are stable.             ALLERGIES: Review of patient's allergies indicates no known allergies.  Past Medical, Surgical, Family and Social History reviewed and updated in [facility].    Current Outpatient Prescriptions   Medication Sig Dispense Refill     ACETAMINOPHEN PO Take 650 mg by mouth 3 times daily And q4h prn       albuterol (PROAIR HFA/PROVENTIL HFA/VENTOLIN HFA) 108 (90 Base) MCG/ACT Inhaler Inhale 2 puffs into the lungs every 4 hours as needed for shortness of breath / dyspnea or wheezing       AZITHROMYCIN PO Take 250 mg by mouth daily       bismuth subsalicylate (PEPTO BISMOL) 262 MG chewable tablet Take 2 tablets by mouth every hour as needed for heartburn Max of 16 tabs per 24 hrs       calcium carbonate (OS-MARY GRACE 500 MG [place]. CA) 500 MG tablet Take 1 tablet by mouth daily        estradiol (ESTRACE) 0.1 MG/GM cream Place 2 g vaginally twice a week       fentaNYL (DURAGESIC) 12 mcg/hr 72 hr patch Place 1 patch onto the skin every 72 hours remove old patch. 3 patch 0     Fluticasone Propionate (FLONASE NA) Spray 2 sprays into both nostrils daily       GABAPENTIN PO Take 300 mg by mouth 3 times daily       guaiFENesin (ORGANIDIN) 200 MG TABS tablet Take 400 mg by mouth every 4 hours as needed for cough       HYDROcodone-acetaminophen (NORCO) 5-325 MG per tablet Take 1 tablet by mouth every 6 hours as needed for severe pain       hydrocortisone 1 % CREA cream Apply topically daily as needed for itching       ipratropium - albuterol 0.5 mg/2.5 mg/3 mL (DUONEB) 0.5-2.5 (3) MG/3ML neb solution Take 1 vial by nebulization 4 times daily       ipratropium - albuterol 0.5 mg/2.5 mg/3 mL (DUONEB) 0.5-2.5 (3) MG/3ML neb solution Take 1 vial by nebulization every 4 hours as needed for shortness of breath / dyspnea or wheezing       latanoprost (XALATAN) 0.005  "% ophthalmic solution Place 1 drop into both eyes At Bedtime       Liniments (SALONPAS PAIN RELIEF PATCH EX) Externally apply 1 patch topically daily as needed       Misc Natural Products (OSTEO BI-FLEX ADV DOUBLE ST PO) Take 1 tablet by mouth 2 times daily ON HOLD       POTASSIUM CHLORIDE PO Take 20 mEq by mouth daily       senna-docusate (SENOKOT-S;PERICOLACE) 8.6-50 MG per tablet Take 1 tablet by mouth daily       Medications reviewed:  Medications reconciled to facility chart and changes were made to reflect current medications as identified as above med list. Below are the changes that were made:   Medications stopped since last EPIC medication reconciliation:   There are no discontinued medications.    Medications started since last [facility] medication reconciliation:  No orders of the defined types were placed in this encounter.          REVIEW OF SYSTEMS:  4 point ROS including Respiratory, CV, GI and , other than that noted in the HPI,  is negative      Physical Exam:  /74  Pulse 86  Temp 98.1  F (36.7  C)  Resp 18  Ht 5' 2\" (1.575 m)  Wt 124 lb 14.4 oz (56.7 kg)  SpO2 92%  BMI 22.84 kg/m2  GENERAL APPEARANCE:  Alert, in no distress, appears healthy, oriented, cooperative  RESP:  respiratory effort and palpation of chest normal, lungs wheezing throughout, diminished at bases, no respiratory distress  CV:  Palpation and auscultation of heart done , regular rate and rhythm, no murmur, rub, or gallop, +2 pedal pulses, mild edema BLE  ABDOMEN:  normal bowel sounds, soft, nontender, no hepatosplenomegaly or other masses, no guarding or reboundnormal bowel sounds, soft, nontender, no hepatosplenomegaly or other masses  M/S:   Gait and station abnormal, requires assistance with activity and ADLs. Digits and nails normal  SKIN:  Inspection of skin and subcutaneous tissue baseline, Palpation of skin and subcutaneous tissue baseline.   NEURO:   Cranial nerves 2-12 are normal tested and grossly at patient's " baseline, Examination of sensation by touch normal  PSYCH:  Oriented X 3, affect and mood normal, cognitive testing [facility] 15/30      BP Readings from Last 3 Encounters:   10/17/18 121/74   10/15/18 121/74   10/09/18 132/76     Pulse Readings from Last 4 Encounters:   10/17/18 86   10/15/18 86   10/09/18 86   10/03/18 78     Wt Readings from Last 5 Encounters:   10/17/18 124 lb 14.4 oz (56.7 kg)   10/15/18 124 lb 14.4 oz (56.7 kg)   10/09/18 122 lb (55.3 kg)   10/03/18 123 lb 8 oz (56 kg)   09/28/18 122 lb 6.4 oz (55.5 kg)         Recent Labs:   CBC RESULTS:   Recent Labs   Lab Test  10/17/18   0559  10/10/18   0633   WBC  7.2  8.0   RBC  3.83  4.31   HGB  11.6*  13.0   HCT  37.4  42.0   MCV  98  97   MCH  30.3  30.2   MCHC  31.0*  31.0*   RDW  13.3  13.1   PLT  312  294       Last Basic Metabolic Panel:  Recent Labs   Lab Test  10/17/18   0559  10/12/18   0726   NA  141  141   POTASSIUM  3.9  3.7   CHLORIDE  106  106   MARY GRACE  9.0  9.5   CO2  29  31   BUN  14  17   CR  0.48*  0.60   GLC  85  72       Liver Function Studies -   Recent Labs   Lab Test  10/12/18   0726  09/27/18   0556  06/25/18   1852   PROTTOTAL   --   6.9  7.4   ALBUMIN  3.7  3.3*  3.5   BILITOTAL   --   0.4  0.2   ALKPHOS   --   68  80   AST   --   16  17   ALT   --   19  18         CXR 10/17  Impression  1. Chronic lung findings without superimposed focal infiltrate  2. No radiographically evident acute cardiopulmonary process    XR L tib/fib 10/17:   Impression  1. Nondisplaced oblique fracture is seen of the distal tibia              Assessment/Plan:    (P52.403D) Closed nondisplaced oblique fracture of shaft of left tibia with routine healing, subsequent encounter  (primary encounter diagnosis)  (M79.732) Pain of left lower leg  Comment: XR 10/16 reveals nondisplaced left oblique fracture in the distal tibia  Plan: Findings were discussed with REGINA Hwang Ortho PA, who was able to review XR L tibia/fibula 9/21 and verified same fracture was visible on  this previous scan. Updated Dr. Buster Armando findings from recent XR L tibia/fibula. Ortho initiated CAM boot, NWB status, but allow TTWB for transfer. Continue pain management plan. Patient will Follow-up with Dr. Armando in 3 weeks at [facility].     (J20.9) Acute bronchitis, unspecified organism  Comment: Stable, azithromycin started 10/17  Plan: Continue plan of care, add saline nasal spray for nasal congestion. Monitor respiratory status, keep O2 sats > 90%.               Total time spent with patient visit at the [facility] nursing facility was 36 mins including patient visit and review of past records. Greater than 50% of total time spent with counseling and coordinating care with Frances Wolfe and nursing staff due to left oblique tibial fracture, bronchitis.       This patient was seen along with a nurse practitioner student, Princess Perdomo.  The histories and reviews of systems were obtained by her and confirmed by myself. All objective, assessments and plans were completed by myself.      Electronically signed by  CAT Sanchez CNP

## 2018-10-17 NOTE — TELEPHONE ENCOUNTER
Ortho Nursing home visit    Kelsey Wood is a 89 year old female who resides at [facility]    Patient has x-rays reviewed for positive findings, of Left tibia fracture, with 3-4 week hs of pain, swelling, was seen in the ED 9/21/2018 with reported neg x-rays for acute fracture. However, films last evening show fracture line; oblique, distal 3rd, of tibia. Patient also has hs of prior tibial plateau fracture with deformity, and has appt to see ORTHO today for this,  The Ortho Office is notified of this recent fracture finding now 4 weeks old, via phone today, hopefully they can help with decision to treat.      Past Medical History:   Diagnosis Date     Asthma      Hard of hearing       Past Surgical History:   Procedure Laterality Date     CHOLECYSTECTOMY       LAPAROSCOPIC APPENDECTOMY          No Known Allergies   There were no vitals taken for this visit.         ASSESSMENT / PLAN:  Will await [facility] leonard of care regarding tibia fx, and knee deformity.    [id number]      [facility]  [phone]

## 2018-10-17 NOTE — LETTER
10/17/2018        RE: Kelsey Wood  [address]  [place] [address]        [facility]    Chief Complaint   Patient presents with     residential Acute       [place] Medical Record Number:  [id number]  Place of Service where encounter took place:  [facility] ([facility]) [[id number]]    HPI:    Kelsey Wood is a 89 year old  (4/18/1929), who is being seen today for an episodic care visit.  HPI information obtained from: facility chart records, facility staff, patient report and [facility] chart review.    Today's concern is:    Closed nondisplaced oblique fracture of shaft of left tibia with routine healing, subsequent encounter  Pain of left lower leg  Patient was admitted to [facility] 9/21-9/23 for left leg pain and possible cellulitis. XR of tib/fib on 9/21 was negative for acute fractures and LLE venous US 9/21 negative for DVT. Patient briefly treated for cellulitis with 1 dose of IV ancef and a short regimen of oral keflex, but this was discontinued as cellulitis was less likely. Patient has had continued pain in LLE since arrival at TCU. During exam on 10/16 patient was noted to have pain in her L leg at 6-8/10 with ambulation. She was noted to have difficulty participating in therapy. Significant ecchymosis was also noted in the LLE. Repeat XR of L tib/fib ordered 10/16 which revealed  a nondisplaced oblique fracture in the distal left tibia. During exam, patient reports mild-moderate pain with activity. Has been taking norco prn, tylenol, and fentanyl patch for pain management. Patient has follow up appt with Dr. Buster Armando, at [facility] on 10/17.     Acute bronchitis, unspecified organism  Patient reported cough, chest congestion and dyspnea starting over the weekend of 10/12-10/14 and was started on duo nebs, guaifenesin and flonase. Patient was noted to have audible wheezing and increased dyspnea when working with therapy on 10/16 and a 5 day regimen of  azithromycin was added. Patient has remained afebrile. On exam today, patient no longer reports dyspnea and denies SOB or a cough. She reports a significant improvement in her symptoms. Her only complaint is continued sinus congestion. She is afebrile and vitals are stable.             ALLERGIES: Review of patient's allergies indicates no known allergies.  Past Medical, Surgical, Family and Social History reviewed and updated in [facility].    Current Outpatient Prescriptions   Medication Sig Dispense Refill     ACETAMINOPHEN PO Take 650 mg by mouth 3 times daily And q4h prn       albuterol (PROAIR HFA/PROVENTIL HFA/VENTOLIN HFA) 108 (90 Base) MCG/ACT Inhaler Inhale 2 puffs into the lungs every 4 hours as needed for shortness of breath / dyspnea or wheezing       AZITHROMYCIN PO Take 250 mg by mouth daily       bismuth subsalicylate (PEPTO BISMOL) 262 MG chewable tablet Take 2 tablets by mouth every hour as needed for heartburn Max of 16 tabs per 24 hrs       calcium carbonate (OS-MARY GRACE 500 MG [place]. CA) 500 MG tablet Take 1 tablet by mouth daily        estradiol (ESTRACE) 0.1 MG/GM cream Place 2 g vaginally twice a week       fentaNYL (DURAGESIC) 12 mcg/hr 72 hr patch Place 1 patch onto the skin every 72 hours remove old patch. 3 patch 0     Fluticasone Propionate (FLONASE NA) Spray 2 sprays into both nostrils daily       GABAPENTIN PO Take 300 mg by mouth 3 times daily       guaiFENesin (ORGANIDIN) 200 MG TABS tablet Take 400 mg by mouth every 4 hours as needed for cough       HYDROcodone-acetaminophen (NORCO) 5-325 MG per tablet Take 1 tablet by mouth every 6 hours as needed for severe pain       hydrocortisone 1 % CREA cream Apply topically daily as needed for itching       ipratropium - albuterol 0.5 mg/2.5 mg/3 mL (DUONEB) 0.5-2.5 (3) MG/3ML neb solution Take 1 vial by nebulization 4 times daily       ipratropium - albuterol 0.5 mg/2.5 mg/3 mL (DUONEB) 0.5-2.5 (3) MG/3ML neb solution Take 1 vial by nebulization every 4  "hours as needed for shortness of breath / dyspnea or wheezing       latanoprost (XALATAN) 0.005 % ophthalmic solution Place 1 drop into both eyes At Bedtime       Liniments (SALONPAS PAIN RELIEF PATCH EX) Externally apply 1 patch topically daily as needed       Misc Natural Products (OSTEO BI-FLEX ADV DOUBLE ST PO) Take 1 tablet by mouth 2 times daily ON HOLD       POTASSIUM CHLORIDE PO Take 20 mEq by mouth daily       senna-docusate (SENOKOT-S;PERICOLACE) 8.6-50 MG per tablet Take 1 tablet by mouth daily       Medications reviewed:  Medications reconciled to facility chart and changes were made to reflect current medications as identified as above med list. Below are the changes that were made:   Medications stopped since last EPIC medication reconciliation:   There are no discontinued medications.    Medications started since last [facility] medication reconciliation:  No orders of the defined types were placed in this encounter.          REVIEW OF SYSTEMS:  4 point ROS including Respiratory, CV, GI and , other than that noted in the HPI,  is negative      Physical Exam:  /74  Pulse 86  Temp 98.1  F (36.7  C)  Resp 18  Ht 5' 2\" (1.575 m)  Wt 124 lb 14.4 oz (56.7 kg)  SpO2 92%  BMI 22.84 kg/m2  GENERAL APPEARANCE:  Alert, in no distress, appears healthy, oriented, cooperative  RESP:  respiratory effort and palpation of chest normal, lungs wheezing throughout, diminished at bases, no respiratory distress  CV:  Palpation and auscultation of heart done , regular rate and rhythm, no murmur, rub, or gallop, +2 pedal pulses, mild edema BLE  ABDOMEN:  normal bowel sounds, soft, nontender, no hepatosplenomegaly or other masses, no guarding or reboundnormal bowel sounds, soft, nontender, no hepatosplenomegaly or other masses  M/S:   Gait and station abnormal, requires assistance with activity and ADLs. Digits and nails normal  SKIN:  Inspection of skin and subcutaneous tissue baseline, Palpation of skin and " subcutaneous tissue baseline.   NEURO:   Cranial nerves 2-12 are normal tested and grossly at patient's baseline, Examination of sensation by touch normal  PSYCH:  Oriented X 3, affect and mood normal, cognitive testing [facility] 15/30      BP Readings from Last 3 Encounters:   10/17/18 121/74   10/15/18 121/74   10/09/18 132/76     Pulse Readings from Last 4 Encounters:   10/17/18 86   10/15/18 86   10/09/18 86   10/03/18 78     Wt Readings from Last 5 Encounters:   10/17/18 124 lb 14.4 oz (56.7 kg)   10/15/18 124 lb 14.4 oz (56.7 kg)   10/09/18 122 lb (55.3 kg)   10/03/18 123 lb 8 oz (56 kg)   09/28/18 122 lb 6.4 oz (55.5 kg)         Recent Labs:   CBC RESULTS:   Recent Labs   Lab Test  10/17/18   0559  10/10/18   0633   WBC  7.2  8.0   RBC  3.83  4.31   HGB  11.6*  13.0   HCT  37.4  42.0   MCV  98  97   MCH  30.3  30.2   MCHC  31.0*  31.0*   RDW  13.3  13.1   PLT  312  294       Last Basic Metabolic Panel:  Recent Labs   Lab Test  10/17/18   0559  10/12/18   0726   NA  141  141   POTASSIUM  3.9  3.7   CHLORIDE  106  106   MARY GRACE  9.0  9.5   CO2  29  31   BUN  14  17   CR  0.48*  0.60   GLC  85  72       Liver Function Studies -   Recent Labs   Lab Test  10/12/18   0726  09/27/18   0556  06/25/18   1852   PROTTOTAL   --   6.9  7.4   ALBUMIN  3.7  3.3*  3.5   BILITOTAL   --   0.4  0.2   ALKPHOS   --   68  80   AST   --   16  17   ALT   --   19  18         CXR 10/17  Impression  1. Chronic lung findings without superimposed focal infiltrate  2. No radiographically evident acute cardiopulmonary process    XR L tib/fib 10/17:   Impression  1. Nondisplaced oblique fracture is seen of the distal tibia              Assessment/Plan:    (O82.509V) Closed nondisplaced oblique fracture of shaft of left tibia with routine healing, subsequent encounter  (primary encounter diagnosis)  (M79.192) Pain of left lower leg  Comment: XR 10/16 reveals nondisplaced left oblique fracture in the distal tibia  Plan: Findings were discussed with REGINA  Frances Hwang, who was able to review XR L tibia/fibula 9/21 and verified same fracture was visible on this previous scan. Updated Dr. Buster Armando findings from recent XR L tibia/fibula. Ortho initiated CAM boot, NWB status, but allow TTWB for transfer. Continue pain management plan. Patient will Follow-up with Dr. Armando in 3 weeks at [facility].     (J20.9) Acute bronchitis, unspecified organism  Comment: Stable, azithromycin started 10/17  Plan: Continue plan of care, add saline nasal spray for nasal congestion. Monitor respiratory status, keep O2 sats > 90%.               Total time spent with patient visit at the [facility] nursing facility was 36 mins including patient visit and review of past records. Greater than 50% of total time spent with counseling and coordinating care with Frances Wolfe and nursing staff due to left oblique tibial fracture, bronchitis.       This patient was seen along with a nurse practitioner student, Princess Perdomo.  The histories and reviews of systems were obtained by her and confirmed by myself. All objective, assessments and plans were completed by myself.      Electronically signed by  CAT Sanchez CNP                        Sincerely,        CAT Sanchez CNP

## 2018-10-22 ENCOUNTER — NURSING HOME VISIT (OUTPATIENT)
Dept: GERIATRICS | Facility: CLINIC | Age: 83
End: 2018-10-22
Payer: COMMERCIAL

## 2018-10-22 VITALS
HEART RATE: 102 BPM | HEIGHT: 62 IN | WEIGHT: 131.5 LBS | TEMPERATURE: 98.9 F | DIASTOLIC BLOOD PRESSURE: 52 MMHG | SYSTOLIC BLOOD PRESSURE: 114 MMHG | OXYGEN SATURATION: 91 % | BODY MASS INDEX: 24.2 KG/M2 | RESPIRATION RATE: 20 BRPM

## 2018-10-22 DIAGNOSIS — S82.235D CLOSED NONDISPLACED OBLIQUE FRACTURE OF SHAFT OF LEFT TIBIA WITH ROUTINE HEALING, SUBSEQUENT ENCOUNTER: Primary | ICD-10-CM

## 2018-10-22 DIAGNOSIS — J18.9 PNEUMONIA OF RIGHT LOWER LOBE DUE TO INFECTIOUS ORGANISM: ICD-10-CM

## 2018-10-22 DIAGNOSIS — R41.89 COGNITIVE IMPAIRMENT: ICD-10-CM

## 2018-10-22 DIAGNOSIS — R53.81 PHYSICAL DECONDITIONING: ICD-10-CM

## 2018-10-22 PROCEDURE — 99309 SBSQ NF CARE MODERATE MDM 30: CPT | Performed by: NURSE PRACTITIONER

## 2018-10-22 NOTE — LETTER
10/22/2018        RE: Kelsey Wood  [address]  [place] [address]        [facility]    Chief Complaint   Patient presents with     intermediate Acute       [facility] Medical Record Number:  [id number]  Place of Service where encounter took place:  [facility] ([facility] [[id number]]    HPI:    Kelsey Wood is a 89 year old  (4/18/1929), who is being seen today for an episodic care visit.  HPI information obtained from: facility chart records, facility staff, patient report and [facility] chart review.    Today's concern is:    Closed nondisplaced oblique fracture of shaft of left tibia with routine healing, subsequent encounter  Patient was admitted to [facility] 9/21-9/23 for left leg pain and possible cellulitis. XR of tib/fib on 9/21 was negative for acute fractures and LLE venous US 9/21 negative for DVT. Patient briefly treated for cellulitis with 1 dose of IV ancef and a short regimen of oral keflex, but this was discontinued as cellulitis was less likely. Patient has had continued pain in LLE since arrival at [facility]. On 10/16 patient was noted to have pain in her L leg at 6-8/10 with ambulation. She was noted to have difficulty participating in therapy. Significant ecchymosis was also noted in the LLE. Repeat XR of L tib/fib ordered 10/16 which revealed  a nondisplaced oblique fracture in the distal left tibia. Reviewed XR with Frances Wolfe and also noted fracture visible on XR 9/21. Patient was seen by Dr. Buster Armando, at [facility] on 10/17, recommending NWB to LLE. Patient seen wearing CAM boot today. Patient reports mild-moderate pain with activity. Has been taking norco prn, tylenol, and fentanyl patch for pain management. Patient to follow-up with Dr. Armando as directed.    Pneumonia of right lower lobe due to infectious organism (H)  Patient reported cough, chest congestion and dyspnea starting over the weekend of 10/12-10/14 and was started on duo nebs,  guaifenesin and flonase. Patient was noted to have audible wheezing and increased dyspnea when working with therapy on 10/16 and a 5 day regimen of azithromycin was added (completed on 10/20). Patient states she was feeling better with treatment of azithromycin, then today c/o increased chest congestion and wheezing. CXR 10/22 + RLL infiltrate. Patient has low grade fevers, but denies feeling feverish or chills.     Physical deconditioning  Cognitive impairment  PTA lives in group home. Patient is actively participating in therapy sessions. Currently NWB to LLE, patient unable to follow NWB status and is nonambulatory at this time. Prior to NWB status, patient able to ambulate 8-10ft with walker. Requires slide board for transfers. Requires moderate assistance with ADLs. SLUMS 24/30 (07/2018); repeat SLUMS 15/30 (10/2018). SW following for discharge planning, patient's goal is to discharge back to [facility].            ALLERGIES: Review of patient's allergies indicates no known allergies.  Past Medical, Surgical, Family and Social History reviewed and updated in [facility].    Current Outpatient Prescriptions   Medication Sig Dispense Refill     ACETAMINOPHEN PO Take 650 mg by mouth 3 times daily And q4h prn       albuterol (PROAIR HFA/PROVENTIL HFA/VENTOLIN HFA) 108 (90 Base) MCG/ACT Inhaler Inhale 2 puffs into the lungs every 4 hours as needed for shortness of breath / dyspnea or wheezing       bismuth subsalicylate (PEPTO BISMOL) 262 MG chewable tablet Take 2 tablets by mouth every hour as needed for heartburn Max of 16 tabs per 24 hrs       calcium carbonate (OS-MARY GRACE 500 MG [place]. [place]) 500 MG tablet Take 1 tablet by mouth daily        estradiol (ESTRACE) 0.1 MG/GM cream Place 2 g vaginally twice a week       fentaNYL (DURAGESIC) 12 mcg/hr 72 hr patch Place 1 patch onto the skin every 72 hours remove old patch. 3 patch 0     Fluticasone Propionate (FLONASE NA) Spray 2 sprays into both nostrils daily       GABAPENTIN PO Take 300 mg by  "mouth 3 times daily       guaiFENesin (ORGANIDIN) 200 MG TABS tablet Take 400 mg by mouth every 4 hours as needed for cough       HYDROcodone-acetaminophen (NORCO) 5-325 MG per tablet Take 1 tablet by mouth every 6 hours as needed for severe pain       ipratropium - albuterol 0.5 mg/2.5 mg/3 mL (DUONEB) 0.5-2.5 (3) MG/3ML neb solution Take 1 vial by nebulization 4 times daily And q4h prn       latanoprost (XALATAN) 0.005 % ophthalmic solution Place 1 drop into both eyes At Bedtime       LEVOFLOXACIN PO Take 750 mg by mouth daily       Liniments (SALONPAS PAIN RELIEF PATCH EX) Externally apply 1 patch topically daily as needed       Misc Natural Products (OSTEO BI-FLEX ADV DOUBLE ST PO) Take 1 tablet by mouth 2 times daily ON HOLD       POTASSIUM CHLORIDE PO Take 20 mEq by mouth daily       PREDNISONE PO Take 40 mg by mouth daily       senna-docusate (SENOKOT-S;PERICOLACE) 8.6-50 MG per tablet Take 1 tablet by mouth daily       sodium chloride (OCEAN) 0.65 % nasal spray Spray 2 sprays into both nostrils 3 times daily       Medications reviewed:  Medications reconciled to facility chart and changes were made to reflect current medications as identified as above med list. Below are the changes that were made:   Medications stopped since last EPIC medication reconciliation:   There are no discontinued medications.    Medications started since last [facility] medication reconciliation:  No orders of the defined types were placed in this encounter.          REVIEW OF SYSTEMS:  4 point ROS including Respiratory, CV, GI and , other than that noted in the HPI,  is negative      Physical Exam:  /52  Pulse 102  Temp 98.9  F (37.2  C)  Resp 20  Ht 5' 2\" (1.575 m)  Wt 131 lb 8 oz (59.6 kg)  SpO2 91%  BMI 24.05 kg/m2  GENERAL APPEARANCE:  Alert, in no distress, appears healthy, oriented, cooperative  RESP:  respiratory effort and palpation of chest normal, lungs wheezing throughout, diminished at bases, no respiratory " distress  CV:  Palpation and auscultation of heart done , regular rate and rhythm, no murmur, rub, or gallop, +2 pedal pulses, mild edema BLE  ABDOMEN:  normal bowel sounds, soft, nontender, no hepatosplenomegaly or other masses, no guarding or reboundnormal bowel sounds, soft, nontender, no hepatosplenomegaly or other masses  M/S:   Gait and station abnormal, requires assistance with activity and ADLs. Digits and nails normal  SKIN:  Inspection of skin and subcutaneous tissue baseline, Palpation of skin and subcutaneous tissue baseline. LLE in CAM boot.   NEURO:   Cranial nerves 2-12 are normal tested and grossly at patient's baseline, Examination of sensation by touch normal  PSYCH:  Oriented X 3, affect and mood normal, cognitive testing SLUMS 15/30      BP Readings from Last 3 Encounters:   10/26/18 131/73   10/25/18 132/77   10/22/18 114/52     Pulse Readings from Last 4 Encounters:   10/26/18 87   10/25/18 81   10/22/18 102   10/17/18 86     Wt Readings from Last 5 Encounters:   10/26/18 131 lb 12.8 oz (59.8 kg)   10/25/18 131 lb 12.8 oz (59.8 kg)   10/22/18 131 lb 8 oz (59.6 kg)   10/17/18 124 lb 14.4 oz (56.7 kg)   10/15/18 124 lb 14.4 oz (56.7 kg)         Recent Labs:   CBC RESULTS:   Recent Labs   Lab Test  10/17/18   0559  10/10/18   0633   WBC  7.2  8.0   RBC  3.83  4.31   HGB  11.6*  13.0   HCT  37.4  42.0   MCV  98  97   MCH  30.3  30.2   MCHC  31.0*  31.0*   RDW  13.3  13.1   PLT  312  294       Last Basic Metabolic Panel:  Recent Labs   Lab Test  10/17/18   0559  10/12/18   0726   NA  141  141   POTASSIUM  3.9  3.7   CHLORIDE  106  106   MARY GRACE  9.0  9.5   CO2  29  31   BUN  14  17   CR  0.48*  0.60   GLC  85  72       Liver Function Studies -   Recent Labs   Lab Test  10/12/18   0726  09/27/18   0556  06/25/18   1852   PROTTOTAL   --   6.9  7.4   ALBUMIN  3.7  3.3*  3.5   BILITOTAL   --   0.4  0.2   ALKPHOS   --   68  80   AST   --   16  17   ALT   --   19  18           CXR 10/22:  Impression  1.  Chronic lung findings with developing RLL infiltrate.   2. No pleural effusion is seen          Assessment/Plan:    (S83.884D) Closed nondisplaced oblique fracture of shaft of left tibia with routine healing, subsequent encounter  (primary encounter diagnosis)  Comment: XR 10/16 reveals nondisplaced left oblique fracture in the distal tibia, also noted on XR 9/21 (less visible)  Plan: Continue pain management. Elevate LLE while in bed and throughout the day. Continue NWB to LLE. Patient to follow-up with Ortho as directed.     (J18.1) Pneumonia of right lower lobe due to infectious organism (H)  Comment: CXR 10/22, + RLL infiltrate.  Plan: Add levofloxacin 750mg every day x 7 days dx RLL pneumonia. Add prednisone 40mg every day x 5 days. Increase duonebs to QID. And add scheduled guaifenesin 400mg QID x 7 days. Check CBC & BMP 10/25.     (R53.81) Physical deconditioning  (R41.89) Cognitive impairment  Comment: Ongoing  Plan: Encourage active participation in therapy session to increase strength and promote independence in activities and ADLs. SW following for discharge planning.              Electronically signed by  CAT Sanchez CNP                      Sincerely,        CAT Sanchez CNP

## 2018-10-22 NOTE — PROGRESS NOTES
[facility]    Chief Complaint   Patient presents with     correction Acute       [place] Medical Record Number:  [id number]  Place of Service where encounter took place:  [facility] - [facility] ([facility]) [[id number]]    HPI:    Kelsey Wood is a 89 year old  (4/18/1929), who is being seen today for an episodic care visit.  HPI information obtained from: facility chart records, facility staff, patient report and [facility] chart review.    Today's concern is:    Closed nondisplaced oblique fracture of shaft of left tibia with routine healing, subsequent encounter  Patient was admitted to [facility] 9/21-9/23 for left leg pain and possible cellulitis. XR of tib/fib on 9/21 was negative for acute fractures and LLE venous US 9/21 negative for DVT. Patient briefly treated for cellulitis with 1 dose of IV ancef and a short regimen of oral keflex, but this was discontinued as cellulitis was less likely. Patient has had continued pain in LLE since arrival at [facility]. On 10/16 patient was noted to have pain in her L leg at 6-8/10 with ambulation. She was noted to have difficulty participating in therapy. Significant ecchymosis was also noted in the LLE. Repeat XR of L tib/fib ordered 10/16 which revealed  a nondisplaced oblique fracture in the distal left tibia. Reviewed XR with Frances Wolfe and also noted fracture visible on XR 9/21. Patient was seen by Dr. Buster Armando, at [facility] on 10/17, recommending NWB to LLE. Patient seen wearing CAM boot today. Patient reports mild-moderate pain with activity. Has been taking norco prn, tylenol, and fentanyl patch for pain management. Patient to follow-up with Dr. Armando as directed.    Pneumonia of right lower lobe due to infectious organism (H)  Patient reported cough, chest congestion and dyspnea starting over the weekend of 10/12-10/14 and was started on duo nebs, guaifenesin and flonase. Patient was noted to have audible wheezing and increased dyspnea  when working with therapy on 10/16 and a 5 day regimen of azithromycin was added (completed on 10/20). Patient states she was feeling better with treatment of azithromycin, then today c/o increased chest congestion and wheezing. CXR 10/22 + RLL infiltrate. Patient has low grade fevers, but denies feeling feverish or chills.     Physical deconditioning  Cognitive impairment  PTA lives in nursing home. Patient is actively participating in therapy sessions. Currently NWB to LLE, patient unable to follow NWB status and is nonambulatory at this time. Prior to NWB status, patient able to ambulate 8-10ft with walker. Requires slide board for transfers. Requires moderate assistance with ADLs. SLUMS 24/30 (07/2018); repeat SLUMS 15/30 (10/2018). SW following for discharge planning, patient's goal is to discharge back to [facility].            ALLERGIES: Review of patient's allergies indicates no known allergies.  Past Medical, Surgical, Family and Social History reviewed and updated in [facility].    Current Outpatient Prescriptions   Medication Sig Dispense Refill     ACETAMINOPHEN PO Take 650 mg by mouth 3 times daily And q4h prn       albuterol (PROAIR HFA/PROVENTIL HFA/VENTOLIN HFA) 108 (90 Base) MCG/ACT Inhaler Inhale 2 puffs into the lungs every 4 hours as needed for shortness of breath / dyspnea or wheezing       bismuth subsalicylate (PEPTO BISMOL) 262 MG chewable tablet Take 2 tablets by mouth every hour as needed for heartburn Max of 16 tabs per 24 hrs       calcium carbonate (OS-MARY GRACE 500 MG [place]. [place]) 500 MG tablet Take 1 tablet by mouth daily        estradiol (ESTRACE) 0.1 MG/GM cream Place 2 g vaginally twice a week       fentaNYL (DURAGESIC) 12 mcg/hr 72 hr patch Place 1 patch onto the skin every 72 hours remove old patch. 3 patch 0     Fluticasone Propionate (FLONASE NA) Spray 2 sprays into both nostrils daily       GABAPENTIN PO Take 300 mg by mouth 3 times daily       guaiFENesin (ORGANIDIN) 200 MG TABS tablet Take 400 mg by mouth  "every 4 hours as needed for cough       HYDROcodone-acetaminophen (NORCO) 5-325 MG per tablet Take 1 tablet by mouth every 6 hours as needed for severe pain       ipratropium - albuterol 0.5 mg/2.5 mg/3 mL (DUONEB) 0.5-2.5 (3) MG/3ML neb solution Take 1 vial by nebulization 4 times daily And q4h prn       latanoprost (XALATAN) 0.005 % ophthalmic solution Place 1 drop into both eyes At Bedtime       LEVOFLOXACIN PO Take 750 mg by mouth daily       Liniments (SALONPAS PAIN RELIEF PATCH EX) Externally apply 1 patch topically daily as needed       Misc Natural Products (OSTEO BI-FLEX ADV DOUBLE ST PO) Take 1 tablet by mouth 2 times daily ON HOLD       POTASSIUM CHLORIDE PO Take 20 mEq by mouth daily       PREDNISONE PO Take 40 mg by mouth daily       senna-docusate (SENOKOT-S;PERICOLACE) 8.6-50 MG per tablet Take 1 tablet by mouth daily       sodium chloride (OCEAN) 0.65 % nasal spray Spray 2 sprays into both nostrils 3 times daily       Medications reviewed:  Medications reconciled to facility chart and changes were made to reflect current medications as identified as above med list. Below are the changes that were made:   Medications stopped since last EPIC medication reconciliation:   There are no discontinued medications.    Medications started since last [facility] medication reconciliation:  No orders of the defined types were placed in this encounter.          REVIEW OF SYSTEMS:  4 point ROS including Respiratory, CV, GI and , other than that noted in the HPI,  is negative      Physical Exam:  /52  Pulse 102  Temp 98.9  F (37.2  C)  Resp 20  Ht 5' 2\" (1.575 m)  Wt 131 lb 8 oz (59.6 kg)  SpO2 91%  BMI 24.05 kg/m2  GENERAL APPEARANCE:  Alert, in no distress, appears healthy, oriented, cooperative  RESP:  respiratory effort and palpation of chest normal, lungs wheezing throughout, diminished at bases, no respiratory distress  CV:  Palpation and auscultation of heart done , regular rate and rhythm, no " murmur, rub, or gallop, +2 pedal pulses, mild edema BLE  ABDOMEN:  normal bowel sounds, soft, nontender, no hepatosplenomegaly or other masses, no guarding or reboundnormal bowel sounds, soft, nontender, no hepatosplenomegaly or other masses  M/S:   Gait and station abnormal, requires assistance with activity and ADLs. Digits and nails normal  SKIN:  Inspection of skin and subcutaneous tissue baseline, Palpation of skin and subcutaneous tissue baseline. LLE in CAM boot.   NEURO:   Cranial nerves 2-12 are normal tested and grossly at patient's baseline, Examination of sensation by touch normal  PSYCH:  Oriented X 3, affect and mood normal, cognitive testing SLUMS 15/30      BP Readings from Last 3 Encounters:   10/26/18 131/73   10/25/18 132/77   10/22/18 114/52     Pulse Readings from Last 4 Encounters:   10/26/18 87   10/25/18 81   10/22/18 102   10/17/18 86     Wt Readings from Last 5 Encounters:   10/26/18 131 lb 12.8 oz (59.8 kg)   10/25/18 131 lb 12.8 oz (59.8 kg)   10/22/18 131 lb 8 oz (59.6 kg)   10/17/18 124 lb 14.4 oz (56.7 kg)   10/15/18 124 lb 14.4 oz (56.7 kg)         Recent Labs:   CBC RESULTS:   Recent Labs   Lab Test  10/17/18   0559  10/10/18   0633   WBC  7.2  8.0   RBC  3.83  4.31   HGB  11.6*  13.0   HCT  37.4  42.0   MCV  98  97   MCH  30.3  30.2   MCHC  31.0*  31.0*   RDW  13.3  13.1   PLT  312  294       Last Basic Metabolic Panel:  Recent Labs   Lab Test  10/17/18   0559  10/12/18   0726   NA  141  141   POTASSIUM  3.9  3.7   CHLORIDE  106  106   MARY GRACE  9.0  9.5   CO2  29  31   BUN  14  17   CR  0.48*  0.60   GLC  85  72       Liver Function Studies -   Recent Labs   Lab Test  10/12/18   0726  09/27/18   0556  06/25/18   1852   PROTTOTAL   --   6.9  7.4   ALBUMIN  3.7  3.3*  3.5   BILITOTAL   --   0.4  0.2   ALKPHOS   --   68  80   AST   --   16  17   ALT   --   19  18           CXR 10/22:  Impression  1. Chronic lung findings with developing RLL infiltrate.   2. No pleural effusion is  seen          Assessment/Plan:    (S85.817T) Closed nondisplaced oblique fracture of shaft of left tibia with routine healing, subsequent encounter  (primary encounter diagnosis)  Comment: XR 10/16 reveals nondisplaced left oblique fracture in the distal tibia, also noted on XR 9/21 (less visible)  Plan: Continue pain management. Elevate LLE while in bed and throughout the day. Continue NWB to LLE. Patient to follow-up with Ortho as directed.     (J18.1) Pneumonia of right lower lobe due to infectious organism (H)  Comment: CXR 10/22, + RLL infiltrate.  Plan: Add levofloxacin 750mg every day x 7 days dx RLL pneumonia. Add prednisone 40mg every day x 5 days. Increase duonebs to QID. And add scheduled guaifenesin 400mg QID x 7 days. Check CBC & BMP 10/25.     (R53.81) Physical deconditioning  (R41.89) Cognitive impairment  Comment: Ongoing  Plan: Encourage active participation in therapy session to increase strength and promote independence in activities and ADLs. SW following for discharge planning.              Electronically signed by  CAT Sanchez CNP

## 2018-10-25 ENCOUNTER — NURSING HOME VISIT (OUTPATIENT)
Dept: GERIATRICS | Facility: CLINIC | Age: 83
End: 2018-10-25
Payer: COMMERCIAL

## 2018-10-25 ENCOUNTER — HOSPITAL LABORATORY (OUTPATIENT)
Dept: OTHER | Facility: CLINIC | Age: 83
End: 2018-10-25

## 2018-10-25 VITALS
WEIGHT: 131.8 LBS | OXYGEN SATURATION: 93 % | RESPIRATION RATE: 22 BRPM | SYSTOLIC BLOOD PRESSURE: 132 MMHG | HEART RATE: 81 BPM | BODY MASS INDEX: 24.25 KG/M2 | TEMPERATURE: 97.8 F | DIASTOLIC BLOOD PRESSURE: 77 MMHG | HEIGHT: 62 IN

## 2018-10-25 DIAGNOSIS — J18.9 PNEUMONIA OF RIGHT LOWER LOBE DUE TO INFECTIOUS ORGANISM: Primary | ICD-10-CM

## 2018-10-25 DIAGNOSIS — J44.1 COPD EXACERBATION (H): ICD-10-CM

## 2018-10-25 DIAGNOSIS — J45.901 EXACERBATION OF PERSISTENT ASTHMA, UNSPECIFIED ASTHMA SEVERITY: ICD-10-CM

## 2018-10-25 DIAGNOSIS — R06.09 DYSPNEA ON EXERTION: ICD-10-CM

## 2018-10-25 LAB
ANION GAP SERPL CALCULATED.3IONS-SCNC: 7 MMOL/L (ref 3–14)
BUN SERPL-MCNC: 21 MG/DL (ref 7–30)
CALCIUM SERPL-MCNC: 9.7 MG/DL (ref 8.5–10.1)
CHLORIDE SERPL-SCNC: 106 MMOL/L (ref 94–109)
CO2 SERPL-SCNC: 29 MMOL/L (ref 20–32)
CREAT SERPL-MCNC: 0.69 MG/DL (ref 0.52–1.04)
ERYTHROCYTE [DISTWIDTH] IN BLOOD BY AUTOMATED COUNT: 13.3 % (ref 10–15)
FLUAV+FLUBV AG SPEC QL: NEGATIVE
FLUAV+FLUBV AG SPEC QL: NEGATIVE
GFR SERPL CREATININE-BSD FRML MDRD: 80 ML/MIN/1.7M2
GLUCOSE SERPL-MCNC: 74 MG/DL (ref 70–99)
HCT VFR BLD AUTO: 37 % (ref 35–47)
HGB BLD-MCNC: 11.4 G/DL (ref 11.7–15.7)
MCH RBC QN AUTO: 30 PG (ref 26.5–33)
MCHC RBC AUTO-ENTMCNC: 30.8 G/DL (ref 31.5–36.5)
MCV RBC AUTO: 97 FL (ref 78–100)
PLATELET # BLD AUTO: 295 10E9/L (ref 150–450)
POTASSIUM SERPL-SCNC: 3.9 MMOL/L (ref 3.4–5.3)
RBC # BLD AUTO: 3.8 10E12/L (ref 3.8–5.2)
SODIUM SERPL-SCNC: 142 MMOL/L (ref 133–144)
SPECIMEN SOURCE: NORMAL
WBC # BLD AUTO: 8.7 10E9/L (ref 4–11)

## 2018-10-25 PROCEDURE — 99310 SBSQ NF CARE HIGH MDM 45: CPT | Performed by: NURSE PRACTITIONER

## 2018-10-25 NOTE — LETTER
10/25/2018        RE: Kelsey Wood  [address]  [place] [address]        [facility]    Chief Complaint   Patient presents with     longterm Acute       [facility] Medical Record Number:  [id number]  Place of Service where encounter took place:  [facility] ([facility]) [[id number]]    HPI:    Kelsey Wood is a 89 year old  (4/18/1929), who is being seen today for an episodic care visit.  HPI information obtained from: facility chart records, facility staff, patient report and [facility] chart review.    Today's concern is:    Pneumonia of right lower lobe due to infectious organism (H)  COPD exacerbation (H)  Exacerbation of persistent asthma, unspecified asthma severity  Dyspnea on exertion  Hx COPD exacerbation and L sided pneumonia in 06/2018, was treated with course of azithromycin at that time. Also noted to have past medical hx of asthma, was taking albuterol inhaler prn PTA. Patient reported cough, chest congestion and dyspnea starting over the weekend of 10/12-10/14 and was started on duo nebs, guaifenesin and flonase. Patient was noted to have audible wheezing and increased dyspnea when working with therapy on 10/16 and a 5 day regimen of azithromycin was added (completed on 10/20).   CXR 10/22 + RLL pneumonia, was then started on levofloxacin. Also taking duonebs, prednisone, guaifenesin. Today, reports increased wheezing, feeling weaker, and SOB with activity. O2 sats have been stable on RA. Patient has low grade fevers, but denies feeling feverish or chills.     Recheck 1300:   Patient reports feeling better following additional duoneb and guaifenesin. Influenza nasal swab negative.           ALLERGIES: Review of patient's allergies indicates no known allergies.  Past Medical, Surgical, Family and Social History reviewed and updated in [facility].    Current Outpatient Prescriptions   Medication Sig Dispense Refill     ACETAMINOPHEN PO Take 650 mg by mouth  3 times daily And q4h prn       albuterol (PROAIR HFA/PROVENTIL HFA/VENTOLIN HFA) 108 (90 Base) MCG/ACT Inhaler Inhale 2 puffs into the lungs every 4 hours as needed for shortness of breath / dyspnea or wheezing       bismuth subsalicylate (PEPTO BISMOL) 262 MG chewable tablet Take 2 tablets by mouth every hour as needed for heartburn Max of 16 tabs per 24 hrs       calcium carbonate (OS-MARY GRACE 500 MG [place]. [place]) 500 MG tablet Take 1 tablet by mouth daily        estradiol (ESTRACE) 0.1 MG/GM cream Place 2 g vaginally twice a week       fentaNYL (DURAGESIC) 12 mcg/hr 72 hr patch Place 1 patch onto the skin every 72 hours remove old patch. 3 patch 0     Fluticasone Propionate (FLONASE NA) Spray 2 sprays into both nostrils daily       GABAPENTIN PO Take 300 mg by mouth 3 times daily       guaiFENesin (ORGANIDIN) 200 MG TABS tablet Take 400 mg by mouth every 4 hours as needed for cough       HYDROcodone-acetaminophen (NORCO) 5-325 MG per tablet Take 1 tablet by mouth every 6 hours as needed for severe pain       hydrocortisone 1 % CREA cream Apply topically daily as needed for itching       ipratropium - albuterol 0.5 mg/2.5 mg/3 mL (DUONEB) 0.5-2.5 (3) MG/3ML neb solution Take 1 vial by nebulization 4 times daily And q4h prn       latanoprost (XALATAN) 0.005 % ophthalmic solution Place 1 drop into both eyes At Bedtime       LEVOFLOXACIN PO Take 750 mg by mouth daily       Liniments (SALONPAS PAIN RELIEF PATCH EX) Externally apply 1 patch topically daily as needed       Misc Natural Products (OSTEO BI-FLEX ADV DOUBLE ST PO) Take 1 tablet by mouth 2 times daily ON HOLD       POTASSIUM CHLORIDE PO Take 20 mEq by mouth daily       PREDNISONE PO Take 40 mg by mouth daily       senna-docusate (SENOKOT-S;PERICOLACE) 8.6-50 MG per tablet Take 1 tablet by mouth daily       sodium chloride (OCEAN) 0.65 % nasal spray Spray 2 sprays into both nostrils 3 times daily       Medications reviewed:  Medications reconciled to facility chart  "and changes were made to reflect current medications as identified as above med list. Below are the changes that were made:   Medications stopped since last EPIC medication reconciliation:   There are no discontinued medications.    Medications started since last [facility] medication reconciliation:  No orders of the defined types were placed in this encounter.          REVIEW OF SYSTEMS:  4 point ROS including Respiratory, CV, GI and , other than that noted in the HPI,  is negative      Physical Exam:  /77  Pulse 81  Temp 97.8  F (36.6  C)  Resp 22  Ht 5' 2\" (1.575 m)  Wt 131 lb 12.8 oz (59.8 kg)  SpO2 93%  BMI 24.11 kg/m2  GENERAL APPEARANCE:  Alert, in no distress, appears healthy, oriented, cooperative  RESP:  respiratory effort and palpation of chest normal, lungs wheezing/rhonchi throughout, diminished at bases, no respiratory distress  CV:  Palpation and auscultation of heart done , regular rate and rhythm, no murmur, rub, or gallop, +2 pedal pulses, mild edema BLE  ABDOMEN:  normal bowel sounds, soft, nontender, no hepatosplenomegaly or other masses, no guarding or reboundnormal bowel sounds, soft, nontender, no hepatosplenomegaly or other masses  M/S:   Gait and station abnormal, requires assistance with activity and ADLs. Digits and nails normal  SKIN:  Inspection of skin and subcutaneous tissue baseline, Palpation of skin and subcutaneous tissue baseline. LLE CAM boot on.  NEURO:   Cranial nerves 2-12 are normal tested and grossly at patient's baseline, Examination of sensation by touch normal  PSYCH:  Oriented X 3, affect and mood normal, cognitive testing SLUMS 15/30      BP Readings from Last 3 Encounters:   10/25/18 132/77   10/22/18 114/52   10/17/18 121/74     Pulse Readings from Last 4 Encounters:   10/25/18 81   10/22/18 102   10/17/18 86   10/15/18 86     Wt Readings from Last 5 Encounters:   10/25/18 131 lb 12.8 oz (59.8 kg)   10/22/18 131 lb 8 oz (59.6 kg)   10/17/18 124 lb 14.4 oz " (56.7 kg)   10/15/18 124 lb 14.4 oz (56.7 kg)   10/09/18 122 lb (55.3 kg)         Recent Labs:   CBC RESULTS:   Recent Labs   Lab Test  10/25/18   0805  10/17/18   0559   WBC  8.7  7.2   RBC  3.80  3.83   HGB  11.4*  11.6*   HCT  37.0  37.4   MCV  97  98   MCH  30.0  30.3   MCHC  30.8*  31.0*   RDW  13.3  13.3   PLT  295  312       Last Basic Metabolic Panel:  Recent Labs   Lab Test  10/17/18   0559  10/12/18   0726   NA  141  141   POTASSIUM  3.9  3.7   CHLORIDE  106  106   MARY GRACE  9.0  9.5   CO2  29  31   BUN  14  17   CR  0.48*  0.60   GLC  85  72       Liver Function Studies -   Recent Labs   Lab Test  10/12/18   0726  09/27/18   0556  06/25/18   1852   PROTTOTAL   --   6.9  7.4   ALBUMIN  3.7  3.3*  3.5   BILITOTAL   --   0.4  0.2   ALKPHOS   --   68  80   AST   --   16  17   ALT   --   19  18           Assessment/Plan:    (J18.1) Pneumonia of right lower lobe due to infectious organism (H)  (primary encounter diagnosis)  (J44.1) COPD exacerbation (H)  (J45.901) Exacerbation of persistent asthma, unspecified asthma severity  (R06.09) Dyspnea on exertion  Comment: RLL pneumonia with COPD and asthma exacerbation. SOB with activity. O2 sats stable on RA.   Plan: STAT RN to give guaifenesin 800mg x 1 dose and additional duoneb x 1 on 10/25. Check influenza nasal swab 10/25. Extend levofloxacin for total of 10 days. Check CBC & BMP 10/29. Monitor respiratory status, keep O2 > 90%.             Total time spent with patient visit at the skilled nursing facility was 38 mins including patient visit and review of past records. Greater than 50% of total time spent with counseling and coordinating care due to RLL pneumonia.       Electronically signed by  CAT Sanchez CNP                      Sincerely,        CAT Sanchez CNP

## 2018-10-25 NOTE — PROGRESS NOTES
[facility]    Chief Complaint   Patient presents with     FCI Acute       [facility] Medical Record Number:  [id number]  Place of Service where encounter took place:  [facility] ([facility]) [[id number]]    HPI:    Kelsey Wood is a 89 year old  (4/18/1929), who is being seen today for an episodic care visit.  HPI information obtained from: facility chart records, facility staff, patient report and [facility] chart review.    Today's concern is:    Pneumonia of right lower lobe due to infectious organism (H)  COPD exacerbation (H)  Exacerbation of persistent asthma, unspecified asthma severity  Dyspnea on exertion  Hx COPD exacerbation and L sided pneumonia in 06/2018, was treated with course of azithromycin at that time. Also noted to have past medical hx of asthma, was taking albuterol inhaler prn PTA. Patient reported cough, chest congestion and dyspnea starting over the weekend of 10/12-10/14 and was started on duo nebs, guaifenesin and flonase. Patient was noted to have audible wheezing and increased dyspnea when working with therapy on 10/16 and a 5 day regimen of azithromycin was added (completed on 10/20).   CXR 10/22 + RLL pneumonia, was then started on levofloxacin. Also taking duonebs, prednisone, guaifenesin. Today, reports increased wheezing, feeling weaker, and SOB with activity. O2 sats have been stable on RA. Patient has low grade fevers, but denies feeling feverish or chills.     Recheck 1300:   Patient reports feeling better following additional duoneb and guaifenesin. Influenza nasal swab negative.           ALLERGIES: Review of patient's allergies indicates no known allergies.  Past Medical, Surgical, Family and Social History reviewed and updated in [facility].    Current Outpatient Prescriptions   Medication Sig Dispense Refill     ACETAMINOPHEN PO Take 650 mg by mouth 3 times daily And q4h prn       albuterol (PROAIR HFA/PROVENTIL HFA/VENTOLIN HFA) 108 (90  Base) MCG/ACT Inhaler Inhale 2 puffs into the lungs every 4 hours as needed for shortness of breath / dyspnea or wheezing       bismuth subsalicylate (PEPTO BISMOL) 262 MG chewable tablet Take 2 tablets by mouth every hour as needed for heartburn Max of 16 tabs per 24 hrs       calcium carbonate (OS-MARY GRACE 500 MG [place]. [place]) 500 MG tablet Take 1 tablet by mouth daily        estradiol (ESTRACE) 0.1 MG/GM cream Place 2 g vaginally twice a week       fentaNYL (DURAGESIC) 12 mcg/hr 72 hr patch Place 1 patch onto the skin every 72 hours remove old patch. 3 patch 0     Fluticasone Propionate (FLONASE NA) Spray 2 sprays into both nostrils daily       GABAPENTIN PO Take 300 mg by mouth 3 times daily       guaiFENesin (ORGANIDIN) 200 MG TABS tablet Take 400 mg by mouth every 4 hours as needed for cough       HYDROcodone-acetaminophen (NORCO) 5-325 MG per tablet Take 1 tablet by mouth every 6 hours as needed for severe pain       hydrocortisone 1 % CREA cream Apply topically daily as needed for itching       ipratropium - albuterol 0.5 mg/2.5 mg/3 mL (DUONEB) 0.5-2.5 (3) MG/3ML neb solution Take 1 vial by nebulization 4 times daily And q4h prn       latanoprost (XALATAN) 0.005 % ophthalmic solution Place 1 drop into both eyes At Bedtime       LEVOFLOXACIN PO Take 750 mg by mouth daily       Liniments (SALONPAS PAIN RELIEF PATCH EX) Externally apply 1 patch topically daily as needed       Misc Natural Products (OSTEO BI-FLEX ADV DOUBLE ST PO) Take 1 tablet by mouth 2 times daily ON HOLD       POTASSIUM CHLORIDE PO Take 20 mEq by mouth daily       PREDNISONE PO Take 40 mg by mouth daily       senna-docusate (SENOKOT-S;PERICOLACE) 8.6-50 MG per tablet Take 1 tablet by mouth daily       sodium chloride (OCEAN) 0.65 % nasal spray Spray 2 sprays into both nostrils 3 times daily       Medications reviewed:  Medications reconciled to facility chart and changes were made to reflect current medications as identified as above med list. Below  "are the changes that were made:   Medications stopped since last EPIC medication reconciliation:   There are no discontinued medications.    Medications started since last [facility] medication reconciliation:  No orders of the defined types were placed in this encounter.          REVIEW OF SYSTEMS:  4 point ROS including Respiratory, CV, GI and , other than that noted in the HPI,  is negative      Physical Exam:  /77  Pulse 81  Temp 97.8  F (36.6  C)  Resp 22  Ht 5' 2\" (1.575 m)  Wt 131 lb 12.8 oz (59.8 kg)  SpO2 93%  BMI 24.11 kg/m2  GENERAL APPEARANCE:  Alert, in no distress, appears healthy, oriented, cooperative  RESP:  respiratory effort and palpation of chest normal, lungs wheezing/rhonchi throughout, diminished at bases, no respiratory distress  CV:  Palpation and auscultation of heart done , regular rate and rhythm, no murmur, rub, or gallop, +2 pedal pulses, mild edema BLE  ABDOMEN:  normal bowel sounds, soft, nontender, no hepatosplenomegaly or other masses, no guarding or reboundnormal bowel sounds, soft, nontender, no hepatosplenomegaly or other masses  M/S:   Gait and station abnormal, requires assistance with activity and ADLs. Digits and nails normal  SKIN:  Inspection of skin and subcutaneous tissue baseline, Palpation of skin and subcutaneous tissue baseline. LLE CAM boot on.  NEURO:   Cranial nerves 2-12 are normal tested and grossly at patient's baseline, Examination of sensation by touch normal  PSYCH:  Oriented X 3, affect and mood normal, cognitive testing SLUMS 15/30      BP Readings from Last 3 Encounters:   10/25/18 132/77   10/22/18 114/52   10/17/18 121/74     Pulse Readings from Last 4 Encounters:   10/25/18 81   10/22/18 102   10/17/18 86   10/15/18 86     Wt Readings from Last 5 Encounters:   10/25/18 131 lb 12.8 oz (59.8 kg)   10/22/18 131 lb 8 oz (59.6 kg)   10/17/18 124 lb 14.4 oz (56.7 kg)   10/15/18 124 lb 14.4 oz (56.7 kg)   10/09/18 122 lb (55.3 kg)         Recent Labs: "   CBC RESULTS:   Recent Labs   Lab Test  10/25/18   0805  10/17/18   0559   WBC  8.7  7.2   RBC  3.80  3.83   HGB  11.4*  11.6*   HCT  37.0  37.4   MCV  97  98   MCH  30.0  30.3   MCHC  30.8*  31.0*   RDW  13.3  13.3   PLT  295  312       Last Basic Metabolic Panel:  Recent Labs   Lab Test  10/17/18   0559  10/12/18   0726   NA  141  141   POTASSIUM  3.9  3.7   CHLORIDE  106  106   MARY GRACE  9.0  9.5   CO2  29  31   BUN  14  17   CR  0.48*  0.60   GLC  85  72       Liver Function Studies -   Recent Labs   Lab Test  10/12/18   0726  09/27/18   0556  06/25/18   1852   PROTTOTAL   --   6.9  7.4   ALBUMIN  3.7  3.3*  3.5   BILITOTAL   --   0.4  0.2   ALKPHOS   --   68  80   AST   --   16  17   ALT   --   19  18           Assessment/Plan:    (J18.1) Pneumonia of right lower lobe due to infectious organism (H)  (primary encounter diagnosis)  (J44.1) COPD exacerbation (H)  (J45.901) Exacerbation of persistent asthma, unspecified asthma severity  (R06.09) Dyspnea on exertion  Comment: RLL pneumonia with COPD and asthma exacerbation. SOB with activity. O2 sats stable on RA.   Plan: STAT RN to give guaifenesin 800mg x 1 dose and additional duoneb x 1 on 10/25. Check influenza nasal swab 10/25. Extend levofloxacin for total of 10 days. Check CBC & BMP 10/29. Monitor respiratory status, keep O2 > 90%.             Total time spent with patient visit at the skilled nursing facility was 38 mins including patient visit and review of past records. Greater than 50% of total time spent with counseling and coordinating care due to RLL pneumonia.       Electronically signed by  CAT Sanchez CNP

## 2018-10-26 ENCOUNTER — DISCHARGE SUMMARY NURSING HOME (OUTPATIENT)
Dept: GERIATRICS | Facility: CLINIC | Age: 83
End: 2018-10-26
Payer: COMMERCIAL

## 2018-10-26 VITALS
SYSTOLIC BLOOD PRESSURE: 131 MMHG | RESPIRATION RATE: 18 BRPM | TEMPERATURE: 98.4 F | BODY MASS INDEX: 24.25 KG/M2 | HEIGHT: 62 IN | WEIGHT: 131.8 LBS | DIASTOLIC BLOOD PRESSURE: 73 MMHG | HEART RATE: 87 BPM | OXYGEN SATURATION: 92 %

## 2018-10-26 DIAGNOSIS — M17.0 OSTEOARTHRITIS OF BOTH KNEES, UNSPECIFIED OSTEOARTHRITIS TYPE: ICD-10-CM

## 2018-10-26 DIAGNOSIS — G89.29 OTHER CHRONIC PAIN: ICD-10-CM

## 2018-10-26 DIAGNOSIS — R41.89 COGNITIVE IMPAIRMENT: ICD-10-CM

## 2018-10-26 DIAGNOSIS — R53.81 PHYSICAL DECONDITIONING: ICD-10-CM

## 2018-10-26 DIAGNOSIS — S82.235D CLOSED NONDISPLACED OBLIQUE FRACTURE OF SHAFT OF LEFT TIBIA WITH ROUTINE HEALING, SUBSEQUENT ENCOUNTER: ICD-10-CM

## 2018-10-26 DIAGNOSIS — J18.9 PNEUMONIA OF RIGHT LOWER LOBE DUE TO INFECTIOUS ORGANISM: Primary | ICD-10-CM

## 2018-10-26 DIAGNOSIS — J44.1 COPD EXACERBATION (H): ICD-10-CM

## 2018-10-26 PROCEDURE — 99316 NF DSCHRG MGMT 30 MIN+: CPT | Performed by: NURSE PRACTITIONER

## 2018-10-26 NOTE — LETTER
10/26/2018        RE: Kelsey TAPIA Israel  [address]  [place] [address]        [facility] DISCHARGE SUMMARY    PATIENT'S NAME: Kelsey Wood  YOB: 1929  MEDICAL RECORD NUMBER:  [id number]  Place of Service where encounter took place:  [facility] - [facility] ([facility]) [[id number]]    PRIMARY CARE PROVIDER AND CLINIC RESPONSIBLE AFTER TRANSFER: Demetrio Arriaza [facility] PHYSICIAN [facility] [address] [address] / [place]    TRANSFERRING PROVIDERS: CAT Sanchez CNP; Gustavo Parker MD  DATE OF SNF ADMISSION:  September / 23 / 2018  DATE OF SNF (anticipated) DISCHARGE: October / 29 / 2018  DISCHARGE DISPOSITION: Assisted Living: [facility] [facility]   RECENT HOSPITALIZATION/ED:  [facility] stay 9/21/18 to 9/23/18.     CODE STATUS/ADVANCE DIRECTIVES DISCUSSION:   CPR/Full code    No Known Allergies  Condition on Discharge:  Stable.  Function:  NWB to LLE, requires slide board for transfers and moderate assistance with ADLs  Cognitive Scores: SLUMS 15/30    Equipment: walker and wheelchair        DISCHARGE DIAGNOSIS:   1. Pneumonia of right lower lobe due to infectious organism (H)    2. COPD exacerbation (H)    3. Closed nondisplaced oblique fracture of shaft of left tibia with routine healing, subsequent encounter    4. Other chronic pain    5. Osteoarthritis of both knees, unspecified osteoarthritis type    6. Physical deconditioning    7. Cognitive impairment        HPI Nursing Facility Course:  HPI information obtained from: facility chart records, facility staff, patient report and [facility] chart review.    Pneumonia of right lower lobe due to infectious organism (H)  COPD exacerbation (H)  Hx COPD exacerbation and L sided pneumonia in 06/2018, was treated with course of azithromycin at that time. Also noted to have past medical hx of asthma, was taking albuterol inhaler prn PTA. Patient reported cough, chest  congestion and dyspnea starting over the weekend of 10/12-10/14 and was started on duo nebs, guaifenesin and flonase. Patient was noted to have audible wheezing and increased dyspnea when working with therapy on 10/16 and a 5 day regimen of azithromycin was added (completed on 10/20).   CXR 10/22 + RLL pneumonia, was then started on levofloxacin (course to be completed 10/31). Also taking duonebs, prednisone (to be completed on 10/27), guaifenesin. Today, patient reports feeling better. Continues to have intermittent wheezing, relieved by duonebs. Denies SOB with activity today. O2 sats have been stable on RA. Afebrile today.    Closed nondisplaced oblique fracture of shaft of left tibia with routine healing, subsequent encounter  Patient was admitted to [facility] 9/21-9/23 for left leg pain and possible cellulitis. XR of tib/fib on 9/21 was negative for acute fractures and LLE venous US 9/21 negative for DVT. Patient briefly treated for cellulitis with 1 dose of IV ancef and a short regimen of oral keflex, but this was discontinued as cellulitis was less likely. Patient has had continued pain in LLE since arrival at U. Noted to have hx prior tibial plateau fracture 07/2017. On 10/16 patient was noted to have pain in her L leg at 6-8/10 with ambulation. She was noted to have difficulty participating in therapy. Significant ecchymosis was also noted in the LLE. Repeat XR of L tib/fib ordered 10/16 which revealed  a nondisplaced oblique fracture in the distal left tibia. Reviewed XR with Frances Wolfe and also noted fracture visible on XR 9/21. Patient was seen by Dr. Buster Armando, at [facility] on 10/17, recommending NWB to LLE. Patient seen wearing CAM boot today. Patient reports mild-moderate pain with activity. Has been taking norco prn, tylenol, and fentanyl patch for pain management. Patient to follow-up with Dr. Armando as directed.    Other chronic pain  Osteoarthritis of both knees, unspecified osteoarthritis  type  Has hx chronic pain secondary to bilateral knee osteoarthritis and chronic right hip pain. PTA was taking gabapentin, ibuprofen prn, norco prn, and fentanyl patches.  Denies constipation, taking senna-s 1 tab every day. Chronic pain now exacerbated by L tibial oblique fracture.     Physical deconditioning  Cognitive impairment  PTA lives in [facility]. Patient is actively participating in therapy sessions. Currently NWB to LLE, patient unable to follow NWB status and is nonambulatory at this time. Prior to NWB status, patient able to ambulate 8-10ft with walker. Requires slide board for transfers. Requires moderate assistance with ADLs. SLUMS 24/30 (07/2018); repeat SLUMS 15/30 (10/2018). SW following for discharge planning, patient's goal is to discharge back to [facility].               PAST MEDICAL HISTORY:  has a past medical history of Asthma and Hard of hearing.    DISCHARGE MEDICATIONS:  Current Outpatient Prescriptions   Medication Sig Dispense Refill     ACETAMINOPHEN PO Take 650 mg by mouth 3 times daily And q4h prn       albuterol (PROAIR HFA/PROVENTIL HFA/VENTOLIN HFA) 108 (90 Base) MCG/ACT Inhaler Inhale 2 puffs into the lungs every 4 hours as needed for shortness of breath / dyspnea or wheezing       bismuth subsalicylate (PEPTO BISMOL) 262 MG chewable tablet Take 2 tablets by mouth every hour as needed for heartburn Max of 16 tabs per 24 hrs       calcium carbonate (OS-MARY GRACE 500 MG [place]. [place]) 500 MG tablet Take 1 tablet by mouth daily        estradiol (ESTRACE) 0.1 MG/GM cream Place 2 g vaginally twice a week       fentaNYL (DURAGESIC) 12 mcg/hr 72 hr patch Place 1 patch onto the skin every 72 hours remove old patch. 3 patch 0     Fluticasone Propionate (FLONASE NA) Spray 2 sprays into both nostrils daily       GABAPENTIN PO Take 300 mg by mouth 3 times daily       guaiFENesin (ORGANIDIN) 200 MG TABS tablet Take 400 mg by mouth every 4 hours as needed for cough       HYDROcodone-acetaminophen (NORCO) 5-325 MG  "per tablet Take 1 tablet by mouth every 6 hours as needed for severe pain       ipratropium - albuterol 0.5 mg/2.5 mg/3 mL (DUONEB) 0.5-2.5 (3) MG/3ML neb solution Take 1 vial by nebulization 3 times daily And q4h prn       latanoprost (XALATAN) 0.005 % ophthalmic solution Place 1 drop into both eyes At Bedtime       LEVOFLOXACIN PO Take 750 mg by mouth daily       Liniments (SALONPAS PAIN RELIEF PATCH EX) Externally apply 1 patch topically daily as needed       Misc Natural Products (OSTEO BI-FLEX ADV DOUBLE ST PO) Take 1 tablet by mouth 2 times daily ON HOLD       POTASSIUM CHLORIDE PO Take 20 mEq by mouth daily       PREDNISONE PO Take 40 mg by mouth daily       senna-docusate (SENOKOT-S;PERICOLACE) 8.6-50 MG per tablet Take 1 tablet by mouth daily         MEDICATION CHANGES/RATIONALE:   Added levofloxacin due to RLL pneumonia, to be completed 10/31  Added scheduled duonebs TID  Added flonase due to nasal congestion  Added guaifenesin due to cough  Added artificial eye drops due to dry eyes    Controlled medications sent with patient:   Medication: Norco, 20 tabs given to patient at the time of discharge to take home  Medication: Fentanyl, 3 patches given to patient at the time of discharge to take home  Future refills of narcotics to be completed by PCP.           ROS:    10 point ROS of systems including Constitutional, Eyes, Respiratory, Cardiovascular, Gastroenterology, Genitourinary, Integumentary, Musculoskeletal, Psychiatric were all negative except for pertinent positives noted in my HPI.      Physical Exam:   Vitals: /73  Pulse 87  Temp 98.4  F (36.9  C)  Resp 18  Ht 5' 2\" (1.575 m)  Wt 131 lb 12.8 oz (59.8 kg)  SpO2 92%  BMI 24.11 kg/m2  BMI= Body mass index is 24.11 kg/(m^2).  GENERAL APPEARANCE:  Alert, in no distress, appears healthy, oriented, cooperative  ENT:  Mouth and posterior oropharynx normal, moist mucous membranes, [place]  EYES:  EOM, conjunctivae, lids, pupils and irises normal, " PERRL  NECK:  No adenopathy,masses or thyromegaly  RESP:  respiratory effort and palpation of chest normal, mild expiratory wheezing at bases, diminished at bases, no respiratory distress  CV:  Palpation and auscultation of heart done , regular rate and rhythm, no murmur, rub, or gallop, +2 pedal pulses, mild edema BLE  ABDOMEN:  normal bowel sounds, soft, nontender, no hepatosplenomegaly or other masses, no guarding or reboundnormal bowel sounds, soft, nontender, no hepatosplenomegaly or other masses  M/S:   Gait and station abnormal, requires assistance with activity and ADLs. Digits and nails normal  SKIN:  Inspection of skin and subcutaneous tissue baseline, Palpation of skin and subcutaneous tissue baseline. LLE CAM boot on  NEURO:   Cranial nerves 2-12 are normal tested and grossly at patient's baseline, Examination of sensation by touch normal  PSYCH:  Oriented X 3, affect and mood normal, cognitive testing SLUMS 15/30             DISCHARGE PLAN:  Occupational Therapy, Physical Therapy, Registered Nurse, Home Health Aide and From:  [facility]  Patient instructed to follow-up with:  PCP in 7 days      Current scheduled appointments:  1. Patient to follow-up with PCP within 7 days of discharge from TCU.  2. Patient to follow-up with Dr. Armando (Ortho) as directed  3. Patient to follow-up with Ophthalmologist as directed    MTM referral needed and placed by this provider: No              Pending labs: None    SNF labs   CBC RESULTS:   Recent Labs   Lab Test  10/25/18   0805  10/17/18   0559   WBC  8.7  7.2   RBC  3.80  3.83   HGB  11.4*  11.6*   HCT  37.0  37.4   MCV  97  98   MCH  30.0  30.3   MCHC  30.8*  31.0*   RDW  13.3  13.3   PLT  295  312       Last Basic Metabolic Panel:  Recent Labs   Lab Test  10/25/18   0805  10/17/18   0559   NA  142  141   POTASSIUM  3.9  3.9   CHLORIDE  106  106   MARY GRACE  9.7  9.0   CO2  29  29   BUN  21  14   CR  0.69  0.48*   GLC  74  85       Liver Function Studies -    Recent Labs   Lab Test  10/12/18   0726  09/27/18   0556  06/25/18   1852   PROTTOTAL   --   6.9  7.4   ALBUMIN  3.7  3.3*  3.5   BILITOTAL   --   0.4  0.2   ALKPHOS   --   68  80   AST   --   16  17   ALT   --   19  18                   TOTAL DISCHARGE TIME:   Greater than 30 minutes       Electronically signed by:  CAT Sanchez CNP      Sincerely,        CAT Sanchez CNP

## 2018-10-26 NOTE — PROGRESS NOTES
[facility] DISCHARGE SUMMARY    PATIENT'S NAME: Kelsey Wood  YOB: 1929  MEDICAL RECORD NUMBER:  [id number]  Place of Service where encounter took place:  [facility] - [facility] ([facility]) [[id number]]    PRIMARY CARE PROVIDER AND CLINIC RESPONSIBLE AFTER TRANSFER: Demetrio Arriaza PHYSICIAN [facility] [address] [facility] / [place]    TRANSFERRING PROVIDERS: CAT Sanchez CNP; Gustavo Parker MD  DATE OF SNF ADMISSION:  September / 23 / 2018  DATE OF SNF (anticipated) DISCHARGE: October / 29 / 2018    ADDENDUM 11/1: Patient discharging to [facility] 11/1/18.   DISCHARGE DISPOSITION: Assisted Living: [facility]   RECENT HOSPITALIZATION/ED:  [facility] stay 9/21/18 to 9/23/18.     CODE STATUS/ADVANCE DIRECTIVES DISCUSSION:   CPR/Full code    No Known Allergies  Condition on Discharge:  Stable.  Function:  NWB to LLE, requires slide board for transfers and moderate assistance with ADLs  Cognitive Scores: SLUMS 15/30    Equipment: walker and wheelchair, hospital bed        DISCHARGE DIAGNOSIS:   1. Pneumonia of right lower lobe due to infectious organism (H)    2. COPD exacerbation (H)    3. Closed nondisplaced oblique fracture of shaft of left tibia with routine healing, subsequent encounter    4. Other chronic pain    5. Osteoarthritis of both knees, unspecified osteoarthritis type    6. Physical deconditioning    7. Cognitive impairment        HPI Nursing Facility Course:  HPI information obtained from: facility chart records, facility staff, patient report and [facility] chart review.    Pneumonia of right lower lobe due to infectious organism (H)  COPD exacerbation (H)  Hx COPD exacerbation and L sided pneumonia in 06/2018, was treated with course of azithromycin at that time. Also noted to have past medical hx of asthma, was taking albuterol inhaler prn PTA. Patient reported cough, chest  congestion and dyspnea starting over the weekend of 10/12-10/14 and was started on duo nebs, guaifenesin and flonase. Patient was noted to have audible wheezing and increased dyspnea when working with therapy on 10/16 and a 5 day regimen of azithromycin was added (completed on 10/20).   CXR 10/22 + RLL pneumonia, was then started on levofloxacin (course to be completed 10/31). Also taking duonebs, prednisone (to be completed on 10/27), guaifenesin. Today, patient reports feeling better. Continues to have intermittent wheezing, relieved by duonebs. Denies SOB with activity today. O2 sats have been stable on RA. Afebrile today.    Closed nondisplaced oblique fracture of shaft of left tibia with routine healing, subsequent encounter  Patient was admitted to [facility] 9/21-9/23 for left leg pain and possible cellulitis. XR of tib/fib on 9/21 was negative for acute fractures and LLE venous US 9/21 negative for DVT. Patient briefly treated for cellulitis with 1 dose of IV ancef and a short regimen of oral keflex, but this was discontinued as cellulitis was less likely. Patient has had continued pain in LLE since arrival at [facility]. Noted to have hx prior tibial plateau fracture 07/2017. On 10/16 patient was noted to have pain in her L leg at 6-8/10 with ambulation. She was noted to have difficulty participating in therapy. Significant ecchymosis was also noted in the LLE. Repeat XR of L tib/fib ordered 10/16 which revealed  a nondisplaced oblique fracture in the distal left tibia. Reviewed XR with Frances Wolfe and also noted fracture visible on XR 9/21. Patient was seen by Dr. Buster Armando, at [facility] on 10/17, recommending NWB to LLE. Patient seen wearing CAM boot today. Patient reports mild-moderate pain with activity. Has been taking norco prn, tylenol, and fentanyl patch for pain management. Patient to follow-up with Dr. Armando as directed.    Other chronic pain  Osteoarthritis of both knees, unspecified osteoarthritis  type  Has hx chronic pain secondary to bilateral knee osteoarthritis and chronic right hip pain. PTA was taking gabapentin, ibuprofen prn, norco prn, and fentanyl patches.  Denies constipation, taking senna-s 1 tab every day. Chronic pain now exacerbated by L tibial oblique fracture.     Physical deconditioning  Cognitive impairment  PTA lives in [facility]. Patient is actively participating in therapy sessions. Currently NWB to LLE, patient unable to follow NWB status and is nonambulatory at this time. Prior to NWB status, patient able to ambulate 8-10ft with walker. Requires slide board for transfers. Requires moderate assistance with ADLs. SLUMS 24/30 (07/2018); repeat SLUMS 15/30 (10/2018). SW following for discharge planning, patient's goal is to discharge back to [facility].               PAST MEDICAL HISTORY:  has a past medical history of Asthma and Hard of hearing.    DISCHARGE MEDICATIONS:  Current Outpatient Prescriptions   Medication Sig Dispense Refill     ACETAMINOPHEN PO Take 650 mg by mouth 3 times daily And q4h prn       albuterol (PROAIR HFA/PROVENTIL HFA/VENTOLIN HFA) 108 (90 Base) MCG/ACT Inhaler Inhale 2 puffs into the lungs every 4 hours as needed for shortness of breath / dyspnea or wheezing       bismuth subsalicylate (PEPTO BISMOL) 262 MG chewable tablet Take 2 tablets by mouth every hour as needed for heartburn Max of 16 tabs per 24 hrs       calcium carbonate (OS-MARY GRACE 500 MG [place]. [place]) 500 MG tablet Take 1 tablet by mouth daily        estradiol (ESTRACE) 0.1 MG/GM cream Place 2 g vaginally twice a week       fentaNYL (DURAGESIC) 12 mcg/hr 72 hr patch Place 1 patch onto the skin every 72 hours remove old patch. 3 patch 0     Fluticasone Propionate (FLONASE NA) Spray 2 sprays into both nostrils daily       GABAPENTIN PO Take 300 mg by mouth 3 times daily       guaiFENesin (ORGANIDIN) 200 MG TABS tablet Take 400 mg by mouth every 4 hours as needed for cough       HYDROcodone-acetaminophen (NORCO) 5-325 MG  "per tablet Take 1 tablet by mouth every 6 hours as needed for severe pain       ipratropium - albuterol 0.5 mg/2.5 mg/3 mL (DUONEB) 0.5-2.5 (3) MG/3ML neb solution Take 1 vial by nebulization every 4 hours as needed for shortness of breath / dyspnea or wheezing       latanoprost (XALATAN) 0.005 % ophthalmic solution Place 1 drop into both eyes At Bedtime       Liniments (SALONPAS PAIN RELIEF PATCH EX) Externally apply 1 patch topically daily as needed       Misc Natural Products (OSTEO BI-FLEX ADV DOUBLE ST PO) Take 1 tablet by mouth 2 times daily ON HOLD       POTASSIUM CHLORIDE PO Take 20 mEq by mouth daily       senna-docusate (SENOKOT-S;PERICOLACE) 8.6-50 MG per tablet Take 1 tablet by mouth daily         MEDICATION CHANGES/RATIONALE:   Added levofloxacin due to RLL pneumonia, to be completed 10/31  Added scheduled duonebs TID  Added flonase due to nasal congestion  Added guaifenesin due to cough  Added artificial eye drops due to dry eyes    Controlled medications sent with patient:   Medication: Norco, 20 tabs given to patient at the time of discharge to take home  Medication: Fentanyl, 3 patches given to patient at the time of discharge to take home  Future refills of narcotics to be completed by PCP.           ROS:    10 point ROS of systems including Constitutional, Eyes, Respiratory, Cardiovascular, Gastroenterology, Genitourinary, Integumentary, Musculoskeletal, Psychiatric were all negative except for pertinent positives noted in my HPI.      Physical Exam:   Vitals: /73  Pulse 87  Temp 98.4  F (36.9  C)  Resp 18  Ht 5' 2\" (1.575 m)  Wt 131 lb 12.8 oz (59.8 kg)  SpO2 92%  BMI 24.11 kg/m2  BMI= Body mass index is 24.11 kg/(m^2).  GENERAL APPEARANCE:  Alert, in no distress, appears healthy, oriented, cooperative  ENT:  Mouth and posterior oropharynx normal, moist mucous membranes, [place]  EYES:  EOM, conjunctivae, lids, pupils and irises normal, PERRL  NECK:  No adenopathy,masses or " thyromegaly  RESP:  respiratory effort and palpation of chest normal, mild expiratory wheezing at bases, diminished at bases, no respiratory distress  CV:  Palpation and auscultation of heart done , regular rate and rhythm, no murmur, rub, or gallop, +2 pedal pulses, mild edema BLE  ABDOMEN:  normal bowel sounds, soft, nontender, no hepatosplenomegaly or other masses, no guarding or reboundnormal bowel sounds, soft, nontender, no hepatosplenomegaly or other masses  M/S:   Gait and station abnormal, requires assistance with activity and ADLs. Digits and nails normal  SKIN:  Inspection of skin and subcutaneous tissue baseline, Palpation of skin and subcutaneous tissue baseline. LLE CAM boot on  NEURO:   Cranial nerves 2-12 are normal tested and grossly at patient's baseline, Examination of sensation by touch normal  PSYCH:  Oriented X 3, affect and mood normal, cognitive testing SLUMS 15/30             DISCHARGE PLAN:  Occupational Therapy, Physical Therapy, Registered Nurse, Home Health Aide and From:  [facility]  Patient instructed to follow-up with:  PCP in 7 days      Current scheduled appointments:  1. Patient to follow-up with PCP within 7 days of discharge from TCU.  2. Patient to follow-up with Dr. Armando (Ortho) as directed  3. Patient to follow-up with Ophthalmologist as directed    MTM referral needed and placed by this provider: No              Pending labs: None    SNF labs   CBC RESULTS:   Recent Labs   Lab Test  10/25/18   0805  10/17/18   0559   WBC  8.7  7.2   RBC  3.80  3.83   HGB  11.4*  11.6*   HCT  37.0  37.4   MCV  97  98   MCH  30.0  30.3   MCHC  30.8*  31.0*   RDW  13.3  13.3   PLT  295  312       Last Basic Metabolic Panel:  Recent Labs   Lab Test  10/25/18   0805  10/17/18   0559   NA  142  141   POTASSIUM  3.9  3.9   CHLORIDE  106  106   MARY GRACE  9.7  9.0   CO2  29  29   BUN  21  14   CR  0.69  0.48*   GLC  74  85       Liver Function Studies -   Recent Labs   Lab Test  10/12/18   0756   09/27/18   0556  06/25/18   1852   PROTTOTAL   --   6.9  7.4   ALBUMIN  3.7  3.3*  3.5   BILITOTAL   --   0.4  0.2   ALKPHOS   --   68  80   AST   --   16  17   ALT   --   19  18                   TOTAL DISCHARGE TIME:   Greater than 30 minutes       Electronically signed by:  CAT Sanchez CNP

## 2018-10-28 NOTE — PATIENT INSTRUCTIONS
[facility] Discharge Orders    Name: Kelsey TAPIA Israel  : 1929  Planned Discharge Date: 10/29/18  Discharged to: previous assisted living     MEDICAL FOLLOW UP  1. Patient to follow-up with PCP within 7 days of discharge from TCU.  2. Patient to follow-up with Dr. Armando (Ortho) as directed  3. Patient to follow-up with Ophthalmologist as directed      FUTURE LABS: None    ORDER CHANGES:  Added levofloxacin due to RLL pneumonia, to be completed 10/31  Added scheduled duonebs TID  Added flonase due to nasal congestion  Added guaifenesin due to cough  Added artificial eye drops due to dry eyes    DISCHARGE MEDICATIONS:  The patient s pharmacy is authorized to dispense a 30-day supply of medications. Refill requests should be directed to the primary provider, Demetrio Arriaza   Medication: Norco, 20 tabs given to patient at the time of discharge to take home  Medication: Fentanyl, 3 patches given to patient at the time of discharge to take home  Future refills of narcotics to be completed by PCP.     Current Outpatient Prescriptions   Medication Sig Dispense Refill     ACETAMINOPHEN PO Take 650 mg by mouth 3 times daily And q4h prn       albuterol (PROAIR HFA/PROVENTIL HFA/VENTOLIN HFA) 108 (90 Base) MCG/ACT Inhaler Inhale 2 puffs into the lungs every 4 hours as needed for shortness of breath / dyspnea or wheezing       bismuth subsalicylate (PEPTO BISMOL) 262 MG chewable tablet Take 2 tablets by mouth every hour as needed for heartburn Max of 16 tabs per 24 hrs       calcium carbonate (OS-MARY GRACE 500 MG [place]. CA) 500 MG tablet Take 1 tablet by mouth daily        estradiol (ESTRACE) 0.1 MG/GM cream Place 2 g vaginally twice a week       fentaNYL (DURAGESIC) 12 mcg/hr 72 hr patch Place 1 patch onto the skin every 72 hours remove old patch. 3 patch 0     Fluticasone Propionate (FLONASE NA) Spray 2 sprays into both nostrils daily       GABAPENTIN PO Take 300 mg by mouth 3 times daily       guaiFENesin  (ORGANIDIN) 200 MG TABS tablet Take 400 mg by mouth every 4 hours as needed for cough       HYDROcodone-acetaminophen (NORCO) 5-325 MG per tablet Take 1 tablet by mouth every 6 hours as needed for severe pain       ipratropium - albuterol 0.5 mg/2.5 mg/3 mL (DUONEB) 0.5-2.5 (3) MG/3ML neb solution Take 1 vial by nebulization every 4 hours as needed for shortness of breath / dyspnea or wheezing       latanoprost (XALATAN) 0.005 % ophthalmic solution Place 1 drop into both eyes At Bedtime       Liniments (SALONPAS PAIN RELIEF PATCH EX) Externally apply 1 patch topically daily as needed       Misc Natural Products (OSTEO BI-FLEX ADV DOUBLE ST PO) Take 1 tablet by mouth 2 times daily ON HOLD       POTASSIUM CHLORIDE PO Take 20 mEq by mouth daily       senna-docusate (SENOKOT-S;PERICOLACE) 8.6-50 MG per tablet Take 1 tablet by mouth daily         SERVICES:  Home Care:  Occupational Therapy, Physical Therapy, Registered Nurse, Home Health Aide and From:  [facility]          CAT Sanchez CNP  This document was electronically signed on October 28, 2018

## 2018-10-29 ENCOUNTER — HOSPITAL LABORATORY (OUTPATIENT)
Dept: OTHER | Facility: CLINIC | Age: 83
End: 2018-10-29

## 2018-10-29 LAB
ANION GAP SERPL CALCULATED.3IONS-SCNC: 4 MMOL/L (ref 3–14)
BUN SERPL-MCNC: 20 MG/DL (ref 7–30)
CALCIUM SERPL-MCNC: 9.1 MG/DL (ref 8.5–10.1)
CHLORIDE SERPL-SCNC: 105 MMOL/L (ref 94–109)
CO2 SERPL-SCNC: 31 MMOL/L (ref 20–32)
CREAT SERPL-MCNC: 0.7 MG/DL (ref 0.52–1.04)
ERYTHROCYTE [DISTWIDTH] IN BLOOD BY AUTOMATED COUNT: 13.6 % (ref 10–15)
GFR SERPL CREATININE-BSD FRML MDRD: 78 ML/MIN/1.7M2
GLUCOSE SERPL-MCNC: 74 MG/DL (ref 70–99)
HCT VFR BLD AUTO: 37.5 % (ref 35–47)
HGB BLD-MCNC: 11.8 G/DL (ref 11.7–15.7)
MCH RBC QN AUTO: 30.3 PG (ref 26.5–33)
MCHC RBC AUTO-ENTMCNC: 31.5 G/DL (ref 31.5–36.5)
MCV RBC AUTO: 96 FL (ref 78–100)
PLATELET # BLD AUTO: 320 10E9/L (ref 150–450)
POTASSIUM SERPL-SCNC: 3.9 MMOL/L (ref 3.4–5.3)
RBC # BLD AUTO: 3.89 10E12/L (ref 3.8–5.2)
SODIUM SERPL-SCNC: 140 MMOL/L (ref 133–144)
WBC # BLD AUTO: 7.4 10E9/L (ref 4–11)

## 2018-10-30 VITALS
HEIGHT: 62 IN | SYSTOLIC BLOOD PRESSURE: 131 MMHG | DIASTOLIC BLOOD PRESSURE: 73 MMHG | RESPIRATION RATE: 18 BRPM | WEIGHT: 131.8 LBS | TEMPERATURE: 97.1 F | HEART RATE: 78 BPM | OXYGEN SATURATION: 92 % | BODY MASS INDEX: 24.25 KG/M2

## 2018-10-30 NOTE — PROGRESS NOTES
[place] [facility]    Chief Complaint   Patient presents with     RANDALLECK       [place] Medical Record Number:  [id number]  Place of Service where encounter took place:  [facility] ([facility]) [[id number]]    HPI:    Kelsey Wood is a 89 year old  (4/18/1929), who is being seen today for an episodic care visit.  HPI information obtained from: facility chart records, facility staff, patient report and [facility] chart review.  Today's concern is:  {FGS DX:435646}   Pneumonia of right lower lobe due to infectious organism (H)  COPD exacerbation (H)  ***    Closed nondisplaced oblique fracture of shaft of left tibia with routine healing, subsequent encounter  ***      ALLERGIES: Review of patient's allergies indicates no known allergies.  Past Medical, Surgical, Family and Social History reviewed and updated in [facility].    Current Outpatient Prescriptions   Medication Sig Dispense Refill     ACETAMINOPHEN PO Take 650 mg by mouth 3 times daily And q4h prn       albuterol (PROAIR HFA/PROVENTIL HFA/VENTOLIN HFA) 108 (90 Base) MCG/ACT Inhaler Inhale 2 puffs into the lungs every 4 hours as needed for shortness of breath / dyspnea or wheezing       bismuth subsalicylate (PEPTO BISMOL) 262 MG chewable tablet Take 2 tablets by mouth every hour as needed for heartburn Max of 16 tabs per 24 hrs       calcium carbonate (OS-MARY GRACE 500 MG [place]. [place]) 500 MG tablet Take 1 tablet by mouth daily        estradiol (ESTRACE) 0.1 MG/GM cream Place 2 g vaginally twice a week       fentaNYL (DURAGESIC) 12 mcg/hr 72 hr patch Place 1 patch onto the skin every 72 hours remove old patch. 3 patch 0     Fluticasone Propionate (FLONASE NA) Spray 2 sprays into both nostrils daily       GABAPENTIN PO Take 300 mg by mouth 3 times daily       guaiFENesin (ORGANIDIN) 200 MG TABS tablet Take 400 mg by mouth every 4 hours as needed for cough       HYDROcodone-acetaminophen (NORCO) 5-325 MG per tablet Take 1 tablet by mouth every 6  "hours as needed for severe pain       ipratropium - albuterol 0.5 mg/2.5 mg/3 mL (DUONEB) 0.5-2.5 (3) MG/3ML neb solution Take 1 vial by nebulization 3 times daily And q4h prn       latanoprost (XALATAN) 0.005 % ophthalmic solution Place 1 drop into both eyes At Bedtime       LEVOFLOXACIN PO Take 750 mg by mouth daily       Liniments (SALONPAS PAIN RELIEF PATCH EX) Externally apply 1 patch topically daily as needed       Misc Natural Products (OSTEO BI-FLEX ADV DOUBLE ST PO) Take 1 tablet by mouth 2 times daily ON HOLD       POTASSIUM CHLORIDE PO Take 20 mEq by mouth daily       senna-docusate (SENOKOT-S;PERICOLACE) 8.6-50 MG per tablet Take 1 tablet by mouth daily       Medications reviewed:  Medications reconciled to facility chart and changes were made to reflect current medications as identified as above med list. Below are the changes that were made:   Medications stopped since last EPIC medication reconciliation:   There are no discontinued medications.    Medications started since last [facility] medication reconciliation:  No orders of the defined types were placed in this encounter.      REVIEW OF SYSTEMS:  4 point ROS including Respiratory, CV, GI and , other than that noted in the HPI,  is negative    Physical Exam:  /73  Pulse 78  Temp 97.1  F (36.2  C)  Resp 18  Ht 5' 2\" (1.575 m)  Wt 131 lb 12.8 oz (59.8 kg)  SpO2 92%  BMI 24.11 kg/m2  GENERAL APPEARANCE:  Alert, in no distress, appears healthy, oriented, cooperative  RESP:  respiratory effort and palpation of chest normal, lungs wheezing in LLL,  no respiratory distress  CV:  Palpation and auscultation of heart done , regular rate and rhythm, no murmur, rub, or gallop, +2 pedal pulses, mild edema BLE  ABDOMEN:  normal bowel sounds, soft, nontender, no hepatosplenomegaly or other masses, no guarding or reboundnormal bowel sounds, soft, nontender, no hepatosplenomegaly or other masses  M/S:   Gait and station abnormal, requires assistance with " activity and ADLs. Digits and nails normal  SKIN:  Inspection of skin and subcutaneous tissue baseline, Palpation of skin and subcutaneous tissue baseline. LLE CAM boot on.  NEURO:   Cranial nerves 2-12 are normal tested and grossly at patient's baseline, Examination of sensation by touch normal  PSYCH:  Oriented X 3, affect and mood normal, cognitive testing SLUMS 15/30    BP Readings from Last 3 Encounters:   10/30/18 131/73   10/26/18 131/73   10/25/18 132/77     Pulse Readings from Last 4 Encounters:   10/30/18 78   10/26/18 87   10/25/18 81   10/22/18 102     Wt Readings from Last 5 Encounters:   10/30/18 131 lb 12.8 oz (59.8 kg)   10/26/18 131 lb 12.8 oz (59.8 kg)   10/25/18 131 lb 12.8 oz (59.8 kg)   10/22/18 131 lb 8 oz (59.6 kg)   10/17/18 124 lb 14.4 oz (56.7 kg)         Recent Labs:   CBC RESULTS:   Recent Labs   Lab Test  10/29/18   0604  10/25/18   0805   WBC  7.4  8.7   RBC  3.89  3.80   HGB  11.8  11.4*   HCT  37.5  37.0   MCV  96  97   MCH  30.3  30.0   MCHC  31.5  30.8*   RDW  13.6  13.3   PLT  320  295       Last Basic Metabolic Panel:  Recent Labs   Lab Test  10/29/18   0604  10/25/18   0805   NA  140  142   POTASSIUM  3.9  3.9   CHLORIDE  105  106   MARY GRACE  9.1  9.7   CO2  31  29   BUN  20  21   CR  0.70  0.69   GLC  74  74       Liver Function Studies -   Recent Labs   Lab Test  10/12/18   0726  09/27/18   0556  06/25/18   1852   PROTTOTAL   --   6.9  7.4   ALBUMIN  3.7  3.3*  3.5   BILITOTAL   --   0.4  0.2   ALKPHOS   --   68  80   AST   --   16  17   ALT   --   19  18             Assessment/Plan:  {FGS DX:850886}   (J18.1) Pneumonia of right lower lobe due to infectious organism (H)  (primary encounter diagnosis)  (J44.1) COPD exacerbation (H)  Comment: ***  Plan: ***    (S82.235D) Closed nondisplaced oblique fracture of shaft of left tibia with routine healing, subsequent encounter  Comment: ***  Plan: ******    Orders:  ***    Electronically signed by  Thais Platt

## 2018-10-30 NOTE — PROGRESS NOTES
Face to Face and Medical Necessity Statement for DME Provider visit    Demographic Information on Kelsey Wood:  Gender: female  : 1929  [address]   [place] [address]  [phone] (home)     Medical Record: [id number]  Social Security Number: [id number]  Primary Care Provider: Demetrio Arriaza  Insurance: Payor: MEDICARE / Plan: MEDICARE FOR HB SUPPLEMENT / Product Type: Medicare /     HPI:   Kelsey Wood is a 89 year old  (1929), who is being seen today for a face to face provider visit at [facility]; medical necessity statement for DME included. This patient requires the following:  DME Ordered and Medical Necessity Statement   Nebulizer:  for duoneb medication   Hospital bed: semi-electronic with 2 side rails    The patient does  require positioning of the body in ways not feasible with an ordinary bed due to a medical condition that is expected to last at least 1 month due to closed nondisplaced oblique fracture of shaft of left tibia and COPD.  The patient does  require the head of bed elevated more than 30* most of the time due to COPD.  The patient does  require a bed height different than a fixed height hospital bed to permit tranfers to wheelchair or standing position.   The patient does require frequent changes in body position due to COPD.         Pt needing above DME with expected length of need of 99 years  due to medical necessity associated with following diagnosis:  COPD exacerbation (H)  Closed nondisplaced oblique fracture of shaft of left tibia with routine healing, subsequent encounter  Physical deconditioning  Cognitive impairment      PMH   has a past medical history of Asthma and Hard of hearing.      EXAM  See exam on 10/26    ASSESSMENT/PLAN:  COPD exacerbation (H)  Closed nondisplaced oblique fracture of shaft of left tibia with routine healing, subsequent encounter  Physical deconditioning  Cognitive impairment      Orders:  1. Facility staff/TC  to contact [employer] company to get their order form for provider to fill out      ELECTRONICALLY SIGNED BY PECOS CERTIFIED PROVIDER:  CAT Sanchez CNP   NPI: [id number]  [place] [facility]  [address] [place] [address], [facility] [address]  [place] [address]

## 2018-10-31 ENCOUNTER — NURSING HOME VISIT (OUTPATIENT)
Dept: GERIATRICS | Facility: CLINIC | Age: 83
End: 2018-10-31
Payer: COMMERCIAL

## 2018-10-31 DIAGNOSIS — J18.9 PNEUMONIA OF RIGHT LOWER LOBE DUE TO INFECTIOUS ORGANISM: Primary | ICD-10-CM

## 2018-10-31 DIAGNOSIS — J44.1 COPD EXACERBATION (H): ICD-10-CM

## 2018-10-31 PROCEDURE — 99309 SBSQ NF CARE MODERATE MDM 30: CPT | Performed by: NURSE PRACTITIONER

## 2018-10-31 NOTE — LETTER
10/31/2018        RE: Kelsey Wood  [address]  [place] [address]        [place] [facility]    Chief Complaint   Patient presents with     RECHECK       [place] Medical Record Number:  [id number]  Place of Service where encounter took place:  [facility] (FGS) [[id number]]    HPI:    Kelsey Wood is a 89 year old  (4/18/1929), who is being seen today for an episodic care visit.  HPI information obtained from: facility chart records, facility staff, patient report and [facility] chart review.    Today's concern is:    Pneumonia of right lower lobe due to infectious organism (H)  COPD exacerbation (H)  Hx COPD exacerbation and L sided pneumonia in 06/2018, was treated with course of azithromycin at that time. Also noted to have past medical hx of asthma, was taking albuterol inhaler prn PTA. Patient reported cough, chest congestion and dyspnea starting over the weekend of 10/12-10/14 and was started on duo nebs, guaifenesin and flonase. Patient was noted to have audible wheezing and increased dyspnea when working with therapy on 10/16 and a 5 day regimen of azithromycin was added (completed on 10/20). CXR 10/22 + RLL pneumonia, was then started on levofloxacin, course of abx [id number]. Also taking duonebs, and completed prednisone on 10/27. Today, patient reports respiratory symptoms have resolved, denies fever, SOB, productive coughing, or audible wheezing with activity or at rest. She only reports a residual dry cough. O2 sats have been stable on RA. Afebrile.          ALLERGIES: Review of patient's allergies indicates no known allergies.  Past Medical, Surgical, Family and Social History reviewed and updated in [facility].    Current Outpatient Prescriptions   Medication Sig Dispense Refill     ACETAMINOPHEN PO Take 650 mg by mouth 3 times daily And q4h prn       albuterol (PROAIR HFA/PROVENTIL HFA/VENTOLIN HFA) 108 (90 Base) MCG/ACT Inhaler Inhale 2 puffs into  the lungs every 4 hours as needed for shortness of breath / dyspnea or wheezing       bismuth subsalicylate (PEPTO BISMOL) 262 MG chewable tablet Take 2 tablets by mouth every hour as needed for heartburn Max of 16 tabs per 24 hrs       calcium carbonate (OS-MARY GRACE 500 MG [place]. [place]) 500 MG tablet Take 1 tablet by mouth daily        estradiol (ESTRACE) 0.1 MG/GM cream Place 2 g vaginally twice a week       fentaNYL (DURAGESIC) 12 mcg/hr 72 hr patch Place 1 patch onto the skin every 72 hours remove old patch. 3 patch 0     Fluticasone Propionate (FLONASE NA) Spray 2 sprays into both nostrils daily       GABAPENTIN PO Take 300 mg by mouth 3 times daily       guaiFENesin (ORGANIDIN) 200 MG TABS tablet Take 400 mg by mouth every 4 hours as needed for cough       HYDROcodone-acetaminophen (NORCO) 5-325 MG per tablet Take 1 tablet by mouth every 6 hours as needed for severe pain       latanoprost (XALATAN) 0.005 % ophthalmic solution Place 1 drop into both eyes At Bedtime       Liniments (SALONPAS PAIN RELIEF PATCH EX) Externally apply 1 patch topically daily as needed       Misc Natural Products (OSTEO BI-FLEX ADV DOUBLE ST PO) Take 1 tablet by mouth 2 times daily ON HOLD       POTASSIUM CHLORIDE PO Take 20 mEq by mouth daily       senna-docusate (SENOKOT-S;PERICOLACE) 8.6-50 MG per tablet Take 1 tablet by mouth daily       ipratropium - albuterol 0.5 mg/2.5 mg/3 mL (DUONEB) 0.5-2.5 (3) MG/3ML neb solution Take 1 vial by nebulization every 4 hours as needed for shortness of breath / dyspnea or wheezing       Medications reviewed:  Medications reconciled to facility chart and changes were made to reflect current medications as identified as above med list. Below are the changes that were made:   Medications stopped since last EPIC medication reconciliation:   There are no discontinued medications.    Medications started since last [facility] medication reconciliation:  No orders of the defined types were placed in this  "encounter.          REVIEW OF SYSTEMS:  4 point ROS including Respiratory, CV, GI and , other than that noted in the HPI,  is negative      Physical Exam:  /73  Pulse 78  Temp 97.1  F (36.2  C)  Resp 18  Ht 5' 2\" (1.575 m)  Wt 131 lb 12.8 oz (59.8 kg)  SpO2 92%  BMI 24.11 kg/m2  GENERAL APPEARANCE:  Alert, in no distress, appears healthy, oriented, cooperative  RESP:  respiratory effort and palpation of chest normal, lungs clear to auscultation,  no respiratory distress  CV:  Palpation and auscultation of heart done , regular rate and rhythm, no murmur, rub, or gallop, +2 pedal pulses, mild edema BLE  ABDOMEN:  normal bowel sounds, soft, nontender, no hepatosplenomegaly or other masses, no guarding or reboundnormal bowel sounds, soft, nontender, no hepatosplenomegaly or other masses  M/S:   Gait and station abnormal, requires assistance with activity and ADLs. Digits and nails normal  SKIN:  Inspection of skin and subcutaneous tissue baseline, Palpation of skin and subcutaneous tissue baseline. LLE CAM boot on.  NEURO:   Cranial nerves 2-12 are normal tested and grossly at patient's baseline, Examination of sensation by touch normal  PSYCH:  Oriented X 3, affect and mood normal, cognitive testing SLUMS 15/30      BP Readings from Last 3 Encounters:   10/30/18 131/73   10/26/18 131/73   10/25/18 132/77     Pulse Readings from Last 4 Encounters:   10/30/18 78   10/26/18 87   10/25/18 81   10/22/18 102     Wt Readings from Last 5 Encounters:   10/30/18 131 lb 12.8 oz (59.8 kg)   10/26/18 131 lb 12.8 oz (59.8 kg)   10/25/18 131 lb 12.8 oz (59.8 kg)   10/22/18 131 lb 8 oz (59.6 kg)   10/17/18 124 lb 14.4 oz (56.7 kg)         Recent Labs:   CBC RESULTS:   Recent Labs   Lab Test  10/29/18   0604  10/25/18   0805   WBC  7.4  8.7   RBC  3.89  3.80   HGB  11.8  11.4*   HCT  37.5  37.0   MCV  96  97   MCH  30.3  30.0   MCHC  31.5  30.8*   RDW  13.6  13.3   PLT  320  295       Last Basic Metabolic Panel:  Recent " Labs   Lab Test  10/29/18   0604  10/25/18   0805   NA  140  142   POTASSIUM  3.9  3.9   CHLORIDE  105  106   MARY GRACE  9.1  9.7   CO2  31  29   BUN  20  21   CR  0.70  0.69   GLC  74  74       Liver Function Studies -   Recent Labs   Lab Test  10/12/18   0726  09/27/18   0556  06/25/18   1852   PROTTOTAL   --   6.9  7.4   ALBUMIN  3.7  3.3*  3.5   BILITOTAL   --   0.4  0.2   ALKPHOS   --   68  80   AST   --   16  17   ALT   --   19  18             Assessment/Plan:    (J18.1) Pneumonia of right lower lobe due to infectious organism (H)  (primary encounter diagnosis) - resolved  (J44.1) COPD exacerbation (H) - resolved  Comment: RLL PNA resolved. O2 sats stable on RA, COPD stable.   Plan: Discontinue scheduled Duonebs as wheezing/sob resolved. Add PRN Duonebs as needed for wheezing.   Patient continues to plan to discharge to [facility] on 11/1.             Total time spent with patient visit at the [facility] nursing [facility] was 25 mins including patient visit and review of past records. Greater than 50% of total time spent with counseling and coordinating care due to pneumonia f/u.         This patient was seen along with a nurse practitioner student, Princess Perdomo.  The histories and reviews of systems were obtained by her and confirmed by myself. All objective, assessments and plans were completed by myself.    Electronically signed by  CAT Sanchez CNP                    Sincerely,        CAT Sanchez CNP

## 2018-10-31 NOTE — PROGRESS NOTES
[place] [facility]    Chief Complaint   Patient presents with     SUKHI       [place] Medical Record Number:  [id number]  Place of Service where encounter took place:  [facility] ([facility]) [[id number]]    HPI:    Kelsey Wood is a 89 year old  (4/18/1929), who is being seen today for an episodic care visit.  HPI information obtained from: facility chart records, facility staff, patient report and [facility] chart review.    Today's concern is:    Pneumonia of right lower lobe due to infectious organism (H)  COPD exacerbation (H)  Hx COPD exacerbation and L sided pneumonia in 06/2018, was treated with course of azithromycin at that time. Also noted to have past medical hx of asthma, was taking albuterol inhaler prn PTA. Patient reported cough, chest congestion and dyspnea starting over the weekend of 10/12-10/14 and was started on duo nebs, guaifenesin and flonase. Patient was noted to have audible wheezing and increased dyspnea when working with therapy on 10/16 and a 5 day regimen of azithromycin was added (completed on 10/20). CXR 10/22 + RLL pneumonia, was then started on levofloxacin, course of abx [id number]. Also taking duonebs, and completed prednisone on 10/27. Today, patient reports respiratory symptoms have resolved, denies fever, SOB, productive coughing, or audible wheezing with activity or at rest. She only reports a residual dry cough. O2 sats have been stable on RA. Afebrile.          ALLERGIES: Review of patient's allergies indicates no known allergies.  Past Medical, Surgical, Family and Social History reviewed and updated in [facility].    Current Outpatient Prescriptions   Medication Sig Dispense Refill     ACETAMINOPHEN PO Take 650 mg by mouth 3 times daily And q4h prn       albuterol (PROAIR HFA/PROVENTIL HFA/VENTOLIN HFA) 108 (90 Base) MCG/ACT Inhaler Inhale 2 puffs into the lungs every 4 hours as needed for shortness of breath / dyspnea or wheezing       bismuth  subsalicylate (PEPTO BISMOL) 262 MG chewable tablet Take 2 tablets by mouth every hour as needed for heartburn Max of 16 tabs per 24 hrs       calcium carbonate (OS-MARY GRACE 500 MG [place]. [place]) 500 MG tablet Take 1 tablet by mouth daily        estradiol (ESTRACE) 0.1 MG/GM cream Place 2 g vaginally twice a week       fentaNYL (DURAGESIC) 12 mcg/hr 72 hr patch Place 1 patch onto the skin every 72 hours remove old patch. 3 patch 0     Fluticasone Propionate (FLONASE NA) Spray 2 sprays into both nostrils daily       GABAPENTIN PO Take 300 mg by mouth 3 times daily       guaiFENesin (ORGANIDIN) 200 MG TABS tablet Take 400 mg by mouth every 4 hours as needed for cough       HYDROcodone-acetaminophen (NORCO) 5-325 MG per tablet Take 1 tablet by mouth every 6 hours as needed for severe pain       latanoprost (XALATAN) 0.005 % ophthalmic solution Place 1 drop into both eyes At Bedtime       Liniments (SALONPAS PAIN RELIEF PATCH EX) Externally apply 1 patch topically daily as needed       Misc Natural Products (OSTEO BI-FLEX ADV DOUBLE ST PO) Take 1 tablet by mouth 2 times daily ON HOLD       POTASSIUM CHLORIDE PO Take 20 mEq by mouth daily       senna-docusate (SENOKOT-S;PERICOLACE) 8.6-50 MG per tablet Take 1 tablet by mouth daily       ipratropium - albuterol 0.5 mg/2.5 mg/3 mL (DUONEB) 0.5-2.5 (3) MG/3ML neb solution Take 1 vial by nebulization every 4 hours as needed for shortness of breath / dyspnea or wheezing       Medications reviewed:  Medications reconciled to facility chart and changes were made to reflect current medications as identified as above med list. Below are the changes that were made:   Medications stopped since last EPIC medication reconciliation:   There are no discontinued medications.    Medications started since last [facility] medication reconciliation:  No orders of the defined types were placed in this encounter.          REVIEW OF SYSTEMS:  4 point ROS including Respiratory, CV, GI and , other than that  "noted in the HPI,  is negative      Physical Exam:  /73  Pulse 78  Temp 97.1  F (36.2  C)  Resp 18  Ht 5' 2\" (1.575 m)  Wt 131 lb 12.8 oz (59.8 kg)  SpO2 92%  BMI 24.11 kg/m2  GENERAL APPEARANCE:  Alert, in no distress, appears healthy, oriented, cooperative  RESP:  respiratory effort and palpation of chest normal, lungs clear to auscultation,  no respiratory distress  CV:  Palpation and auscultation of heart done , regular rate and rhythm, no murmur, rub, or gallop, +2 pedal pulses, mild edema BLE  ABDOMEN:  normal bowel sounds, soft, nontender, no hepatosplenomegaly or other masses, no guarding or reboundnormal bowel sounds, soft, nontender, no hepatosplenomegaly or other masses  M/S:   Gait and station abnormal, requires assistance with activity and ADLs. Digits and nails normal  SKIN:  Inspection of skin and subcutaneous tissue baseline, Palpation of skin and subcutaneous tissue baseline. LLE CAM boot on.  NEURO:   Cranial nerves 2-12 are normal tested and grossly at patient's baseline, Examination of sensation by touch normal  PSYCH:  Oriented X 3, affect and mood normal, cognitive testing SLUMS 15/30      BP Readings from Last 3 Encounters:   10/30/18 131/73   10/26/18 131/73   10/25/18 132/77     Pulse Readings from Last 4 Encounters:   10/30/18 78   10/26/18 87   10/25/18 81   10/22/18 102     Wt Readings from Last 5 Encounters:   10/30/18 131 lb 12.8 oz (59.8 kg)   10/26/18 131 lb 12.8 oz (59.8 kg)   10/25/18 131 lb 12.8 oz (59.8 kg)   10/22/18 131 lb 8 oz (59.6 kg)   10/17/18 124 lb 14.4 oz (56.7 kg)         Recent Labs:   CBC RESULTS:   Recent Labs   Lab Test  10/29/18   0604  10/25/18   0805   WBC  7.4  8.7   RBC  3.89  3.80   HGB  11.8  11.4*   HCT  37.5  37.0   MCV  96  97   MCH  30.3  30.0   MCHC  31.5  30.8*   RDW  13.6  13.3   PLT  320  295       Last Basic Metabolic Panel:  Recent Labs   Lab Test  10/29/18   0604  10/25/18   0805   NA  140  142   POTASSIUM  3.9  3.9   CHLORIDE  105  " 106   MARY GRACE  9.1  9.7   CO2  31  29   BUN  20  21   CR  0.70  0.69   GLC  74  74       Liver Function Studies -   Recent Labs   Lab Test  10/12/18   0726  09/27/18   0556  06/25/18   1852   PROTTOTAL   --   6.9  7.4   ALBUMIN  3.7  3.3*  3.5   BILITOTAL   --   0.4  0.2   ALKPHOS   --   68  80   AST   --   16  17   ALT   --   19  18             Assessment/Plan:    (J18.1) Pneumonia of right lower lobe due to infectious organism (H)  (primary encounter diagnosis) - resolved  (J44.1) COPD exacerbation (H) - resolved  Comment: RLL PNA resolved. O2 sats stable on RA, COPD stable.   Plan: Discontinue scheduled Duonebs as wheezing/sob resolved. Add PRN Duonebs as needed for wheezing.   Patient continues to plan to discharge to [facility] on [facility]/1.             Total time spent with patient visit at the [facility] nursing [facility] was 25 mins including patient visit and review of past records. Greater than 50% of total time spent with counseling and coordinating care due to pneumonia f/u.         This patient was seen along with a nurse practitioner student, Princess Perdomo.  The histories and reviews of systems were obtained by her and confirmed by myself. All objective, assessments and plans were completed by myself.    Electronically signed by  CAT Sanchez CNP

## 2018-11-01 RX ORDER — IPRATROPIUM BROMIDE AND ALBUTEROL SULFATE 2.5; .5 MG/3ML; MG/3ML
1 SOLUTION RESPIRATORY (INHALATION) 4 TIMES DAILY PRN
COMMUNITY

## 2018-11-21 ENCOUNTER — RECORDS - HEALTHEAST (OUTPATIENT)
Dept: LAB | Facility: CLINIC | Age: 83
End: 2018-11-21

## 2018-11-21 LAB
ANION GAP SERPL CALCULATED.3IONS-SCNC: 11 MMOL/L (ref 5–18)
BUN SERPL-MCNC: 15 MG/DL (ref 8–28)
CALCIUM SERPL-MCNC: 10.4 MG/DL (ref 8.5–10.5)
CHLORIDE BLD-SCNC: 102 MMOL/L (ref 98–107)
CO2 SERPL-SCNC: 29 MMOL/L (ref 22–31)
CREAT SERPL-MCNC: 0.63 MG/DL (ref 0.6–1.1)
ERYTHROCYTE [DISTWIDTH] IN BLOOD BY AUTOMATED COUNT: 13.2 % (ref 11–14.5)
GFR SERPL CREATININE-BSD FRML MDRD: >60 ML/MIN/1.73M2
GLUCOSE BLD-MCNC: 140 MG/DL (ref 70–125)
HCT VFR BLD AUTO: 41.1 % (ref 35–47)
HGB BLD-MCNC: 12.4 G/DL (ref 12–16)
MCH RBC QN AUTO: 30 PG (ref 27–34)
MCHC RBC AUTO-ENTMCNC: 30.2 G/DL (ref 32–36)
MCV RBC AUTO: 100 FL (ref 80–100)
PLATELET # BLD AUTO: 382 THOU/UL (ref 140–440)
PMV BLD AUTO: 9.3 FL (ref 8.5–12.5)
POTASSIUM BLD-SCNC: 4.3 MMOL/L (ref 3.5–5)
RBC # BLD AUTO: 4.13 MILL/UL (ref 3.8–5.4)
SODIUM SERPL-SCNC: 142 MMOL/L (ref 136–145)
WBC: 7.6 THOU/UL (ref 4–11)

## 2019-04-03 ENCOUNTER — APPOINTMENT (OUTPATIENT)
Dept: CT IMAGING | Facility: CLINIC | Age: 84
End: 2019-04-03
Attending: EMERGENCY MEDICINE
Payer: MEDICARE

## 2019-04-03 ENCOUNTER — MEDICAL CORRESPONDENCE (OUTPATIENT)
Dept: HEALTH INFORMATION MANAGEMENT | Facility: CLINIC | Age: 84
End: 2019-04-03

## 2019-04-03 ENCOUNTER — APPOINTMENT (OUTPATIENT)
Dept: GENERAL RADIOLOGY | Facility: CLINIC | Age: 84
End: 2019-04-03
Attending: EMERGENCY MEDICINE
Payer: MEDICARE

## 2019-04-03 ENCOUNTER — APPOINTMENT (OUTPATIENT)
Dept: ULTRASOUND IMAGING | Facility: CLINIC | Age: 84
End: 2019-04-03
Attending: EMERGENCY MEDICINE
Payer: MEDICARE

## 2019-04-03 ENCOUNTER — HOSPITAL ENCOUNTER (EMERGENCY)
Facility: CLINIC | Age: 84
Discharge: HOME | End: 2019-04-03
Attending: EMERGENCY MEDICINE | Admitting: EMERGENCY MEDICINE
Payer: MEDICARE

## 2019-04-03 VITALS
RESPIRATION RATE: 20 BRPM | TEMPERATURE: 98 F | HEART RATE: 78 BPM | SYSTOLIC BLOOD PRESSURE: 123 MMHG | OXYGEN SATURATION: 97 % | DIASTOLIC BLOOD PRESSURE: 62 MMHG

## 2019-04-03 DIAGNOSIS — R60.0 BILATERAL LEG EDEMA: ICD-10-CM

## 2019-04-03 DIAGNOSIS — J18.9 PNEUMONIA OF RIGHT LUNG DUE TO INFECTIOUS ORGANISM, UNSPECIFIED PART OF LUNG: ICD-10-CM

## 2019-04-03 DIAGNOSIS — N39.0 UTI (URINARY TRACT INFECTION) WITH PYURIA: ICD-10-CM

## 2019-04-03 LAB
ALBUMIN SERPL-MCNC: 3.4 G/DL (ref 3.4–5)
ALBUMIN UR-MCNC: NEGATIVE MG/DL
ALP SERPL-CCNC: 78 U/L (ref 40–150)
ALT SERPL W P-5'-P-CCNC: 17 U/L (ref 0–50)
AMORPH CRY #/AREA URNS HPF: ABNORMAL /HPF
ANION GAP SERPL CALCULATED.3IONS-SCNC: 4 MMOL/L (ref 3–14)
APPEARANCE UR: CLEAR
AST SERPL W P-5'-P-CCNC: 17 U/L (ref 0–45)
BACTERIA #/AREA URNS HPF: ABNORMAL /HPF
BASOPHILS # BLD AUTO: 0.1 10E9/L (ref 0–0.2)
BASOPHILS NFR BLD AUTO: 0.9 %
BILIRUB SERPL-MCNC: 0.3 MG/DL (ref 0.2–1.3)
BILIRUB UR QL STRIP: NEGATIVE
BUN SERPL-MCNC: 12 MG/DL (ref 7–30)
CALCIUM SERPL-MCNC: 9.3 MG/DL (ref 8.5–10.1)
CHLORIDE SERPL-SCNC: 106 MMOL/L (ref 94–109)
CO2 SERPL-SCNC: 29 MMOL/L (ref 20–32)
COLOR UR AUTO: ABNORMAL
CREAT SERPL-MCNC: 0.68 MG/DL (ref 0.52–1.04)
DIFFERENTIAL METHOD BLD: ABNORMAL
EOSINOPHIL # BLD AUTO: 0.2 10E9/L (ref 0–0.7)
EOSINOPHIL NFR BLD AUTO: 3.2 %
ERYTHROCYTE [DISTWIDTH] IN BLOOD BY AUTOMATED COUNT: 13.2 % (ref 10–15)
GFR SERPL CREATININE-BSD FRML MDRD: 77 ML/MIN/{1.73_M2}
GLUCOSE SERPL-MCNC: 76 MG/DL (ref 70–99)
GLUCOSE UR STRIP-MCNC: NEGATIVE MG/DL
HCT VFR BLD AUTO: 41.1 % (ref 35–47)
HGB BLD-MCNC: 12.2 G/DL (ref 11.7–15.7)
HGB UR QL STRIP: NEGATIVE
IMM GRANULOCYTES # BLD: 0.1 10E9/L (ref 0–0.4)
IMM GRANULOCYTES NFR BLD: 0.9 %
INR PPP: 0.96 (ref 0.86–1.14)
KETONES UR STRIP-MCNC: NEGATIVE MG/DL
LEUKOCYTE ESTERASE UR QL STRIP: ABNORMAL
LYMPHOCYTES # BLD AUTO: 2 10E9/L (ref 0.8–5.3)
LYMPHOCYTES NFR BLD AUTO: 28.7 %
MCH RBC QN AUTO: 28.8 PG (ref 26.5–33)
MCHC RBC AUTO-ENTMCNC: 29.7 G/DL (ref 31.5–36.5)
MCV RBC AUTO: 97 FL (ref 78–100)
MONOCYTES # BLD AUTO: 0.7 10E9/L (ref 0–1.3)
MONOCYTES NFR BLD AUTO: 10.7 %
MUCOUS THREADS #/AREA URNS LPF: PRESENT /LPF
NEUTROPHILS # BLD AUTO: 3.8 10E9/L (ref 1.6–8.3)
NEUTROPHILS NFR BLD AUTO: 55.6 %
NITRATE UR QL: POSITIVE
NRBC # BLD AUTO: 0 10*3/UL
NRBC BLD AUTO-RTO: 0 /100
PH UR STRIP: 7 PH (ref 5–7)
PLATELET # BLD AUTO: 295 10E9/L (ref 150–450)
POTASSIUM SERPL-SCNC: 4.3 MMOL/L (ref 3.4–5.3)
PROT SERPL-MCNC: 7.1 G/DL (ref 6.8–8.8)
RBC # BLD AUTO: 4.24 10E12/L (ref 3.8–5.2)
RBC #/AREA URNS AUTO: 1 /HPF (ref 0–2)
SODIUM SERPL-SCNC: 139 MMOL/L (ref 133–144)
SOURCE: ABNORMAL
SP GR UR STRIP: 1.02 (ref 1–1.03)
SQUAMOUS #/AREA URNS AUTO: <1 /HPF (ref 0–1)
UROBILINOGEN UR STRIP-MCNC: NORMAL MG/DL (ref 0–2)
WBC # BLD AUTO: 6.8 10E9/L (ref 4–11)
WBC #/AREA URNS AUTO: 8 /HPF (ref 0–5)

## 2019-04-03 PROCEDURE — 85610 PROTHROMBIN TIME: CPT | Performed by: EMERGENCY MEDICINE

## 2019-04-03 PROCEDURE — 93970 EXTREMITY STUDY: CPT

## 2019-04-03 PROCEDURE — 81001 URINALYSIS AUTO W/SCOPE: CPT | Performed by: EMERGENCY MEDICINE

## 2019-04-03 PROCEDURE — 80053 COMPREHEN METABOLIC PANEL: CPT | Performed by: EMERGENCY MEDICINE

## 2019-04-03 PROCEDURE — 74177 CT ABD & PELVIS W/CONTRAST: CPT

## 2019-04-03 PROCEDURE — 99285 EMERGENCY DEPT VISIT HI MDM: CPT | Mod: 25

## 2019-04-03 PROCEDURE — 85025 COMPLETE CBC W/AUTO DIFF WBC: CPT | Performed by: EMERGENCY MEDICINE

## 2019-04-03 PROCEDURE — 25000128 H RX IP 250 OP 636: Performed by: EMERGENCY MEDICINE

## 2019-04-03 PROCEDURE — 71046 X-RAY EXAM CHEST 2 VIEWS: CPT

## 2019-04-03 RX ORDER — CEFDINIR 300 MG/1
300 CAPSULE ORAL 2 TIMES DAILY
Qty: 14 CAPSULE | Refills: 0 | Status: SHIPPED | OUTPATIENT
Start: 2019-04-03 | End: 2019-04-10

## 2019-04-03 RX ORDER — IOPAMIDOL 755 MG/ML
500 INJECTION, SOLUTION INTRAVASCULAR ONCE
Status: COMPLETED | OUTPATIENT
Start: 2019-04-03 | End: 2019-04-03

## 2019-04-03 RX ORDER — LIDOCAINE 40 MG/G
CREAM TOPICAL
Status: DISCONTINUED | OUTPATIENT
Start: 2019-04-03 | End: 2019-04-03 | Stop reason: HOSPADM

## 2019-04-03 RX ADMIN — IOPAMIDOL 65 ML: 755 INJECTION, SOLUTION INTRAVENOUS at 13:25

## 2019-04-03 RX ADMIN — SODIUM CHLORIDE 56 ML: 9 INJECTION, SOLUTION INTRAVENOUS at 13:25

## 2019-04-03 ASSESSMENT — ENCOUNTER SYMPTOMS
DYSURIA: 0
ABDOMINAL DISTENTION: 1
HEMATURIA: 0

## 2019-04-03 NOTE — ED PROVIDER NOTES
History     Chief Complaint:  Leg Swelling      HPI   Kelsey Wood is a 89 year old female who presents via EMS with bilateral lower extremity swelling. The patient states that she has been gaining weight recently and thought this was due to her not being very active. Over the last few days, she has had increased bilateral lower extremity swelling along with abdominal distention. The patient has had decreased urinary output despite drinking plenty of fluids. She denies any dysuria or hematuria.          Allergies:  No known drug allergies.     Medications:    Acetaminophen  Albuterol   Pepto bismol  Estrace   Duragesic   Flonase   Gabapentin   Organidin  Duoneb   Liniments  Potassium Chloride   Senokot     Past Medical History:    Asthma   Hard of hearing   COPD   Chronic pain     Past Surgical History:    Cholecystectomy   Appendectomy     Family History:    History reviewed. No pertinent family history.     Social History:  Marital Status:   Presents to the ED via EMS   PCP: ROGER GE        Review of Systems   Cardiovascular: Positive for leg swelling.   Gastrointestinal: Positive for abdominal distention.   Genitourinary: Positive for decreased urine volume. Negative for dysuria and hematuria.   All other systems reviewed and are negative.        Physical Exam   First Vitals:  BP: 158/77  Pulse: 78  Heart Rate: 78  Temp: 98  F (36.7  C)  Resp: 20  SpO2: 96 %      Physical Exam   Constitutional: She appears well-developed and well-nourished.   HENT:   Right Ear: External ear normal.   Left Ear: External ear normal.   Mouth/Throat: Oropharynx is clear and moist. No oropharyngeal exudate.   TM's clear bilaterally   Eyes: Conjunctivae and EOM are normal. Pupils are equal, round, and reactive to light. No scleral icterus.   Neck: Normal range of motion. Neck supple.   Cardiovascular: Normal rate, regular rhythm, normal heart sounds and intact distal pulses. Exam reveals no gallop and no friction rub.   No  murmur heard.  Pulmonary/Chest: Effort normal and breath sounds normal. No respiratory distress. She has no wheezes. She has no rales.   Abdominal: Soft. Bowel sounds are normal. She exhibits distension. She exhibits no mass. There is no tenderness.   Mild mid abd pain on exam, no fluid wave noted   Musculoskeletal: Normal range of motion. She exhibits edema.   3+ pitting edema in BLE to the knees   Neurological: She is alert.   Skin: Skin is warm and dry. No rash noted.   Psychiatric: She has a normal mood and affect.         Emergency Department Course   Imaging:  US Lower Extremity Venous Duplex Bilateral:   No DVT demonstrated.   Report per radiology.     Chest x-ray, 2 views:   No radiographic evidence of acute chest abnormality.   Report per radiology.     CT Abdomen Pelvis with contrast:   1. Airspace consolidation in the medial right lower lobe may represent  atelectasis or pneumonia.  2. Indeterminate 1.3 cm nodule in the left adrenal gland. Consider MRI  for further evaluation.  3. 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  This can be associated with pelvic venous congestion syndrome.  4. No cause for abdominal distention demonstrated.  Report per radiology.   Radiographic findings were communicated with the patient who voiced understanding of the findings.    Laboratory:  CMP:  WNL (Creatinine 0.68)   INR: 0.96  CBC:  WBC 6.8, HGB 12.2, , otherwise WNL     UA: Clear light yellow urine, Nitrite Positive, Leukocyte Esterase trace, WBC 8 (H), Mucous present, Amorphous Crystals few,  otherwise WNL     Emergency Department Course:  Nursing notes and vitals reviewed.  ([id number]) I performed an exam of the patient as documented above.    A peripheral IV was established. Blood was drawn from the patient. This was sent for laboratory testing, findings above.    Urine sample was obtained and sent for laboratory analysis, findings above.   The patient was sent for a chest x-ray, US  lower extremity, CT Abdomen while in the emergency department, findings above.    Findings and plan explained to the patient. Patient discharged home with instructions regarding supportive care, medications, and reasons to return. The importance of close follow-up was reviewed.    I personally reviewed the laboratory results with the patient and answered all related questions prior to discharge.      Impression & Plan    Medical Decision Making:  Patient presents with the above symptoms. She has leg swelling and UTI symptoms. She was incidentally found to have UTI plus pneumonia. Leg swelling appears to be more of dependent edema. There is no evidence of DVT on the ultrasound. I explained to the daughter that she needs to take medication, elevate, and use compression stockings, as well as follow-up with her doctor to discuss Lasix. The daughter voiced understanding. Omnicef will be prescribed for UTI and lung findings.    Diagnosis:    ICD-10-CM    1. UTI (urinary tract infection) with pyuria N39.0    2. Pneumonia of right lung due to infectious organism, unspecified part of lung J18.9    3. Bilateral leg edema R60.0        Disposition:  discharged to home    Discharge Medications:     Medication List      Started    cefdinir 300 MG capsule  Commonly known as:  OMNICEF  300 mg, Oral, 2 TIMES DAILY                       ICrystal, am serving as a scribe on 4/3/2019 at 11:09 AM to personally document services performed by Dr. Aldrich based on my observations and the provider's statements to me.    4/3/2019   [facility] EMERGENCY DEPARTMENT       Nette Aldrich MD  04/03/19 7956

## 2019-04-03 NOTE — ED AVS SNAPSHOT
[facility] Emergency Department  [address]  [facility] [address]  Phone:  [phone]  Fax:  [phone]                                    Kelsey Wood   MRN: [id number]    Department:  [facility] Emergency Department   Date of Visit:  4/3/2019           After Visit Summary Signature Page    I have received my discharge instructions, and my questions have been answered. I have discussed any challenges I see with this plan with the nurse or doctor.    ..........................................................................................................................................  Patient/Patient Representative Signature      ..........................................................................................................................................  Patient Representative Print Name and Relationship to Patient    ..................................................               ................................................  Date                                   Time    ..........................................................................................................................................  Reviewed by Signature/Title    ...................................................              ..............................................  Date                                               Time          22EPIC Rev 08/18

## 2019-04-03 NOTE — ED TRIAGE NOTES
"Presents to ED via EMS for bilateral leg swelling. Pt reports she noticed she has been gaining weight and was attribute it to \"not being active.\" Noticed the swelling yesterday. Also noticed that her abdomen has been more swelling. Pt also notes that she has not voided much over the weekend, despite drinking plenty of fluids. Denies SOB. Recent cold/cough.   "

## 2019-05-07 ENCOUNTER — RECORDS - HEALTHEAST (OUTPATIENT)
Dept: LAB | Facility: CLINIC | Age: 84
End: 2019-05-07

## 2019-05-07 LAB
ANION GAP SERPL CALCULATED.3IONS-SCNC: 11 MMOL/L (ref 5–18)
BUN SERPL-MCNC: 11 MG/DL (ref 8–28)
CALCIUM SERPL-MCNC: 10.3 MG/DL (ref 8.5–10.5)
CHLORIDE BLD-SCNC: 100 MMOL/L (ref 98–107)
CO2 SERPL-SCNC: 26 MMOL/L (ref 22–31)
CREAT SERPL-MCNC: 0.68 MG/DL (ref 0.6–1.1)
GFR SERPL CREATININE-BSD FRML MDRD: >60 ML/MIN/1.73M2
GLUCOSE BLD-MCNC: 136 MG/DL (ref 70–125)
POTASSIUM BLD-SCNC: 4 MMOL/L (ref 3.5–5)
SODIUM SERPL-SCNC: 137 MMOL/L (ref 136–145)

## 2019-05-29 ENCOUNTER — RECORDS - HEALTHEAST (OUTPATIENT)
Dept: LAB | Facility: CLINIC | Age: 84
End: 2019-05-29

## 2019-05-30 LAB
ANION GAP SERPL CALCULATED.3IONS-SCNC: 9 MMOL/L (ref 5–18)
BUN SERPL-MCNC: 13 MG/DL (ref 8–28)
CALCIUM SERPL-MCNC: 10.1 MG/DL (ref 8.5–10.5)
CHLORIDE BLD-SCNC: 103 MMOL/L (ref 98–107)
CO2 SERPL-SCNC: 28 MMOL/L (ref 22–31)
CREAT SERPL-MCNC: 0.65 MG/DL (ref 0.6–1.1)
ERYTHROCYTE [DISTWIDTH] IN BLOOD BY AUTOMATED COUNT: 14.2 % (ref 11–14.5)
GFR SERPL CREATININE-BSD FRML MDRD: >60 ML/MIN/1.73M2
GLUCOSE BLD-MCNC: 92 MG/DL (ref 70–125)
HCT VFR BLD AUTO: 39.5 % (ref 35–47)
HGB BLD-MCNC: 12 G/DL (ref 12–16)
MCH RBC QN AUTO: 28.1 PG (ref 27–34)
MCHC RBC AUTO-ENTMCNC: 30.4 G/DL (ref 32–36)
MCV RBC AUTO: 93 FL (ref 80–100)
PLATELET # BLD AUTO: 301 THOU/UL (ref 140–440)
PMV BLD AUTO: 8.8 FL (ref 8.5–12.5)
POTASSIUM BLD-SCNC: 4.2 MMOL/L (ref 3.5–5)
RBC # BLD AUTO: 4.27 MILL/UL (ref 3.8–5.4)
SODIUM SERPL-SCNC: 140 MMOL/L (ref 136–145)
TSH SERPL DL<=0.005 MIU/L-ACNC: 0.78 UIU/ML (ref 0.3–5)
WBC: 9.3 THOU/UL (ref 4–11)

## 2019-05-31 LAB — 25(OH)D3 SERPL-MCNC: 48.2 NG/ML (ref 30–80)

## 2019-07-22 ENCOUNTER — RECORDS - HEALTHEAST (OUTPATIENT)
Dept: LAB | Facility: CLINIC | Age: 84
End: 2019-07-22

## 2019-07-22 LAB
ALBUMIN UR-MCNC: NEGATIVE MG/DL
AMORPH CRY #/AREA URNS HPF: ABNORMAL /[HPF]
APPEARANCE UR: ABNORMAL
BACTERIA #/AREA URNS HPF: ABNORMAL HPF
BILIRUB UR QL STRIP: NEGATIVE
COLOR UR AUTO: ABNORMAL
GLUCOSE UR STRIP-MCNC: NEGATIVE MG/DL
HGB UR QL STRIP: NEGATIVE
KETONES UR STRIP-MCNC: NEGATIVE MG/DL
LEUKOCYTE ESTERASE UR QL STRIP: ABNORMAL
NITRATE UR QL: NEGATIVE
PH UR STRIP: 7 [PH] (ref 4.5–8)
RBC #/AREA URNS AUTO: ABNORMAL HPF
SP GR UR STRIP: 1.01 (ref 1–1.03)
SQUAMOUS #/AREA URNS AUTO: ABNORMAL LPF
TRANS CELLS #/AREA URNS HPF: ABNORMAL LPF
UROBILINOGEN UR STRIP-ACNC: ABNORMAL
WBC #/AREA URNS AUTO: ABNORMAL HPF

## 2019-07-26 LAB
BACTERIA SPEC CULT: ABNORMAL
BACTERIA SPEC CULT: ABNORMAL

## 2019-10-14 ENCOUNTER — APPOINTMENT (OUTPATIENT)
Age: 84
Setting detail: DERMATOLOGY
End: 2019-10-15

## 2019-10-14 DIAGNOSIS — L82.1 OTHER SEBORRHEIC KERATOSIS: ICD-10-CM

## 2019-10-14 DIAGNOSIS — L82.0 INFLAMED SEBORRHEIC KERATOSIS: ICD-10-CM

## 2019-10-14 PROCEDURE — 99213 OFFICE O/P EST LOW 20 MIN: CPT | Mod: 25

## 2019-10-14 PROCEDURE — OTHER COUNSELING: OTHER

## 2019-10-14 PROCEDURE — OTHER LIQUID NITROGEN: OTHER

## 2019-10-14 PROCEDURE — 17110 DESTRUCT B9 LESION 1-14: CPT

## 2019-10-14 ASSESSMENT — LOCATION SIMPLE DESCRIPTION DERM
LOCATION SIMPLE: LEFT ANTERIOR NECK
LOCATION SIMPLE: LEFT SUPERIOR EYELID
LOCATION SIMPLE: RIGHT CHEEK
LOCATION SIMPLE: NECK
LOCATION SIMPLE: LEFT CHEEK
LOCATION SIMPLE: RIGHT ANTERIOR NECK

## 2019-10-14 ASSESSMENT — LOCATION DETAILED DESCRIPTION DERM
LOCATION DETAILED: LEFT LATERAL SUPERIOR EYELID
LOCATION DETAILED: RIGHT INFERIOR LATERAL NECK
LOCATION DETAILED: RIGHT INFERIOR ANTERIOR NECK
LOCATION DETAILED: LEFT CLAVICULAR NECK
LOCATION DETAILED: RIGHT SUPERIOR ANTERIOR NECK
LOCATION DETAILED: LEFT INFERIOR LATERAL NECK
LOCATION DETAILED: RIGHT CENTRAL LATERAL NECK
LOCATION DETAILED: LEFT SUPERIOR LATERAL NECK
LOCATION DETAILED: LEFT INFERIOR CENTRAL MALAR CHEEK
LOCATION DETAILED: RIGHT MEDIAL MALAR CHEEK
LOCATION DETAILED: LEFT MEDIAL MALAR CHEEK
LOCATION DETAILED: RIGHT SUPERIOR LATERAL MALAR CHEEK
LOCATION DETAILED: RIGHT LATERAL MALAR CHEEK

## 2019-10-14 ASSESSMENT — LOCATION ZONE DERM
LOCATION ZONE: EYELID
LOCATION ZONE: NECK
LOCATION ZONE: FACE

## 2019-10-14 NOTE — PROCEDURE: LIQUID NITROGEN
Samreen Emmanuel   MRN: [id number]    Department:  [facility] Emergency Department   Date of Visit:  3/11/2018           Disclosure     Insurance plans vary and the physician(s) referred by the ER may not be covered by your plan.  Please contact your
tell this physician (or your personal doctor if your instructions are to return to your personal doctor) about any new or lasting problems. The primary care or specialist physician will see patients referred from the [facility] Emergency Department.  Rohan Yo
Include Z78.9 (Other Specified Conditions Influencing Health Status) As An Associated Diagnosis?: No
Medical Necessity Clause: This procedure was medically necessary because the lesions that were treated were: contagious
Duration Of Freeze Thaw-Cycle (Seconds): 3
Medical Necessity Information: It is in your best interest to select a reason for this procedure from the list below. All of these items fulfill various CMS LCD requirements except the new and changing color options.
Post-Care Instructions: I reviewed with the patient in detail post-care instructions. Patient is to wear sunprotection, and avoid picking at any of the treated lesions. Pt may apply Vaseline to crusted or scabbing areas.
Consent: The patient's consent was obtained including but not limited to risks of crusting, scabbing, blistering, scarring, darker or lighter pigmentary change, recurrence, incomplete removal and infection.
Detail Level: Detailed
Number Of Freeze-Thaw Cycles: 2 freeze-thaw cycles

## 2019-11-21 ENCOUNTER — RECORDS - HEALTHEAST (OUTPATIENT)
Dept: LAB | Facility: CLINIC | Age: 84
End: 2019-11-21

## 2019-11-21 LAB
ANION GAP SERPL CALCULATED.3IONS-SCNC: 9 MMOL/L (ref 5–18)
BUN SERPL-MCNC: 12 MG/DL (ref 8–28)
CALCIUM SERPL-MCNC: 9.6 MG/DL (ref 8.5–10.5)
CHLORIDE BLD-SCNC: 99 MMOL/L (ref 98–107)
CO2 SERPL-SCNC: 29 MMOL/L (ref 22–31)
CREAT SERPL-MCNC: 0.69 MG/DL (ref 0.6–1.1)
ERYTHROCYTE [DISTWIDTH] IN BLOOD BY AUTOMATED COUNT: 13.9 % (ref 11–14.5)
GFR SERPL CREATININE-BSD FRML MDRD: >60 ML/MIN/1.73M2
GLUCOSE BLD-MCNC: 162 MG/DL (ref 70–125)
HCT VFR BLD AUTO: 42.4 % (ref 35–47)
HGB BLD-MCNC: 12.6 G/DL (ref 12–16)
MCH RBC QN AUTO: 27.8 PG (ref 27–34)
MCHC RBC AUTO-ENTMCNC: 29.7 G/DL (ref 32–36)
MCV RBC AUTO: 94 FL (ref 80–100)
PLATELET # BLD AUTO: 339 THOU/UL (ref 140–440)
PMV BLD AUTO: 9.4 FL (ref 8.5–12.5)
POTASSIUM BLD-SCNC: 4.4 MMOL/L (ref 3.5–5)
RBC # BLD AUTO: 4.53 MILL/UL (ref 3.8–5.4)
SODIUM SERPL-SCNC: 137 MMOL/L (ref 136–145)
WBC: 8 THOU/UL (ref 4–11)

## 2019-12-02 ENCOUNTER — RECORDS - HEALTHEAST (OUTPATIENT)
Dept: LAB | Facility: CLINIC | Age: 84
End: 2019-12-02

## 2019-12-02 LAB
ALBUMIN UR-MCNC: ABNORMAL MG/DL
AMORPH CRY #/AREA URNS HPF: ABNORMAL /[HPF]
APPEARANCE UR: ABNORMAL
BACTERIA #/AREA URNS HPF: ABNORMAL HPF
BILIRUB UR QL STRIP: NEGATIVE
COLOR UR AUTO: YELLOW
GLUCOSE UR STRIP-MCNC: NEGATIVE MG/DL
HGB UR QL STRIP: NEGATIVE
KETONES UR STRIP-MCNC: NEGATIVE MG/DL
LEUKOCYTE ESTERASE UR QL STRIP: ABNORMAL
MUCOUS THREADS #/AREA URNS LPF: ABNORMAL LPF
NITRATE UR QL: POSITIVE
PH UR STRIP: 7 [PH] (ref 4.5–8)
RBC #/AREA URNS AUTO: ABNORMAL HPF
SP GR UR STRIP: 1.01 (ref 1–1.03)
SQUAMOUS #/AREA URNS AUTO: ABNORMAL LPF
UROBILINOGEN UR STRIP-ACNC: ABNORMAL
WBC #/AREA URNS AUTO: ABNORMAL HPF

## 2019-12-04 LAB — BACTERIA SPEC CULT: ABNORMAL

## 2020-01-08 ENCOUNTER — RECORDS - HEALTHEAST (OUTPATIENT)
Dept: LAB | Facility: CLINIC | Age: 85
End: 2020-01-08

## 2020-01-08 LAB
ALBUMIN UR-MCNC: ABNORMAL MG/DL
APPEARANCE UR: ABNORMAL
BACTERIA #/AREA URNS HPF: ABNORMAL HPF
BILIRUB UR QL STRIP: NEGATIVE
COLOR UR AUTO: YELLOW
GLUCOSE UR STRIP-MCNC: NEGATIVE MG/DL
HGB UR QL STRIP: NEGATIVE
KETONES UR STRIP-MCNC: NEGATIVE MG/DL
LEUKOCYTE ESTERASE UR QL STRIP: ABNORMAL
MUCOUS THREADS #/AREA URNS LPF: ABNORMAL LPF
NITRATE UR QL: NEGATIVE
PH UR STRIP: 7 [PH] (ref 4.5–8)
RBC #/AREA URNS AUTO: ABNORMAL HPF
SP GR UR STRIP: 1.01 (ref 1–1.03)
SQUAMOUS #/AREA URNS AUTO: >100 LPF
TRANS CELLS #/AREA URNS HPF: ABNORMAL LPF
UROBILINOGEN UR STRIP-ACNC: ABNORMAL
WBC #/AREA URNS AUTO: ABNORMAL HPF

## 2020-01-09 LAB — BACTERIA SPEC CULT: NO GROWTH

## 2020-02-24 ENCOUNTER — HOSPITAL ENCOUNTER (INPATIENT)
Facility: CLINIC | Age: 85
LOS: 3 days | Discharge: NURSING, REHAB OR LONG-TERM CARE | DRG: 190 | End: 2020-02-27
Attending: EMERGENCY MEDICINE | Admitting: INTERNAL MEDICINE
Payer: MEDICARE

## 2020-02-24 ENCOUNTER — APPOINTMENT (OUTPATIENT)
Dept: CT IMAGING | Facility: CLINIC | Age: 85
DRG: 190 | End: 2020-02-24
Attending: EMERGENCY MEDICINE
Payer: MEDICARE

## 2020-02-24 DIAGNOSIS — I48.0 PAROXYSMAL ATRIAL FIBRILLATION (H): Primary | ICD-10-CM

## 2020-02-24 DIAGNOSIS — J44.9 CHRONIC OBSTRUCTIVE PULMONARY DISEASE, UNSPECIFIED COPD TYPE (H): ICD-10-CM

## 2020-02-24 DIAGNOSIS — M54.9 BACK PAIN WITH RADIATION: ICD-10-CM

## 2020-02-24 DIAGNOSIS — G89.29 OTHER CHRONIC PAIN: ICD-10-CM

## 2020-02-24 DIAGNOSIS — R93.5 ABNORMAL CT OF THE ABDOMEN: ICD-10-CM

## 2020-02-24 DIAGNOSIS — R10.84 ABDOMINAL PAIN, GENERALIZED: ICD-10-CM

## 2020-02-24 DIAGNOSIS — J96.01 ACUTE RESPIRATORY FAILURE WITH HYPOXIA (H): ICD-10-CM

## 2020-02-24 PROBLEM — K52.9 COLITIS: Status: ACTIVE | Noted: 2020-02-24

## 2020-02-24 LAB
ALBUMIN SERPL-MCNC: 3.7 G/DL (ref 3.4–5)
ALBUMIN UR-MCNC: 10 MG/DL
ALP SERPL-CCNC: 169 U/L (ref 40–150)
ALT SERPL W P-5'-P-CCNC: 23 U/L (ref 0–50)
ANION GAP SERPL CALCULATED.3IONS-SCNC: 4 MMOL/L (ref 3–14)
APPEARANCE UR: CLEAR
AST SERPL W P-5'-P-CCNC: 22 U/L (ref 0–45)
BASE EXCESS BLDA CALC-SCNC: 4.1 MMOL/L
BASOPHILS # BLD AUTO: 0 10E9/L (ref 0–0.2)
BASOPHILS NFR BLD AUTO: 0.1 %
BILIRUB SERPL-MCNC: 0.9 MG/DL (ref 0.2–1.3)
BILIRUB UR QL STRIP: NEGATIVE
BUN SERPL-MCNC: 16 MG/DL (ref 7–30)
CALCIUM SERPL-MCNC: 11.6 MG/DL (ref 8.5–10.1)
CHLORIDE SERPL-SCNC: 98 MMOL/L (ref 94–109)
CO2 SERPL-SCNC: 32 MMOL/L (ref 20–32)
COLOR UR AUTO: YELLOW
CREAT BLD-MCNC: 0.9 MG/DL (ref 0.52–1.04)
CREAT SERPL-MCNC: 0.71 MG/DL (ref 0.52–1.04)
DIFFERENTIAL METHOD BLD: ABNORMAL
EOSINOPHIL # BLD AUTO: 0 10E9/L (ref 0–0.7)
EOSINOPHIL NFR BLD AUTO: 0.2 %
ERYTHROCYTE [DISTWIDTH] IN BLOOD BY AUTOMATED COUNT: 14.5 % (ref 10–15)
FLUAV+FLUBV AG SPEC QL: NEGATIVE
FLUAV+FLUBV AG SPEC QL: NEGATIVE
GFR SERPL CREATININE-BSD FRML MDRD: 59 ML/MIN/{1.73_M2}
GFR SERPL CREATININE-BSD FRML MDRD: 75 ML/MIN/{1.73_M2}
GLUCOSE SERPL-MCNC: 139 MG/DL (ref 70–99)
GLUCOSE UR STRIP-MCNC: NEGATIVE MG/DL
HCO3 BLD-SCNC: 29 MMOL/L (ref 21–28)
HCT VFR BLD AUTO: 47.4 % (ref 35–47)
HGB BLD-MCNC: 14.9 G/DL (ref 11.7–15.7)
HGB UR QL STRIP: NEGATIVE
HYALINE CASTS #/AREA URNS LPF: 15 /LPF (ref 0–2)
IMM GRANULOCYTES # BLD: 0.1 10E9/L (ref 0–0.4)
IMM GRANULOCYTES NFR BLD: 0.5 %
INTERPRETATION ECG - MUSE: NORMAL
KETONES UR STRIP-MCNC: NEGATIVE MG/DL
LACTATE BLD-SCNC: 1.8 MMOL/L (ref 0.7–2)
LEUKOCYTE ESTERASE UR QL STRIP: ABNORMAL
LYMPHOCYTES # BLD AUTO: 0.4 10E9/L (ref 0.8–5.3)
LYMPHOCYTES NFR BLD AUTO: 3.2 %
MCH RBC QN AUTO: 29.1 PG (ref 26.5–33)
MCHC RBC AUTO-ENTMCNC: 31.4 G/DL (ref 31.5–36.5)
MCV RBC AUTO: 93 FL (ref 78–100)
MONOCYTES # BLD AUTO: 1 10E9/L (ref 0–1.3)
MONOCYTES NFR BLD AUTO: 8.2 %
MUCOUS THREADS #/AREA URNS LPF: PRESENT /LPF
NEUTROPHILS # BLD AUTO: 10.5 10E9/L (ref 1.6–8.3)
NEUTROPHILS NFR BLD AUTO: 87.8 %
NITRATE UR QL: NEGATIVE
NRBC # BLD AUTO: 0 10*3/UL
NRBC BLD AUTO-RTO: 0 /100
NT-PROBNP SERPL-MCNC: 238 PG/ML (ref 0–1800)
OXYHGB MFR BLD: 98 % (ref 92–100)
PCO2 BLD: 41 MM HG (ref 35–45)
PH BLD: 7.45 PH (ref 7.35–7.45)
PH UR STRIP: 5.5 PH (ref 5–7)
PLATELET # BLD AUTO: 291 10E9/L (ref 150–450)
PO2 BLD: 148 MM HG (ref 80–105)
POTASSIUM SERPL-SCNC: 4.2 MMOL/L (ref 3.4–5.3)
PROT SERPL-MCNC: 7.8 G/DL (ref 6.8–8.8)
RBC # BLD AUTO: 5.12 10E12/L (ref 3.8–5.2)
RBC #/AREA URNS AUTO: <1 /HPF (ref 0–2)
SODIUM SERPL-SCNC: 134 MMOL/L (ref 133–144)
SOURCE: ABNORMAL
SP GR UR STRIP: 1.02 (ref 1–1.03)
SPECIMEN SOURCE: NORMAL
TROPONIN I SERPL-MCNC: <0.015 UG/L (ref 0–0.04)
UROBILINOGEN UR STRIP-MCNC: 2 MG/DL (ref 0–2)
WBC # BLD AUTO: 12 10E9/L (ref 4–11)
WBC #/AREA URNS AUTO: 2 /HPF (ref 0–5)

## 2020-02-24 PROCEDURE — 94640 AIRWAY INHALATION TREATMENT: CPT | Mod: 76

## 2020-02-24 PROCEDURE — 40000275 ZZH STATISTIC RCP TIME EA 10 MIN

## 2020-02-24 PROCEDURE — 94640 AIRWAY INHALATION TREATMENT: CPT

## 2020-02-24 PROCEDURE — 25000128 H RX IP 250 OP 636: Performed by: PHYSICIAN ASSISTANT

## 2020-02-24 PROCEDURE — 25000128 H RX IP 250 OP 636: Performed by: EMERGENCY MEDICINE

## 2020-02-24 PROCEDURE — 85025 COMPLETE CBC W/AUTO DIFF WBC: CPT | Performed by: EMERGENCY MEDICINE

## 2020-02-24 PROCEDURE — 82565 ASSAY OF CREATININE: CPT

## 2020-02-24 PROCEDURE — 81001 URINALYSIS AUTO W/SCOPE: CPT | Performed by: EMERGENCY MEDICINE

## 2020-02-24 PROCEDURE — 12000000 ZZH R&B MED SURG/OB

## 2020-02-24 PROCEDURE — 96374 THER/PROPH/DIAG INJ IV PUSH: CPT

## 2020-02-24 PROCEDURE — 25800030 ZZH RX IP 258 OP 636: Performed by: EMERGENCY MEDICINE

## 2020-02-24 PROCEDURE — 25000125 ZZHC RX 250: Performed by: EMERGENCY MEDICINE

## 2020-02-24 PROCEDURE — 40000809 ZZH STATISTIC NO DOCUMENTATION TO SUPPORT CHARGE

## 2020-02-24 PROCEDURE — 93005 ELECTROCARDIOGRAM TRACING: CPT

## 2020-02-24 PROCEDURE — 80053 COMPREHEN METABOLIC PANEL: CPT | Performed by: EMERGENCY MEDICINE

## 2020-02-24 PROCEDURE — 96361 HYDRATE IV INFUSION ADD-ON: CPT

## 2020-02-24 PROCEDURE — 99207 ZZC APP CREDIT; MD BILLING SHARED VISIT: CPT | Performed by: PHYSICIAN ASSISTANT

## 2020-02-24 PROCEDURE — 99285 EMERGENCY DEPT VISIT HI MDM: CPT | Mod: 25

## 2020-02-24 PROCEDURE — 83605 ASSAY OF LACTIC ACID: CPT | Performed by: EMERGENCY MEDICINE

## 2020-02-24 PROCEDURE — 84484 ASSAY OF TROPONIN QUANT: CPT | Performed by: EMERGENCY MEDICINE

## 2020-02-24 PROCEDURE — 25000125 ZZHC RX 250: Performed by: PHYSICIAN ASSISTANT

## 2020-02-24 PROCEDURE — 25000132 ZZH RX MED GY IP 250 OP 250 PS 637: Mod: GY | Performed by: PHYSICIAN ASSISTANT

## 2020-02-24 PROCEDURE — 74177 CT ABD & PELVIS W/CONTRAST: CPT

## 2020-02-24 PROCEDURE — 82805 BLOOD GASES W/O2 SATURATION: CPT | Performed by: PHYSICIAN ASSISTANT

## 2020-02-24 PROCEDURE — 36600 WITHDRAWAL OF ARTERIAL BLOOD: CPT

## 2020-02-24 PROCEDURE — 83880 ASSAY OF NATRIURETIC PEPTIDE: CPT | Performed by: EMERGENCY MEDICINE

## 2020-02-24 PROCEDURE — 99223 1ST HOSP IP/OBS HIGH 75: CPT | Mod: AI | Performed by: INTERNAL MEDICINE

## 2020-02-24 PROCEDURE — 87804 INFLUENZA ASSAY W/OPTIC: CPT | Performed by: EMERGENCY MEDICINE

## 2020-02-24 RX ORDER — IOPAMIDOL 755 MG/ML
65 INJECTION, SOLUTION INTRAVASCULAR ONCE
Status: COMPLETED | OUTPATIENT
Start: 2020-02-24 | End: 2020-02-24

## 2020-02-24 RX ORDER — GABAPENTIN 300 MG/1
300 CAPSULE ORAL 3 TIMES DAILY
Status: DISCONTINUED | OUTPATIENT
Start: 2020-02-24 | End: 2020-02-27 | Stop reason: HOSPADM

## 2020-02-24 RX ORDER — IPRATROPIUM BROMIDE AND ALBUTEROL SULFATE 2.5; .5 MG/3ML; MG/3ML
3 SOLUTION RESPIRATORY (INHALATION) ONCE
Status: COMPLETED | OUTPATIENT
Start: 2020-02-24 | End: 2020-02-24

## 2020-02-24 RX ORDER — FUROSEMIDE 20 MG
20 TABLET ORAL DAILY
Status: ON HOLD | COMMUNITY
End: 2021-02-06

## 2020-02-24 RX ORDER — IPRATROPIUM BROMIDE AND ALBUTEROL SULFATE 2.5; .5 MG/3ML; MG/3ML
3 SOLUTION RESPIRATORY (INHALATION)
Status: DISCONTINUED | OUTPATIENT
Start: 2020-02-24 | End: 2020-02-26

## 2020-02-24 RX ORDER — ONDANSETRON 4 MG/1
4 TABLET, ORALLY DISINTEGRATING ORAL EVERY 6 HOURS PRN
Status: DISCONTINUED | OUTPATIENT
Start: 2020-02-24 | End: 2020-02-27 | Stop reason: HOSPADM

## 2020-02-24 RX ORDER — HYDROCODONE BITARTRATE AND ACETAMINOPHEN 5; 325 MG/1; MG/1
1 TABLET ORAL EVERY 6 HOURS PRN
Status: DISCONTINUED | OUTPATIENT
Start: 2020-02-24 | End: 2020-02-27 | Stop reason: HOSPADM

## 2020-02-24 RX ORDER — ACETAMINOPHEN 500 MG
1000 TABLET ORAL 2 TIMES DAILY
Status: DISCONTINUED | OUTPATIENT
Start: 2020-02-24 | End: 2020-02-27 | Stop reason: HOSPADM

## 2020-02-24 RX ORDER — AMOXICILLIN 250 MG
1 CAPSULE ORAL 2 TIMES DAILY PRN
Status: DISCONTINUED | OUTPATIENT
Start: 2020-02-24 | End: 2020-02-27 | Stop reason: HOSPADM

## 2020-02-24 RX ORDER — IPRATROPIUM BROMIDE AND ALBUTEROL SULFATE 2.5; .5 MG/3ML; MG/3ML
3 SOLUTION RESPIRATORY (INHALATION) ONCE
Status: DISCONTINUED | OUTPATIENT
Start: 2020-02-24 | End: 2020-02-24

## 2020-02-24 RX ORDER — ONDANSETRON 2 MG/ML
4 INJECTION INTRAMUSCULAR; INTRAVENOUS EVERY 6 HOURS PRN
Status: DISCONTINUED | OUTPATIENT
Start: 2020-02-24 | End: 2020-02-27 | Stop reason: HOSPADM

## 2020-02-24 RX ORDER — FLUTICASONE PROPIONATE 50 MCG
2 SPRAY, SUSPENSION (ML) NASAL DAILY
COMMUNITY

## 2020-02-24 RX ORDER — NALOXONE HYDROCHLORIDE 0.4 MG/ML
.1-.4 INJECTION, SOLUTION INTRAMUSCULAR; INTRAVENOUS; SUBCUTANEOUS
Status: DISCONTINUED | OUTPATIENT
Start: 2020-02-24 | End: 2020-02-27 | Stop reason: HOSPADM

## 2020-02-24 RX ORDER — PROCHLORPERAZINE 25 MG
12.5 SUPPOSITORY, RECTAL RECTAL EVERY 12 HOURS PRN
Status: DISCONTINUED | OUTPATIENT
Start: 2020-02-24 | End: 2020-02-27 | Stop reason: HOSPADM

## 2020-02-24 RX ORDER — GUAIFENESIN 600 MG/1
1200 TABLET, EXTENDED RELEASE ORAL 2 TIMES DAILY
Status: DISCONTINUED | OUTPATIENT
Start: 2020-02-24 | End: 2020-02-27 | Stop reason: HOSPADM

## 2020-02-24 RX ORDER — LIDOCAINE 4 G/G
1 PATCH TOPICAL
Status: DISCONTINUED | OUTPATIENT
Start: 2020-02-25 | End: 2020-02-27 | Stop reason: HOSPADM

## 2020-02-24 RX ORDER — PROCHLORPERAZINE MALEATE 5 MG
5 TABLET ORAL EVERY 6 HOURS PRN
Status: DISCONTINUED | OUTPATIENT
Start: 2020-02-24 | End: 2020-02-27 | Stop reason: HOSPADM

## 2020-02-24 RX ORDER — LIDOCAINE 4 G/G
1 PATCH TOPICAL EVERY 24 HOURS
Status: DISCONTINUED | OUTPATIENT
Start: 2020-02-24 | End: 2020-02-24

## 2020-02-24 RX ORDER — BISACODYL 10 MG
10 SUPPOSITORY, RECTAL RECTAL DAILY PRN
Status: DISCONTINUED | OUTPATIENT
Start: 2020-02-24 | End: 2020-02-27 | Stop reason: HOSPADM

## 2020-02-24 RX ORDER — FUROSEMIDE 20 MG
20 TABLET ORAL DAILY
Status: DISCONTINUED | OUTPATIENT
Start: 2020-02-25 | End: 2020-02-27 | Stop reason: HOSPADM

## 2020-02-24 RX ORDER — LIDOCAINE 40 MG/G
CREAM TOPICAL
Status: DISCONTINUED | OUTPATIENT
Start: 2020-02-24 | End: 2020-02-27 | Stop reason: HOSPADM

## 2020-02-24 RX ORDER — POTASSIUM CHLORIDE 1500 MG/1
20 TABLET, EXTENDED RELEASE ORAL DAILY
COMMUNITY

## 2020-02-24 RX ORDER — LIDOCAINE 4 G/G
1 PATCH TOPICAL EVERY 24 HOURS
COMMUNITY

## 2020-02-24 RX ORDER — HYDROMORPHONE HYDROCHLORIDE 1 MG/ML
0.2 INJECTION, SOLUTION INTRAMUSCULAR; INTRAVENOUS; SUBCUTANEOUS
Status: DISCONTINUED | OUTPATIENT
Start: 2020-02-24 | End: 2020-02-25

## 2020-02-24 RX ORDER — METHYLPREDNISOLONE SODIUM SUCCINATE 125 MG/2ML
60 INJECTION, POWDER, LYOPHILIZED, FOR SOLUTION INTRAMUSCULAR; INTRAVENOUS EVERY 12 HOURS
Status: COMPLETED | OUTPATIENT
Start: 2020-02-24 | End: 2020-02-25

## 2020-02-24 RX ORDER — METHYLPREDNISOLONE SODIUM SUCCINATE 125 MG/2ML
125 INJECTION, POWDER, LYOPHILIZED, FOR SOLUTION INTRAMUSCULAR; INTRAVENOUS ONCE
Status: COMPLETED | OUTPATIENT
Start: 2020-02-24 | End: 2020-02-24

## 2020-02-24 RX ORDER — AMOXICILLIN 250 MG
2 CAPSULE ORAL 2 TIMES DAILY PRN
Status: DISCONTINUED | OUTPATIENT
Start: 2020-02-24 | End: 2020-02-27 | Stop reason: HOSPADM

## 2020-02-24 RX ORDER — ASCORBIC ACID 500 MG
500 TABLET ORAL DAILY
COMMUNITY

## 2020-02-24 RX ORDER — POLYETHYLENE GLYCOL 3350 17 G/17G
17 POWDER, FOR SOLUTION ORAL DAILY PRN
Status: DISCONTINUED | OUTPATIENT
Start: 2020-02-24 | End: 2020-02-27 | Stop reason: HOSPADM

## 2020-02-24 RX ORDER — BISMUTH SUBSALICYLATE 262 MG/1
2 TABLET, CHEWABLE ORAL
COMMUNITY
End: 2020-02-24

## 2020-02-24 RX ADMIN — IOPAMIDOL 65 ML: 755 INJECTION, SOLUTION INTRAVENOUS at 11:35

## 2020-02-24 RX ADMIN — ACETAMINOPHEN 1000 MG: 500 TABLET, FILM COATED ORAL at 20:50

## 2020-02-24 RX ADMIN — IPRATROPIUM BROMIDE AND ALBUTEROL SULFATE 3 ML: .5; 3 SOLUTION RESPIRATORY (INHALATION) at 12:02

## 2020-02-24 RX ADMIN — GUAIFENESIN 1200 MG: 600 TABLET, EXTENDED RELEASE ORAL at 20:50

## 2020-02-24 RX ADMIN — SODIUM CHLORIDE 1000 ML: 9 INJECTION, SOLUTION INTRAVENOUS at 13:09

## 2020-02-24 RX ADMIN — SODIUM CHLORIDE 60 ML: 9 INJECTION, SOLUTION INTRAVENOUS at 11:35

## 2020-02-24 RX ADMIN — IPRATROPIUM BROMIDE AND ALBUTEROL SULFATE 3 ML: .5; 3 SOLUTION RESPIRATORY (INHALATION) at 13:19

## 2020-02-24 RX ADMIN — IPRATROPIUM BROMIDE AND ALBUTEROL SULFATE 3 ML: .5; 3 SOLUTION RESPIRATORY (INHALATION) at 20:19

## 2020-02-24 RX ADMIN — METHYLPREDNISOLONE SODIUM SUCCINATE 125 MG: 125 INJECTION, POWDER, FOR SOLUTION INTRAMUSCULAR; INTRAVENOUS at 13:30

## 2020-02-24 RX ADMIN — GABAPENTIN 300 MG: 300 CAPSULE ORAL at 20:50

## 2020-02-24 RX ADMIN — HYDROCODONE BITARTRATE AND ACETAMINOPHEN 1 TABLET: 5; 325 TABLET ORAL at 16:37

## 2020-02-24 RX ADMIN — GABAPENTIN 300 MG: 300 CAPSULE ORAL at 16:37

## 2020-02-24 RX ADMIN — METHYLPREDNISOLONE SODIUM SUCCINATE 62.5 MG: 125 INJECTION, POWDER, FOR SOLUTION INTRAMUSCULAR; INTRAVENOUS at 23:56

## 2020-02-24 ASSESSMENT — ACTIVITIES OF DAILY LIVING (ADL)
RETIRED_EATING: 2-->ASSISTIVE PERSON
DRESS: 2-->ASSISTIVE PERSON
TOILETING: 3-->ASSISTIVE EQUIPMENT AND PERSON
BATHING: 2-->ASSISTIVE PERSON
COGNITION: 1 - ATTENTION OR MEMORY DEFICITS
AMBULATION: 3-->ASSISTIVE EQUIPMENT AND PERSON
TRANSFERRING: 3-->ASSISTIVE EQUIPMENT AND PERSON
FALL_HISTORY_WITHIN_LAST_SIX_MONTHS: NO
SWALLOWING: 0-->SWALLOWS FOODS/LIQUIDS WITHOUT DIFFICULTY
RETIRED_COMMUNICATION: 2-->DIFFICULTY UNDERSTANDING AND SPEAKING (NOT RELATED TO LANGUAGE BARRIER)
ADLS_ACUITY_SCORE: 20

## 2020-02-24 ASSESSMENT — MIFFLIN-ST. JEOR: SCORE: 983.34

## 2020-02-24 NOTE — ED NOTES
Spoke with Dr. Garcia at 1505 who just evaluated patient.  Will add ABG to assess for CO2 retention.  Changed bed status to unit where BiPAP could be initiated prn.  Dr. Garcia and I agreed to defer BiPAP for now.  ABG ordered, Dr. Garcia to f/u results.  Dr. Patino aware of patient in ED while awaiting transport upstairs.  Pt confirmed DNR/DNI with son at bedside.     Les Caballero MD  02/24/20 1832

## 2020-02-24 NOTE — ED NOTES
One pair of earring and three necklaces removed and place in specimen cup and put in main pocket of purse.

## 2020-02-24 NOTE — PROGRESS NOTES
RT attempted ABG collection twice no success at this time. VBG recommended. Will continue to monitor.     Andreas Christy, RT

## 2020-02-24 NOTE — ED PROVIDER NOTES
"  History     Chief Complaint:  Abdominal Pain      HPI   history limited as patient is a poor historian, and supplemented by electronic chart review and paramedics.    Kelsey Wood is a 89 year old female who presents via EMS from a care facility with abdominal pain. Per EMS, the patient was having abdominal pain since yesterday that worsened this morning. They noted some abdominal distension. They heard some wheezing and found the patient's oxygenation to be 80% on room air. The patient is not on oxygen normally. She notes that she did have some soft bowel movements this morning. Per the patient, she has been having a \"cold\" for some time. She denies any vomiting.        Allergies:  No Known Drug Allergies     Medications:    Senokot  Potassium chloride  Xalatan  Duoneb  Norco  Organidin  Gabapentin  Flonase  Duragesic  Albuterol  Pepto Bismol      Past Medical History:    Asthma  Hard of hearing  Osteoarthritis   Glaucoma   Cataract   Vitamin deficiency  Seborrheic keratosis  Scoliosis   Arthrodesis   Membranous glomerulonephritis  Osteoporosis     Past Surgical History:    Kidney biopsy   Cholecystectomy  Appendectomy     Family History:    Family history reviewed. No pertinent family history.     Social History:  PCP: Demetrio Arriaza   Marital Status:        Review of Systems   Reason unable to perform ROS: ROS limited by cognitive impairment.       Physical Exam     Patient Vitals for the past 24 hrs:   BP Temp Temp src Pulse Heart Rate Resp SpO2 Height Weight   02/24/20 1302 -- 98  F (36.7  C) Oral -- -- -- -- -- --   02/24/20 1300 -- -- -- -- -- -- (!) 84 % -- --   02/24/20 1245 124/67 -- -- 115 117 15 95 % -- --   02/24/20 1230 117/65 -- -- 114 116 15 95 % -- --   02/24/20 1215 (!) 145/70 -- -- 115 113 22 100 % -- --   02/24/20 1200 134/68 -- -- 111 108 22 97 % -- --   02/24/20 1115 127/66 -- -- 108 106 16 97 % -- --   02/24/20 1100 128/72 -- -- 111 107 15 98 % -- --   02/24/20 1050 -- 98.6  F (37  C) " "-- -- -- -- -- -- --   02/24/20 1042 118/67 -- -- 109 -- 16 (!) 85 % 1.6 m (5' 3\") 59.4 kg (131 lb)      Physical Exam  General: woman recumbent in room 25  HENT: mucous membranes moist   CV: rate as above, no LE edema, no murmur audible  Resp: normal effort, moderate diffuse wheezing initially, no stridor  GI: abdomen soft, moderate diffuse tenderness with some distension, bowel sounds present  MSK: no bony tenderness, no CVAT  Skin: appropriately warm and dry, no CVAT  Neuro: alert, clear speech, poor historian,  equal  Psych: cooperates as able    Emergency Department Course     ECG:  ECG taken at 1120, ECG read at 1130  Sinus tachycardia  Left axis deviation  right  bundle branch block   Possible inferior infarct, age undetermined  Abnormal ECG  Rate 109 bpm. NV interval 170 ms. QRS duration 112 ms. QT/QTc 350/471 ms. P-R-T axes 61 -35 19.    Imaging:  Radiology findings were communicated with the patient and family who voiced understanding of the findings.    CT Chest (PE) Abdomen Pelvis w Contrast  1. Right middle lobe atelectasis. Previously noted medial right lower  lobe atelectatic changes have resolved. No obvious hilar mass or  adenopathy. Endobronchial lesion is difficult to exclude.  2. No evidence of pulmonary embolism. Peripheral arteries are obscured  by respiratory motion.  3. Stable left adrenal nodule and thickening  4. Simple renal cysts. No further follow-up required.  5. Subtle wall thickening and surrounding mesenteric inflammation  involving the right colon and left colon possibly an infectious or  inflammatory process not evident on prior CT. No obstruction, abscess  or free intraperitoneal air.  FUENTES SOTO MD  Reading per radiology    Laboratory:  Laboratory findings were communicated with the patient and family who voiced understanding of the findings.    Influenza A/B Antigen: negative     UA: protein albumin 10 (A), Leukocyte esterase small (A), mucous present (A), hyaline casts " (H) o/w WNL    CBC: WBC 12.0 (H), HGB 14.9,   CMP:  (H), calcium 11.6 (H), ALKPHOS 169 (H) o/w WNL (Creatinine 0.71)  Lactic acid: 1.8  Nt probnp inpatient: 238  Troponin (Collected 1047): <0.015   Creatinine POCT (Collected 1046): 0.9     Interventions:  1202 Duoneb 3 mL nebulization   1309 NS 1 L IV  1319 Duoneb 3 mL nebulization   Solu-medrol 125 mg IV     Emergency Department Course:    1034 Nursing notes and vitals reviewed. I performed an exam of the patient as documented above.     1047 IV was inserted and blood was drawn for laboratory testing, results above.     1100 The patient provided a urine sample here in the emergency department. This was sent for laboratory testing, findings above.     1119 The patient was sent for a CT while in the emergency department, results above.      1306 Patient rechecked and patient's family updated.    1325 I spoke with Dr. Garcia of the hospitalist service regarding patient's presentation, findings, and plan of care.      The patient is admitted into the care of Dr. Garcia.     Impression & Plan      Medical Decision Making:  Her evaluation is complicated by the fact that she is quite a poor historian.  Regarding her abdominal pain, her CT is somewhat abnormal though this is nonspecific.  She does not have peritonitis or vomiting.  No fever to raise higher suspicion for infection.  She is passing nonbloody stool.  Given her advanced age and cognitive impairment, as well as the fact that she did not wish to have pain medicine, analgesia was deferred.  Of additional concern, and the primary motivation for hospitalization, is her persistent hypoxic respiratory failure which is new.  Given her CT results, which do not show pulmonary embolism, edema, or infiltrate nor pneumothorax, I think that a flare of reactive airway disease is most likely the etiology.  She has some wheezing on exam and was given bronchodilators with improvement in her lung exam though she  remained slightly hypoxic.  Steroids were administered.  She has a history of smoking.  I spoke with her son who confirms that she is DNR/DNI.  He agrees with the plan for hospitalization for further care given these findings.        Diagnosis:    ICD-10-CM    1. Acute respiratory failure with hypoxia (H) J96.01    2. Abdominal pain, generalized R10.84    3. Abnormal CT of the abdomen R93.5      Disposition:   Findings and plan explained to the Patient who consents to admission. Discussed the patient with Dr. Garcia, who will admit the patient to a med/surg bed for further monitoring, evaluation, and treatment.     Scribe Disclosure:  Pritesh VANESSA, am serving as a scribe at 10:35 AM on 2/24/2020 to document services personally performed by Les Caballero MD based on my observations and the provider's statements to me.       EMERGENCY DEPARTMENT       Les Caballero MD  02/24/20 1229

## 2020-02-24 NOTE — ED NOTES
"[facility]  ED Nurse Handoff Report    ED Chief complaint: Abdominal Pain      ED Diagnosis:   Final diagnoses:   None       Code Status: DNR / DNI    Allergies: No Known Allergies    Patient Story: Abdominal pain started yesterday and worsened at 0230 with distension. O2 sats were found to be 80% on RA. No use of home O2.   Focused Assessment:  Pt very sleepy. Arouses to voice and touch. Abdominal pain with palpation and distended. Denies nausea or SOB    Treatments and/or interventions provided: Nebs. No pain med given due to sleepiness.   Patient's response to treatments and/or interventions: sleeping    To be done/followed up on inpatient unit:  Supplemental O2.     Does this patient have any cognitive concerns?: Pt alert but is poor historian    Activity level - Baseline/Home:  Stand with Assist and Walker  Activity Level - Current:   Did not attempt to stand.    Patient's Preferred language: English   Needed?: Yes    Isolation: None and Droplet  Infection: Not Applicable  Bariatric?: No    Vital Signs:   Vitals:    02/24/20 1042 02/24/20 1050 02/24/20 1100   BP: 118/67  128/72   Pulse: 109  111   Resp: 16  15   Temp:  98.6  F (37  C)    SpO2: (!) 85%  98%   Weight: 59.4 kg (131 lb)     Height: 1.6 m (5' 3\")         Cardiac Rhythm:Cardiac Rhythm: Sinus tachycardia    Was the PSS-3 completed:   Yes  What interventions are required if any?               Family Comments: No family here  OBS brochure/video discussed/provided to patient/family: N/A              Name of person given brochure if not patient: na              Relationship to patient: na    For the majority of the shift this patient's behavior was Green.   Behavioral interventions performed were na.    ED NURSE PHONE NUMBER: [phone]       "

## 2020-02-24 NOTE — ED NOTES
Bed: ED25  Expected date: 2/24/20  Expected time: 10:29 AM  Means of arrival: Ambulance  Comments:  HEast 89f abd pain, distention ETA  1030

## 2020-02-24 NOTE — PHARMACY-ADMISSION MEDICATION HISTORY
Pharmacy Medication History  Admission medication history interview status for the 2/24/2020  admission is complete. See EPIC admission navigator for prior to admission medications     Medication history sources: med list from [facility] ([phone]).  Medication history source reliability: Good  Adherence assessment: Good    Significant changes made to the medication list:        Additional medication history information:       Medication reconciliation completed by provider prior to medication history? No    Time spent in this activity: 20 minutes      Prior to Admission medications    Medication Sig Last Dose Taking? Auth Provider   ACETAMINOPHEN PO Take 650 mg by mouth every 4 hours as needed for pain or fever And q4h prn prn Yes Reported, Patient   albuterol (PROAIR HFA/PROVENTIL HFA/VENTOLIN HFA) 108 (90 Base) MCG/ACT Inhaler Inhale 2 puffs into the lungs 4 times daily as needed for shortness of breath / dyspnea or wheezing  prn Yes Unknown, Entered By History   bismuth subsalicylate (PEPTO BISMOL) 262 MG chewable tablet Take 2 tablets by mouth every hour as needed for heartburn Max of 16 tabs per 24 hrs prn Yes Unknown, Entered By History   calcium carbonate (OS-MARY GRACE 500 MG [place]) 500 MG tablet Take 1 tablet by mouth daily  2/24/2020 at am Yes Unknown, Entered By History   fentaNYL (DURAGESIC) 12 mcg/hr 72 hr patch Place 1 patch onto the skin every 72 hours remove old patch. 2/23/2020 at 09:00 Yes Julieth Keys PA-C   fluticasone (FLONASE) 50 MCG/ACT nasal spray Spray 2 sprays into both nostrils daily 2/24/2020 at am Yes Unknown, Entered By History   Fluticasone-Umeclidin-Vilanterol (TRELEGY ELLIPTA) 100-62.5-25 MCG/INH oral inhaler Inhale 1 puff into the lungs daily 2/24/2020 at am Yes Unknown, Entered By History   furosemide (LASIX) 20 MG tablet Take 20 mg by mouth daily 2/24/2020 at am Yes Unknown, Entered By History   GABAPENTIN PO Take 300 mg by mouth 3 times daily 09:00, 14:00  and 21:00 2/24/2020 at am Yes Unknown, Entered By History   Glucosamine-Chondroitin (OSTEO BI-FLEX REGULAR STRENGTH PO) Take 1 tablet by mouth 2 times daily 2/24/2020 at am Yes Unknown, Entered By History   guaiFENesin (ORGANIDIN) 200 MG TABS tablet Take 400 mg by mouth 2 times daily as needed for cough  prn Yes Reported, Patient   HYDROcodone-acetaminophen (NORCO) 5-325 MG per tablet Take 1 tablet by mouth every 6 hours as needed for severe pain prn Yes Reported, Patient   hypromellose (ARTIFICIAL TEARS) 0.5 % SOLN ophthalmic solution Place 1 drop into both eyes daily 2/24/2020 at am Yes Unknown, Entered By History   hypromellose (ARTIFICIAL TEARS) 0.5 % SOLN ophthalmic solution Place 1 drop into both eyes daily as needed for dry eyes prn Yes Unknown, Entered By History   ipratropium - albuterol 0.5 mg/2.5 mg/3 mL (DUONEB) 0.5-2.5 (3) MG/3ML neb solution Take 1 vial by nebulization 4 times daily as needed for shortness of breath / dyspnea or wheezing  prn Yes Reported, Patient   latanoprost (XALATAN) 0.005 % ophthalmic solution Place 1 drop into both eyes At Bedtime 2/23/2020 at hs Yes Unknown, Entered By History   Lidocaine (LIDOCARE) 4 % Patch Place 1 patch onto the skin every 24 hours To prevent lidocaine toxicity, patient should be patch free for 12 hrs daily.  Salonpas for hip pain 2/24/2020 at 08:00 hip Yes Unknown, Entered By History   potassium chloride ER (K-TAB) 20 MEQ CR tablet Take 20 mEq by mouth daily 2/24/2020 at am Yes Unknown, Entered By History   senna-docusate (SENOKOT-S;PERICOLACE) 8.6-50 MG per tablet Take 1 tablet by mouth daily 2/24/2020 at am Yes Reported, Patient   vitamin C (ASCORBIC ACID) 500 MG tablet Take 500 mg by mouth daily 2/24/2020 at am Yes Unknown, Entered By History

## 2020-02-24 NOTE — PROGRESS NOTES
SW:  acknowledges consult. Awaiting Physical Therapy recommendations. SW to follow and assist with discharge needs.    GREGG Prince  Daytime (8:00am-4:30pm): [phone]  After-Hours SW Pager (4:30pm-11:30pm): [phone]

## 2020-02-24 NOTE — ED TRIAGE NOTES
Abdominal pain since yesterday with distension. Pain worse at 0230. States had soft BM this AM. EMS noted 02 sats to be 80%. Wheezing noted. Patient is not on home 02. Pain to LLQ with palpation.

## 2020-02-24 NOTE — ED NOTES
RT unable to get ABG's in ED.  Dr. Garcia paged to relay information.  Dr. Garcia okay with sending patient to floor without ABG read, saying that they can be obtained while patient is on the floor.

## 2020-02-24 NOTE — H&P
Admitted:     02/24/2020      PRIMARY CARE PHYSICIAN:  Demetrio Arriaza MD      CHIEF COMPLAINT:  Abdominal pain.      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      The patient currently resides in [facility] living [facility].  The patient began experiencing abdominal pain yesterday, but it significantly worsened this morning.  Staff members called EMS and patient was brought into [facility] Emergency Department.  During initial examination by EMS, patient was noted to be wheezing, O2 saturations while on room air was noted to be 80% and patient was placed on supplemental oxygen.      In the Emergency Department, Dr. Caballero evaluated the patient.  Evaluation included an EKG that showed sinus tachycardia with right bundle branch block, possible age-undetermined inferior infarct.  A chest, abdomen and pelvis CT with contrast was performed that showed right middle lobe atelectasis and previously noted medial right lower lobe atelectasis has since resolved, and no obvious hilar mass or adenopathy noted.  There was no evidence of PE.  Stable left adrenal nodule and thickening.  A simple renal cyst and subtle wall thickening and surrounding mesenteric inflammation involving the right colon and left colon, possibly an infectious or inflammatory process, not evident on prior CT, without evidence for obstruction, abscess or free intraperitoneal air.  Laboratory studies that were performed included rapid influenza A antigen that was negative, urinalysis revealing protein albumin of 10, leukocyte esterase and small amount of mucus present and  hyaline casts.  A comprehensive metabolic panel revealed a creatinine of 0.71 with a GFR 75, alkaline phosphatase of 169, calcium level of 11.6, glucose level 139, otherwise within normal limits.  Lactic acid level was noted to be 1.8.  ProBNP was within normal limits at 238 and initial troponin was less than 0.015.  CBC with differential revealed a white count of 12, hemoglobin of 14.9, hematocrit of 47.4, platelets of 291, MCHC of 31.4, absolute neutrophils of 10.5, absolute lymphocytes of 0.4, otherwise within normal limits.  The patient received a liter of IV fluids, 2 DuoNeb treatments, 125 mg of Solu-Medrol, and was then admitted to [facility] under the Hospitalist Service for continued evaluation and management.      I evaluated the patient in the Emergency Department where she was lying on the gurney with her son present in the room.  The patient was able to be awakened when shaken hard enough, but was unable to maintain wakefulness and was unable to answer any questions.      The majority of the information was obtained through patient's son and electronic medical record.  I did personally speak with Dr. Caballero to get some background information as well.  It appears patient began experiencing abdominal pain yesterday and called her son who happened to be out of town.  He returned this morning and was planning to come and visit when he received another phone call indicating she was in the emergency room.      PAST MEDICAL HISTORY:   1.  COPD.   2.  Mild persistent asthma.   3.  Osteoarthritis.   4.  Osteoporosis.   5.  Glaucoma.   6.  History of membranous glomerulonephritis.   7.  Hard of hearing.   8.  Chronic pain.   9.  Seborrheic keratosis.   10.  Cognitive impairment with a SLUMS score 15/30.   11.  History of colitis.      PAST SURGICAL HISTORY:   1.  Kidney biopsy.   2.  Cholecystectomy.   3.  Appendectomy.   4.  Spinal fusion of L4 and L5.   5.  Tonsillectomy.   6.  Parotid tumor  resection.      PRIOR TO ADMIT MEDICATIONS:    Prior to Admission Medications   Prescriptions Last Dose Informant Patient Reported? Taking?   ACETAMINOPHEN PO prn  Yes Yes   Sig: Take 650 mg by mouth every 4 hours as needed for pain or fever And q4h prn   Fluticasone-Umeclidin-Vilanterol (TRELEGY ELLIPTA) 100-62.5-25 MCG/INH oral inhaler 2/24/2020 at am  Yes Yes   Sig: Inhale 1 puff into the lungs daily   GABAPENTIN PO 2/24/2020 at am  Yes Yes   Sig: Take 300 mg by mouth 3 times daily 09:00, 14:00 and 21:00   Glucosamine-Chondroitin (OSTEO BI-FLEX REGULAR STRENGTH PO) 2/24/2020 at am  Yes Yes   Sig: Take 1 tablet by mouth 2 times daily   HYDROcodone-acetaminophen (NORCO) 5-325 MG per tablet prn  Yes Yes   Sig: Take 1 tablet by mouth every 6 hours as needed for severe pain   Lidocaine (LIDOCARE) 4 % Patch 2/24/2020 at 08:00 hip  Yes Yes   Sig: Place 1 patch onto the skin every 24 hours To prevent lidocaine toxicity, patient should be patch free for 12 hrs daily.  Salonpas for hip pain   albuterol (PROAIR HFA/PROVENTIL HFA/VENTOLIN HFA) 108 (90 Base) MCG/ACT Inhaler prn  Yes Yes   Sig: Inhale 2 puffs into the lungs 4 times daily as needed for shortness of breath / dyspnea or wheezing    bismuth subsalicylate (PEPTO BISMOL) 262 MG chewable tablet prn  Yes Yes   Sig: Take 2 tablets by mouth every hour as needed for heartburn Max of 16 tabs per 24 hrs   calcium carbonate (OS-MARY GRACE 500 MG [place]) 500 MG tablet 2/24/2020 at am  Yes Yes   Sig: Take 1 tablet by mouth daily    fentaNYL (DURAGESIC) 12 mcg/hr 72 hr patch 2/23/2020 at 09:00  No Yes   Sig: Place 1 patch onto the skin every 72 hours remove old patch.   fluticasone (FLONASE) 50 MCG/ACT nasal spray 2/24/2020 at am  Yes Yes   Sig: Spray 2 sprays into both nostrils daily   furosemide (LASIX) 20 MG tablet 2/24/2020 at am  Yes Yes   Sig: Take 20 mg by mouth daily   guaiFENesin (ORGANIDIN) 200 MG TABS tablet prn  Yes Yes   Sig: Take 400 mg by mouth 2 times daily as  needed for cough    hypromellose (ARTIFICIAL TEARS) 0.5 % SOLN ophthalmic solution 2/24/2020 at am  Yes Yes   Sig: Place 1 drop into both eyes daily   hypromellose (ARTIFICIAL TEARS) 0.5 % SOLN ophthalmic solution prn  Yes Yes   Sig: Place 1 drop into both eyes daily as needed for dry eyes   ipratropium - albuterol 0.5 mg/2.5 mg/3 mL (DUONEB) 0.5-2.5 (3) MG/3ML neb solution prn  Yes Yes   Sig: Take 1 vial by nebulization 4 times daily as needed for shortness of breath / dyspnea or wheezing    latanoprost (XALATAN) 0.005 % ophthalmic solution 2/23/2020 at hs  Yes Yes   Sig: Place 1 drop into both eyes At Bedtime   potassium chloride ER (K-TAB) 20 MEQ CR tablet 2/24/2020 at am  Yes Yes   Sig: Take 20 mEq by mouth daily   senna-docusate (SENOKOT-S;PERICOLACE) 8.6-50 MG per tablet 2/24/2020 at am  Yes Yes   Sig: Take 1 tablet by mouth daily   vitamin C (ASCORBIC ACID) 500 MG tablet 2/24/2020 at am  Yes Yes   Sig: Take 500 mg by mouth daily      Facility-Administered Medications: None        ALLERGIES:  NO KNOWN DRUG ALLERGIES.      SOCIAL HISTORY:  The patient currently resides in [facility].  The patient's electronic medical record indicates she was a former smoker but quit in 1994; however, upon discussion with the patient's son, it appears she is a closet smoker and just recently quit maybe a year ago.  The patient's son denies active alcohol use or illicit drug use.  She is primarily wheelchair bound.  She does not utilize supplemental oxygen or CPAP but does utilize a nebulizing machine.      FAMILY HISTORY:   1.  Mother had lung cancer.   2.  Brother had prostate cancer.      REVIEW OF SYSTEMS:  Unable to be performed due to patient's inability to stay awake and not alert.      PHYSICAL EXAMINATION:   VITAL SIGNS:  Temperature is 98 degrees Fahrenheit with a blood pressure of 109/60, heart rate of 115 beats per minute, respiratory rate of 15, O2 saturation 100% on 3 liters per  nasal cannula.   GENERAL:  The patient was denying any pain but was asleep.  Physical exam is limited due to patient's level of alertness.  The patient is asleep, difficult to arouse, but did awaken briefly before falling back asleep, appears to be in no apparent distress.   HEENT:  Normocephalic, atraumatic.  Remainder is deferred.   NECK:  Supple, normal range of motion, no tracheal deviation.  No cervical lymphadenopathy present.   CARDIOVASCULAR:  Tachycardic, regular rate and rhythm.  Otherwise, no rubs, murmurs or gallops appreciated.   PULMONARY:  Diffuse wheeze appreciated bilaterally, otherwise no rhonchi or rales appreciated.   GASTROINTESTINAL:  Bowel sounds are present in all 4 quadrants.  Soft, mildly distended and nontender to palpation.   NEUROLOGIC:  Deferred due to the level of alertness.   EXTREMITIES:  1+ lower extremity edema noted bilaterally.  Calves are nontender to palpation.      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   1.  Acute hypoxic respiratory failure secondary to chronic obstructive pulmonary disease exacerbation:  The patient has a documented O2 saturation of 84% on room air and per EMS was 80%.  She is currently requiring 3-4 liters of supplemental oxygen to maintain O2 saturations in the upper 90s-100%.  Due to patient's level of alertness, we will order a stat ABG once on the floor.  The patient has already received DuoNeb therapies and 125 mg of Solu-Medrol.  We will continue with DuoNeb every 4 hours while awake and order for Solu-Medrol 60 mg 2  times daily.  We will also order Mucinex 1200 mg 2 times daily.  I have requested Respiratory Therapy assistance for aggressive pulmonary hygiene with incentive spirometer and flutter valve.  We will attempt to wean oxygen as tolerated.  We will plan to resume prior to admit Trelegy Ellipta 1 puff daily.   ADDENDUM:  ABG ordered in the ED and awaiting results.    2.  Colitis:  Patient is currently not alert enough to complain of pain.  She does have a noted history of chronic pain and appears to be on a fentanyl patch, as needed Norco at home.  We will resume both these medications and have As needed IV Dilaudid available and will schedule Tylenol 1000 mg 2 times daily.   3.  Leukocytosis:  WBC mildly elevated at 12.  Could be secondary to colitis.  Patient afebrile and without other signs of infection.  Will monitor though expect WBC to be elevated for the next several days with initiation of steroids.    4.  Chronic pain:  Again, will resume prior to admit fentanyl patch and as needed Norco, scheduled Tylenol and continue with gabapentin 300 mg 3 times daily.   5.  Lower extremity edema:  Patient appears to be on Lasix 20 mg daily and has no history of heart failure.  We will resume this medication and monitor.   6.  Glaucoma:  We will hold prior to admit eyedrops and resume at discharge.   7.  Cognitive impairment:  Patient has a previous SLUMS score 15/30.  I have requested PT and OT assessment.   8.  DVT prophylaxis:  We will place the patient on PCDs.      CODE STATUS:  Discussed with the patient's son who indicated she is DNR/DNI.      DISPOSITION:  The patient is being admitted under inpatient status due to acute hypoxic respiratory failure thought to be secondary to exacerbation of chronic obstructive pulmonary disease and for noted colitis.  I believe the patient will be hospitalized for a minimum of 2 evenings while undergoing continued evaluation and management.      The patient was discussed with   Jose, who agrees with the assessment and plan as stated above.  Dr. Garcia will evaluate the patient independently.         BETHANY GARCIA, DO       As dictated by PHILLIP SMITH PA-C            D: 2020   T: 2020   MT: NABILA      Name:     SONALI TUCKER   MRN:      [id number]        Account:      [id number]   :      1929        Admitted:     2020                   Document: [id number]       cc: Demetrio Arriaza MD

## 2020-02-24 NOTE — PROGRESS NOTES
RECEIVING UNIT ED HANDOFF REVIEW    ED Nurse Handoff Report was reviewed by: Livia Toribio RN on February 24, 2020 at 2:39 PM

## 2020-02-25 ENCOUNTER — APPOINTMENT (OUTPATIENT)
Dept: PHYSICAL THERAPY | Facility: CLINIC | Age: 85
DRG: 190 | End: 2020-02-25
Attending: PHYSICIAN ASSISTANT
Payer: MEDICARE

## 2020-02-25 LAB
ALBUMIN SERPL-MCNC: 2.9 G/DL (ref 3.4–5)
ALP SERPL-CCNC: 102 U/L (ref 40–150)
ALT SERPL W P-5'-P-CCNC: 21 U/L (ref 0–50)
ANION GAP SERPL CALCULATED.3IONS-SCNC: 1 MMOL/L (ref 3–14)
AST SERPL W P-5'-P-CCNC: 20 U/L (ref 0–45)
BASOPHILS # BLD AUTO: 0 10E9/L (ref 0–0.2)
BASOPHILS NFR BLD AUTO: 0 %
BILIRUB SERPL-MCNC: 0.5 MG/DL (ref 0.2–1.3)
BUN SERPL-MCNC: 25 MG/DL (ref 7–30)
CALCIUM SERPL-MCNC: 9.9 MG/DL (ref 8.5–10.1)
CHLORIDE SERPL-SCNC: 103 MMOL/L (ref 94–109)
CO2 SERPL-SCNC: 33 MMOL/L (ref 20–32)
CREAT SERPL-MCNC: 0.47 MG/DL (ref 0.52–1.04)
DIFFERENTIAL METHOD BLD: ABNORMAL
EOSINOPHIL # BLD AUTO: 0 10E9/L (ref 0–0.7)
EOSINOPHIL NFR BLD AUTO: 0 %
ERYTHROCYTE [DISTWIDTH] IN BLOOD BY AUTOMATED COUNT: 14.5 % (ref 10–15)
GFR SERPL CREATININE-BSD FRML MDRD: 87 ML/MIN/{1.73_M2}
GLUCOSE SERPL-MCNC: 144 MG/DL (ref 70–99)
HCT VFR BLD AUTO: 43 % (ref 35–47)
HGB BLD-MCNC: 13.1 G/DL (ref 11.7–15.7)
IMM GRANULOCYTES # BLD: 0 10E9/L (ref 0–0.4)
IMM GRANULOCYTES NFR BLD: 0.2 %
LACTATE BLD-SCNC: 1.2 MMOL/L (ref 0.7–2)
LYMPHOCYTES # BLD AUTO: 0.9 10E9/L (ref 0.8–5.3)
LYMPHOCYTES NFR BLD AUTO: 7.3 %
MCH RBC QN AUTO: 28.6 PG (ref 26.5–33)
MCHC RBC AUTO-ENTMCNC: 30.5 G/DL (ref 31.5–36.5)
MCV RBC AUTO: 94 FL (ref 78–100)
MONOCYTES # BLD AUTO: 0.4 10E9/L (ref 0–1.3)
MONOCYTES NFR BLD AUTO: 3.4 %
NEUTROPHILS # BLD AUTO: 10.9 10E9/L (ref 1.6–8.3)
NEUTROPHILS NFR BLD AUTO: 89.1 %
NRBC # BLD AUTO: 0 10*3/UL
NRBC BLD AUTO-RTO: 0 /100
PLATELET # BLD AUTO: 293 10E9/L (ref 150–450)
POTASSIUM SERPL-SCNC: 4.9 MMOL/L (ref 3.4–5.3)
PROT SERPL-MCNC: 6.6 G/DL (ref 6.8–8.8)
RBC # BLD AUTO: 4.58 10E12/L (ref 3.8–5.2)
SODIUM SERPL-SCNC: 137 MMOL/L (ref 133–144)
WBC # BLD AUTO: 12.2 10E9/L (ref 4–11)

## 2020-02-25 PROCEDURE — 25000128 H RX IP 250 OP 636: Performed by: INTERNAL MEDICINE

## 2020-02-25 PROCEDURE — 94640 AIRWAY INHALATION TREATMENT: CPT | Mod: 76

## 2020-02-25 PROCEDURE — 25000125 ZZHC RX 250: Performed by: PHYSICIAN ASSISTANT

## 2020-02-25 PROCEDURE — 99232 SBSQ HOSP IP/OBS MODERATE 35: CPT | Performed by: INTERNAL MEDICINE

## 2020-02-25 PROCEDURE — 21000001 ZZH R&B HEART CARE

## 2020-02-25 PROCEDURE — 36415 COLL VENOUS BLD VENIPUNCTURE: CPT | Performed by: PHYSICIAN ASSISTANT

## 2020-02-25 PROCEDURE — 97161 PT EVAL LOW COMPLEX 20 MIN: CPT | Mod: GP | Performed by: PHYSICAL THERAPIST

## 2020-02-25 PROCEDURE — 25000125 ZZHC RX 250: Performed by: INTERNAL MEDICINE

## 2020-02-25 PROCEDURE — 36415 COLL VENOUS BLD VENIPUNCTURE: CPT | Performed by: INTERNAL MEDICINE

## 2020-02-25 PROCEDURE — 25000128 H RX IP 250 OP 636: Performed by: PHYSICIAN ASSISTANT

## 2020-02-25 PROCEDURE — 80053 COMPREHEN METABOLIC PANEL: CPT | Performed by: PHYSICIAN ASSISTANT

## 2020-02-25 PROCEDURE — 40000275 ZZH STATISTIC RCP TIME EA 10 MIN

## 2020-02-25 PROCEDURE — 94640 AIRWAY INHALATION TREATMENT: CPT

## 2020-02-25 PROCEDURE — 85025 COMPLETE CBC W/AUTO DIFF WBC: CPT | Performed by: PHYSICIAN ASSISTANT

## 2020-02-25 PROCEDURE — 25000132 ZZH RX MED GY IP 250 OP 250 PS 637: Mod: GY | Performed by: INTERNAL MEDICINE

## 2020-02-25 PROCEDURE — 97530 THERAPEUTIC ACTIVITIES: CPT | Mod: GP | Performed by: PHYSICAL THERAPIST

## 2020-02-25 PROCEDURE — 83605 ASSAY OF LACTIC ACID: CPT | Performed by: INTERNAL MEDICINE

## 2020-02-25 PROCEDURE — 93010 ELECTROCARDIOGRAM REPORT: CPT | Performed by: INTERNAL MEDICINE

## 2020-02-25 PROCEDURE — 25000132 ZZH RX MED GY IP 250 OP 250 PS 637: Mod: GY | Performed by: PHYSICIAN ASSISTANT

## 2020-02-25 PROCEDURE — 40000894 ZZH STATISTIC OT IP EVAL DEFER

## 2020-02-25 PROCEDURE — 93005 ELECTROCARDIOGRAM TRACING: CPT

## 2020-02-25 RX ORDER — FENTANYL 12.5 UG/1
12 PATCH TRANSDERMAL
Status: DISCONTINUED | OUTPATIENT
Start: 2020-02-25 | End: 2020-02-27 | Stop reason: HOSPADM

## 2020-02-25 RX ORDER — METOPROLOL TARTRATE 1 MG/ML
2.5-5 INJECTION, SOLUTION INTRAVENOUS EVERY 4 HOURS PRN
Status: DISCONTINUED | OUTPATIENT
Start: 2020-02-25 | End: 2020-02-27 | Stop reason: HOSPADM

## 2020-02-25 RX ORDER — PREDNISONE 20 MG/1
40 TABLET ORAL DAILY
Status: DISCONTINUED | OUTPATIENT
Start: 2020-02-26 | End: 2020-02-27 | Stop reason: HOSPADM

## 2020-02-25 RX ORDER — METOPROLOL TARTRATE 1 MG/ML
5 INJECTION, SOLUTION INTRAVENOUS ONCE
Status: COMPLETED | OUTPATIENT
Start: 2020-02-25 | End: 2020-02-25

## 2020-02-25 RX ADMIN — IPRATROPIUM BROMIDE AND ALBUTEROL SULFATE 3 ML: .5; 3 SOLUTION RESPIRATORY (INHALATION) at 22:47

## 2020-02-25 RX ADMIN — METHYLPREDNISOLONE SODIUM SUCCINATE 62.5 MG: 125 INJECTION, POWDER, FOR SOLUTION INTRAMUSCULAR; INTRAVENOUS at 11:15

## 2020-02-25 RX ADMIN — IPRATROPIUM BROMIDE AND ALBUTEROL SULFATE 3 ML: .5; 3 SOLUTION RESPIRATORY (INHALATION) at 07:21

## 2020-02-25 RX ADMIN — IPRATROPIUM BROMIDE AND ALBUTEROL SULFATE 3 ML: .5; 3 SOLUTION RESPIRATORY (INHALATION) at 16:03

## 2020-02-25 RX ADMIN — METOPROLOL TARTRATE 5 MG: 5 INJECTION INTRAVENOUS at 19:26

## 2020-02-25 RX ADMIN — METHYLPREDNISOLONE SODIUM SUCCINATE 62.5 MG: 125 INJECTION, POWDER, FOR SOLUTION INTRAMUSCULAR; INTRAVENOUS at 23:13

## 2020-02-25 RX ADMIN — FUROSEMIDE 20 MG: 20 TABLET ORAL at 08:20

## 2020-02-25 RX ADMIN — FENTANYL 1 PATCH: 12 PATCH TRANSDERMAL at 17:52

## 2020-02-25 RX ADMIN — GUAIFENESIN 1200 MG: 600 TABLET, EXTENDED RELEASE ORAL at 21:41

## 2020-02-25 RX ADMIN — LIDOCAINE 1 PATCH: 560 PATCH PERCUTANEOUS; TOPICAL; TRANSDERMAL at 08:20

## 2020-02-25 RX ADMIN — GABAPENTIN 300 MG: 300 CAPSULE ORAL at 08:20

## 2020-02-25 RX ADMIN — METOPROLOL TARTRATE 2.5 MG: 5 INJECTION INTRAVENOUS at 23:44

## 2020-02-25 RX ADMIN — GABAPENTIN 300 MG: 300 CAPSULE ORAL at 14:57

## 2020-02-25 RX ADMIN — IPRATROPIUM BROMIDE AND ALBUTEROL SULFATE 3 ML: .5; 3 SOLUTION RESPIRATORY (INHALATION) at 12:10

## 2020-02-25 RX ADMIN — FLUTICASONE FUROATE AND VILANTEROL TRIFENATATE 1 PUFF: 100; 25 POWDER RESPIRATORY (INHALATION) at 12:08

## 2020-02-25 RX ADMIN — IPRATROPIUM BROMIDE AND ALBUTEROL SULFATE 3 ML: .5; 3 SOLUTION RESPIRATORY (INHALATION) at 20:41

## 2020-02-25 RX ADMIN — GABAPENTIN 300 MG: 300 CAPSULE ORAL at 21:40

## 2020-02-25 RX ADMIN — GUAIFENESIN 1200 MG: 600 TABLET, EXTENDED RELEASE ORAL at 08:20

## 2020-02-25 RX ADMIN — ACETAMINOPHEN 1000 MG: 500 TABLET, FILM COATED ORAL at 08:20

## 2020-02-25 RX ADMIN — UMECLIDINIUM 1 PUFF: 62.5 AEROSOL, POWDER ORAL at 11:14

## 2020-02-25 RX ADMIN — HYDROCODONE BITARTRATE AND ACETAMINOPHEN 1 TABLET: 5; 325 TABLET ORAL at 21:49

## 2020-02-25 RX ADMIN — ACETAMINOPHEN 1000 MG: 500 TABLET, FILM COATED ORAL at 21:40

## 2020-02-25 ASSESSMENT — ACTIVITIES OF DAILY LIVING (ADL)
ADLS_ACUITY_SCORE: 32
ADLS_ACUITY_SCORE: 27
ADLS_ACUITY_SCORE: 28
ADLS_ACUITY_SCORE: 27
ADLS_ACUITY_SCORE: 28
ADLS_ACUITY_SCORE: 28

## 2020-02-25 ASSESSMENT — MIFFLIN-ST. JEOR: SCORE: 983.13

## 2020-02-25 NOTE — CONSULTS
Care Transition Initial Assessment - RN        Met with: Patient and spoke to Dasia in nursing at [facility].  DATA   Active Problems:    Colitis       Cognitive Status: awake, alert and forgetful, not remembering details of admission.     Contact information and PCP information verified: Yes  Lives With: facility resident --[facility]    Insurance concerns: No Insurance issues identified  ASSESSMENT  Patient currently receives the following services:  Currently receiving [facility] RN for leg wraps through [facility].  At the assisted living she receives all medication administration, escort to all meals, laundry. Per the assisted living and patient also states she is able to get from bed to her wheelchair independently and then wheel to bathroom and toilet self independently.   PT/OT to assess for any weaknesses.     Dasia's contact number is [phone]. Nursing generally there [phone]. Fax number for discharge orders is [phone].     Identified issues/concerns regarding health management:   None stated  PLAN  Financial costs for the patient include per insurance .  Patient given options and choices for discharge patient preference is to return to assisted living.  Patient anticipates discharging to home.     Patient anticipates needs for home equipment: No  Plan/Disposition: likely return to assisted living   Appointments: see S    Care  (CTS) will continue to follow as needed.    Lucy Goodman RN   [facility]   Phone [phone]

## 2020-02-25 NOTE — PROGRESS NOTES
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     Acute hypoxic respiratory failure 2/2 to chronic obstructive pulmonary disease exacerbation. Improved   On presentation had a documented O2 saturation of 84% on room air and per EMS was 80%.  She was initially requiring 3-4 liters of supplemental oxygen to maintain O2 saturations in the upper 90s-100%. ABG was done and fortunately did not show any significant CO2 retention  - Titrate O2 as tolerated.  Down to 1 L on my evaluation   - Duonebs  - Continue Solu-Medrol IV for today and transition to PO Prednisone tomorrow   - Mucinex 1200 mg BID  - RCAT   - PTA Trelegy Ellipta 1 puff daily    Colitis  Chronic pain   Patient did have some abdominal pain for the past 1-2 days prior to admission.  She does have a noted history of chronic pain and appears to be on a fentanyl patch as well as needed Norco at home.  Pain is already improving without intervention  - PTA analgesics   - Stopped IV Dilaudid   - PTA gabapentin 300 mg 3 times daily    Lower extremity edema  Patient appears to be on Lasix 20 mg daily and has no history of heart failure.   - PTA Lasix     Glaucoma  - Holding prior to admit eyedrops and resume at discharge    Cognitive impairment  Patient has a previous SLUMS score 15/30.   - PT/OT evaluation       Diet: Combination Diet Regular Diet Adult    DVT Prophylaxis: Pneumatic Compression Devices  Martinez Catheter: not  present  Code Status: DNR/DNI      Disposition Plan   Expected discharge: 1-2 days, once therapy evaluation complete and on room air.  May need TCU as patient is requiring assist of 2 earlier   Entered: Mg Garcia DO 02/25/2020, 1:47 PM       The patient's care was discussed with the Bedside Nurse, Care Coordinator/ and Patient.    Mg Garcia DO  Hospitalist Service  [facility]    ______________________________________________________________________    Interval History   Patient seen and examined.  No acute events over night.  Breathing has significantly improved.  No fevers or chills.  No complaints of pain on my evaluation.      Data reviewed today: I reviewed all medications, new labs and imaging results over the last 24 hours. I personally reviewed no images or EKG's today.    Physical Exam   Vital Signs: Temp: 97.9  F (36.6  C) Temp src: Oral BP: 118/62 Pulse: 95 Heart Rate: 98 Resp: 15 SpO2: 92 % O2 Device: None (Room air) Oxygen Delivery: 1/2 LPM  Weight: 130 lbs 15.25 oz  General Appearance: Resting comfortably.  NAD   Respiratory: Clear to auscultation.  No respiratory distress  Cardiovascular: RRR.  No murmurs  GI: Bowel sounds present.  Non-tender  Skin: No rashes.  No cyanosis  Other: No edema.  No calf tenderness     Data   Recent Labs   Lab 02/25/20  0756 02/24/20  1047   WBC 12.2* 12.0*   HGB 13.1 14.9   MCV 94 93    291    134   POTASSIUM 4.9 4.2   CHLORIDE 103 98   CO2 33* 32   BUN 25 16   CR 0.47* 0.71   ANIONGAP 1* 4   MARY GRACE 9.9 11.6*   * 139*   ALBUMIN 2.9* 3.7   PROTTOTAL 6.6* 7.8   BILITOTAL 0.5 0.9   ALKPHOS 102 169*   ALT 21 23   AST 20 22   TROPI  --  <0.015     No results found for this or any previous visit (from the past 24 hour(s)).

## 2020-02-25 NOTE — PROGRESS NOTES
02/25/20 1400   Quick Adds   Type of Visit Initial PT Evaluation   Living Environment   Lives With facility resident   Living Arrangements assisted living   Home Accessibility wheelchair accessible   Transportation Anticipated other (see comments)  (Unknown)   Living Environment Comment Pt has a hospital bed.    Self-Care   Usual Activity Tolerance moderate   Current Activity Tolerance fair   Regular Exercise Other (see comments)  (Wheels herself throughout the halls )   Equipment Currently Used at Home wheelchair, manual;grab bar, toilet   Functional Level Prior   Ambulation 4-->completely dependent  (Uses w/c to meals, able to propel self to bathroom)   Transferring 0-->independent  (Bed to w/c )   Toileting 1-->assistive equipment   Bathing 0-->independent  (Sponge bath by sink)   Communication 0-->understands/communicates without difficulty   Swallowing 0-->swallows foods/liquids without difficulty   Cognition 0 - no cognition issues reported   Fall history within last six months no   Which of the above functional risks had a recent onset or change? transferring   General Information   Onset of Illness/Injury or Date of Surgery - Date 02/24/20   Referring Physician Mg Garcia    Patient/Family Goals Statement Go home   Pertinent History of Current Problem (include personal factors and/or comorbidities that impact the POC) Pt is a 89 y/o female admitted with abdominal pain after straining herself getting dressed. Pt was admitted with colitis, acute hypoxic respiratory failure, and acute exacerbation of chronic obstructive pulmonary. Pt had a PMH of COPD, asthma, osteoarthritis, osteoporosis, glaucoma, hard of hearing, chronic pain, and cognitive impairment.    Precautions/Limitations fall precautions   Weight-Bearing Status - LUE full weight-bearing   Weight-Bearing Status - RUE full weight-bearing   Weight-Bearing Status - LLE full weight-bearing   Weight-Bearing Status - RLE full weight-bearing    General Observations Received pt in supine, agreed to participate in PT.    Cognitive Status Examination   Orientation orientation to person, place and time   Level of Consciousness alert   Follows Commands and Answers Questions 100% of the time   Personal Safety and Judgment impaired  (Pt thought she could get up herself without nursing. )   Memory intact   Pain Assessment   Patient Currently in Pain Yes, see Vital Sign flowsheet  (Abdomen )   Integumentary/Edema   Integumentary/Edema no deficits were identifed   Posture    Posture Protracted shoulders   Range of Motion (ROM)   ROM Quick Adds No deficits were identified   Strength   Strength Comments Generalized weakness in bilateral LEs    Bed Mobility   Bed Mobility Comments Pt required min A for supine to sit with HOB elevated and use of hand rails.    Transfer Skills   Transfer Comments Attempted sit to/from stand with FWW and max Ax2. Pt was unable to obtain upright position despite multiple cues. Sit to/from stand with max Ax2, pt was unable to obtain upright position.    Gait   Gait Comments N/a, pt does not ambulate at home.    Balance   Balance Comments Good seated balance.    Sensory Examination   Sensory Perception Comments Intact. Pt believes she has poor sensation.    General Therapy Interventions   Planned Therapy Interventions balance training;neuromuscular re-education;strengthening;transfer training;gait training   Clinical Impression   Criteria for Skilled Therapeutic Intervention yes, treatment indicated   PT Diagnosis Impaired mobility    Influenced by the following impairments decreased strength, pain, decreased activity tolerance   Functional limitations due to impairments transfers and bed mobility    Clinical Presentation Stable/Uncomplicated   Clinical Presentation Rationale Clinical judgement   Clinical Decision Making (Complexity) Low complexity   Therapy Frequency Daily   Predicted Duration of Therapy Intervention (days/wks) 3 days  "  Anticipated Discharge Disposition Transitional Care Facility   Risk & Benefits of therapy have been explained Yes   Patient, Family & other staff in agreement with plan of care Yes   [facility]-PAC TM \"6 Clicks\"   2016, Trustees of [facility], under license to "[employer]".  All rights reserved.   6 Clicks Short Forms Basic Mobility Inpatient Short Form   [facility] AM-PAC  \"6 Clicks\" V.2 Basic Mobility Inpatient Short Form   1. Turning from your back to your side while in a flat bed without using bedrails? 3 - A Little   2. Moving from lying on your back to sitting on the side of a flat bed without using bedrails? 3 - A Little   3. Moving to and from a bed to a chair (including a wheelchair)? 1 - Total   4. Standing up from a chair using your arms (e.g., wheelchair, or bedside chair)? 1 - Total   5. To walk in hospital room? 1 - Total   6. Climbing 3-5 steps with a railing? 1 - Total   Basic Mobility Raw Score (Score out of 24.Lower scores equate to lower levels of function) 10   Total Evaluation Time   Total Evaluation Time (Minutes) 26     "

## 2020-02-25 NOTE — PLAN OF CARE
Discharge Planner OT   Patient plan for discharge: Unclear at this time    Current status: Order received, chart reviewed. Pt was seen by PT and unable to stand with max A x2 requiring use of ceiling lift for transfers. PT addressing rehab needs during hospitalization; thus, defer OT to next level of care.    Barriers to return to prior living situation: Current A required; Fall risk    Recommendations for discharge: TCU    Rationale for recommendations: Defer to PT note for details.        Entered by: Jared Glasgow 02/25/2020 3:50 PM

## 2020-02-25 NOTE — PLAN OF CARE
Alert to self only.  VSS on 4L O2.  Lung sounds diminished. Incontinent of Bowels & bladder. Bowel sounds active, +2BM. Purewick in place. Complains of right sided hip/ stomach pain. PRN Norco given x1. Wheelchair bound baseline. Assist x2. Tolerating regular diet. ABG's completed this shift.

## 2020-02-25 NOTE — PLAN OF CARE
Alert to self. VS on RA to 2L with activity, tachy. Up with assist of two and lift, PT worked with pt and was unable to transfer with GB and FWW. Pt adamant about not going to TCU at this time. BS active, +BM. Incontinent of BB. Purwick in place. Tolerating diet.  Tele ST with BBB. Lido patch in place, fentanyl patch ordered.

## 2020-02-26 ENCOUNTER — APPOINTMENT (OUTPATIENT)
Dept: CARDIOLOGY | Facility: CLINIC | Age: 85
DRG: 190 | End: 2020-02-26
Attending: INTERNAL MEDICINE
Payer: MEDICARE

## 2020-02-26 ENCOUNTER — APPOINTMENT (OUTPATIENT)
Dept: PHYSICAL THERAPY | Facility: CLINIC | Age: 85
DRG: 190 | End: 2020-02-26
Payer: MEDICARE

## 2020-02-26 PROCEDURE — 25000131 ZZH RX MED GY IP 250 OP 636 PS 637: Mod: GY | Performed by: INTERNAL MEDICINE

## 2020-02-26 PROCEDURE — 93306 TTE W/DOPPLER COMPLETE: CPT | Mod: 26 | Performed by: INTERNAL MEDICINE

## 2020-02-26 PROCEDURE — 25000132 ZZH RX MED GY IP 250 OP 250 PS 637: Mod: GY | Performed by: PHYSICIAN ASSISTANT

## 2020-02-26 PROCEDURE — 97530 THERAPEUTIC ACTIVITIES: CPT | Mod: GP | Performed by: PHYSICAL THERAPIST

## 2020-02-26 PROCEDURE — 25000125 ZZHC RX 250: Performed by: PHYSICIAN ASSISTANT

## 2020-02-26 PROCEDURE — 25000132 ZZH RX MED GY IP 250 OP 250 PS 637: Mod: GY | Performed by: INTERNAL MEDICINE

## 2020-02-26 PROCEDURE — 40000275 ZZH STATISTIC RCP TIME EA 10 MIN

## 2020-02-26 PROCEDURE — 94640 AIRWAY INHALATION TREATMENT: CPT

## 2020-02-26 PROCEDURE — 99233 SBSQ HOSP IP/OBS HIGH 50: CPT | Performed by: INTERNAL MEDICINE

## 2020-02-26 PROCEDURE — 21000001 ZZH R&B HEART CARE

## 2020-02-26 PROCEDURE — 40000264 ECHOCARDIOGRAM COMPLETE

## 2020-02-26 PROCEDURE — 25500064 ZZH RX 255 OP 636: Performed by: INTERNAL MEDICINE

## 2020-02-26 PROCEDURE — 25000125 ZZHC RX 250: Performed by: INTERNAL MEDICINE

## 2020-02-26 RX ORDER — ALBUTEROL SULFATE 0.83 MG/ML
2.5 SOLUTION RESPIRATORY (INHALATION)
Status: DISCONTINUED | OUTPATIENT
Start: 2020-02-26 | End: 2020-02-27 | Stop reason: HOSPADM

## 2020-02-26 RX ADMIN — GABAPENTIN 300 MG: 300 CAPSULE ORAL at 09:30

## 2020-02-26 RX ADMIN — METOPROLOL TARTRATE 12.5 MG: 25 TABLET, FILM COATED ORAL at 09:30

## 2020-02-26 RX ADMIN — GUAIFENESIN 1200 MG: 600 TABLET, EXTENDED RELEASE ORAL at 21:19

## 2020-02-26 RX ADMIN — HYDROCODONE BITARTRATE AND ACETAMINOPHEN 1 TABLET: 5; 325 TABLET ORAL at 23:11

## 2020-02-26 RX ADMIN — GABAPENTIN 300 MG: 300 CAPSULE ORAL at 21:19

## 2020-02-26 RX ADMIN — HUMAN ALBUMIN MICROSPHERES AND PERFLUTREN 9 ML: 10; .22 INJECTION, SOLUTION INTRAVENOUS at 13:42

## 2020-02-26 RX ADMIN — HYDROCODONE BITARTRATE AND ACETAMINOPHEN 1 TABLET: 5; 325 TABLET ORAL at 09:30

## 2020-02-26 RX ADMIN — FLUTICASONE FUROATE AND VILANTEROL TRIFENATATE 1 PUFF: 100; 25 POWDER RESPIRATORY (INHALATION) at 10:05

## 2020-02-26 RX ADMIN — HYDROCODONE BITARTRATE AND ACETAMINOPHEN 1 TABLET: 5; 325 TABLET ORAL at 14:52

## 2020-02-26 RX ADMIN — GUAIFENESIN 1200 MG: 600 TABLET, EXTENDED RELEASE ORAL at 09:30

## 2020-02-26 RX ADMIN — ACETAMINOPHEN 1000 MG: 500 TABLET, FILM COATED ORAL at 21:19

## 2020-02-26 RX ADMIN — UMECLIDINIUM 1 PUFF: 62.5 AEROSOL, POWDER ORAL at 10:05

## 2020-02-26 RX ADMIN — IPRATROPIUM BROMIDE AND ALBUTEROL SULFATE 3 ML: .5; 3 SOLUTION RESPIRATORY (INHALATION) at 08:20

## 2020-02-26 RX ADMIN — ALBUTEROL SULFATE 2.5 MG: 2.5 SOLUTION RESPIRATORY (INHALATION) at 19:10

## 2020-02-26 RX ADMIN — METOPROLOL TARTRATE 12.5 MG: 25 TABLET, FILM COATED ORAL at 21:19

## 2020-02-26 RX ADMIN — ACETAMINOPHEN 1000 MG: 500 TABLET, FILM COATED ORAL at 07:05

## 2020-02-26 RX ADMIN — FUROSEMIDE 20 MG: 20 TABLET ORAL at 09:30

## 2020-02-26 RX ADMIN — GABAPENTIN 300 MG: 300 CAPSULE ORAL at 14:53

## 2020-02-26 RX ADMIN — PREDNISONE 40 MG: 20 TABLET ORAL at 09:30

## 2020-02-26 RX ADMIN — LIDOCAINE 1 PATCH: 560 PATCH PERCUTANEOUS; TOPICAL; TRANSDERMAL at 07:06

## 2020-02-26 ASSESSMENT — ACTIVITIES OF DAILY LIVING (ADL)
ADLS_ACUITY_SCORE: 26
ADLS_ACUITY_SCORE: 27
ADLS_ACUITY_SCORE: 27
ADLS_ACUITY_SCORE: 26
ADLS_ACUITY_SCORE: 25
ADLS_ACUITY_SCORE: 27

## 2020-02-26 ASSESSMENT — MIFFLIN-ST. JEOR: SCORE: 1037.77

## 2020-02-26 NOTE — PLAN OF CARE
Pt Alert; disoriented situation. VSS except for HR. Around 1830, pt went into rapid a-fib with HR up to 140s. Pt asymptomatic and didn't feel her fast HR. BP stable. MD notified and 5 mg IV metoprolol given; awaiting for response; triggered sepsis BPA; lactic acid checked per protocol; On-coming RN to follow. Otherwise, LS with expiratory wheezes; some KABA but payam stable on 1/2 L of O2 via N/c. Will continue to monitor.

## 2020-02-26 NOTE — PROGRESS NOTES
Called that patient went into afib, confirmed by EKG.  Currently on tele.  Heart rate in the 120s - 130s, BP adequate.  Patient is asymptomatic.  Suspect this is due to her age, COPD exacerbation and steroids.  Plan to change to po prednisone tomorrow.  Respiratory status stable.      Continue to monitor on tele.  Give 5 mg IV metoprolol now and add prn metoprolol.

## 2020-02-26 NOTE — PROGRESS NOTES
SW  I: Patient has been accepted at [facility] for tomorrow, 2/27/20. Writer informed patient and called patients son, Sha ([phone]) who are both in agreement with this plan. [facility] will transport patient at  : Will continue to follow.     RENEE Diaz, [facility]  [phone]

## 2020-02-26 NOTE — PLAN OF CARE
VSS on 2L NC. Tele- SR. A&Ox4 but forgetful. Wheelchair bound, lift assist. Fentanyl patch on left chest. Lidocaine patch on back. Purewick in place. Echo ordered. Continue to monitor.

## 2020-02-26 NOTE — PLAN OF CARE
A&O x 4, forgetful. VSS on 2L NC. Pt c/o of  pain in back and L hip.PRN Norco given w/ relief. Tele: SR.  Up with lift.  Aggression color: green.  Pt converted from A fib RVR to SR @ 0010 after PRN metoprolol given. Purewick in place. Continue to monitor.

## 2020-02-26 NOTE — PROVIDER NOTIFICATION
Pt noted to be in rapid a-fib on tele monitor. HR up to 140s. 12 lead EKG done confirming that. Dr. Gallegos notified. Awaiting for response.    Addendum/ 1909: order received for IV metoprolol and to continue monitor.

## 2020-02-26 NOTE — CONSULTS
Care Transition Initial Assessment -      Met with: Patient and Family    Active Problems:    Colitis       DATA  Lives With: alone   Living Arrangements: assisted living  Quality of Family Relationships: helpful, involved, supportive  Description of Support System: Supportive, Involved  Who is your support system?: Children  Support Assessment: Adequate family and caregiver support, Adequate social supports.   Quality of Family Relationships: helpful, involved, supportive  Transportation Anticipated: [facility] Wheelchair requested    Identified issues/concerns regarding health management:  Per Care Transitions consult for discharge planning. Patient admitted to [facility] on 2/24/20 with colitis. The tentative date of discharge is 2/27/20. Reviewed chart and spoke with patient and sonSha ([phone]) regarding discharge planning and therapy recommendations for TCU.  Patient lives alone at [facility] senior living with no stairs to enter. TCU options were given and patient gave choices in order of preference; [facility], [facility], [facility] and [facility]. Verified these choices with son via patients request. Initial SNF referrals sent via DOD. Transportation options discussed. [facility] wheelchair ride at $74.90 base fare and $4.82 per mile agreed upon. Patient and patients son are in agreement with this plan.     ASSESSMENT  Cognitive Status:  awake, alert and oriented  Concerns to be addressed: Discharge to TCU.     PLAN  Financial costs for the patient includes [facility] wheelchair ride discussed and agreed upon.  Patient given options and choices for discharge Yes .  Patient/family is agreeable to the plan?  Yes:   Transportation/person available to transport on day of discharge  is [facility] and have they been notified/set up TBD  Patient Goals and Preferences: TCU .  Patient anticipates discharging to:  TCU .    Pt/family was given the Medicare Compare list for SNF, with  associated star ratings to assist with choice for referrals/discharge planning Yes    Education was given to pt/family that star ratings are updated/maintained by Medicare and can be reviewed by visiting www.medicare.gov Yes    RENEE Diaz, [facility]  [phone]

## 2020-02-26 NOTE — PROGRESS NOTES
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     Acute hypoxic respiratory failure 2/2 to chronic obstructive pulmonary disease exacerbation. Improved   On presentation had a documented O2 saturation of 84% on room air and per EMS was 80%.  She  was initially requiring 3-4 liters of supplemental oxygen to maintain O2 saturations in the upper 90s-100%. ABG was done and fortunately did not show any significant CO2 retention  - Titrate O2 as tolerated.  Continues to fluctuate between room air and 1 L  - Duonebs  - PO Prednisone 40 mg for 3 more days  - Mucinex 1200 mg BID  - RCAT   - PTA Trelegy Ellipta 1 puff daily    Atrial fibrillation w/RVR, new onset  On the evening of 2/25 the patient went in to A fib with RVR.  She was given IV Lopressor and has since converted. CHADS-Vasc is 3. But given the patient's cognitive impairment and mobility issues I am concerned with starting anticoagulation at this time and this was discussed with the patient  - Monitor on telemetry  - Metoprolol 12.5 mg BID   - Echocardiogram   - Defer anticoagulation to discussion with PCP     Colitis  Chronic pain   Patient did have some abdominal pain for the past 1-2 days prior to admission.  She does have a noted history of chronic pain and appears to be on a fentanyl patch as well as needed Norco at home.  Pain is already improving without intervention  - PTA analgesics   - Stopped IV Dilaudid   - PTA  gabapentin 300 mg 3 times daily     Lower extremity edema  Patient appears to be on Lasix 20 mg daily and has no history of heart failure.   - PTA Lasix     Glaucoma  - Holding prior to admit eyedrops and resume at discharge    Cognitive impairment  Patient has a previous SLUMS score 15/30.   - PT/OT evaluation       Diet: Combination Diet Regular Diet Adult    DVT Prophylaxis: Pneumatic Compression Devices  Martinez Catheter: not present  Code Status: DNR/DNI      Disposition Plan   Expected discharge: 1-2 days, recommended to transitional care unit once safe disposition plan/ TCU bed available, oxygenation is stable and heart rates controlled  Entered: Mg Garcia DO 02/26/2020, 9:46 AM       The patient's care was discussed with the Bedside Nurse, Care Coordinator/ and Patient.    Mg Gacria DO  Hospitalist Service  [facility]    ______________________________________________________________________    Interval History   Patient seen and examined.  Yesterday evening went in to A fib with RVR and was brought [place] [facility]  Was given IV Lopressor and has since converted back to sinus.  Currently asymptomatic.  No chest pain.  Breathing is normal per patient.  No fevers or chills.     Data reviewed today: I reviewed all medications, new labs and imaging results over the last 24 hours. I personally reviewed  EKG (2/25/20 at 18:45):  Atrial fibrillation w/RVR.  Rate 134 BPM     Physical Exam   Vital Signs: Temp: 97.6  F (36.4  C) Temp src: Oral BP: 117/56 Pulse: 89 Heart Rate: 102 Resp: 20 SpO2: 94 % O2 Device: Nasal cannula Oxygen Delivery: 2 LPM  Weight: 143 lbs 0 oz  General Appearance: Resting comfortably in bed.  NAD   Respiratory: Clear to auscultation.  No respiratory distress  Cardiovascular: RRR.  ?Murmur?  Reviewed telemetry and converted back to sinus this morning.  Currently in normal sinus rhythm on my evaluation  GI: Bowel sounds present.  Non-tender  Skin: No  rashes.  No cyanosis  Other: No edema.  No calf tenderness      Data   Recent Labs   Lab 02/25/20  0756 02/24/20  1047   WBC 12.2* 12.0*   HGB 13.1 14.9   MCV 94 93    291    134   POTASSIUM 4.9 4.2   CHLORIDE 103 98   CO2 33* 32   BUN 25 16   CR 0.47* 0.71   ANIONGAP 1* 4   MARY GRACE 9.9 11.6*   * 139*   ALBUMIN 2.9* 3.7   PROTTOTAL 6.6* 7.8   BILITOTAL 0.5 0.9   ALKPHOS 102 169*   ALT 21 23   AST 20 22   TROPI  --  <0.015     No results found for this or any previous visit (from the past 24 hour(s)).

## 2020-02-26 NOTE — PLAN OF CARE
Discharge Planner PT   Patient plan for discharge: To assisted living facility   Current status: Pt reports that she is have L low back pain. Pt requires min A and use of bed rails for rolling bilaterally. Pt requires min A for supine to/from sit. Pt requires max A for scooting buttock laterally in supine and at EOB. Pt requires total A x2 for sit to/from stand, pt is unable to obtain full upright position.   At baseline, BP was 112/53, HR was 86bpm, and O2 sats were 100%.  Pt's O2 sats at EOB were 90%.  At end of session pt's BP was 120/53, HR was 93 bpm, and O2 sats were 93%.  Barriers to return to prior living situation: Fall risk, decreased strength, decreased mobility.   Recommendations for discharge: TCU  Rationale for recommendations: Pt is well below baseline and would not be able to be IND within her home. Pt would benefit from further PT to improve transfers, bed mobility and strength.        Entered by: Adrianna Briceno 02/26/2020 9:07 AM

## 2020-02-26 NOTE — PLAN OF CARE
"Pt A/O. VSS. Tele shows SR with BBB. Pt denies any CP or SOB. On RA-1L O2. Discharge tomorrow at 1030 to [facility]. Still has pain on abdomen, meds given. Pt has no new complaints.    Blood pressure 105/52, pulse 89, temperature 97.6  F (36.4  C), temperature source Oral, resp. rate 20, height 1.6 m (5' 3\"), weight 64.9 kg (143 lb), SpO2 96 %.      "

## 2020-02-27 VITALS
SYSTOLIC BLOOD PRESSURE: 116 MMHG | RESPIRATION RATE: 18 BRPM | HEART RATE: 71 BPM | TEMPERATURE: 98.6 F | WEIGHT: 140.8 LBS | OXYGEN SATURATION: 91 % | BODY MASS INDEX: 24.95 KG/M2 | HEIGHT: 63 IN | DIASTOLIC BLOOD PRESSURE: 71 MMHG

## 2020-02-27 VITALS
WEIGHT: 142.7 LBS | OXYGEN SATURATION: 91 % | HEIGHT: 63 IN | BODY MASS INDEX: 25.29 KG/M2 | SYSTOLIC BLOOD PRESSURE: 162 MMHG | HEART RATE: 79 BPM | DIASTOLIC BLOOD PRESSURE: 72 MMHG | TEMPERATURE: 98 F | RESPIRATION RATE: 18 BRPM

## 2020-02-27 LAB — INTERPRETATION ECG - MUSE: NORMAL

## 2020-02-27 PROCEDURE — 40000275 ZZH STATISTIC RCP TIME EA 10 MIN

## 2020-02-27 PROCEDURE — 94640 AIRWAY INHALATION TREATMENT: CPT

## 2020-02-27 PROCEDURE — 25000132 ZZH RX MED GY IP 250 OP 250 PS 637: Mod: GY | Performed by: INTERNAL MEDICINE

## 2020-02-27 PROCEDURE — 25000131 ZZH RX MED GY IP 250 OP 636 PS 637: Mod: GY | Performed by: INTERNAL MEDICINE

## 2020-02-27 PROCEDURE — 25000125 ZZHC RX 250: Performed by: INTERNAL MEDICINE

## 2020-02-27 PROCEDURE — 25000132 ZZH RX MED GY IP 250 OP 250 PS 637: Mod: GY | Performed by: PHYSICIAN ASSISTANT

## 2020-02-27 PROCEDURE — 99239 HOSP IP/OBS DSCHRG MGMT >30: CPT | Performed by: INTERNAL MEDICINE

## 2020-02-27 PROCEDURE — 94640 AIRWAY INHALATION TREATMENT: CPT | Mod: 76

## 2020-02-27 RX ORDER — BISACODYL 10 MG
10 SUPPOSITORY, RECTAL RECTAL DAILY PRN
DISCHARGE
Start: 2020-02-27 | End: 2020-03-17

## 2020-02-27 RX ORDER — METOPROLOL TARTRATE 25 MG/1
12.5 TABLET, FILM COATED ORAL 2 TIMES DAILY
Status: ON HOLD | DISCHARGE
Start: 2020-02-27 | End: 2021-02-06

## 2020-02-27 RX ORDER — FENTANYL 12.5 UG/1
1 PATCH TRANSDERMAL
Qty: 1 PATCH | Refills: 0 | Status: SHIPPED | OUTPATIENT
Start: 2020-02-27

## 2020-02-27 RX ORDER — PREDNISONE 20 MG/1
40 TABLET ORAL DAILY
DISCHARGE
Start: 2020-02-28 | End: 2020-03-03

## 2020-02-27 RX ORDER — HYDROCODONE BITARTRATE AND ACETAMINOPHEN 5; 325 MG/1; MG/1
1 TABLET ORAL EVERY 6 HOURS PRN
Qty: 5 TABLET | Refills: 0 | Status: SHIPPED | OUTPATIENT
Start: 2020-02-27

## 2020-02-27 RX ADMIN — FLUTICASONE FUROATE AND VILANTEROL TRIFENATATE 1 PUFF: 100; 25 POWDER RESPIRATORY (INHALATION) at 08:22

## 2020-02-27 RX ADMIN — LIDOCAINE 1 PATCH: 560 PATCH PERCUTANEOUS; TOPICAL; TRANSDERMAL at 08:18

## 2020-02-27 RX ADMIN — PREDNISONE 40 MG: 20 TABLET ORAL at 08:17

## 2020-02-27 RX ADMIN — ALBUTEROL SULFATE 2.5 MG: 2.5 SOLUTION RESPIRATORY (INHALATION) at 08:07

## 2020-02-27 RX ADMIN — ACETAMINOPHEN 1000 MG: 500 TABLET, FILM COATED ORAL at 08:17

## 2020-02-27 RX ADMIN — GUAIFENESIN 1200 MG: 600 TABLET, EXTENDED RELEASE ORAL at 08:17

## 2020-02-27 RX ADMIN — UMECLIDINIUM 1 PUFF: 62.5 AEROSOL, POWDER ORAL at 08:23

## 2020-02-27 RX ADMIN — METOPROLOL TARTRATE 12.5 MG: 25 TABLET, FILM COATED ORAL at 08:17

## 2020-02-27 RX ADMIN — FUROSEMIDE 20 MG: 20 TABLET ORAL at 08:17

## 2020-02-27 RX ADMIN — GABAPENTIN 300 MG: 300 CAPSULE ORAL at 08:17

## 2020-02-27 ASSESSMENT — ACTIVITIES OF DAILY LIVING (ADL)
ADLS_ACUITY_SCORE: 25

## 2020-02-27 ASSESSMENT — MIFFLIN-ST. JEOR
SCORE: 1036.41
SCORE: 1027.79

## 2020-02-27 NOTE — PLAN OF CARE
Alert and oriented x 1. VS stable, on RA and no complaints of pain. Patient was discharged this morning with family present. No new medications were sent home with the patient. I reviewed all DC paperwork, new medications, orders and appointments with the patient and family, family was able to verbalize understanding of teaching. All questions answered. All IVs were removed and patient had all belongings at the time of DC.

## 2020-02-27 NOTE — PROGRESS NOTES
[facility]  INITIAL VISIT NOTE  February 28, 2020    PRIMARY CARE PROVIDER AND CLINIC:  Demetrio Arriaza [facility] [address] / [facility]    Chief Complaint   Patient presents with     Hospital F/U       HPI:    Kelsey Wood is a 89 year old  (4/18/1929) female who was seen at [place] [facility] on February 28, 2020 for an initial visit. Medical history is notable for asthma/COPD, cognitive impairment, osteoarthritis, osteoporosis, glaucoma, and impaired hearing.    She was hospitalized at [facility] from February 24 through February 27, 2020.  She initially presented via EMS for evaluation of abdominal pain.  On exam she was noted to have respiratory wheezing and hypoxia, with O2 sat at 80% on room air.  CBC was significant for elevated white count of 12,000.  Hemoglobin was 14.9.  Chemistry profile was essentially unremarkable.  Lactic acid was 1.8.  BNP was 238.  Troponin I was less than 0.015.  Urinalysis was unremarkable except for small leukocyte esterase.  Nasopharyngeal swab was negative for influenza A/B antigen.  Electrocardiogram revealed normal sinus rhythm with a heart rate of 109, right bundle branch block, and left axis deviation.  CT chest with contrast was negative for pulmonary embolism.  There was right middle lobe atelectasis, stable left adrenal nodule and thickening, simple renal cysts, and subtle wall thickening and surrounding mesentery inflammation involving the right and left parts of colon, possibly an infectious or inflammatory process.    She was treated with supplemental oxygen, high-dose prednisone, and DuoNeb.    On February 25, the patient developed atrial fibrillation with rapid heart rate of 134, associated with nonspecific ST-T abnormality in anterior and inferior leads.  Echocardiogram, February 26, showed normal LV systolic function with EF of 60-65%, grade 1 or early diastolic dysfunction, elevated RV systolic  pressure, and no regional wall motion abnormalities.  Patient was treated with IV metoprolol and subsequently converted to normal sinus rhythm.  Her [id number] score was calculated at 3, however due to underlying cognitive impairment and mobility issues, anticoagulation therapy was not initiated.  She was started on metoprolol 12.5 mg p.o. twice daily.    Patient was seen today in her room, while lying in bed.  She complains of left upper quadrant abdominal discomfort which is mild and started last night.  She reports no fever, chills, chest pain, dyspnea, wheezing, nausea, vomiting, diarrhea, melena, hematochezia, urinary symptoms.  Her appetite is poor.  She does endorse chronic right thigh/buttock pain.    Patient is admitted to this facility for medical management, nursing care, and rehab.     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    SOCIAL HISTORY:  Patient is  and resides in a senior assisted living facility.  She is a former smoker and quit 1994.  She does not drink alcohol.  She does use a wheelchair for ambulation.    MEDICATIONS:  Current Outpatient Medications   Medication Sig Dispense Refill     ACETAMINOPHEN PO Take 650 mg by mouth every 4  hours as needed for pain or fever And q4h prn       albuterol (PROAIR HFA/PROVENTIL HFA/VENTOLIN HFA) 108 (90 Base) MCG/ACT Inhaler Inhale 2 puffs into the lungs 4 times daily as needed for shortness of breath / dyspnea or wheezing        bisacodyl (DULCOLAX) 10 MG suppository Place 1 suppository (10 mg) rectally daily as needed for constipation       bismuth subsalicylate (PEPTO BISMOL) 262 MG chewable tablet Take 2 tablets by mouth every hour as needed for heartburn Max of 16 tabs per 24 hrs       calcium carbonate (OS-MARY GRACE 500 MG [place]) 500 MG tablet Take 1 tablet by mouth daily        fentaNYL (DURAGESIC) 12 mcg/hr 72 hr patch Place 1 patch onto the skin every 72 hours remove old patch. 1 patch 0     fluticasone (FLONASE) 50 MCG/ACT nasal spray Spray 2 sprays into both nostrils daily       Fluticasone-Umeclidin-Vilanterol (TRELEGY ELLIPTA) 100-62.5-25 MCG/INH oral inhaler Inhale 1 puff into the lungs daily       furosemide (LASIX) 20 MG tablet Take 20 mg by mouth daily       GABAPENTIN PO Take 300 mg by mouth 3 times daily 09:00, 14:00 and 21:00       Glucosamine-Chondroitin (OSTEO BI-FLEX REGULAR STRENGTH PO) Take 1 tablet by mouth 2 times daily       guaiFENesin (ORGANIDIN) 200 MG TABS tablet Take 400 mg by mouth 2 times daily as needed for cough        HYDROcodone-acetaminophen (NORCO) 5-325 MG tablet Take 1 tablet by mouth every 6 hours as needed for severe pain 5 tablet 0     hypromellose (ARTIFICIAL TEARS) 0.5 % SOLN ophthalmic solution Place 1 drop into both eyes daily       hypromellose (ARTIFICIAL TEARS) 0.5 % SOLN ophthalmic solution Place 1 drop into both eyes daily as needed for dry eyes       ipratropium - albuterol 0.5 mg/2.5 mg/3 mL (DUONEB) 0.5-2.5 (3) MG/3ML neb solution Take 1 vial by nebulization 4 times daily as needed for shortness of breath / dyspnea or wheezing        latanoprost (XALATAN) 0.005 % ophthalmic solution Place 1 drop into both eyes At Bedtime       Lidocaine (LIDOCARE) 4 %  "Patch Place 1 patch onto the skin every 24 hours To prevent lidocaine toxicity, patient should be patch free for 12 hrs daily.  Salonpas for hip pain       metoprolol tartrate (LOPRESSOR) 25 MG tablet Take 0.5 tablets (12.5 mg) by mouth 2 times daily       potassium chloride ER (K-TAB) 20 MEQ CR tablet Take 20 mEq by mouth daily       [START ON 2/28/2020] predniSONE (DELTASONE) 20 MG tablet Take 2 tablets (40 mg) by mouth daily for 1 day       senna-docusate (SENOKOT-S;PERICOLACE) 8.6-50 MG per tablet Take 1 tablet by mouth daily       vitamin C (ASCORBIC ACID) 500 MG tablet Take 500 mg by mouth daily         ALLERGIES:  No Known Allergies    ROS:  10 point ROS neg other than the symptoms noted above in the HPI.    PHYSICAL EXAM:  /71   Pulse 71   Temp 98.6  F (37  C)   Resp 18   Ht 1.6 m (5' 3\")   Wt 63.9 kg (140 lb 12.8 oz)   SpO2 91%   BMI 24.94 kg/m     Gen: Cooperative and in no acute distress  HEENT: Normocephalic; oropharynx clear  Card: Normal S1, S2, RRR  Resp: Lungs clear to auscultation bilaterally  GI: Abdomen soft, non-distended, +BS; there is slight left upper quadrant abdominal tenderness to palpation with no rebound or guarding  Ext: No LE edema  Neuro: CX II-XII grossly intact; ROM in all four extremities grossly intact  Psych: Alert and oriented almost x3; normal affect  Skin: No acute rash    LABORATORY/IMAGING DATA:  Lab Results   Component Value Date    WBC 12.2 02/25/2020     Lab Results   Component Value Date    RBC 4.58 02/25/2020     Lab Results   Component Value Date    HGB 13.1 02/25/2020     Lab Results   Component Value Date    HCT 43.0 02/25/2020     Lab Results   Component Value Date    MCV 94 02/25/2020     Lab Results   Component Value Date    MCH 28.6 02/25/2020     Lab Results   Component Value Date    MCHC 30.5 02/25/2020     Lab Results   Component Value Date    RDW 14.5 02/25/2020     Lab Results   Component Value Date     02/25/2020       Last Comprehensive " Metabolic Panel:  Sodium   Date Value Ref Range Status   02/25/2020 137 133 - 144 mmol/L Final     Potassium   Date Value Ref Range Status   02/25/2020 4.9 3.4 - 5.3 mmol/L Final     Chloride   Date Value Ref Range Status   02/25/2020 103 94 - 109 mmol/L Final     Carbon Dioxide   Date Value Ref Range Status   02/25/2020 33 (H) 20 - 32 mmol/L Final     Anion Gap   Date Value Ref Range Status   02/25/2020 1 (L) 3 - 14 mmol/L Final     Glucose   Date Value Ref Range Status   02/25/2020 144 (H) 70 - 99 mg/dL Final     Urea Nitrogen   Date Value Ref Range Status   02/25/2020 25 7 - 30 mg/dL Final     Creatinine   Date Value Ref Range Status   02/25/2020 0.47 (L) 0.52 - 1.04 mg/dL Final     GFR Estimate   Date Value Ref Range Status   02/25/2020 87 >60 mL/min/[1.73_m2] Final     Comment:     Non  GFR Calc  Starting 12/18/2018, serum creatinine based estimated GFR (eGFR) will be   calculated using the Chronic Kidney Disease Epidemiology Collaboration   (CKD-EPI) equation.       Calcium   Date Value Ref Range Status   02/25/2020 9.9 8.5 - 10.1 mg/dL Final         ASSESSMENT/PLAN:  Acute COPD and asthma exacerbation,  Acute hypoxic respiratory failure.  In the hospital the patient was treated with high-dose prednisone, supplemental oxygen, and DuoNeb.  Patient is currently stable and off oxygen.  Plan:  Continue prednisone 40 mg p.o. daily through the end of today, February 28  Continue PTA Trelegy Ellipta, 1 puff daily, PRN DuoNeb, PRN albuterol inhaler, and PRN guaifenesin  Monitor respiratory status    Colitis, nonspecific.  Patient has known history of chronic pain.  She initially presented to the hospital with abdominal pain.  CT abdomen/pelvis revealed subtle wall thickening and surrounding mesenteric inflammation involving the right colon and left colon, possibly an infectious or inflammatory process.  She does report left upper quadrant abdominal discomfort which is nonspecific.  Exam is benign and  there is no rebound or guarding.  Last colonoscopy is unknown.  Plan:  Monitor symptoms and repeat abdominal imaging if there is worsening   Consider colonoscopy as an outpatient if patient agreeable  Continue PRN Norco  Check CBC and BMP on Monday, March 2    Paroxysmal atrial fibrillation with RVR.  It occurred on February 25, while the patient was inpatient.  It subsequently converted normal sinus rhythm.  Her [id number] score is 4 (given her age, gender, and diastolic dysfunction) however due to cognitive impairment and mobility issues, chronic anticoagulation therapy was not initiated.  She was started on metoprolol in the hospital.  Rate is currently controlled.  Plan:  Continue metoprolol 12.5 mg p.o. twice daily  Monitor heart rate    Early diastolic dysfunction,  Chronic lower extremity edema.  Patient appears euvolemic.  There is no lower extremity edema on exam today.  BNP was normal at 238 on February 24.  Plan:  Continue PTA furosemide 20 mg p.o. daily  Continue potassium chloride 20 mEq p.o. daily  Monitor volume status and weight    Chronic pain.  Controlled.  Plan:  Continue PTA lidocaine patch, fentanyl patch, gabapentin 300 mg p.o. 3 times daily, and PRN acetaminophen/Norco    Glaucoma.  Plan:  Continue PTA Xalatan eyedrop    Allergic rhinitis.  Plan:  Continue PTA Flonase 50 mcg, 2 sprays into both nostrils daily    Cognitive impairment.  Mild and stable.  Plan:  Implement standard measures for prevention of delirium  OT evaluation and therapy    Slow transit constipation.  Plan:  Continue bowel regimen    Physical deconditioning.  Plan:  PT/OT evaluation and therapy        Electronically signed by:  Brooks Landa MD

## 2020-02-27 NOTE — DISCHARGE SUMMARY
[facility]  Hospitalist Discharge Summary       Date of Admission:  2/24/2020  Date of Discharge:  2/27/2020 10:30 AM  Discharging Provider: Mg Garcia,       Discharge Diagnoses   1. Acute hypoxic respiratory failure 2/2 COPD exacerbation   2. New onset atrial fibrillation with RVR, paroxysmal   3. Non-infectious colitis, suspect inflammatory   4. Glaucoma  5. Age related cognitive decline     Follow-ups Needed After Discharge   Follow-up Appointments     Follow Up and recommended labs and tests      Follow up with Nursing home physician.  No follow up labs or test are   needed.  Follow up with primary care provider in 1-2 weeks after TCU discharge.    Will need to discuss anticoagulation given the newly found atrial   fibrillation.  Concerns in starting due to fall risk           Unresulted Labs Ordered in the Past 30 Days of this Admission     No orders found from 1/25/2020 to 2/25/2020.        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      Acute hypoxic respiratory failure 2/2 to chronic obstructive pulmonary disease exacerbation. Improved   On presentation had a documented O2 saturation of 84% on room air and per EMS was 80%.  She  was initially requiring 3-4 liters of supplemental oxygen to maintain O2 saturations in the upper 90s-100%. ABG was done and fortunately did not show any significant CO2 retention  - Titrate O2 as tolerated.  Continues to fluctuate between  1-2 L  - Duonebs  - PO Prednisone 40 mg for 2 more days  - Mucinex 1200 mg BID  - RCAT   - PTA Trelegy Ellipta 1 puff daily     Atrial fibrillation w/RVR, new onset  On the evening of 2/25 the patient went in to A fib with RVR.  She was given IV Lopressor and has since converted. CHADS-Vasc is 3. But given the patient's cognitive impairment and mobility issues I am concerned with starting anticoagulation at this time and this was discussed with the patient  - Monitor on telemetry  - Metoprolol 12.5 mg BID started and maintained NSR   - Defer anticoagulation to discussion with PCP      Colitis  Chronic pain   Patient did have some abdominal pain for the past 1-2 days prior to admission.  She does have a noted history of chronic pain and appears to be on a fentanyl patch as well as needed Norco at home.  Pain is already improving without intervention  - PTA analgesics   - Stopped IV Dilaudid   - PTA gabapentin 300 mg 3 times daily      Lower extremity edema  Patient appears to be on Lasix 20 mg daily and has no history of heart failure.   - PTA Lasix      Glaucoma  - Holding prior to admit eyedrops and resume at discharge     Cognitive impairment  Patient has a previous SLUMS score 15/30.   - PT/OT evaluation     Consultations This Hospital Stay   PHYSICAL THERAPY ADULT IP CONSULT  OCCUPATIONAL THERAPY ADULT IP CONSULT  SOCIAL WORK IP CONSULT  CARE TRANSITION RN/SW IP CONSULT  PHYSICAL THERAPY ADULT IP CONSULT  OCCUPATIONAL THERAPY ADULT IP CONSULT    Code Status   DNR/DNI    Time Spent on this Encounter   IMg DO, personally saw the patient today and spent greater than 30 minutes discharging this patient.       Mg Garcia DO  [facility]  ______________________________________________________________________    Physical Exam   Vital Signs: Temp: 98  F (36.7  C) Temp src: Oral BP: (!) 162/72 Pulse: 79 Heart Rate: 86 Resp: 18 SpO2: 91 % O2 Device: Nasal cannula Oxygen Delivery:  1 LPM  Weight: 142 lbs 11.2 oz  General Appearance: Resting comfortably.  NAD   Respiratory: Clear to auscultation.  No respiratory distress on 1 L via NC   Cardiovascular: RRR.  No obvious murmurs  GI: Bowel sounds present. Non-tender  Skin: No rashes.  No cyanosis  Other: No edema.  No calf tenderness         Primary Care Physician   ROGER GE    Discharge Orders      General info for SNF    Length of Stay Estimate: Short Term Care: Estimated # of Days <30  Condition at Discharge: Stable  Level of care:skilled   Rehabilitation Potential: Good  Admission H&P remains valid and up-to-date: Yes  Recent Chemotherapy: N/A  Use Nursing Home Standing Orders: Yes     Mantoux instructions    Give two-step Mantoux (PPD) Per Facility Policy Yes     Reason for your hospital stay    COPD and newly found atrial fibrillation     Activity - Up with nursing assistance     Follow Up and recommended labs and tests    Follow up with Nursing home physician.  No follow up labs or test are needed.  Follow up with primary care provider in 1-2 weeks after TCU discharge.  Will need to discuss anticoagulation given the newly found atrial fibrillation.  Concerns in starting due to fall risk     Physical Therapy Adult Consult    Evaluate and treat as clinically indicated.    Reason:  Deconditioning     Occupational Therapy Adult Consult    Evaluate and treat as clinically indicated.    Reason:  Deconditioning     Oxygen Adult/Peds    Titrate off O2 as tolerated.  Currently on 2 L.  Goal SpO2 is >89%     Advance Diet as Tolerated    Follow this diet upon discharge: Orders Placed This Encounter      Combination Diet Regular Diet Adult       Significant Results and Procedures   Most Recent 3 CBC's:  Recent Labs   Lab Test 02/25/20  0756 02/24/20  1047 04/03/19  1203   WBC 12.2* 12.0* 6.8   HGB 13.1 14.9 12.2   MCV 94 93 97    291 295     Most Recent 3 BMP's:  Recent Labs   Lab Test 02/25/20  0756 02/24/20  1047 04/03/19  1203     134 139   POTASSIUM 4.9 4.2 4.3   CHLORIDE 103 98 106   CO2 33* 32 29   BUN 25 16 12   CR 0.47* 0.71 0.68   ANIONGAP 1* 4 4   MARY GRACE 9.9 11.6* 9.3   * 139* 76   ,   Results for orders placed or performed during the hospital encounter of 02/24/20   CT Chest (PE) Abdomen Pelvis w Contrast    Narrative    CT CHEST PULMONARY EMBOLUS ABDOMEN AND PELVIS WITH CONTRAST 2/24/2020  11:42 AM     HISTORY: Hypoxia, shortness of breath, also acute left lower quadrant  abdominal pain.     COMPARISON: CT abdomen/pelvis 4/3/2019.    TECHNIQUE: Thin section axial images are performed from the thoracic  inlet to the lung bases utilizing 65 mL of Isovue 370 IV contrast  without adverse event. Coronal reformatted images are also generated.  Axial images from the lung bases to the symphysis are performed with  additional coronal reformatted images. Radiation dose for this scan  was reduced using automated exposure control, adjustment of the mA  and/or kV according to patient size, or iterative reconstruction  technique.    FINDINGS:     Chest: Respiratory motion artifact is present on the exam. Emphysema  is present. Middle lobe atelectasis is noted. Partial atelectasis  posterior lingula is also noted. Lungs are otherwise grossly clear  without infiltrate, consolidation or mass. Tiny left upper lobe nodule  is noted on series 6, image 126 measuring 0.3 cm. Tiny calcified  granuloma right upper lobe is noted on image 93. Scattered areas of  subpleural scarring are also noted. No pleural or pericardial fluid.  Heart is normal in size. Esophagus is unremarkable. Thyroid gland  appears normal in size. No enlarged lymph nodes. Peripheral pulmonary  arteries are obscured by respiratory motion artifact. No central  pulmonary emboli are appreciated. Scattered coronary artery  calcifications are present. Thoracic aorta demonstrates scattered  calcified plaque without aneurysm.    Abdomen: Prior cholecystectomy changes. The liver, spleen,  pancreas  and right adrenal gland are unremarkable. Left adrenal gland  thickening is stable. Medial nodularity is unchanged on image 45 of  series 8. Bilateral renal cortical cysts are present. These appear to  be simple cysts for which no further follow-up recommended. No  evidence of renal calculi. Retroperitoneal artifact is noted due to  spinal fusion hardware in the lumbosacral region. Abdominal aorta is  calcified without aneurysm or dissection. The bowel is normal in  caliber without obstruction. Mild colonic wall thickening and  surrounding inflammation is noted in the region of the ascending colon  and descending colon of uncertain significance. This represents a  change from the prior exam and could reflect mild underlying  infectious or inflammatory colitis. No evidence of abscess or free  intraperitoneal air. No lymph node enlargement.    Pelvis: The bladder is decompressed. The uterus, adnexal regions and  rectum are unremarkable. No enlarged pelvic lymph nodes or free fluid.  Prominent left periuterine vessels are again noted and appear stable.  Bone window examination demonstrates probable osteopenia and changes  related to lumbosacral spinal fusion, but no destructive bone changes  are appreciated.      Impression    IMPRESSION:  1. Right middle lobe atelectasis. Previously noted medial right lower  lobe atelectatic changes have resolved. No obvious hilar mass or  adenopathy. Endobronchial lesion is difficult to exclude.  2. No evidence of pulmonary embolism. Peripheral arteries are obscured  by respiratory motion.  3. Stable left adrenal nodule and thickening  4. Simple renal cysts. No further follow-up required.  5. Subtle wall thickening and surrounding mesenteric inflammation  involving the right colon and left colon possibly an infectious or  inflammatory process not evident on prior CT. No obstruction, abscess  or free intraperitoneal air.    FUENTES SOTO MD   Echocardiogram Complete     [facility]    [id number]  [id number]  [id number]  [id number]^MAYLIN^BETHANY^AMNA           [facility]  Echocardiography Laboratory  [address] [place] [address]        Name: SONALI TUCKER  MRN: [id number]  : 1929  Study Date: 2020 01:20 PM  Age: 89 yrs  Gender: Female  Patient [facility]: [facility]  Reason For Study: Afib  Ordering Physician: BETHANY CARLISLE  Referring Physician: Demetrio Arriaza  Performed By: Larry Koch RDCS     BSA: 1.7 m2  Height: 63 in  Weight: 143 lb  HR: 90  BP: 117/56 mmHg  _____________________________________________________________________________  __        Procedure  Complete Portable Echo Adult. Optison (NDC #3165-1785) given intravenously.  _____________________________________________________________________________  __        Interpretation Summary     1. Normal left ventricular size and systolic function. LVEF 60-65%.  2. No regional wall motion abnormalities.  3. Normal right ventricular size and systolic function. The right ventricular  systolic pressure is approximated at 42.0 mmHg plus the right atrial pressure.     4. Trace mitral and tricuspid valve regurgitation.  5. Dilated inferior vena cava.     There is no comparison study available.  _____________________________________________________________________________  __        Left Ventricle  The left ventricle is normal in size. There is normal left ventricular wall  thickness. Left ventricular systolic function is normal. The visual ejection  fraction is estimated at 60-65%. Grade I or early diastolic dysfunction. No  regional wall motion abnormalities noted.     Right Ventricle  The right ventricle is normal in size and function.     Atria  The left atrium is mildly dilated. Right atrial size is normal. There is no  color Doppler evidence of an atrial shunt.     Mitral Valve  The mitral valve leaflets appear normal. There is no evidence of stenosis,  fluttering, or prolapse. There is mild  mitral annular calcification. There is  trace mitral regurgitation.        Tricuspid Valve  Normal tricuspid valve. There is trace tricuspid regurgitation. The right  ventricular systolic pressure is approximated at 42.0 mmHg plus the right  atrial pressure.     Aortic Valve  The aortic valve is trileaflet with aortic valve sclerosis. There is trace  aortic regurgitation. No hemodynamically significant valvular aortic stenosis.     Pulmonic Valve  The pulmonic valve is not well visualized. There is trace pulmonic valvular  regurgitation.     Vessels  The aortic root is normal size. Normal size ascending aorta. Dilation of the  inferior vena cava is present with abnormal respiratory variation in diameter.     Pericardium  There is no pericardial effusion.        Rhythm  Sinus rhythm was noted.  _____________________________________________________________________________  __  MMode/2D Measurements & Calculations  IVSd: 1.1 cm     LVIDd: 3.6 cm  LVIDs: 2.1 cm  LVPWd: 0.87 cm  FS: 41.6 %  LV mass(C)d: 108.9 grams  LV mass(C)dI: 64.9 grams/m2  Ao root diam: 3.1 cm  LA dimension: 3.0 cm  asc Aorta Diam: 3.1 cm  LA/Ao: 0.96  LA Volume (BP): 39.6 ml  LA Volume Index (BP): 23.6 ml/m2  RWT: 0.48           Doppler Measurements & Calculations  MV E max avril: 95.6 cm/sec  MV A max avril: 131.9 cm/sec  MV E/A: 0.72  MV max P.0 mmHg  MV mean PG: 3.6 mmHg  MV V2 VTI: 30.4 cm  MV dec slope: 542.8 cm/sec2  PA acc time: 0.08 sec  TR max avril: 323.8 cm/sec  TR max P.0 mmHg  E/E' av.9  Lateral E/e': 15.2  Medial E/e': 18.7           _____________________________________________________________________________  __           Report approved by: Dr Luther Earl 2020 03:43 PM            Discharge Medications   Current Discharge Medication List      START taking these medications    Details   bisacodyl (DULCOLAX) 10 MG suppository Place 1 suppository (10 mg) rectally daily as needed for constipation    Associated  Diagnoses: Back pain with radiation      metoprolol tartrate (LOPRESSOR) 25 MG tablet Take 0.5 tablets (12.5 mg) by mouth 2 times daily    Comments: Future refills by PCP Dr. ROGER GE with phone number [phone].  Associated Diagnoses: Paroxysmal atrial fibrillation (H)      predniSONE (DELTASONE) 20 MG tablet Take 2 tablets (40 mg) by mouth daily for 1 day    Associated Diagnoses: Chronic obstructive pulmonary disease, unspecified COPD type (H)         CONTINUE these medications which have CHANGED    Details   fentaNYL (DURAGESIC) 12 mcg/hr 72 hr patch Place 1 patch onto the skin every 72 hours remove old patch.  Qty: 1 patch, Refills: 0    Associated Diagnoses: Other chronic pain      HYDROcodone-acetaminophen (NORCO) 5-325 MG tablet Take 1 tablet by mouth every 6 hours as needed for severe pain  Qty: 5 tablet, Refills: 0    Associated Diagnoses: Back pain with radiation         CONTINUE these medications which have NOT CHANGED    Details   ACETAMINOPHEN PO Take 650 mg by mouth every 4 hours as needed for pain or fever And q4h prn      albuterol (PROAIR HFA/PROVENTIL HFA/VENTOLIN HFA) 108 (90 Base) MCG/ACT Inhaler Inhale 2 puffs into the lungs 4 times daily as needed for shortness of breath / dyspnea or wheezing       bismuth subsalicylate (PEPTO BISMOL) 262 MG chewable tablet Take 2 tablets by mouth every hour as needed for heartburn Max of 16 tabs per 24 hrs      calcium carbonate (OS-MARY GRACE 500 MG [place]. [place]) 500 MG tablet Take 1 tablet by mouth daily       fluticasone (FLONASE) 50 MCG/ACT nasal spray Spray 2 sprays into both nostrils daily      Fluticasone-Umeclidin-Vilanterol (TRELEGY ELLIPTA) 100-62.5-25 MCG/INH oral inhaler Inhale 1 puff into the lungs daily      furosemide (LASIX) 20 MG tablet Take 20 mg by mouth daily      GABAPENTIN PO Take 300 mg by mouth 3 times daily 09:00, 14:00 and 21:00      Glucosamine-Chondroitin (OSTEO BI-FLEX REGULAR STRENGTH PO) Take 1 tablet by mouth 2 times daily       guaiFENesin (ORGANIDIN) 200 MG TABS tablet Take 400 mg by mouth 2 times daily as needed for cough       !! hypromellose (ARTIFICIAL TEARS) 0.5 % SOLN ophthalmic solution Place 1 drop into both eyes daily      !! hypromellose (ARTIFICIAL TEARS) 0.5 % SOLN ophthalmic solution Place 1 drop into both eyes daily as needed for dry eyes      ipratropium - albuterol 0.5 mg/2.5 mg/3 mL (DUONEB) 0.5-2.5 (3) MG/3ML neb solution Take 1 vial by nebulization 4 times daily as needed for shortness of breath / dyspnea or wheezing       latanoprost (XALATAN) 0.005 % ophthalmic solution Place 1 drop into both eyes At Bedtime      Lidocaine (LIDOCARE) 4 % Patch Place 1 patch onto the skin every 24 hours To prevent lidocaine toxicity, patient should be patch free for 12 hrs daily.  Salonpas for hip pain      potassium chloride ER (K-TAB) 20 MEQ CR tablet Take 20 mEq by mouth daily      senna-docusate (SENOKOT-S;PERICOLACE) 8.6-50 MG per tablet Take 1 tablet by mouth daily      vitamin C (ASCORBIC ACID) 500 MG tablet Take 500 mg by mouth daily       !! - Potential duplicate medications found. Please discuss with provider.        Allergies   No Known Allergies

## 2020-02-27 NOTE — PROGRESS NOTES
SW:   D: Received discharge orders for this patient. [facility] Patient to transport via [facility].  MD orders sent via [facility] and medications faxed. No PAS needed, patient admitted to [facility] within the last 30 days. Patient and family are aware and in agreement with this plan.   P: Will continue to follow.       Ruchi Arceo   [facility]  [phone]

## 2020-02-27 NOTE — PLAN OF CARE
Alert. Disorientated to place, couldn't state what hospital. Weaning O2 on RA low 90s now. VSS. IV SL. Regular diet, tray set up. Wheelchair bound at baseline. A/1 with turns/reposition.Purewick intact changed at 2100. Tele SR BBB. Feels breathing has improved. Had some family visitors tonight. Sat on edge of bed to eat dinner. Complains of back pain, repositioned, fentanyl patch in place, scheduled tylenol given.  Plan to discharge to [facility] tomorrow. Son(Sha) aware. Kelsey toby Dalton has her purse with her jewelry in it.

## 2020-02-27 NOTE — PLAN OF CARE
Neuro- A&Ox2  Most Recent Vitals- Temp: 97.5  F (36.4  C) Temp src: Oral BP: (!) 145/67 Pulse: 79 Heart Rate: 79 Resp: 18 SpO2: 93 % O2 Device: Nasal cannula Oxygen Delivery: 1 LPM  Tele/Cardiac- SR  Resp- clear 1 LPM  Activity- A2  Pain- chronic, patch  Drips- none  Drains/Tubes- none  Skin- na  GI/- na  Aggression Color- Green  Plan- discontinue to leticia today  Blanca-     Linette Lopez, RN

## 2020-02-27 NOTE — PLAN OF CARE
Physical Therapy Discharge Summary    Reason for therapy discharge:    Discharged to transitional care facility.    Progress towards therapy goal(s). See goals on Care Plan in [facility] electronic health record for goal details.  Goals partially met.  Barriers to achieving goals:   discharge from facility.    Therapy recommendation(s):    Continued therapy is recommended.  Rationale/Recommendations:  To improve IND with functional mobility as pt below baseline at this time .

## 2020-02-28 ENCOUNTER — NURSING HOME VISIT (OUTPATIENT)
Dept: GERIATRICS | Facility: CLINIC | Age: 85
End: 2020-02-28
Payer: MEDICARE

## 2020-02-28 DIAGNOSIS — J30.89 ALLERGIC RHINITIS DUE TO OTHER ALLERGIC TRIGGER, UNSPECIFIED SEASONALITY: ICD-10-CM

## 2020-02-28 DIAGNOSIS — G89.29 OTHER CHRONIC PAIN: ICD-10-CM

## 2020-02-28 DIAGNOSIS — I51.89 DIASTOLIC DYSFUNCTION: ICD-10-CM

## 2020-02-28 DIAGNOSIS — I48.0 PAF (PAROXYSMAL ATRIAL FIBRILLATION) (H): ICD-10-CM

## 2020-02-28 DIAGNOSIS — R41.89 COGNITIVE IMPAIRMENT: ICD-10-CM

## 2020-02-28 DIAGNOSIS — R53.81 PHYSICAL DECONDITIONING: ICD-10-CM

## 2020-02-28 DIAGNOSIS — J44.1 COPD EXACERBATION (H): Primary | ICD-10-CM

## 2020-02-28 DIAGNOSIS — J45.31 MILD PERSISTENT ASTHMA WITH ACUTE EXACERBATION: ICD-10-CM

## 2020-02-28 DIAGNOSIS — R60.0 BILATERAL LOWER EXTREMITY EDEMA: ICD-10-CM

## 2020-02-28 DIAGNOSIS — K52.9 NON-SPECIFIC COLITIS: ICD-10-CM

## 2020-02-28 DIAGNOSIS — H40.9 GLAUCOMA OF BOTH EYES, UNSPECIFIED GLAUCOMA TYPE: ICD-10-CM

## 2020-02-28 DIAGNOSIS — K59.01 SLOW TRANSIT CONSTIPATION: ICD-10-CM

## 2020-02-28 PROCEDURE — 99305 1ST NF CARE MODERATE MDM 35: CPT | Mod: AI | Performed by: INTERNAL MEDICINE

## 2020-02-28 NOTE — LETTER
2/28/2020        RE: Kelsey Wood  [address]  [facility] [address]        [facility]  INITIAL VISIT NOTE  February 28, 2020    PRIMARY CARE PROVIDER AND CLINIC:  Demetrio Arriaza [facility] [address] [address] / [facility]    Chief Complaint   Patient presents with     Hospital F/U       HPI:    Kelsey Wood is a 89 year old  (4/18/1929) female who was seen at [facility] on February 28, 2020 for an initial visit. Medical history is notable for asthma/COPD, cognitive impairment, osteoarthritis, osteoporosis, glaucoma, and impaired hearing.    She was hospitalized at [facility] from February 24 through February 27, 2020.  She initially presented via EMS for evaluation of abdominal pain.  On exam she was noted to have respiratory wheezing and hypoxia, with O2 sat at 80% on room air.  CBC was significant for elevated white count of 12,000.  Hemoglobin was 14.9.  Chemistry profile was essentially unremarkable.  Lactic acid was 1.8.  BNP was 238.  Troponin I was less than 0.015.  Urinalysis was unremarkable except for small leukocyte esterase.  Nasopharyngeal swab was negative for influenza A/B antigen.  Electrocardiogram revealed normal sinus rhythm with a heart rate of 109, right bundle branch block, and left axis deviation.  CT chest with contrast was negative for pulmonary embolism.  There was right middle lobe atelectasis, stable left adrenal nodule and thickening, simple renal cysts, and subtle wall thickening and surrounding mesentery inflammation involving the right and left parts of colon, possibly an infectious or inflammatory process.    She was treated with supplemental oxygen, high-dose prednisone, and DuoNeb.    On February 25, the patient developed atrial fibrillation with rapid heart rate of 134, associated with nonspecific ST-T abnormality in anterior and inferior leads.  Echocardiogram, February 26, showed normal LV systolic  function with EF of 60-65%, grade 1 or early diastolic dysfunction, elevated RV systolic pressure, and no regional wall motion abnormalities.  Patient was treated with IV metoprolol and subsequently converted to normal sinus rhythm.  Her [id number] score was calculated at 3, however due to underlying cognitive impairment and mobility issues, anticoagulation therapy was not initiated.  She was started on metoprolol 12.5 mg p.o. twice daily.    Patient was seen today in her room, while lying in bed.  She complains of left upper quadrant abdominal discomfort which is mild and started last night.  She reports no fever, chills, chest pain, dyspnea, wheezing, nausea, vomiting, diarrhea, melena, hematochezia, urinary symptoms.  Her appetite is poor.  She does endorse chronic right thigh/buttock pain.    Patient is admitted to this facility for medical management, nursing care, and rehab.     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    SOCIAL HISTORY:  Patient is  and resides in a senior assisted living facility.  She is a former smoker and quit 1994.  She does not drink alcohol.  She does use a wheelchair for ambulation.    MEDICATIONS:  Current Outpatient  Medications   Medication Sig Dispense Refill     ACETAMINOPHEN PO Take 650 mg by mouth every 4 hours as needed for pain or fever And q4h prn       albuterol (PROAIR HFA/PROVENTIL HFA/VENTOLIN HFA) 108 (90 Base) MCG/ACT Inhaler Inhale 2 puffs into the lungs 4 times daily as needed for shortness of breath / dyspnea or wheezing        bisacodyl (DULCOLAX) 10 MG suppository Place 1 suppository (10 mg) rectally daily as needed for constipation       bismuth subsalicylate (PEPTO BISMOL) 262 MG chewable tablet Take 2 tablets by mouth every hour as needed for heartburn Max of 16 tabs per 24 hrs       calcium carbonate (OS-MARY GRACE 500 MG [place]. [place]) 500 MG tablet Take 1 tablet by mouth daily        fentaNYL (DURAGESIC) 12 mcg/hr 72 hr patch Place 1 patch onto the skin every 72 hours remove old patch. 1 patch 0     fluticasone (FLONASE) 50 MCG/ACT nasal spray Spray 2 sprays into both nostrils daily       Fluticasone-Umeclidin-Vilanterol (TRELEGY ELLIPTA) 100-62.5-25 MCG/INH oral inhaler Inhale 1 puff into the lungs daily       furosemide (LASIX) 20 MG tablet Take 20 mg by mouth daily       GABAPENTIN PO Take 300 mg by mouth 3 times daily 09:00, 14:00 and 21:00       Glucosamine-Chondroitin (OSTEO BI-FLEX REGULAR STRENGTH PO) Take 1 tablet by mouth 2 times daily       guaiFENesin (ORGANIDIN) 200 MG TABS tablet Take 400 mg by mouth 2 times daily as needed for cough        HYDROcodone-acetaminophen (NORCO) 5-325 MG tablet Take 1 tablet by mouth every 6 hours as needed for severe pain 5 tablet 0     hypromellose (ARTIFICIAL TEARS) 0.5 % SOLN ophthalmic solution Place 1 drop into both eyes daily       hypromellose (ARTIFICIAL TEARS) 0.5 % SOLN ophthalmic solution Place 1 drop into both eyes daily as needed for dry eyes       ipratropium - albuterol 0.5 mg/2.5 mg/3 mL (DUONEB) 0.5-2.5 (3) MG/3ML neb solution Take 1 vial by nebulization 4 times daily as needed for shortness of breath / dyspnea or wheezing        latanoprost (XALATAN)  "0.005 % ophthalmic solution Place 1 drop into both eyes At Bedtime       Lidocaine (LIDOCARE) 4 % Patch Place 1 patch onto the skin every 24 hours To prevent lidocaine toxicity, patient should be patch free for 12 hrs daily.  Salonpas for hip pain       metoprolol tartrate (LOPRESSOR) 25 MG tablet Take 0.5 tablets (12.5 mg) by mouth 2 times daily       potassium chloride ER (K-TAB) 20 MEQ CR tablet Take 20 mEq by mouth daily       [START ON 2/28/2020] predniSONE (DELTASONE) 20 MG tablet Take 2 tablets (40 mg) by mouth daily for 1 day       senna-docusate (SENOKOT-S;PERICOLACE) 8.6-50 MG per tablet Take 1 tablet by mouth daily       vitamin C (ASCORBIC ACID) 500 MG tablet Take 500 mg by mouth daily         ALLERGIES:  No Known Allergies    ROS:  10 point ROS neg other than the symptoms noted above in the HPI.    PHYSICAL EXAM:  /71   Pulse 71   Temp 98.6  F (37  C)   Resp 18   Ht 1.6 m (5' 3\")   Wt 63.9 kg (140 lb 12.8 oz)   SpO2 91%   BMI 24.94 kg/m      Gen: Cooperative and in no acute distress  HEENT: Normocephalic; oropharynx clear  Card: Normal S1, S2, RRR  Resp: Lungs clear to auscultation bilaterally  GI: Abdomen soft, non-distended, +BS; there is slight left upper quadrant abdominal tenderness to palpation with no rebound or guarding  Ext: No LE edema  Neuro: CX II-XII grossly intact; ROM in all four extremities grossly intact  Psych: Alert and oriented almost x3; normal affect  Skin: No acute rash    LABORATORY/IMAGING DATA:  Lab Results   Component Value Date    WBC 12.2 02/25/2020     Lab Results   Component Value Date    RBC 4.58 02/25/2020     Lab Results   Component Value Date    HGB 13.1 02/25/2020     Lab Results   Component Value Date    HCT 43.0 02/25/2020     Lab Results   Component Value Date    MCV 94 02/25/2020     Lab Results   Component Value Date    MCH 28.6 02/25/2020     Lab Results   Component Value Date    MCHC 30.5 02/25/2020     Lab Results   Component Value Date    RDW " 14.5 02/25/2020     Lab Results   Component Value Date     02/25/2020       Last Comprehensive Metabolic Panel:  Sodium   Date Value Ref Range Status   02/25/2020 137 133 - 144 mmol/L Final     Potassium   Date Value Ref Range Status   02/25/2020 4.9 3.4 - 5.3 mmol/L Final     Chloride   Date Value Ref Range Status   02/25/2020 103 94 - 109 mmol/L Final     Carbon Dioxide   Date Value Ref Range Status   02/25/2020 33 (H) 20 - 32 mmol/L Final     Anion Gap   Date Value Ref Range Status   02/25/2020 1 (L) 3 - 14 mmol/L Final     Glucose   Date Value Ref Range Status   02/25/2020 144 (H) 70 - 99 mg/dL Final     Urea Nitrogen   Date Value Ref Range Status   02/25/2020 25 7 - 30 mg/dL Final     Creatinine   Date Value Ref Range Status   02/25/2020 0.47 (L) 0.52 - 1.04 mg/dL Final     GFR Estimate   Date Value Ref Range Status   02/25/2020 87 >60 mL/min/[1.73_m2] Final     Comment:     Non  GFR Calc  Starting 12/18/2018, serum creatinine based estimated GFR (eGFR) will be   calculated using the Chronic Kidney Disease Epidemiology Collaboration   (CKD-EPI) equation.       Calcium   Date Value Ref Range Status   02/25/2020 9.9 8.5 - 10.1 mg/dL Final         ASSESSMENT/PLAN:  Acute COPD and asthma exacerbation,  Acute hypoxic respiratory failure.  In the hospital the patient was treated with high-dose prednisone, supplemental oxygen, and DuoNeb.  Patient is currently stable and off oxygen.  Plan:  Continue prednisone 40 mg p.o. daily through the end of today, February 28  Continue PTA Trelegy Ellipta, 1 puff daily, PRN DuoNeb, PRN albuterol inhaler, and PRN guaifenesin  Monitor respiratory status    Colitis, nonspecific.  Patient has known history of chronic pain.  She initially presented to the hospital with abdominal pain.  CT abdomen/pelvis revealed subtle wall thickening and surrounding mesenteric inflammation involving the right colon and left colon, possibly an infectious or inflammatory  process.  She does report left upper quadrant abdominal discomfort which is nonspecific.  Exam is benign and there is no rebound or guarding.  Plan:  Monitor symptoms and repeat imaging if there is worsening   Continue PRN Norco  Check CBC and BMP on Monday, March 2    Paroxysmal atrial fibrillation with RVR.  It occurred on February 25, while the patient was inpatient.  It subsequently converted normal sinus rhythm.  Her [id number] score is 4 (given her age, gender, and diastolic dysfunction) however due to cognitive impairment and mobility issues, chronic anticoagulation therapy was not initiated.  She was started on metoprolol in the hospital.  Rate is currently controlled.  Plan:  Continue metoprolol 12.5 mg p.o. twice daily  Monitor heart rate    Early diastolic dysfunction,  Chronic lower extremity edema.  Patient appears euvolemic.  There is no lower extremity edema on exam today.  BNP was normal at 238 on February 24.  Plan:  Continue PTA furosemide 20 mg p.o. daily  Continue potassium chloride 20 mEq p.o. daily  Monitor volume status and weight    Chronic pain.  Controlled.  Plan:  Continue PTA lidocaine patch, fentanyl patch, gabapentin 300 mg p.o. 3 times daily, and PRN acetaminophen/Norco    Glaucoma.  Plan:  Continue PTA Xalatan eyedrop    Allergic rhinitis.  Plan:  Continue PTA Flonase 50 mcg, 2 sprays into both nostrils daily    Cognitive impairment.  Mild and stable.  Plan:  Implement standard measures for prevention of delirium  OT evaluation and therapy    Slow transit constipation.  Plan:  Continue bowel regimen    Physical deconditioning.  Plan:  PT/OT evaluation and therapy        Electronically signed by:  Brooks Landa MD                      Sincerely,        Brooks Landa MD

## 2020-03-02 ENCOUNTER — HOSPITAL LABORATORY (OUTPATIENT)
Dept: OTHER | Facility: CLINIC | Age: 85
End: 2020-03-02

## 2020-03-02 LAB
ANION GAP SERPL CALCULATED.3IONS-SCNC: 5 MMOL/L (ref 3–14)
BUN SERPL-MCNC: 13 MG/DL (ref 7–30)
CALCIUM SERPL-MCNC: 9.2 MG/DL (ref 8.5–10.1)
CHLORIDE SERPL-SCNC: 101 MMOL/L (ref 94–109)
CO2 SERPL-SCNC: 29 MMOL/L (ref 20–32)
CREAT SERPL-MCNC: 0.53 MG/DL (ref 0.52–1.04)
ERYTHROCYTE [DISTWIDTH] IN BLOOD BY AUTOMATED COUNT: 14.6 % (ref 10–15)
GFR SERPL CREATININE-BSD FRML MDRD: 83 ML/MIN/{1.73_M2}
GLUCOSE SERPL-MCNC: 164 MG/DL (ref 70–99)
HCT VFR BLD AUTO: 46.4 % (ref 35–47)
HGB BLD-MCNC: 14.5 G/DL (ref 11.7–15.7)
MCH RBC QN AUTO: 28.8 PG (ref 26.5–33)
MCHC RBC AUTO-ENTMCNC: 31.3 G/DL (ref 31.5–36.5)
MCV RBC AUTO: 92 FL (ref 78–100)
PLATELET # BLD AUTO: 387 10E9/L (ref 150–450)
POTASSIUM SERPL-SCNC: 3.8 MMOL/L (ref 3.4–5.3)
RBC # BLD AUTO: 5.04 10E12/L (ref 3.8–5.2)
SODIUM SERPL-SCNC: 135 MMOL/L (ref 133–144)
WBC # BLD AUTO: 11 10E9/L (ref 4–11)

## 2020-03-03 ENCOUNTER — NURSING HOME VISIT (OUTPATIENT)
Dept: GERIATRICS | Facility: CLINIC | Age: 85
End: 2020-03-03
Payer: MEDICARE

## 2020-03-03 VITALS
HEIGHT: 63 IN | OXYGEN SATURATION: 92 % | WEIGHT: 138.4 LBS | BODY MASS INDEX: 24.52 KG/M2 | TEMPERATURE: 98.7 F | RESPIRATION RATE: 16 BRPM | HEART RATE: 88 BPM | DIASTOLIC BLOOD PRESSURE: 78 MMHG | SYSTOLIC BLOOD PRESSURE: 120 MMHG

## 2020-03-03 DIAGNOSIS — J45.31 MILD PERSISTENT ASTHMA WITH ACUTE EXACERBATION: ICD-10-CM

## 2020-03-03 DIAGNOSIS — R53.81 DEBILITY: ICD-10-CM

## 2020-03-03 DIAGNOSIS — G89.29 OTHER CHRONIC PAIN: ICD-10-CM

## 2020-03-03 DIAGNOSIS — I50.32 CHRONIC DIASTOLIC CONGESTIVE HEART FAILURE (H): ICD-10-CM

## 2020-03-03 DIAGNOSIS — R60.0 BILATERAL LOWER EXTREMITY EDEMA: ICD-10-CM

## 2020-03-03 DIAGNOSIS — R41.89 COGNITIVE IMPAIRMENT: ICD-10-CM

## 2020-03-03 DIAGNOSIS — R10.84 ABDOMINAL PAIN, GENERALIZED: ICD-10-CM

## 2020-03-03 DIAGNOSIS — J44.1 COPD EXACERBATION (H): Primary | ICD-10-CM

## 2020-03-03 DIAGNOSIS — K52.9 NON-SPECIFIC COLITIS: ICD-10-CM

## 2020-03-03 DIAGNOSIS — I48.0 PAF (PAROXYSMAL ATRIAL FIBRILLATION) (H): ICD-10-CM

## 2020-03-03 PROCEDURE — 99309 SBSQ NF CARE MODERATE MDM 30: CPT | Performed by: NURSE PRACTITIONER

## 2020-03-03 ASSESSMENT — MIFFLIN-ST. JEOR: SCORE: 1016.91

## 2020-03-03 NOTE — LETTER
"    3/3/2020        RE: Kelsey Wood  [address]  [place] [address]        [facility]  [facility] Record Number:  [id number]  Place of Service where encounter took place:  [facility] [[id number]]  Chief Complaint   Patient presents with     RECHECK       HPI:    Kelsey Wood  is a 89 year old (4/18/1929), who is being seen today for an episodic care visit.  HPI information obtained from: facility chart records, facility staff, patient report and [facility] chart review.     Past Medical and Surgical History reviewed in [facility] today.    Met with Mrs. Wood today for follow up.  RN reports Mrs. oWod had abdominal pain last night.  In meeting Mrs. Wood today she has clear degree of cognitive impairment, poor historian.  She reports her abdomen is \"Firm\" but denies any pain now, does report it get's sore at times, but not bad.  She reports her stools as soft.  No N/V, she endorses chronic Left knee and other pains, but none currently.  Denies any respiratory complaints, but do note she is mildly tachypenic with speech.  No other complaints.     MEDICATIONS:  Current Outpatient Medications   Medication Sig Dispense Refill     ACETAMINOPHEN PO Take 650 mg by mouth every 4 hours as needed for pain or fever And q4h prn       albuterol (PROAIR HFA/PROVENTIL HFA/VENTOLIN HFA) 108 (90 Base) MCG/ACT Inhaler Inhale 2 puffs into the lungs 4 times daily as needed for shortness of breath / dyspnea or wheezing        bisacodyl (DULCOLAX) 10 MG suppository Place 1 suppository (10 mg) rectally daily as needed for constipation       bismuth subsalicylate (PEPTO BISMOL) 262 MG chewable tablet Take 2 tablets by mouth every hour as needed for heartburn Max of 16 tabs per 24 hrs       calcium carbonate (OS-MARY GRACE 500 MG [place]. [place]) 500 MG tablet Take 1 tablet by mouth daily        fentaNYL (DURAGESIC) 12 mcg/hr 72 hr patch Place 1 patch onto the skin every 72 hours remove old patch. 1 " patch 0     fluticasone (FLONASE) 50 MCG/ACT nasal spray Spray 2 sprays into both nostrils daily       Fluticasone-Umeclidin-Vilanterol (TRELEGY ELLIPTA) 100-62.5-25 MCG/INH oral inhaler Inhale 1 puff into the lungs daily       furosemide (LASIX) 20 MG tablet Take 20 mg by mouth daily       GABAPENTIN PO Take 300 mg by mouth 3 times daily 09:00, 14:00 and 21:00       Glucosamine-Chondroitin (OSTEO BI-FLEX REGULAR STRENGTH PO) Take 1 tablet by mouth 2 times daily       guaiFENesin (ORGANIDIN) 200 MG TABS tablet Take 400 mg by mouth 2 times daily as needed for cough        HYDROcodone-acetaminophen (NORCO) 5-325 MG tablet Take 1 tablet by mouth every 6 hours as needed for severe pain 5 tablet 0     hypromellose (ARTIFICIAL TEARS) 0.5 % SOLN ophthalmic solution Place 1 drop into both eyes daily       hypromellose (ARTIFICIAL TEARS) 0.5 % SOLN ophthalmic solution Place 1 drop into both eyes daily as needed for dry eyes       ipratropium - albuterol 0.5 mg/2.5 mg/3 mL (DUONEB) 0.5-2.5 (3) MG/3ML neb solution Take 1 vial by nebulization 4 times daily as needed for shortness of breath / dyspnea or wheezing        latanoprost (XALATAN) 0.005 % ophthalmic solution Place 1 drop into both eyes At Bedtime       Lidocaine (LIDOCARE) 4 % Patch Place 1 patch onto the skin every 24 hours To prevent lidocaine toxicity, patient should be patch free for 12 hrs daily.  Salonpas for hip pain       metoprolol tartrate (LOPRESSOR) 25 MG tablet Take 0.5 tablets (12.5 mg) by mouth 2 times daily       potassium chloride ER (K-TAB) 20 MEQ CR tablet Take 20 mEq by mouth daily       senna-docusate (SENOKOT-S;PERICOLACE) 8.6-50 MG per tablet Take 1 tablet by mouth daily       vitamin C (ASCORBIC ACID) 500 MG tablet Take 500 mg by mouth daily       REVIEW OF SYSTEMS:  Limited secondary to cognitive impairment but today 7 point ROS done including, light headedness/dizziness, fever/chills, pain, Resp, CV, GI, and  and is negative other than noted  "in HPI.      Objective:  /78   Pulse 88   Temp 98.7  F (37.1  C)   Resp 16   Ht 1.6 m (5' 3\")   Wt 62.8 kg (138 lb 6.4 oz)   SpO2 92%   BMI 24.52 kg/m        Exam:  GENERAL APPEARANCE:  Elderly female sitting up in WC, NAD, non-toxic.  ENT:  Mouth and oropharynx normal, moist mucous membranes.   RESP:  Lungs with mild faint coarseness in lower lobes, CTA otherwise, no wheezing.  Regular relaxed breathing effort but does get mild tachypenic with speech.  No cough.   CV: S1/S2 no murmur or rubs.  Regular rhythm and rate.   ABDOMEN:   Obese but, soft, non-tender with active bowel sounds.  No guarding, rigidity, or rebound tenderness.  EXTREMITIES:  1+ bilateral lower extremity edema, no calf tenderness.   PSYCH: Alert to self, not place, date, situation.  Clear degree of cognitive/memory impairment.      Labs:   I have personally reviewed labs, which are in facility or EMR chart.     ASSESSMENT/PLAN:  COPD exacerbation (H)  Mild persistent asthma with acute exacerbation  Has h/o COPD and mild asthma.  Has finished Prednisone and Mucinex treatment.   She is doing well on RA, denies any SOB/KABA but I do note she get's mild tachypenic with speech.  Mild faint coarseness in lower lobes, no wheezing, otherwise CTA.   -Continues Trelegy, Fluticasone.   -Continues PRN Albuterol and DuoNeb's.     Non-specific colitis  Abdominal pain, generalized  Other chronic pain  I went and saw Mrs. Wood due to RN reports c/o vague abdominal pain last night.  Currently denies and I note she has been having vague intermittent Abd pain for a couple weeks.  CT workup in hospital noted wall thickening and surrounding mesenteric inflammation in R and L colon no clear etiology, no obstruction.    She is reporting ongoing loose stools, RN's have charted a couple loose stools.    Due to coming from [facility], no clear etiology, cognitive impairment hindering ROS/HPI will get C-diff for completeness.   -C-diff ordered.   -No change to " analgesics.   -Continue to monitor Acetaminophen usage/max.     PAF (paroxysmal atrial fibrillation) (H)  Chronic diastolic congestive heart failure (H)  Bilateral lower extremity edema  Has ongoing 1+ LE edema, mild coarseness in bilateral lobes but no overt s/s of CHF.   Review of VS's show VSS and within acceptable range.   -Continues on Furosemide/KCl.   -Continues on Metoprolol.     Cognitive impairment  Mod-advanced cognitive/memory impairment on exam, no family present to determine how close to baseline.   -No changes, continue to clinically monitor.     Debility  -Continues with PT/OT.     Orders written by provider at facility  -As noted above.     Electronically signed by:  CAT Tamayo CNP         Sincerely,        CAT Tamayo CNP

## 2020-03-03 NOTE — PROGRESS NOTES
"[facility]  [facility] Record Number:  [id number]  Place of Service where encounter took place:  [facility] [[id number]]  Chief Complaint   Patient presents with     RECHECK       HPI:    Kelsey Wood  is a 89 year old (4/18/1929), who is being seen today for an episodic care visit.  HPI information obtained from: facility chart records, facility staff, patient report and [facility] chart review.     Past Medical and Surgical History reviewed in [facility] today.    Met with Mrs. Wood today for follow up.  RN reports Mrs. Wood had abdominal pain last night.  In meeting Mrs. Wood today she has clear degree of cognitive impairment, poor historian.  She reports her abdomen is \"Firm\" but denies any pain now, does report it get's sore at times, but not bad.  She reports her stools as soft.  No N/V, she endorses chronic Left knee and other pains, but none currently.  Denies any respiratory complaints, but do note she is mildly tachypenic with speech.  No other complaints.     MEDICATIONS:  Current Outpatient Medications   Medication Sig Dispense Refill     ACETAMINOPHEN PO Take 650 mg by mouth every 4 hours as needed for pain or fever And q4h prn       albuterol (PROAIR HFA/PROVENTIL HFA/VENTOLIN HFA) 108 (90 Base) MCG/ACT Inhaler Inhale 2 puffs into the lungs 4 times daily as needed for shortness of breath / dyspnea or wheezing        bisacodyl (DULCOLAX) 10 MG suppository Place 1 suppository (10 mg) rectally daily as needed for constipation       bismuth subsalicylate (PEPTO BISMOL) 262 MG chewable tablet Take 2 tablets by mouth every hour as needed for heartburn Max of 16 tabs per 24 hrs       calcium carbonate (OS-MARY GRACE 500 MG [place]. [place]) 500 MG tablet Take 1 tablet by mouth daily        fentaNYL (DURAGESIC) 12 mcg/hr 72 hr patch Place 1 patch onto the skin every 72 hours remove old patch. 1 patch 0     fluticasone (FLONASE) 50 MCG/ACT nasal spray Spray 2 sprays into both nostrils " daily       Fluticasone-Umeclidin-Vilanterol (TRELEGY ELLIPTA) 100-62.5-25 MCG/INH oral inhaler Inhale 1 puff into the lungs daily       furosemide (LASIX) 20 MG tablet Take 20 mg by mouth daily       GABAPENTIN PO Take 300 mg by mouth 3 times daily 09:00, 14:00 and 21:00       Glucosamine-Chondroitin (OSTEO BI-FLEX REGULAR STRENGTH PO) Take 1 tablet by mouth 2 times daily       guaiFENesin (ORGANIDIN) 200 MG TABS tablet Take 400 mg by mouth 2 times daily as needed for cough        HYDROcodone-acetaminophen (NORCO) 5-325 MG tablet Take 1 tablet by mouth every 6 hours as needed for severe pain 5 tablet 0     hypromellose (ARTIFICIAL TEARS) 0.5 % SOLN ophthalmic solution Place 1 drop into both eyes daily       hypromellose (ARTIFICIAL TEARS) 0.5 % SOLN ophthalmic solution Place 1 drop into both eyes daily as needed for dry eyes       ipratropium - albuterol 0.5 mg/2.5 mg/3 mL (DUONEB) 0.5-2.5 (3) MG/3ML neb solution Take 1 vial by nebulization 4 times daily as needed for shortness of breath / dyspnea or wheezing        latanoprost (XALATAN) 0.005 % ophthalmic solution Place 1 drop into both eyes At Bedtime       Lidocaine (LIDOCARE) 4 % Patch Place 1 patch onto the skin every 24 hours To prevent lidocaine toxicity, patient should be patch free for 12 hrs daily.  Salonpas for hip pain       metoprolol tartrate (LOPRESSOR) 25 MG tablet Take 0.5 tablets (12.5 mg) by mouth 2 times daily       potassium chloride ER (K-TAB) 20 MEQ CR tablet Take 20 mEq by mouth daily       senna-docusate (SENOKOT-S;PERICOLACE) 8.6-50 MG per tablet Take 1 tablet by mouth daily       vitamin C (ASCORBIC ACID) 500 MG tablet Take 500 mg by mouth daily       REVIEW OF SYSTEMS:  Limited secondary to cognitive impairment but today 7 point ROS done including, light headedness/dizziness, fever/chills, pain, Resp, CV, GI, and  and is negative other than noted in HPI.      Objective:  /78   Pulse 88   Temp 98.7  F (37.1  C)   Resp 16   Ht  "1.6 m (5' 3\")   Wt 62.8 kg (138 lb 6.4 oz)   SpO2 92%   BMI 24.52 kg/m       Exam:  GENERAL APPEARANCE:  Elderly female sitting up in WC, NAD, non-toxic.  ENT:  Mouth and oropharynx normal, moist mucous membranes.   RESP:  Lungs with mild faint coarseness in lower lobes, CTA otherwise, no wheezing.  Regular relaxed breathing effort but does get mild tachypenic with speech.  No cough.   CV: S1/S2 no murmur or rubs.  Regular rhythm and rate.   ABDOMEN:   Obese but, soft, non-tender with active bowel sounds.  No guarding, rigidity, or rebound tenderness.  EXTREMITIES:  1+ bilateral lower extremity edema, no calf tenderness.   PSYCH: Alert to self, not place, date, situation.  Clear degree of cognitive/memory impairment.      Labs:   I have personally reviewed labs, which are in facility or EMR chart.     ASSESSMENT/PLAN:  COPD exacerbation (H)  Mild persistent asthma with acute exacerbation  Has h/o COPD and mild asthma.  Has finished Prednisone and Mucinex treatment.   She is doing well on RA, denies any SOB/KABA but I do note she get's mild tachypenic with speech.  Mild faint coarseness in lower lobes, no wheezing, otherwise CTA.   -Continues Trelegy, Fluticasone.   -Continues PRN Albuterol and DuoNeb's.     Non-specific colitis  Abdominal pain, generalized  Other chronic pain  I went and saw Mrs. Wood due to RN reports c/o vague abdominal pain last night.  Currently denies and I note she has been having vague intermittent Abd pain for a couple weeks.  CT workup in hospital noted wall thickening and surrounding mesenteric inflammation in R and L colon no clear etiology, no obstruction.    She is reporting ongoing loose stools, RN's have charted a couple loose stools.    Due to coming from half-way, no clear etiology, cognitive impairment hindering ROS/HPI will get C-diff for completeness.   -C-diff ordered.   -No change to analgesics.   -Continue to monitor Acetaminophen usage/max.     PAF (paroxysmal atrial " fibrillation) (H)  Chronic diastolic congestive heart failure (H)  Bilateral lower extremity edema  Has ongoing 1+ LE edema, mild coarseness in bilateral lobes but no overt s/s of CHF.   Review of VS's show VSS and within acceptable range.   -Continues on Furosemide/KCl.   -Continues on Metoprolol.     Cognitive impairment  Mod-advanced cognitive/memory impairment on exam, no family present to determine how close to baseline.   -No changes, continue to clinically monitor.     Debility  -Continues with PT/OT.     Orders written by provider at facility  -As noted above.     Electronically signed by:  CAT Tamayo CNP

## 2020-03-10 ENCOUNTER — NURSING HOME VISIT (OUTPATIENT)
Dept: GERIATRICS | Facility: CLINIC | Age: 85
End: 2020-03-10
Payer: MEDICARE

## 2020-03-10 VITALS
HEIGHT: 63 IN | DIASTOLIC BLOOD PRESSURE: 65 MMHG | TEMPERATURE: 98.4 F | WEIGHT: 103 LBS | HEART RATE: 83 BPM | BODY MASS INDEX: 18.25 KG/M2 | OXYGEN SATURATION: 93 % | RESPIRATION RATE: 16 BRPM | SYSTOLIC BLOOD PRESSURE: 105 MMHG

## 2020-03-10 DIAGNOSIS — K52.9 NON-SPECIFIC COLITIS: ICD-10-CM

## 2020-03-10 DIAGNOSIS — I48.0 PAF (PAROXYSMAL ATRIAL FIBRILLATION) (H): ICD-10-CM

## 2020-03-10 DIAGNOSIS — I50.32 CHRONIC DIASTOLIC CONGESTIVE HEART FAILURE (H): ICD-10-CM

## 2020-03-10 DIAGNOSIS — R10.84 ABDOMINAL PAIN, GENERALIZED: ICD-10-CM

## 2020-03-10 DIAGNOSIS — R41.89 COGNITIVE IMPAIRMENT: ICD-10-CM

## 2020-03-10 DIAGNOSIS — R60.0 BILATERAL LOWER EXTREMITY EDEMA: ICD-10-CM

## 2020-03-10 DIAGNOSIS — J44.1 COPD EXACERBATION (H): Primary | ICD-10-CM

## 2020-03-10 DIAGNOSIS — G89.29 OTHER CHRONIC PAIN: ICD-10-CM

## 2020-03-10 DIAGNOSIS — R53.81 DEBILITY: ICD-10-CM

## 2020-03-10 DIAGNOSIS — J45.31 MILD PERSISTENT ASTHMA WITH ACUTE EXACERBATION: ICD-10-CM

## 2020-03-10 PROCEDURE — 99309 SBSQ NF CARE MODERATE MDM 30: CPT | Performed by: NURSE PRACTITIONER

## 2020-03-10 ASSESSMENT — MIFFLIN-ST. JEOR: SCORE: 856.33

## 2020-03-12 ENCOUNTER — NURSING HOME VISIT (OUTPATIENT)
Dept: GERIATRICS | Facility: CLINIC | Age: 85
End: 2020-03-12
Payer: MEDICARE

## 2020-03-12 VITALS
BODY MASS INDEX: 24.45 KG/M2 | HEIGHT: 63 IN | WEIGHT: 138 LBS | DIASTOLIC BLOOD PRESSURE: 77 MMHG | HEART RATE: 70 BPM | SYSTOLIC BLOOD PRESSURE: 130 MMHG | RESPIRATION RATE: 16 BRPM | OXYGEN SATURATION: 91 % | TEMPERATURE: 97.8 F

## 2020-03-12 DIAGNOSIS — J44.1 COPD EXACERBATION (H): Primary | ICD-10-CM

## 2020-03-12 DIAGNOSIS — R10.84 ABDOMINAL PAIN, GENERALIZED: ICD-10-CM

## 2020-03-12 DIAGNOSIS — G89.29 OTHER CHRONIC PAIN: ICD-10-CM

## 2020-03-12 DIAGNOSIS — R60.0 BILATERAL LOWER EXTREMITY EDEMA: ICD-10-CM

## 2020-03-12 DIAGNOSIS — K52.9 NON-SPECIFIC COLITIS: ICD-10-CM

## 2020-03-12 DIAGNOSIS — R53.81 DEBILITY: ICD-10-CM

## 2020-03-12 DIAGNOSIS — J45.31 MILD PERSISTENT ASTHMA WITH ACUTE EXACERBATION: ICD-10-CM

## 2020-03-12 DIAGNOSIS — I50.32 CHRONIC DIASTOLIC CONGESTIVE HEART FAILURE (H): ICD-10-CM

## 2020-03-12 DIAGNOSIS — I48.0 PAF (PAROXYSMAL ATRIAL FIBRILLATION) (H): ICD-10-CM

## 2020-03-12 DIAGNOSIS — R41.89 COGNITIVE IMPAIRMENT: ICD-10-CM

## 2020-03-12 PROCEDURE — 99309 SBSQ NF CARE MODERATE MDM 30: CPT | Performed by: NURSE PRACTITIONER

## 2020-03-12 RX ORDER — SIMETHICONE 80 MG
80 TABLET,CHEWABLE ORAL EVERY 6 HOURS PRN
Status: ON HOLD | COMMUNITY
End: 2021-02-06

## 2020-03-12 ASSESSMENT — MIFFLIN-ST. JEOR: SCORE: 1015.09

## 2020-03-12 NOTE — LETTER
3/12/2020        RE: Kelsey Wood  [address]  [place] [address]        [facility]  [place] Record Number:  [id number]  Place of Service where encounter took place:  [facility] [[id number]]  Chief Complaint   Patient presents with     RECHECK       HPI:    Kelsey Wood  is a 89 year old (4/18/1929), who is being seen today for an episodic care visit.  HPI information obtained from: facility chart records, facility staff, patient report and [facility] chart review.     Per recent TCU provider progress notes:  89 year old female with hx of asthma/COPD, cognitive impairment, osteoarthritis, osteoporosis, glaucoma, and impaired hearing. Hospitalized with abdominal pain, wheezing and decreased saturations: diagnosed with COPD exacerbation and acute hypoxic respiratory failure and received o2, nebs and prednisone. Developed a fib with RVR and started metoprolol, no AC due to cognitive impairment and impaired mobility. Abdominal pain ?due to colitis with CT showing subtle wall thickening and surrounding mesenteric inflammation involving the right colon and left colon, possibly an infectious or inflammatory process. Pain managed with PRN [place], may need outpatient colonoscopy. Edema with early diastolic dysfunction treated with lasix and KCL. Chronic pain managed with lidocaine patch, fentanyl, gabapentin and PRN tylenol/Norco. Initially reported loose stools and c diff ordered but stools now formed so test not completed. Reported abdominal pain on 3/11 using Norco with relief.     Today's concern is:  Patient is seen for episodic TCU visit. She once again c/o abdominal pain last night. She reports her abdomen felt quite distended and she felt like she had gas pain. It has resolved by this morning and she is very comfortable. Patient denies headaches, dizziness, chest pain or shortness of breath. Per EMR review note weight down 2 lbs since admit. SBP  range  and sats 91% on room air. She is standing with assist in // bars.     Past Medical and Surgical History reviewed in Epic today.    MEDICATIONS:    Current Outpatient Medications   Medication Sig Dispense Refill     ACETAMINOPHEN PO Take 650 mg by mouth every 4 hours as needed for pain or fever And q4h prn       albuterol (PROAIR HFA/PROVENTIL HFA/VENTOLIN HFA) 108 (90 Base) MCG/ACT Inhaler Inhale 2 puffs into the lungs 4 times daily as needed for shortness of breath / dyspnea or wheezing        bisacodyl (DULCOLAX) 10 MG suppository Place 1 suppository (10 mg) rectally daily as needed for constipation       bismuth subsalicylate (PEPTO BISMOL) 262 MG chewable tablet Take 2 tablets by mouth every hour as needed for heartburn Max of 16 tabs per 24 hrs       calcium carbonate (OS-MARY GRACE 500 MG [place]. [place]) 500 MG tablet Take 1 tablet by mouth daily        fentaNYL (DURAGESIC) 12 mcg/hr 72 hr patch Place 1 patch onto the skin every 72 hours remove old patch. 1 patch 0     fluticasone (FLONASE) 50 MCG/ACT nasal spray Spray 2 sprays into both nostrils daily       Fluticasone-Umeclidin-Vilanterol (TRELEGY ELLIPTA) 100-62.5-25 MCG/INH oral inhaler Inhale 1 puff into the lungs daily       furosemide (LASIX) 20 MG tablet Take 20 mg by mouth daily       GABAPENTIN PO Take 300 mg by mouth 3 times daily 09:00, 14:00 and 21:00       Glucosamine-Chondroitin (OSTEO BI-FLEX REGULAR STRENGTH PO) Take 1 tablet by mouth 2 times daily       guaiFENesin (ORGANIDIN) 200 MG TABS tablet Take 400 mg by mouth 2 times daily as needed for cough        HYDROcodone-acetaminophen (NORCO) 5-325 MG tablet Take 1 tablet by mouth every 6 hours as needed for severe pain 5 tablet 0     hypromellose (ARTIFICIAL TEARS) 0.5 % SOLN ophthalmic solution Place 1 drop into both eyes daily       hypromellose (ARTIFICIAL TEARS) 0.5 % SOLN ophthalmic solution Place 1 drop into both eyes daily as needed for dry eyes       ipratropium - albuterol 0.5 mg/2.5  "mg/3 mL (DUONEB) 0.5-2.5 (3) MG/3ML neb solution Take 1 vial by nebulization 4 times daily as needed for shortness of breath / dyspnea or wheezing        latanoprost (XALATAN) 0.005 % ophthalmic solution Place 1 drop into both eyes At Bedtime       Lidocaine (LIDOCARE) 4 % Patch Place 1 patch onto the skin every 24 hours To prevent lidocaine toxicity, patient should be patch free for 12 hrs daily.  Salonpas for hip pain       metoprolol tartrate (LOPRESSOR) 25 MG tablet Take 0.5 tablets (12.5 mg) by mouth 2 times daily       potassium chloride ER (K-TAB) 20 MEQ CR tablet Take 20 mEq by mouth daily       senna-docusate (SENOKOT-S;PERICOLACE) 8.6-50 MG per tablet Take 1 tablet by mouth daily       vitamin C (ASCORBIC ACID) 500 MG tablet Take 500 mg by mouth daily         REVIEW OF SYSTEMS:  4 point ROS including Respiratory, CV, GI and , other than that noted in the HPI,  is negative    Objective:  /77   Pulse 70   Temp 97.8  F (36.6  C)   Resp 16   Ht 1.6 m (5' 3\")   Wt 62.6 kg (138 lb)   SpO2 91%   BMI 24.45 kg/m    Exam:  GENERAL APPEARANCE:  Alert, in no distress, oriented  RESP:  lungs clear to auscultation , no respiratory distress  CV:  regular rate and rhythm, no murmur, rub, or gallop, no edema  ABDOMEN:  no guarding or rebound, bowel sounds normal  M/S:   Digits and nails normal  wheelchair bound  NEURO:   Cranial nerves 2-12 are normal tested and grossly at patient's baseline, speech clear  PSYCH:  insight and judgement impaired, memory impaired     Labs:   Recent labs in [facility] reviewed by me today.     ASSESSMENT/PLAN:  COPD exacerbation (H)  Mild persistent asthma with acute exacerbation  Acute on chronic, appears at baseline. Monitor, meds as ordered. F/U next visit.     Non-specific colitis  Abdominal pain, generalized  Occasional symptoms. Due to bloating and cramping will add PRN simethicone. Staff to update provider if not effective.     Other chronic pain  Chronic, stable with tylenol " and lidocaine. Monitor.     PAF (paroxysmal atrial fibrillation) (H)  Controlled rate, no AC. Monitor.     Chronic diastolic congestive heart failure (H)  Bilateral lower extremity edema  Chronic, stable, compensated. Meds, vs, wt as ordered and f/u next visit.     Cognitive impairment  Debility  Acute on chronic, making gains in therapies - f/u with progress next week. Monitor for safety. SW to assist with discharge planning.     Orders written by provider at facility  Simethicone 80 mg PO every 6 hrs PRN cramping/bloating/gas pains    Electronically signed by:  CAT Nagel CNP               Sincerely,        CAT Nagel CNP

## 2020-03-12 NOTE — PROGRESS NOTES
[facility]  [facility] Record Number:  [id number]  Place of Service where encounter took place:  [facility] [[id number]]  Chief Complaint   Patient presents with     RECHECK       HPI:    Kelsey Wood  is a 89 year old (4/18/1929), who is being seen today for an episodic care visit.  HPI information obtained from: facility chart records, facility staff, patient report and [facility] chart review.     Per recent TCU provider progress notes:  89 year old female with hx of asthma/COPD, cognitive impairment, osteoarthritis, osteoporosis, glaucoma, and impaired hearing. Hospitalized with abdominal pain, wheezing and decreased saturations: diagnosed with COPD exacerbation and acute hypoxic respiratory failure and received o2, nebs and prednisone. Developed a fib with RVR and started metoprolol, no AC due to cognitive impairment and impaired mobility. Abdominal pain ?due to colitis with CT showing subtle wall thickening and surrounding mesenteric inflammation involving the right colon and left colon, possibly an infectious or inflammatory process. Pain managed with PRN [place], may need outpatient colonoscopy. Edema with early diastolic dysfunction treated with lasix and KCL. Chronic pain managed with lidocaine patch, fentanyl, gabapentin and PRN tylenol/Norco. Initially reported loose stools and c diff ordered but stools now formed so test not completed. Reported abdominal pain on 3/11 using Norco with relief.     Today's concern is:  Patient is seen for episodic TCU visit. She once again c/o abdominal pain last night. She reports her abdomen felt quite distended and she felt like she had gas pain. It has resolved by this morning and she is very comfortable. Patient denies headaches, dizziness, chest pain or shortness of breath. Per EMR review note weight down 2 lbs since admit. SBP range  and sats 91% on room air. She is standing with assist in // bars.     Past Medical  and Surgical History reviewed in Epic today.    MEDICATIONS:    Current Outpatient Medications   Medication Sig Dispense Refill     ACETAMINOPHEN PO Take 650 mg by mouth every 4 hours as needed for pain or fever And q4h prn       albuterol (PROAIR HFA/PROVENTIL HFA/VENTOLIN HFA) 108 (90 Base) MCG/ACT Inhaler Inhale 2 puffs into the lungs 4 times daily as needed for shortness of breath / dyspnea or wheezing        bisacodyl (DULCOLAX) 10 MG suppository Place 1 suppository (10 mg) rectally daily as needed for constipation       bismuth subsalicylate (PEPTO BISMOL) 262 MG chewable tablet Take 2 tablets by mouth every hour as needed for heartburn Max of 16 tabs per 24 hrs       calcium carbonate (OS-MARY GRACE 500 MG [place]. [place]) 500 MG tablet Take 1 tablet by mouth daily        fentaNYL (DURAGESIC) 12 mcg/hr 72 hr patch Place 1 patch onto the skin every 72 hours remove old patch. 1 patch 0     fluticasone (FLONASE) 50 MCG/ACT nasal spray Spray 2 sprays into both nostrils daily       Fluticasone-Umeclidin-Vilanterol (TRELEGY ELLIPTA) 100-62.5-25 MCG/INH oral inhaler Inhale 1 puff into the lungs daily       furosemide (LASIX) 20 MG tablet Take 20 mg by mouth daily       GABAPENTIN PO Take 300 mg by mouth 3 times daily 09:00, 14:00 and 21:00       Glucosamine-Chondroitin (OSTEO BI-FLEX REGULAR STRENGTH PO) Take 1 tablet by mouth 2 times daily       guaiFENesin (ORGANIDIN) 200 MG TABS tablet Take 400 mg by mouth 2 times daily as needed for cough        HYDROcodone-acetaminophen (NORCO) 5-325 MG tablet Take 1 tablet by mouth every 6 hours as needed for severe pain 5 tablet 0     hypromellose (ARTIFICIAL TEARS) 0.5 % SOLN ophthalmic solution Place 1 drop into both eyes daily       hypromellose (ARTIFICIAL TEARS) 0.5 % SOLN ophthalmic solution Place 1 drop into both eyes daily as needed for dry eyes       ipratropium - albuterol 0.5 mg/2.5 mg/3 mL (DUONEB) 0.5-2.5 (3) MG/3ML neb solution Take 1 vial by nebulization 4 times daily as  "needed for shortness of breath / dyspnea or wheezing        latanoprost (XALATAN) 0.005 % ophthalmic solution Place 1 drop into both eyes At Bedtime       Lidocaine (LIDOCARE) 4 % Patch Place 1 patch onto the skin every 24 hours To prevent lidocaine toxicity, patient should be patch free for 12 hrs daily.  Salonpas for hip pain       metoprolol tartrate (LOPRESSOR) 25 MG tablet Take 0.5 tablets (12.5 mg) by mouth 2 times daily       potassium chloride ER (K-TAB) 20 MEQ CR tablet Take 20 mEq by mouth daily       senna-docusate (SENOKOT-S;PERICOLACE) 8.6-50 MG per tablet Take 1 tablet by mouth daily       vitamin C (ASCORBIC ACID) 500 MG tablet Take 500 mg by mouth daily         REVIEW OF SYSTEMS:  4 point ROS including Respiratory, CV, GI and , other than that noted in the HPI,  is negative    Objective:  /77   Pulse 70   Temp 97.8  F (36.6  C)   Resp 16   Ht 1.6 m (5' 3\")   Wt 62.6 kg (138 lb)   SpO2 91%   BMI 24.45 kg/m    Exam:  GENERAL APPEARANCE:  Alert, in no distress, oriented  RESP:  lungs clear to auscultation , no respiratory distress  CV:  regular rate and rhythm, no murmur, rub, or gallop, no edema  ABDOMEN:  no guarding or rebound, bowel sounds normal  M/S:   Digits and nails normal  wheelchair bound  NEURO:   Cranial nerves 2-12 are normal tested and grossly at patient's baseline, speech clear  PSYCH:  insight and judgement impaired, memory impaired     Labs:   Recent labs in [facility] reviewed by me today.     ASSESSMENT/PLAN:  COPD exacerbation (H)  Mild persistent asthma with acute exacerbation  Acute on chronic, appears at baseline. Monitor, meds as ordered. F/U next visit.     Non-specific colitis  Abdominal pain, generalized  Occasional symptoms. Due to bloating and cramping will add PRN simethicone. Staff to update provider if not effective.     Other chronic pain  Chronic, stable with tylenol and lidocaine. Monitor.     PAF (paroxysmal atrial fibrillation) (H)  Controlled rate, no AC. " Monitor.     Chronic diastolic congestive heart failure (H)  Bilateral lower extremity edema  Chronic, stable, compensated. Meds, vs, wt as ordered and f/u next visit.     Cognitive impairment  Debility  Acute on chronic, making gains in therapies - f/u with progress next week. Monitor for safety. SW to assist with discharge planning.     Orders written by provider at facility  Simethicone 80 mg PO every 6 hrs PRN cramping/bloating/gas pains    Electronically signed by:  CAT Nagel CNP

## 2020-03-13 ENCOUNTER — NURSING HOME VISIT (OUTPATIENT)
Dept: GERIATRICS | Facility: CLINIC | Age: 85
End: 2020-03-13
Payer: MEDICARE

## 2020-03-13 VITALS
HEIGHT: 63 IN | SYSTOLIC BLOOD PRESSURE: 114 MMHG | RESPIRATION RATE: 16 BRPM | TEMPERATURE: 100.8 F | OXYGEN SATURATION: 90 % | BODY MASS INDEX: 24.45 KG/M2 | WEIGHT: 138 LBS | HEART RATE: 99 BPM | DIASTOLIC BLOOD PRESSURE: 69 MMHG

## 2020-03-13 VITALS
DIASTOLIC BLOOD PRESSURE: 69 MMHG | HEIGHT: 63 IN | OXYGEN SATURATION: 90 % | SYSTOLIC BLOOD PRESSURE: 114 MMHG | WEIGHT: 138 LBS | TEMPERATURE: 100.8 F | RESPIRATION RATE: 16 BRPM | BODY MASS INDEX: 24.45 KG/M2 | HEART RATE: 99 BPM

## 2020-03-13 DIAGNOSIS — R50.9 FEVER, UNSPECIFIED FEVER CAUSE: ICD-10-CM

## 2020-03-13 DIAGNOSIS — R19.7 VOMITING AND DIARRHEA: Primary | ICD-10-CM

## 2020-03-13 DIAGNOSIS — I48.0 PAF (PAROXYSMAL ATRIAL FIBRILLATION) (H): ICD-10-CM

## 2020-03-13 DIAGNOSIS — R10.84 ABDOMINAL PAIN, GENERALIZED: ICD-10-CM

## 2020-03-13 DIAGNOSIS — J44.1 COPD EXACERBATION (H): ICD-10-CM

## 2020-03-13 DIAGNOSIS — R11.10 VOMITING AND DIARRHEA: Primary | ICD-10-CM

## 2020-03-13 DIAGNOSIS — K52.9 NON-SPECIFIC COLITIS: ICD-10-CM

## 2020-03-13 PROCEDURE — 99309 SBSQ NF CARE MODERATE MDM 30: CPT | Performed by: NURSE PRACTITIONER

## 2020-03-13 RX ORDER — ONDANSETRON 4 MG/1
4 TABLET, FILM COATED ORAL EVERY 8 HOURS PRN
COMMUNITY
End: 2020-03-17

## 2020-03-13 RX ORDER — LOPERAMIDE HYDROCHLORIDE 2 MG/1
2 TABLET ORAL 4 TIMES DAILY PRN
COMMUNITY
End: 2020-03-17

## 2020-03-13 ASSESSMENT — MIFFLIN-ST. JEOR
SCORE: 1015.09
SCORE: 1015.09

## 2020-03-13 NOTE — PROGRESS NOTES
[facility] DISCHARGE SUMMARY  PATIENT'S NAME: Kelsey Wood  YOB: 1929  MEDICAL RECORD NUMBER:  [id number]  Place of Service where encounter took place:  [facility] [[id number]]    PRIMARY CARE PROVIDER AND CLINIC RESPONSIBLE AFTER TRANSFER:   [facility] [address] [facility] [address] / [place]*    Assisted Living: [facility]     Transferring providers: CAT Nagel CNP, Brooks Landa MD  Recent Hospitalization/ED:  [facility] Hospital stay 02/24/2020 to 02/27/2020.  Date of SNF Admission: February / 27 / 2020  Date of SNF (anticipated) Discharge: March / 16 / 2020  Discharged to: new assisted living for patient [facility]  Cognitive Scores: SLUMS: 13/30 and CPT: 4.9/5.6  Physical Function: Wheelchair dependent  DME: Wheelchair    CODE STATUS/ADVANCE DIRECTIVES DISCUSSION:  DNR / DNI   ALLERGIES: Patient has no known allergies.     Per recent TCU provider progress notes:  89 year old female with hx of asthma/COPD, cognitive impairment, osteoarthritis, osteoporosis, glaucoma, and impaired hearing. Hospitalized with abdominal pain, wheezing and decreased saturations: diagnosed with COPD exacerbation and acute hypoxic respiratory failure and received o2, nebs and prednisone. Developed a fib with RVR and started metoprolol, no AC due to cognitive impairment and impaired mobility. Abdominal pain ?due to colitis with CT showing subtle wall thickening and surrounding mesenteric inflammation involving the right colon and left colon, possibly an infectious or inflammatory process. Pain managed with PRN [place], may need outpatient colonoscopy. Edema with early diastolic dysfunction treated with lasix and KCL. Chronic pain managed with lidocaine patch, fentanyl, gabapentin and PRN tylenol/Norco. Initially reported loose stools and c diff ordered but stools now formed so test not completed. Reported abdominal pain on 3/11  using Norco with relief. Developed norovirus with diarrhea, fever and nausea - symptoms have now resolved.     DISCHARGE DIAGNOSIS/NURSING FACILITY COURSE:  COPD exacerbation (H)  Mild persistent asthma with acute exacerbation  Acute on chronic, appears at baseline. Monitor, meds as ordered.     Non-specific colitis  Abdominal pain, generalized  Diarrhea due to norovirus  Due to bloating and cramping added PRN simethicone. Norovirus infection found last week - symptoms now resolved, patient stable.     Other chronic pain  Chronic, stable with tylenol, fentanyl patch, Neurontin and lidocaine. Monitor.     PAF (paroxysmal atrial fibrillation) (H)  Controlled rate, no AC. Monitor.     Chronic diastolic congestive heart failure (H)  Bilateral lower extremity edema  Chronic, stable, compensated.     Cognitive impairment  Debility  Acute on chronic, making gains in therapies - ready to discharge back to [place] [facility] for PT, OT, RN and HHA      Past Medical History:  has a past medical history of Asthma and Hard of hearing.    Discharge Medications:    Current Outpatient Medications   Medication Sig Dispense Refill     ACETAMINOPHEN PO Take 650 mg by mouth every 4 hours as needed for pain or fever And q4h prn       albuterol (PROAIR HFA/PROVENTIL HFA/VENTOLIN HFA) 108 (90 Base) MCG/ACT Inhaler Inhale 2 puffs into the lungs 4 times daily as needed for shortness of breath / dyspnea or wheezing        bismuth subsalicylate (PEPTO BISMOL) 262 MG chewable tablet Take 2 tablets by mouth every hour as needed for heartburn Max of 16 tabs per 24 hrs       calcium carbonate (OS-MARY GRACE 500 MG [place]. [place]) 500 MG tablet Take 1 tablet by mouth daily        fentaNYL (DURAGESIC) 12 mcg/hr 72 hr patch Place 1 patch onto the skin every 72 hours remove old patch. 1 patch 0     fluticasone (FLONASE) 50 MCG/ACT nasal spray Spray 2 sprays into both nostrils daily       Fluticasone-Umeclidin-Vilanterol (TRELEGY ELLIPTA) 100-62.5-25 MCG/INH oral  inhaler Inhale 1 puff into the lungs daily       furosemide (LASIX) 20 MG tablet Take 20 mg by mouth daily       GABAPENTIN PO Take 300 mg by mouth 3 times daily 09:00, 14:00 and 21:00       Glucosamine-Chondroitin (OSTEO BI-FLEX REGULAR STRENGTH PO) Take 1 tablet by mouth 2 times daily       HYDROcodone-acetaminophen (NORCO) 5-325 MG tablet Take 1 tablet by mouth every 6 hours as needed for severe pain 5 tablet 0     hypromellose (ARTIFICIAL TEARS) 0.5 % SOLN ophthalmic solution Place 1 drop into both eyes daily       hypromellose (ARTIFICIAL TEARS) 0.5 % SOLN ophthalmic solution Place 1 drop into both eyes daily as needed for dry eyes       ipratropium - albuterol 0.5 mg/2.5 mg/3 mL (DUONEB) 0.5-2.5 (3) MG/3ML neb solution Take 1 vial by nebulization 4 times daily as needed for shortness of breath / dyspnea or wheezing        latanoprost (XALATAN) 0.005 % ophthalmic solution Place 1 drop into both eyes At Bedtime       Lidocaine (LIDOCARE) 4 % Patch Place 1 patch onto the skin every 24 hours To prevent lidocaine toxicity, patient should be patch free for 12 hrs daily.  Salonpas for hip pain       metoprolol tartrate (LOPRESSOR) 25 MG tablet Take 0.5 tablets (12.5 mg) by mouth 2 times daily       potassium chloride ER (K-TAB) 20 MEQ CR tablet Take 20 mEq by mouth daily       senna-docusate (SENOKOT-S;PERICOLACE) 8.6-50 MG per tablet Take 1 tablet by mouth daily       simethicone (MYLICON) 80 MG chewable tablet Take 80 mg by mouth every 6 hours as needed for flatulence or cramping       vitamin C (ASCORBIC ACID) 500 MG tablet Take 500 mg by mouth daily         Medication Changes/Rationale:     Stopped Zofran, dulcolax and imodium due to diarrhea cleared    Added PRN simethicone for bloating    Controlled medications sent with patient:   script for fentanyl patch 12 mcg/hr #10 patches and script for Norco 5/325 mg tabs #20 tabs sent with patient, no refills     ROS:   4 point ROS including Respiratory, CV, GI and ,  "other than that noted in the HPI,  is negative    Physical Exam:   Vitals: /69   Pulse 99   Temp 100.8  F (38.2  C)   Resp 16   Ht 1.6 m (5' 3\")   Wt 62.6 kg (138 lb)   SpO2 90%   BMI 24.45 kg/m    BMI= Body mass index is 24.45 kg/m .  GENERAL APPEARANCE:  Alert, in no distress  RESP:  no respiratory distress  CV:  no edema     SNF labs:   Most Recent 3 CBC's:  Recent Labs   Lab Test 03/02/20  0950 02/25/20  0756 02/24/20  1047   WBC 11.0 12.2* 12.0*   HGB 14.5 13.1 14.9   MCV 92 94 93    293 291     Most Recent 3 BMP's:  Recent Labs   Lab Test 03/16/20  1055 03/02/20  0950 02/25/20  0756    135 137   POTASSIUM 3.7 3.8 4.9   CHLORIDE 103 101 103   CO2 27 29 33*   BUN 11 13 25   CR 0.46* 0.53 0.47*   ANIONGAP 6 5 1*   MARY GRACE 9.7 9.2 9.9   * 164* 144*       DISCHARGE PLAN:    Follow up labs: No labs orders/due    Medical Follow Up:      Follow up with primary care provider in 2-3 weeks    MTM referral needed and placed by this provider: No    Current [place] scheduled appointments:   No future appointments.     Discharge Services: Home Care:  Occupational Therapy, Physical Therapy, Registered Nurse, Home Health Aide and From:  [facility]    Discharge Instructions Verbalized to Patient at Discharge:     None      TOTAL DISCHARGE TIME:   Greater than 30 minutes  Electronically signed by:  CAT Nagel CNP     Documentation of Face to Face and Certification for Home Health Services    I certify that patient: Kelsey Wood is under my care and that I, or a nurse practitioner or physician's assistant working with me, had a face-to-face encounter that meets the physician face-to-face encounter requirements with this patient on: 3/17/2020.    This encounter with the patient was in whole, or in part, for the following medical condition, which is the primary reason for home health care: weakness after hospitalization with resp failure and abdominal pain.    I certify that, based on my " findings, the following services are medically necessary home health services: Nursing, Occupational Therapy, Physical Therapy and HHA.    My clinical findings support the need for the above services because: Nurse is needed: To assess vs and status after changes in medications or other medical regimen.., Occupational Therapy Services are needed to assess and treat cognitive ability and address ADL safety due to impairment in self care ability., Physical Therapy Services are needed to assess and treat the following functional impairments: weakness. and HHA for bathing    Further, I certify that my clinical findings support that this patient is homebound (i.e. absences from home require considerable and taxing effort and are for medical reasons or [employer] services or infrequently or of short duration when for other reasons) because: Requires assistance of another person or specialized equipment to access medical services because patient: Is unable to exit home safely on own due to: weakness, wheelchair bound...    Based on the above findings. I certify that this patient is confined to the home and needs intermittent skilled nursing care, physical therapy and/or speech therapy.  The patient is under my care, and I have initiated the establishment of the plan of care.  This patient will be followed by a physician who will periodically review the plan of care.  Physician/Provider to provide follow up care: Demetrio Arriaza    Attending hospital physician (the Medicare certified [place] provider): Dr Landa  Physician Signature: See electronic signature associated with these discharge orders.  Date: 3/17/2020

## 2020-03-13 NOTE — LETTER
3/13/2020        RE: Kelsey Wood  [address]  [place] [address]        [facility]  [place] Medical Record Number:  [id number]  Place of Service where encounter took place:  [facility] [[id number]]  Chief Complaint   Patient presents with     RECHECK       HPI:    Kelsey Wood  is a 89 year old (4/18/1929), who is being seen today for an episodic care visit.  HPI information obtained from: facility chart records, facility staff, patient report, [facility] chart review and family/first contact son report.     Per recent TCU provider progress notes:  89 year old female with hx of asthma/COPD, cognitive impairment, osteoarthritis, osteoporosis, glaucoma, and impaired hearing. Hospitalized with abdominal pain, wheezing and decreased saturations: diagnosed with COPD exacerbation and acute hypoxic respiratory failure and received o2, nebs and prednisone. Developed a fib with RVR and started metoprolol, no AC due to cognitive impairment and impaired mobility. Abdominal pain ?due to colitis with CT showing subtle wall thickening and surrounding mesenteric inflammation involving the right colon and left colon, possibly an infectious or inflammatory process. Pain managed with PRN [place], may need outpatient colonoscopy. Edema with early diastolic dysfunction treated with lasix and KCL. Chronic pain managed with lidocaine patch, fentanyl, gabapentin and PRN tylenol/Norco. Initially reported loose stools and c diff ordered but stools now formed so test not completed. Reported abdominal pain on 3/11 using Norco with relief. Due to gas pains added PRN simethicone.     Today's concern is:  Patient seen for episodic follow up. Developed diarrhea, nausea and vomiting and temp of 100.8 last night and this morning. Roommate tested positive for norovirus and had same symptoms. HR  and sats 90% on room air. Temp . Spoke to patient and also her son via phone. At  this time will postpone planned discharge until able to follow up with patient status on 3/16. Push fluids, tylenol for fever, imodium for diarrhea and Zofran for nausea will be ordered.     Past Medical and Surgical History reviewed in Epic today.    MEDICATIONS:    Current Outpatient Medications   Medication Sig Dispense Refill     ACETAMINOPHEN PO Take 650 mg by mouth every 4 hours as needed for pain or fever And q4h prn       albuterol (PROAIR HFA/PROVENTIL HFA/VENTOLIN HFA) 108 (90 Base) MCG/ACT Inhaler Inhale 2 puffs into the lungs 4 times daily as needed for shortness of breath / dyspnea or wheezing        bisacodyl (DULCOLAX) 10 MG suppository Place 1 suppository (10 mg) rectally daily as needed for constipation       bismuth subsalicylate (PEPTO BISMOL) 262 MG chewable tablet Take 2 tablets by mouth every hour as needed for heartburn Max of 16 tabs per 24 hrs       calcium carbonate (OS-MARY GRACE 500 MG [place]. [place]) 500 MG tablet Take 1 tablet by mouth daily        fentaNYL (DURAGESIC) 12 mcg/hr 72 hr patch Place 1 patch onto the skin every 72 hours remove old patch. 1 patch 0     fluticasone (FLONASE) 50 MCG/ACT nasal spray Spray 2 sprays into both nostrils daily       Fluticasone-Umeclidin-Vilanterol (TRELEGY ELLIPTA) 100-62.5-25 MCG/INH oral inhaler Inhale 1 puff into the lungs daily       furosemide (LASIX) 20 MG tablet Take 20 mg by mouth daily       GABAPENTIN PO Take 300 mg by mouth 3 times daily 09:00, 14:00 and 21:00       Glucosamine-Chondroitin (OSTEO BI-FLEX REGULAR STRENGTH PO) Take 1 tablet by mouth 2 times daily       guaiFENesin (ORGANIDIN) 200 MG TABS tablet Take 400 mg by mouth 2 times daily as needed for cough        HYDROcodone-acetaminophen (NORCO) 5-325 MG tablet Take 1 tablet by mouth every 6 hours as needed for severe pain 5 tablet 0     hypromellose (ARTIFICIAL TEARS) 0.5 % SOLN ophthalmic solution Place 1 drop into both eyes daily       hypromellose (ARTIFICIAL TEARS) 0.5 % SOLN ophthalmic  "solution Place 1 drop into both eyes daily as needed for dry eyes       ipratropium - albuterol 0.5 mg/2.5 mg/3 mL (DUONEB) 0.5-2.5 (3) MG/3ML neb solution Take 1 vial by nebulization 4 times daily as needed for shortness of breath / dyspnea or wheezing        latanoprost (XALATAN) 0.005 % ophthalmic solution Place 1 drop into both eyes At Bedtime       Lidocaine (LIDOCARE) 4 % Patch Place 1 patch onto the skin every 24 hours To prevent lidocaine toxicity, patient should be patch free for 12 hrs daily.  Salonpas for hip pain       metoprolol tartrate (LOPRESSOR) 25 MG tablet Take 0.5 tablets (12.5 mg) by mouth 2 times daily       potassium chloride ER (K-TAB) 20 MEQ CR tablet Take 20 mEq by mouth daily       senna-docusate (SENOKOT-S;PERICOLACE) 8.6-50 MG per tablet Take 1 tablet by mouth daily       simethicone (MYLICON) 80 MG chewable tablet Take 80 mg by mouth every 6 hours as needed for flatulence or cramping       vitamin C (ASCORBIC ACID) 500 MG tablet Take 500 mg by mouth daily         REVIEW OF SYSTEMS:  4 point ROS including Respiratory, CV, GI and , other than that noted in the HPI,  is negative    Objective:  /69   Pulse 99   Temp 100.8  F (38.2  C)   Resp 16   Ht 1.6 m (5' 3\")   Wt 62.6 kg (138 lb)   SpO2 90%   BMI 24.45 kg/m    Exam:  GENERAL APPEARANCE:  Alert, in no distress, oriented  RESP:  lungs clear to auscultation , no respiratory distress  CV:  regular rate and rhythm, no murmur, rub, or gallop, no edema  ABDOMEN:  no guarding or rebound, bowel sounds normal  M/S:   Digits and nails normal, wheelchair bound  NEURO:   Cranial nerves 2-12 are normal tested and grossly at patient's baseline, speech clear  PSYCH:  insight and judgement impaired, memory impaired     Labs:   Most Recent 3 BMP's:  Recent Labs   Lab Test 03/02/20  0950 02/25/20  0756 02/24/20  1047    137 134   POTASSIUM 3.8 4.9 4.2   CHLORIDE 101 103 98   CO2 29 33* 32   BUN 13 25 16   CR 0.53 0.47* 0.71 "   ANIONGAP 5 1* 4   MARY GRACE 9.2 9.9 11.6*   * 144* 139*       ASSESSMENT/PLAN:  Vomiting and diarrhea  Fever, unspecified fever cause  New onset, presumed positive for norovirus. Contact precautions, PRN tylenol, PRN Zofran, PRN imodium. Encourage fluids. F/U Monday 3/16    COPD exacerbation (H)  Chronic, stable and at baseline. Monitor.     Non-specific colitis  Abdominal pain, generalized  Recent symptoms - denies today. Monitor.     PAF (paroxysmal atrial fibrillation) (H)  Chronic, meds, vs, wt as PTA. Monitor.     Orders written by provider at facility  1. Zofran 4 mg PO TID PRN nausea  2. Imodium 2 mg PO QID PRN diarrhea  3. BMP 3/16 diagnosis diarrhea  4. Push fluids 1500 ml daily    Electronically signed by:  CAT Nagel CNP               Sincerely,        CAT Nagel CNP

## 2020-03-13 NOTE — PROGRESS NOTES
[facility]  [facility] Medical Record Number:  [id number]  Place of Service where encounter took place:  [facility] [[id number]]  Chief Complaint   Patient presents with     RECHECK       HPI:    Kelsey Wood  is a 89 year old (4/18/1929), who is being seen today for an episodic care visit.  HPI information obtained from: facility chart records, facility staff, patient report, [facility] chart review and family/first contact son report.     Per recent TCU provider progress notes:  89 year old female with hx of asthma/COPD, cognitive impairment, osteoarthritis, osteoporosis, glaucoma, and impaired hearing. Hospitalized with abdominal pain, wheezing and decreased saturations: diagnosed with COPD exacerbation and acute hypoxic respiratory failure and received o2, nebs and prednisone. Developed a fib with RVR and started metoprolol, no AC due to cognitive impairment and impaired mobility. Abdominal pain ?due to colitis with CT showing subtle wall thickening and surrounding mesenteric inflammation involving the right colon and left colon, possibly an infectious or inflammatory process. Pain managed with PRN [place], may need outpatient colonoscopy. Edema with early diastolic dysfunction treated with lasix and KCL. Chronic pain managed with lidocaine patch, fentanyl, gabapentin and PRN tylenol/Norco. Initially reported loose stools and c diff ordered but stools now formed so test not completed. Reported abdominal pain on 3/11 using Norco with relief. Due to gas pains added PRN simethicone.     Today's concern is:  Patient seen for episodic follow up. Developed diarrhea, nausea and vomiting and temp of 100.8 last night and this morning. Roommate tested positive for norovirus and had same symptoms. HR  and sats 90% on room air. Temp . Spoke to patient and also her son via phone. At this time will postpone planned discharge until able to follow up with patient status on 3/16.  Push fluids, tylenol for fever, imodium for diarrhea and Zofran for nausea will be ordered.     Past Medical and Surgical History reviewed in Epic today.    MEDICATIONS:    Current Outpatient Medications   Medication Sig Dispense Refill     ACETAMINOPHEN PO Take 650 mg by mouth every 4 hours as needed for pain or fever And q4h prn       albuterol (PROAIR HFA/PROVENTIL HFA/VENTOLIN HFA) 108 (90 Base) MCG/ACT Inhaler Inhale 2 puffs into the lungs 4 times daily as needed for shortness of breath / dyspnea or wheezing        bisacodyl (DULCOLAX) 10 MG suppository Place 1 suppository (10 mg) rectally daily as needed for constipation       bismuth subsalicylate (PEPTO BISMOL) 262 MG chewable tablet Take 2 tablets by mouth every hour as needed for heartburn Max of 16 tabs per 24 hrs       calcium carbonate (OS-MARY GRACE 500 MG [place]. [place]) 500 MG tablet Take 1 tablet by mouth daily        fentaNYL (DURAGESIC) 12 mcg/hr 72 hr patch Place 1 patch onto the skin every 72 hours remove old patch. 1 patch 0     fluticasone (FLONASE) 50 MCG/ACT nasal spray Spray 2 sprays into both nostrils daily       Fluticasone-Umeclidin-Vilanterol (TRELEGY ELLIPTA) 100-62.5-25 MCG/INH oral inhaler Inhale 1 puff into the lungs daily       furosemide (LASIX) 20 MG tablet Take 20 mg by mouth daily       GABAPENTIN PO Take 300 mg by mouth 3 times daily 09:00, 14:00 and 21:00       Glucosamine-Chondroitin (OSTEO BI-FLEX REGULAR STRENGTH PO) Take 1 tablet by mouth 2 times daily       guaiFENesin (ORGANIDIN) 200 MG TABS tablet Take 400 mg by mouth 2 times daily as needed for cough        HYDROcodone-acetaminophen (NORCO) 5-325 MG tablet Take 1 tablet by mouth every 6 hours as needed for severe pain 5 tablet 0     hypromellose (ARTIFICIAL TEARS) 0.5 % SOLN ophthalmic solution Place 1 drop into both eyes daily       hypromellose (ARTIFICIAL TEARS) 0.5 % SOLN ophthalmic solution Place 1 drop into both eyes daily as needed for dry eyes       ipratropium - albuterol  "0.5 mg/2.5 mg/3 mL (DUONEB) 0.5-2.5 (3) MG/3ML neb solution Take 1 vial by nebulization 4 times daily as needed for shortness of breath / dyspnea or wheezing        latanoprost (XALATAN) 0.005 % ophthalmic solution Place 1 drop into both eyes At Bedtime       Lidocaine (LIDOCARE) 4 % Patch Place 1 patch onto the skin every 24 hours To prevent lidocaine toxicity, patient should be patch free for 12 hrs daily.  Salonpas for hip pain       metoprolol tartrate (LOPRESSOR) 25 MG tablet Take 0.5 tablets (12.5 mg) by mouth 2 times daily       potassium chloride ER (K-TAB) 20 MEQ CR tablet Take 20 mEq by mouth daily       senna-docusate (SENOKOT-S;PERICOLACE) 8.6-50 MG per tablet Take 1 tablet by mouth daily       simethicone (MYLICON) 80 MG chewable tablet Take 80 mg by mouth every 6 hours as needed for flatulence or cramping       vitamin C (ASCORBIC ACID) 500 MG tablet Take 500 mg by mouth daily         REVIEW OF SYSTEMS:  4 point ROS including Respiratory, CV, GI and , other than that noted in the HPI,  is negative    Objective:  /69   Pulse 99   Temp 100.8  F (38.2  C)   Resp 16   Ht 1.6 m (5' 3\")   Wt 62.6 kg (138 lb)   SpO2 90%   BMI 24.45 kg/m    Exam:  GENERAL APPEARANCE:  Alert, in no distress, oriented  RESP:  lungs clear to auscultation , no respiratory distress  CV:  regular rate and rhythm, no murmur, rub, or gallop, no edema  ABDOMEN:  no guarding or rebound, bowel sounds normal  M/S:   Digits and nails normal, wheelchair bound  NEURO:   Cranial nerves 2-12 are normal tested and grossly at patient's baseline, speech clear  PSYCH:  insight and judgement impaired, memory impaired     Labs:   Most Recent 3 BMP's:  Recent Labs   Lab Test 03/02/20  0950 02/25/20  0756 02/24/20  1047    137 134   POTASSIUM 3.8 4.9 4.2   CHLORIDE 101 103 98   CO2 29 33* 32   BUN 13 25 16   CR 0.53 0.47* 0.71   ANIONGAP 5 1* 4   MAYR GRACE 9.2 9.9 11.6*   * 144* 139*       ASSESSMENT/PLAN:  Vomiting and " diarrhea  Fever, unspecified fever cause  New onset, presumed positive for norovirus. Contact precautions, PRN tylenol, PRN Zofran, PRN imodium. Encourage fluids. F/U Monday 3/16    COPD exacerbation (H)  Chronic, stable and at baseline. Monitor.     Non-specific colitis  Abdominal pain, generalized  Recent symptoms - denies today. Monitor.     PAF (paroxysmal atrial fibrillation) (H)  Chronic, meds, vs, wt as PTA. Monitor.     Orders written by provider at facility  1. Zofran 4 mg PO TID PRN nausea  2. Imodium 2 mg PO QID PRN diarrhea  3. BMP 3/16 diagnosis diarrhea  4. Push fluids 1500 ml daily    Electronically signed by:  CAT Nagel CNP

## 2020-03-15 ENCOUNTER — HOSPITAL LABORATORY (OUTPATIENT)
Dept: OTHER | Facility: CLINIC | Age: 85
End: 2020-03-15

## 2020-03-16 ENCOUNTER — HOSPITAL LABORATORY (OUTPATIENT)
Dept: OTHER | Facility: CLINIC | Age: 85
End: 2020-03-16

## 2020-03-16 LAB
ANION GAP SERPL CALCULATED.3IONS-SCNC: 6 MMOL/L (ref 3–14)
BUN SERPL-MCNC: 11 MG/DL (ref 7–30)
C COLI+JEJUNI+LARI FUSA STL QL NAA+PROBE: NOT DETECTED
CALCIUM SERPL-MCNC: 9.7 MG/DL (ref 8.5–10.1)
CHLORIDE SERPL-SCNC: 103 MMOL/L (ref 94–109)
CO2 SERPL-SCNC: 27 MMOL/L (ref 20–32)
CREAT SERPL-MCNC: 0.46 MG/DL (ref 0.52–1.04)
EC STX1 GENE STL QL NAA+PROBE: NOT DETECTED
EC STX2 GENE STL QL NAA+PROBE: NOT DETECTED
ENTERIC PATHOGEN COMMENT: ABNORMAL
GFR SERPL CREATININE-BSD FRML MDRD: 87 ML/MIN/{1.73_M2}
GLUCOSE SERPL-MCNC: 131 MG/DL (ref 70–99)
NOROV GI+II ORF1-ORF2 JNC STL QL NAA+PR: ABNORMAL
POTASSIUM SERPL-SCNC: 3.7 MMOL/L (ref 3.4–5.3)
RVA NSP5 STL QL NAA+PROBE: NOT DETECTED
SALMONELLA SP RPOD STL QL NAA+PROBE: NOT DETECTED
SHIGELLA SP+EIEC IPAH STL QL NAA+PROBE: NOT DETECTED
SODIUM SERPL-SCNC: 136 MMOL/L (ref 133–144)
V CHOL+PARA RFBL+TRKH+TNAA STL QL NAA+PR: NOT DETECTED
Y ENTERO RECN STL QL NAA+PROBE: NOT DETECTED

## 2020-03-17 ENCOUNTER — DISCHARGE SUMMARY NURSING HOME (OUTPATIENT)
Dept: GERIATRICS | Facility: CLINIC | Age: 85
End: 2020-03-17
Payer: MEDICARE

## 2020-03-17 DIAGNOSIS — G89.29 OTHER CHRONIC PAIN: ICD-10-CM

## 2020-03-17 DIAGNOSIS — R19.7 DIARRHEA OF PRESUMED INFECTIOUS ORIGIN: ICD-10-CM

## 2020-03-17 DIAGNOSIS — R53.81 DEBILITY: ICD-10-CM

## 2020-03-17 DIAGNOSIS — R10.84 ABDOMINAL PAIN, GENERALIZED: ICD-10-CM

## 2020-03-17 DIAGNOSIS — I48.0 PAF (PAROXYSMAL ATRIAL FIBRILLATION) (H): ICD-10-CM

## 2020-03-17 DIAGNOSIS — I50.32 CHRONIC DIASTOLIC CONGESTIVE HEART FAILURE (H): ICD-10-CM

## 2020-03-17 DIAGNOSIS — R41.89 COGNITIVE IMPAIRMENT: ICD-10-CM

## 2020-03-17 DIAGNOSIS — R60.0 BILATERAL LOWER EXTREMITY EDEMA: ICD-10-CM

## 2020-03-17 DIAGNOSIS — K52.9 NON-SPECIFIC COLITIS: ICD-10-CM

## 2020-03-17 DIAGNOSIS — J44.1 COPD EXACERBATION (H): Primary | ICD-10-CM

## 2020-03-17 DIAGNOSIS — J45.31 MILD PERSISTENT ASTHMA WITH ACUTE EXACERBATION: ICD-10-CM

## 2020-03-17 PROCEDURE — 99316 NF DSCHRG MGMT 30 MIN+: CPT | Performed by: NURSE PRACTITIONER

## 2020-03-17 NOTE — LETTER
3/17/2020        RE: Kelsey TAPIA Israel  [address]  [place] [address]        [facility] DISCHARGE SUMMARY  PATIENT'S NAME: Kelsey Wood  YOB: 1929  MEDICAL RECORD NUMBER:  [id number]  Place of Service where encounter took place:  [facility] ([facility]) [[id number]]    PRIMARY CARE PROVIDER AND CLINIC RESPONSIBLE AFTER TRANSFER:   [facility] [address] [address] / [place]*    Assisted Living: [facility]     Transferring providers: CAT Nagel CNP, Brooks Landa MD  Recent Hospitalization/ED:  [facility] stay 02/24/2020 to 02/27/2020.  Date of SNF Admission: February / 27 / 2020  Date of SNF (anticipated) Discharge: March / 16 / 2020  Discharged to: new assisted living for patient [facility]  Cognitive Scores: SLUMS: 13/30 and CPT: 4.9/5.6  Physical Function: Wheelchair dependent  DME: Wheelchair    CODE STATUS/ADVANCE DIRECTIVES DISCUSSION:  DNR / DNI   ALLERGIES: Patient has no known allergies.     Per recent TCU provider progress notes:  89 year old female with hx of asthma/COPD, cognitive impairment, osteoarthritis, osteoporosis, glaucoma, and impaired hearing. Hospitalized with abdominal pain, wheezing and decreased saturations: diagnosed with COPD exacerbation and acute hypoxic respiratory failure and received o2, nebs and prednisone. Developed a fib with RVR and started metoprolol, no AC due to cognitive impairment and impaired mobility. Abdominal pain ?due to colitis with CT showing subtle wall thickening and surrounding mesenteric inflammation involving the right colon and left colon, possibly an infectious or inflammatory process. Pain managed with PRN [place], may need outpatient colonoscopy. Edema with early diastolic dysfunction treated with lasix and KCL. Chronic pain managed with lidocaine patch, fentanyl, gabapentin and PRN tylenol/Norco. Initially reported loose stools and  c diff ordered but stools now formed so test not completed. Reported abdominal pain on 3/11 using Norco with relief. Developed norovirus with diarrhea, fever and nausea - symptoms have now resolved.     DISCHARGE DIAGNOSIS/NURSING FACILITY COURSE:  COPD exacerbation (H)  Mild persistent asthma with acute exacerbation  Acute on chronic, appears at baseline. Monitor, meds as ordered.     Non-specific colitis  Abdominal pain, generalized  Diarrhea due to norovirus  Due to bloating and cramping added PRN simethicone. Norovirus infection found last week - symptoms now resolved, patient stable.     Other chronic pain  Chronic, stable with tylenol, fentanyl patch, Neurontin and lidocaine. Monitor.     PAF (paroxysmal atrial fibrillation) (H)  Controlled rate, no AC. Monitor.     Chronic diastolic congestive heart failure (H)  Bilateral lower extremity edema  Chronic, stable, compensated.     Cognitive impairment  Debility  Acute on chronic, making gains in therapies - [facility] [place] [facility]  for PT, OT, RN and HHA      Past Medical History:  has a past medical history of Asthma and Hard of hearing.    Discharge Medications:    Current Outpatient Medications   Medication Sig Dispense Refill     ACETAMINOPHEN PO Take 650 mg by mouth every 4 hours as needed for pain or fever And q4h prn       albuterol (PROAIR HFA/PROVENTIL HFA/VENTOLIN HFA) 108 (90 Base) MCG/ACT Inhaler Inhale 2 puffs into the lungs 4 times daily as needed for shortness of breath / dyspnea or wheezing        bismuth subsalicylate (PEPTO BISMOL) 262 MG chewable tablet Take 2 tablets by mouth every hour as needed for heartburn Max of 16 tabs per 24 hrs       calcium carbonate (OS-MARY GRACE 500 MG [place]. [place]) 500 MG tablet Take 1 tablet by mouth daily        fentaNYL (DURAGESIC) 12 mcg/hr 72 hr patch Place 1 patch onto the skin every 72 hours remove old patch. 1 patch 0     fluticasone (FLONASE) 50 MCG/ACT nasal spray Spray 2 sprays into both  nostrils daily       Fluticasone-Umeclidin-Vilanterol (TRELEGY ELLIPTA) 100-62.5-25 MCG/INH oral inhaler Inhale 1 puff into the lungs daily       furosemide (LASIX) 20 MG tablet Take 20 mg by mouth daily       GABAPENTIN PO Take 300 mg by mouth 3 times daily 09:00, 14:00 and 21:00       Glucosamine-Chondroitin (OSTEO BI-FLEX REGULAR STRENGTH PO) Take 1 tablet by mouth 2 times daily       HYDROcodone-acetaminophen (NORCO) 5-325 MG tablet Take 1 tablet by mouth every 6 hours as needed for severe pain 5 tablet 0     hypromellose (ARTIFICIAL TEARS) 0.5 % SOLN ophthalmic solution Place 1 drop into both eyes daily       hypromellose (ARTIFICIAL TEARS) 0.5 % SOLN ophthalmic solution Place 1 drop into both eyes daily as needed for dry eyes       ipratropium - albuterol 0.5 mg/2.5 mg/3 mL (DUONEB) 0.5-2.5 (3) MG/3ML neb solution Take 1 vial by nebulization 4 times daily as needed for shortness of breath / dyspnea or wheezing        latanoprost (XALATAN) 0.005 % ophthalmic solution Place 1 drop into both eyes At Bedtime       Lidocaine (LIDOCARE) 4 % Patch Place 1 patch onto the skin every 24 hours To prevent lidocaine toxicity, patient should be patch free for 12 hrs daily.  Salonpas for hip pain       metoprolol tartrate (LOPRESSOR) 25 MG tablet Take 0.5 tablets (12.5 mg) by mouth 2 times daily       potassium chloride ER (K-TAB) 20 MEQ CR tablet Take 20 mEq by mouth daily       senna-docusate (SENOKOT-S;PERICOLACE) 8.6-50 MG per tablet Take 1 tablet by mouth daily       simethicone (MYLICON) 80 MG chewable tablet Take 80 mg by mouth every 6 hours as needed for flatulence or cramping       vitamin C (ASCORBIC ACID) 500 MG tablet Take 500 mg by mouth daily         Medication Changes/Rationale:     Stopped Zofran, dulcolax and imodium due to diarrhea cleared    Added PRN simethicone for bloating    Controlled medications sent with patient:   script for fentanyl patch 12 mcg/hr #10 patches and script for Norco 5/325 mg tabs  "#20 tabs sent with patient, no refills     ROS:   4 point ROS including Respiratory, CV, GI and , other than that noted in the HPI,  is negative    Physical Exam:   Vitals: /69   Pulse 99   Temp 100.8  F (38.2  C)   Resp 16   Ht 1.6 m (5' 3\")   Wt 62.6 kg (138 lb)   SpO2 90%   BMI 24.45 kg/m    BMI= Body mass index is 24.45 kg/m .  GENERAL APPEARANCE:  Alert, in no distress  RESP:  no respiratory distress  CV:  no edema     SNF labs:   Most Recent 3 CBC's:  Recent Labs   Lab Test 03/02/20  0950 02/25/20  0756 02/24/20  1047   WBC 11.0 12.2* 12.0*   HGB 14.5 13.1 14.9   MCV 92 94 93    293 291     Most Recent 3 BMP's:  Recent Labs   Lab Test 03/16/20  1055 03/02/20  0950 02/25/20  0756    135 137   POTASSIUM 3.7 3.8 4.9   CHLORIDE 103 101 103   CO2 27 29 33*   BUN 11 13 25   CR 0.46* 0.53 0.47*   ANIONGAP 6 5 1*   MARY GRACE 9.7 9.2 9.9   * 164* 144*       DISCHARGE PLAN:    Follow up labs: No labs orders/due    Medical Follow Up:      Follow up with primary care provider in 2-3 weeks    MT referral needed and placed by this provider: No    Current [facility] scheduled appointments:   No future appointments.     Discharge Services: Home Care:  Occupational Therapy, Physical Therapy, Registered Nurse, Home Health Aide and From:  [facility]    Discharge Instructions Verbalized to Patient at Discharge:     None      TOTAL DISCHARGE TIME:   Greater than 30 minutes  Electronically signed by:  CAT Nagel CNP     Documentation of Face to Face and Certification for Home Health Services    I certify that patient: Kelsey Wood is under my care and that I, or a nurse practitioner or physician's assistant working with me, had a face-to-face encounter that meets the physician face-to-face encounter requirements with this patient on: 3/17/2020.    This encounter with the patient was in whole, or in part, for the following medical condition, which is the primary reason for home health care: " weakness after hospitalization with resp failure and abdominal pain.    I certify that, based on my findings, the following services are medically necessary home health services: Nursing, Occupational Therapy, Physical Therapy and HHA.    My clinical findings support the need for the above services because: Nurse is needed: To assess vs and status after changes in medications or other medical regimen.., Occupational Therapy Services are needed to assess and treat cognitive ability and address ADL safety due to impairment in self care ability., Physical Therapy Services are needed to assess and treat the following functional impairments: weakness. and HHA for bathing    Further, I certify that my clinical findings support that this patient is homebound (i.e. absences from home require considerable and taxing effort and are for medical reasons or [employer] services or infrequently or of short duration when for other reasons) because: Requires assistance of another person or specialized equipment to access medical services because patient: Is unable to exit home safely on own due to: weakness, wheelchair bound...    Based on the above findings. I certify that this patient is confined to the home and needs intermittent skilled nursing care, physical therapy and/or speech therapy.  The patient is under my care, and I have initiated the establishment of the plan of care.  This patient will be followed by a physician who will periodically review the plan of care.  Physician/Provider to provide follow up care: Demetrio Arriaza    Attending hospital physician (the Medicare certified [place] provider): Dr Landa  Physician Signature: See electronic signature associated with these discharge orders.  Date: 3/17/2020                  Sincerely,        CAT Nagel CNP

## 2020-05-12 ENCOUNTER — RECORDS - HEALTHEAST (OUTPATIENT)
Dept: LAB | Facility: CLINIC | Age: 85
End: 2020-05-12

## 2020-05-12 LAB
ALBUMIN UR-MCNC: NEGATIVE MG/DL
APPEARANCE UR: ABNORMAL
BACTERIA #/AREA URNS HPF: ABNORMAL HPF
BILIRUB UR QL STRIP: NEGATIVE
COLOR UR AUTO: YELLOW
GLUCOSE UR STRIP-MCNC: NEGATIVE MG/DL
HGB UR QL STRIP: NEGATIVE
KETONES UR STRIP-MCNC: NEGATIVE MG/DL
LEUKOCYTE ESTERASE UR QL STRIP: ABNORMAL
NITRATE UR QL: POSITIVE
PH UR STRIP: 6.5 [PH] (ref 4.5–8)
RBC #/AREA URNS AUTO: ABNORMAL HPF
SP GR UR STRIP: 1.01 (ref 1–1.03)
SQUAMOUS #/AREA URNS AUTO: >100 LPF
TRANS CELLS #/AREA URNS HPF: ABNORMAL LPF
UROBILINOGEN UR STRIP-ACNC: ABNORMAL
WBC #/AREA URNS AUTO: ABNORMAL HPF

## 2020-05-14 LAB — BACTERIA SPEC CULT: ABNORMAL

## 2020-07-15 ENCOUNTER — RECORDS - HEALTHEAST (OUTPATIENT)
Dept: LAB | Facility: CLINIC | Age: 85
End: 2020-07-15

## 2020-07-15 LAB
ALBUMIN UR-MCNC: ABNORMAL MG/DL
APPEARANCE UR: ABNORMAL
BACTERIA #/AREA URNS HPF: ABNORMAL HPF
BILIRUB UR QL STRIP: NEGATIVE
COLOR UR AUTO: ABNORMAL
GLUCOSE UR STRIP-MCNC: NEGATIVE MG/DL
HGB UR QL STRIP: NEGATIVE
KETONES UR STRIP-MCNC: NEGATIVE MG/DL
LEUKOCYTE ESTERASE UR QL STRIP: ABNORMAL
NITRATE UR QL: NEGATIVE
PH UR STRIP: 6.5 [PH] (ref 4.5–8)
RBC #/AREA URNS AUTO: ABNORMAL HPF
SP GR UR STRIP: 1.01 (ref 1–1.03)
SQUAMOUS #/AREA URNS AUTO: ABNORMAL LPF
UROBILINOGEN UR STRIP-ACNC: ABNORMAL
WBC #/AREA URNS AUTO: ABNORMAL HPF

## 2020-07-17 LAB — BACTERIA SPEC CULT: NORMAL

## 2020-08-03 ENCOUNTER — APPOINTMENT (OUTPATIENT)
Dept: URBAN - METROPOLITAN AREA CLINIC 253 | Age: 85
Setting detail: DERMATOLOGY
End: 2020-08-04

## 2020-08-03 VITALS — RESPIRATION RATE: 14 BRPM | HEIGHT: 62 IN | WEIGHT: 140 LBS

## 2020-08-03 DIAGNOSIS — L82.0 INFLAMED SEBORRHEIC KERATOSIS: ICD-10-CM

## 2020-08-03 PROBLEM — D48.5 NEOPLASM OF UNCERTAIN BEHAVIOR OF SKIN: Status: ACTIVE | Noted: 2020-08-03

## 2020-08-03 PROCEDURE — 11102 TANGNTL BX SKIN SINGLE LES: CPT | Mod: 59

## 2020-08-03 PROCEDURE — 88305 TISSUE EXAM BY PATHOLOGIST: CPT

## 2020-08-03 PROCEDURE — 99213 OFFICE O/P EST LOW 20 MIN: CPT | Mod: 25

## 2020-08-03 PROCEDURE — OTHER ADDITIONAL NOTES: OTHER

## 2020-08-03 PROCEDURE — OTHER COUNSELING: OTHER

## 2020-08-03 PROCEDURE — 17110 DESTRUCT B9 LESION 1-14: CPT

## 2020-08-03 PROCEDURE — OTHER LIQUID NITROGEN: OTHER

## 2020-08-03 PROCEDURE — OTHER PATHOLOGY BILLING: OTHER

## 2020-08-03 PROCEDURE — OTHER BIOPSY BY SHAVE METHOD: OTHER

## 2020-08-03 ASSESSMENT — LOCATION DETAILED DESCRIPTION DERM
LOCATION DETAILED: LEFT INFERIOR FRONTAL SCALP
LOCATION DETAILED: INFERIOR THORACIC SPINE

## 2020-08-03 ASSESSMENT — LOCATION SIMPLE DESCRIPTION DERM
LOCATION SIMPLE: SCALP
LOCATION SIMPLE: UPPER BACK

## 2020-08-03 ASSESSMENT — LOCATION ZONE DERM
LOCATION ZONE: SCALP
LOCATION ZONE: TRUNK

## 2020-08-03 NOTE — PROCEDURE: PATHOLOGY BILLING
Immunohistochemistry ([id number] and [id number]) billing is not performed here. Please use the Immunohistochemistry Stain Billing plan to accomplish this. Immunohistochemistry ([id number] and [id number]) billing is not performed here. Please use the Immunohistochemistry Stain Billing plan to accomplish this.

## 2020-08-03 NOTE — PROCEDURE: PATHOLOGY BILLING
Rendering Text In Billing: The slides will be read by [employer] and reported in the attached document. Rendering Text In Billing: The slides will be read by [employer] and reported in the attached document.

## 2020-08-03 NOTE — HPI: EVALUATION OF SKIN LESION(S)
What Type Of Note Output Would You Prefer (Optional)?: Bullet Format
Hpi Title: Evaluation of Skin Lesions
Additional History: Thinks she has a keratosis om her back. This one is sore.

## 2020-08-03 NOTE — PROCEDURE: ADDITIONAL NOTES
Detail Level: Simple
Additional Notes: Patient stated she has many concerns today. Assessed all possible with time restraints. Recommended patient schedule a full body check and she could do this in two weeks when we follow up on her lesion on her back. Patient seems to have memory issues during visit asking repetetive questions and repeating statements frequently. Communicated with recoptionist to emsure patient’s follow up in two weeks allows enough time for full body skin check.\\n\\nA female nurse was present for the exam. Care instructions of treated amd biopsied sites were explained to the patient in detail. Told patient to call with any concerns or questions. The patient verbalized understanding and agreement of the education provided and the treatment plan. Encouraged patient to schedule a follow up appointment right after visit. At the end of the visit, all questions had been answered and the patient was satisfied with the visit.

## 2020-08-03 NOTE — PROCEDURE: LIQUID NITROGEN
Include Z78.9 (Other Specified Conditions Influencing Health Status) As An Associated Diagnosis?: No
Consent: The patient's consent was obtained including but not limited to risks of crusting, scabbing, blistering, scarring, darker or lighter pigmentary change, recurrence, incomplete removal and infection.
Medical Necessity Clause: This procedure was medically necessary because the lesions that were treated were:
Number Of Freeze-Thaw Cycles: 2 freeze-thaw cycles
Post-Care Instructions: I reviewed with the patient in detail post-care instructions. Patient is to wear sunprotection, and avoid picking at any of the treated lesions. Pt may apply Vaseline to crusted or scabbing areas.
Render Post-Care Instructions In Note?: yes
Medical Necessity Information: It is in your best interest to select a reason for this procedure from the list below. All of these items fulfill various CMS LCD requirements except the new and changing color options.
Detail Level: Detailed
Duration Of Freeze Thaw-Cycle (Seconds): 3

## 2020-08-03 NOTE — PROCEDURE: BIOPSY BY SHAVE METHOD
Information: Selecting Yes will display possible errors in your note based on the variables you have selected. This validation is only offered as a suggestion for you. PLEASE NOTE THAT THE VALIDATION TEXT WILL BE REMOVED WHEN YOU FINALIZE YOUR NOTE. IF YOU WANT TO FAX A PRELIMINARY NOTE YOU WILL NEED TO TOGGLE THIS TO 'NO' IF YOU DO NOT WANT IT IN YOUR FAXED NOTE.
Validate Lesion Size: No
Curettage Text: The wound bed was treated with curettage after the biopsy was performed.
Biopsy Type: H and E
Depth Of Biopsy: dermis
Type Of Destruction Used: Curettage
Render Post-Care Instructions In Note?: yes
Silver Nitrate Text: The wound bed was treated with silver nitrate after the biopsy was performed.
Hemostasis: Drysol
Biopsy Method: Dermablade
Wound Care: Petrolatum
Anesthesia Volume In Cc (Will Not Render If 0): 1
Size Of Lesion In Cm: 0
Dressing: bandage
Consent: Written consent was obtained and risks were reviewed including but not limited to scarring, infection, bleeding, scabbing, incomplete removal, nerve damage and allergy to anesthesia.
Electrodesiccation And Curettage Text: The wound bed was treated with electrodesiccation and curettage after the biopsy was performed.
Post-Care Instructions: I reviewed with the patient in detail post-care instructions. Patient is to keep the biopsy site dry overnight, and then apply bacitracin twice daily until healed. Patient may apply hydrogen peroxide soaks to remove any crusting.
Electrodesiccation Text: The wound bed was treated with electrodesiccation after the biopsy was performed.
Billing Type: Third-Party Bill
Detail Level: Detailed
Anesthesia Type: 2% lidocaine with epinephrine and a 1:10 solution of 8.4% sodium bicarbonate
Notification Instructions: Patient will be notified of biopsy results. However, patient instructed to call the office if not contacted within 2 weeks.
Cryotherapy Text: The wound bed was treated with cryotherapy after the biopsy was performed.

## 2020-08-18 ENCOUNTER — APPOINTMENT (OUTPATIENT)
Dept: URBAN - METROPOLITAN AREA CLINIC 253 | Age: 85
Setting detail: DERMATOLOGY
End: 2020-08-19

## 2020-08-18 VITALS — RESPIRATION RATE: 16 BRPM | HEIGHT: 62 IN | WEIGHT: 140 LBS

## 2020-08-18 DIAGNOSIS — L82.1 OTHER SEBORRHEIC KERATOSIS: ICD-10-CM

## 2020-08-18 DIAGNOSIS — L72.0 EPIDERMAL CYST: ICD-10-CM

## 2020-08-18 DIAGNOSIS — L82.0 INFLAMED SEBORRHEIC KERATOSIS: ICD-10-CM

## 2020-08-18 PROCEDURE — OTHER ADDITIONAL NOTES: OTHER

## 2020-08-18 PROCEDURE — OTHER LIQUID NITROGEN: OTHER

## 2020-08-18 PROCEDURE — 17110 DESTRUCT B9 LESION 1-14: CPT

## 2020-08-18 PROCEDURE — 99213 OFFICE O/P EST LOW 20 MIN: CPT | Mod: 25

## 2020-08-18 PROCEDURE — OTHER COUNSELING: OTHER

## 2020-08-18 ASSESSMENT — LOCATION SIMPLE DESCRIPTION DERM
LOCATION SIMPLE: LEFT FOREHEAD
LOCATION SIMPLE: LEFT ANTERIOR NECK
LOCATION SIMPLE: RIGHT FOREHEAD
LOCATION SIMPLE: LEFT CHEEK
LOCATION SIMPLE: NECK
LOCATION SIMPLE: SCALP
LOCATION SIMPLE: LOWER BACK
LOCATION SIMPLE: RIGHT ANTERIOR NECK
LOCATION SIMPLE: RIGHT CHEEK

## 2020-08-18 ASSESSMENT — LOCATION DETAILED DESCRIPTION DERM
LOCATION DETAILED: LEFT CENTRAL BUCCAL CHEEK
LOCATION DETAILED: LEFT INFERIOR ANTERIOR NECK
LOCATION DETAILED: RIGHT INFERIOR MEDIAL MALAR CHEEK
LOCATION DETAILED: RIGHT LATERAL SUBMANDIBULAR CHEEK
LOCATION DETAILED: RIGHT MEDIAL MALAR CHEEK
LOCATION DETAILED: LEFT INFERIOR FOREHEAD
LOCATION DETAILED: RIGHT CENTRAL SUBMANDIBULAR CHEEK
LOCATION DETAILED: LEFT CENTRAL ANTERIOR NECK
LOCATION DETAILED: RIGHT INFERIOR ANTERIOR NECK
LOCATION DETAILED: LEFT MEDIAL MALAR CHEEK
LOCATION DETAILED: LEFT LATERAL BUCCAL CHEEK
LOCATION DETAILED: RIGHT LATERAL FOREHEAD
LOCATION DETAILED: SUPERIOR LUMBAR SPINE
LOCATION DETAILED: LEFT LATERAL FOREHEAD
LOCATION DETAILED: LEFT CENTRAL FRONTAL SCALP

## 2020-08-18 ASSESSMENT — LOCATION ZONE DERM
LOCATION ZONE: SCALP
LOCATION ZONE: FACE
LOCATION ZONE: TRUNK
LOCATION ZONE: NECK

## 2020-08-18 NOTE — HPI: FULL BODY SKIN EXAMINATION
How Severe Are Your Spot(S)?: moderate
What Type Of Note Output Would You Prefer (Optional)?: Bullet Format
What Is The Reason For Today's Visit?: Full Body Skin Examination
What Is The Reason For Today's Visit? (Being Monitored For X): the re-examination of lesions previously examined
Additional History: Here for FBE and follow up of SK on back\\nDeclined FBE upon beginning of visit. Only concerned with face and back today

## 2020-08-18 NOTE — PROCEDURE: ADDITIONAL NOTES
Detail Level: Simple
Additional Notes: Patient was encouraged to have a FBE today it she declined.
Additional Notes: Handout care sheet given to patient to give to her care center.\\nEncouraged FBE when patient is willing.\\nA female nurse was present for the exam. Care instructions of treated sites were explained to the patient in detail. Told patient to call with any concerns or questions. The patient verbalized understanding and agreement of the education provided and the treatment plan. Encouraged patient to schedule a follow up appointment right after visit. At the end of the visit, all questions had been answered and the patient was satisfied with the visit.

## 2020-08-18 NOTE — PROCEDURE: LIQUID NITROGEN
Add 52 Modifier (Optional): no
Detail Level: Detailed
Number Of Freeze-Thaw Cycles: 2 freeze-thaw cycles
Duration Of Freeze Thaw-Cycle (Seconds): 3
Render Post-Care Instructions In Note?: yes
Medical Necessity Clause: This procedure was medically necessary because the lesions that were treated were:
Consent: The patient's consent was obtained including but not limited to risks of crusting, scabbing, blistering, scarring, darker or lighter pigmentary change, recurrence, incomplete removal and infection.
Medical Necessity Information: It is in your best interest to select a reason for this procedure from the list below. All of these items fulfill various CMS LCD requirements except the new and changing color options.
Post-Care Instructions: I reviewed with the patient in detail post-care instructions. Patient is to wear sunprotection, and avoid picking at any of the treated lesions. Pt may apply Vaseline to crusted or scabbing areas.

## 2020-09-02 ENCOUNTER — RECORDS - HEALTHEAST (OUTPATIENT)
Dept: LAB | Facility: CLINIC | Age: 85
End: 2020-09-02

## 2020-09-02 LAB
ALBUMIN UR-MCNC: ABNORMAL MG/DL
AMORPH CRY #/AREA URNS HPF: ABNORMAL /[HPF]
APPEARANCE UR: ABNORMAL
BACTERIA #/AREA URNS HPF: ABNORMAL HPF
BILIRUB UR QL STRIP: NEGATIVE
COLOR UR AUTO: ABNORMAL
GLUCOSE UR STRIP-MCNC: NEGATIVE MG/DL
HGB UR QL STRIP: NEGATIVE
KETONES UR STRIP-MCNC: NEGATIVE MG/DL
LEUKOCYTE ESTERASE UR QL STRIP: ABNORMAL
NITRATE UR QL: NEGATIVE
PH UR STRIP: 7.5 [PH] (ref 4.5–8)
RBC #/AREA URNS AUTO: ABNORMAL HPF
SP GR UR STRIP: 1.01 (ref 1–1.03)
SQUAMOUS #/AREA URNS AUTO: ABNORMAL LPF
TRANS CELLS #/AREA URNS HPF: ABNORMAL LPF
UROBILINOGEN UR STRIP-ACNC: ABNORMAL
WBC #/AREA URNS AUTO: ABNORMAL HPF

## 2020-09-03 LAB — BACTERIA SPEC CULT: NO GROWTH

## 2020-11-18 ENCOUNTER — RECORDS - HEALTHEAST (OUTPATIENT)
Dept: LAB | Facility: CLINIC | Age: 85
End: 2020-11-18

## 2020-11-18 LAB
ALBUMIN UR-MCNC: ABNORMAL MG/DL
APPEARANCE UR: CLEAR
BACTERIA #/AREA URNS HPF: ABNORMAL HPF
BILIRUB UR QL STRIP: NEGATIVE
COLOR UR AUTO: ABNORMAL
GLUCOSE UR STRIP-MCNC: ABNORMAL MG/DL
HGB UR QL STRIP: NEGATIVE
KETONES UR STRIP-MCNC: NEGATIVE MG/DL
LEUKOCYTE ESTERASE UR QL STRIP: NEGATIVE
NITRATE UR QL: NEGATIVE
PH UR STRIP: 7 [PH] (ref 4.5–8)
RBC #/AREA URNS AUTO: ABNORMAL HPF
SP GR UR STRIP: 1.01 (ref 1–1.03)
SQUAMOUS #/AREA URNS AUTO: ABNORMAL LPF
UROBILINOGEN UR STRIP-ACNC: ABNORMAL
WBC #/AREA URNS AUTO: ABNORMAL HPF

## 2020-11-19 LAB — BACTERIA SPEC CULT: NO GROWTH

## 2020-11-29 ENCOUNTER — RECORDS - HEALTHEAST (OUTPATIENT)
Dept: LAB | Facility: CLINIC | Age: 85
End: 2020-11-29

## 2020-12-01 LAB
ANION GAP SERPL CALCULATED.3IONS-SCNC: 9 MMOL/L (ref 5–18)
BUN SERPL-MCNC: 14 MG/DL (ref 8–28)
CALCIUM SERPL-MCNC: 9.6 MG/DL (ref 8.5–10.5)
CHLORIDE BLD-SCNC: 100 MMOL/L (ref 98–107)
CO2 SERPL-SCNC: 33 MMOL/L (ref 22–31)
CREAT SERPL-MCNC: 0.72 MG/DL (ref 0.6–1.1)
ERYTHROCYTE [DISTWIDTH] IN BLOOD BY AUTOMATED COUNT: 13.4 % (ref 11–14.5)
GFR SERPL CREATININE-BSD FRML MDRD: >60 ML/MIN/1.73M2
GLUCOSE BLD-MCNC: 144 MG/DL (ref 70–125)
HCT VFR BLD AUTO: 43.7 % (ref 35–47)
HGB BLD-MCNC: 13.4 G/DL (ref 12–16)
MCH RBC QN AUTO: 30 PG (ref 27–34)
MCHC RBC AUTO-ENTMCNC: 30.7 G/DL (ref 32–36)
MCV RBC AUTO: 98 FL (ref 80–100)
PLATELET # BLD AUTO: 257 THOU/UL (ref 140–440)
PMV BLD AUTO: 9.2 FL (ref 8.5–12.5)
POTASSIUM BLD-SCNC: 4.3 MMOL/L (ref 3.5–5)
RBC # BLD AUTO: 4.46 MILL/UL (ref 3.8–5.4)
SODIUM SERPL-SCNC: 142 MMOL/L (ref 136–145)
TSH SERPL DL<=0.005 MIU/L-ACNC: 0.64 UIU/ML (ref 0.3–5)
WBC: 6.5 THOU/UL (ref 4–11)

## 2020-12-02 LAB — 25(OH)D3 SERPL-MCNC: 21.6 NG/ML (ref 30–80)

## 2021-02-05 ENCOUNTER — APPOINTMENT (OUTPATIENT)
Dept: ULTRASOUND IMAGING | Facility: CLINIC | Age: 86
DRG: 299 | End: 2021-02-05
Attending: EMERGENCY MEDICINE
Payer: COMMERCIAL

## 2021-02-05 ENCOUNTER — APPOINTMENT (OUTPATIENT)
Dept: CT IMAGING | Facility: CLINIC | Age: 86
DRG: 299 | End: 2021-02-05
Attending: EMERGENCY MEDICINE
Payer: COMMERCIAL

## 2021-02-05 ENCOUNTER — HOSPITAL ENCOUNTER (INPATIENT)
Facility: CLINIC | Age: 86
LOS: 3 days | Discharge: NURSING, REHAB OR LONG-TERM CARE | DRG: 299 | End: 2021-02-08
Attending: EMERGENCY MEDICINE | Admitting: HOSPITALIST
Payer: COMMERCIAL

## 2021-02-05 ENCOUNTER — APPOINTMENT (OUTPATIENT)
Dept: GENERAL RADIOLOGY | Facility: CLINIC | Age: 86
DRG: 299 | End: 2021-02-05
Attending: EMERGENCY MEDICINE
Payer: COMMERCIAL

## 2021-02-05 DIAGNOSIS — I82.4Y1 DEEP VEIN THROMBOSIS (DVT) OF PROXIMAL VEIN OF RIGHT LOWER EXTREMITY, UNSPECIFIED CHRONICITY (H): ICD-10-CM

## 2021-02-05 DIAGNOSIS — I50.9 ACUTE ON CHRONIC CONGESTIVE HEART FAILURE, UNSPECIFIED HEART FAILURE TYPE (H): ICD-10-CM

## 2021-02-05 DIAGNOSIS — J44.1 COPD EXACERBATION (H): ICD-10-CM

## 2021-02-05 PROBLEM — J96.01 ACUTE HYPOXEMIC RESPIRATORY FAILURE (H): Status: ACTIVE | Noted: 2021-02-05

## 2021-02-05 LAB
ALBUMIN UR-MCNC: 70 MG/DL
ANION GAP SERPL CALCULATED.3IONS-SCNC: 4 MMOL/L (ref 3–14)
APPEARANCE UR: CLEAR
BASE EXCESS BLDV CALC-SCNC: 10.2 MMOL/L
BASOPHILS # BLD AUTO: 0.1 10E9/L (ref 0–0.2)
BASOPHILS NFR BLD AUTO: 0.6 %
BILIRUB UR QL STRIP: NEGATIVE
BUN SERPL-MCNC: 23 MG/DL (ref 7–30)
CALCIUM SERPL-MCNC: 9.1 MG/DL (ref 8.5–10.1)
CHLORIDE SERPL-SCNC: 102 MMOL/L (ref 94–109)
CO2 SERPL-SCNC: 34 MMOL/L (ref 20–32)
COLOR UR AUTO: ABNORMAL
CREAT SERPL-MCNC: 0.73 MG/DL (ref 0.52–1.04)
D DIMER PPP FEU-MCNC: 0.9 UG/ML FEU (ref 0–0.5)
DIFFERENTIAL METHOD BLD: ABNORMAL
EOSINOPHIL # BLD AUTO: 0 10E9/L (ref 0–0.7)
EOSINOPHIL NFR BLD AUTO: 0 %
ERYTHROCYTE [DISTWIDTH] IN BLOOD BY AUTOMATED COUNT: 13.3 % (ref 10–15)
GFR SERPL CREATININE-BSD FRML MDRD: 71 ML/MIN/{1.73_M2}
GLUCOSE SERPL-MCNC: 213 MG/DL (ref 70–99)
GLUCOSE UR STRIP-MCNC: NEGATIVE MG/DL
HCO3 BLDV-SCNC: 37 MMOL/L (ref 21–28)
HCT VFR BLD AUTO: 47.7 % (ref 35–47)
HGB BLD-MCNC: 14.4 G/DL (ref 11.7–15.7)
HGB UR QL STRIP: NEGATIVE
IMM GRANULOCYTES # BLD: 0.1 10E9/L (ref 0–0.4)
IMM GRANULOCYTES NFR BLD: 1.1 %
KETONES UR STRIP-MCNC: NEGATIVE MG/DL
LABORATORY COMMENT REPORT: NORMAL
LACTATE BLD-SCNC: 1.7 MMOL/L (ref 0.7–2)
LEUKOCYTE ESTERASE UR QL STRIP: NEGATIVE
LYMPHOCYTES # BLD AUTO: 0.7 10E9/L (ref 0.8–5.3)
LYMPHOCYTES NFR BLD AUTO: 8.1 %
MCH RBC QN AUTO: 30 PG (ref 26.5–33)
MCHC RBC AUTO-ENTMCNC: 30.2 G/DL (ref 31.5–36.5)
MCV RBC AUTO: 99 FL (ref 78–100)
MONOCYTES # BLD AUTO: 0.3 10E9/L (ref 0–1.3)
MONOCYTES NFR BLD AUTO: 3.4 %
NEUTROPHILS # BLD AUTO: 6.9 10E9/L (ref 1.6–8.3)
NEUTROPHILS NFR BLD AUTO: 86.8 %
NITRATE UR QL: NEGATIVE
NRBC # BLD AUTO: 0 10*3/UL
NRBC BLD AUTO-RTO: 0 /100
NT-PROBNP SERPL-MCNC: 70 PG/ML (ref 0–1800)
O2/TOTAL GAS SETTING VFR VENT: ABNORMAL %
PCO2 BLDV: 59 MM HG (ref 40–50)
PH BLDV: 7.41 PH (ref 7.32–7.43)
PH UR STRIP: 7.5 PH (ref 5–7)
PLATELET # BLD AUTO: 291 10E9/L (ref 150–450)
PO2 BLDV: 40 MM HG (ref 25–47)
POTASSIUM SERPL-SCNC: 4.1 MMOL/L (ref 3.4–5.3)
PROCALCITONIN SERPL-MCNC: 0.05 NG/ML
RBC # BLD AUTO: 4.8 10E12/L (ref 3.8–5.2)
RBC #/AREA URNS AUTO: 1 /HPF (ref 0–2)
SARS-COV-2 RNA RESP QL NAA+PROBE: NEGATIVE
SODIUM SERPL-SCNC: 140 MMOL/L (ref 133–144)
SOURCE: ABNORMAL
SP GR UR STRIP: 1.02 (ref 1–1.03)
SPECIMEN SOURCE: NORMAL
TROPONIN I SERPL-MCNC: <0.015 UG/L (ref 0–0.04)
UROBILINOGEN UR STRIP-MCNC: NORMAL MG/DL (ref 0–2)
WBC # BLD AUTO: 8 10E9/L (ref 4–11)
WBC #/AREA URNS AUTO: <1 /HPF (ref 0–5)

## 2021-02-05 PROCEDURE — 81001 URINALYSIS AUTO W/SCOPE: CPT | Performed by: HOSPITALIST

## 2021-02-05 PROCEDURE — 94640 AIRWAY INHALATION TREATMENT: CPT

## 2021-02-05 PROCEDURE — 85025 COMPLETE CBC W/AUTO DIFF WBC: CPT | Performed by: EMERGENCY MEDICINE

## 2021-02-05 PROCEDURE — 250N000013 HC RX MED GY IP 250 OP 250 PS 637: Performed by: EMERGENCY MEDICINE

## 2021-02-05 PROCEDURE — 80048 BASIC METABOLIC PNL TOTAL CA: CPT | Performed by: EMERGENCY MEDICINE

## 2021-02-05 PROCEDURE — C9803 HOPD COVID-19 SPEC COLLECT: HCPCS

## 2021-02-05 PROCEDURE — 84145 PROCALCITONIN (PCT): CPT | Performed by: EMERGENCY MEDICINE

## 2021-02-05 PROCEDURE — 71045 X-RAY EXAM CHEST 1 VIEW: CPT

## 2021-02-05 PROCEDURE — 250N000009 HC RX 250: Performed by: EMERGENCY MEDICINE

## 2021-02-05 PROCEDURE — 96365 THER/PROPH/DIAG IV INF INIT: CPT

## 2021-02-05 PROCEDURE — 71275 CT ANGIOGRAPHY CHEST: CPT

## 2021-02-05 PROCEDURE — 83880 ASSAY OF NATRIURETIC PEPTIDE: CPT | Performed by: EMERGENCY MEDICINE

## 2021-02-05 PROCEDURE — 99285 EMERGENCY DEPT VISIT HI MDM: CPT | Mod: 25

## 2021-02-05 PROCEDURE — 93005 ELECTROCARDIOGRAM TRACING: CPT

## 2021-02-05 PROCEDURE — 999N000157 HC STATISTIC RCP TIME EA 10 MIN

## 2021-02-05 PROCEDURE — 83605 ASSAY OF LACTIC ACID: CPT | Performed by: EMERGENCY MEDICINE

## 2021-02-05 PROCEDURE — 87040 BLOOD CULTURE FOR BACTERIA: CPT | Performed by: EMERGENCY MEDICINE

## 2021-02-05 PROCEDURE — 99223 1ST HOSP IP/OBS HIGH 75: CPT | Mod: AI | Performed by: HOSPITALIST

## 2021-02-05 PROCEDURE — 96375 TX/PRO/DX INJ NEW DRUG ADDON: CPT

## 2021-02-05 PROCEDURE — 250N000011 HC RX IP 250 OP 636: Performed by: EMERGENCY MEDICINE

## 2021-02-05 PROCEDURE — 85379 FIBRIN DEGRADATION QUANT: CPT | Performed by: EMERGENCY MEDICINE

## 2021-02-05 PROCEDURE — 120N000001 HC R&B MED SURG/OB

## 2021-02-05 PROCEDURE — 82803 BLOOD GASES ANY COMBINATION: CPT | Performed by: EMERGENCY MEDICINE

## 2021-02-05 PROCEDURE — 36415 COLL VENOUS BLD VENIPUNCTURE: CPT | Performed by: EMERGENCY MEDICINE

## 2021-02-05 PROCEDURE — 84484 ASSAY OF TROPONIN QUANT: CPT | Performed by: EMERGENCY MEDICINE

## 2021-02-05 PROCEDURE — 93970 EXTREMITY STUDY: CPT

## 2021-02-05 PROCEDURE — 96366 THER/PROPH/DIAG IV INF ADDON: CPT

## 2021-02-05 PROCEDURE — 87635 SARS-COV-2 COVID-19 AMP PRB: CPT | Performed by: EMERGENCY MEDICINE

## 2021-02-05 RX ORDER — LIDOCAINE 40 MG/G
CREAM TOPICAL
Status: DISCONTINUED | OUTPATIENT
Start: 2021-02-05 | End: 2021-02-08 | Stop reason: HOSPADM

## 2021-02-05 RX ORDER — FUROSEMIDE 10 MG/ML
40 INJECTION INTRAMUSCULAR; INTRAVENOUS ONCE
Status: COMPLETED | OUTPATIENT
Start: 2021-02-05 | End: 2021-02-05

## 2021-02-05 RX ORDER — ASCORBIC ACID 500 MG
500 TABLET ORAL DAILY
Status: DISCONTINUED | OUTPATIENT
Start: 2021-02-05 | End: 2021-02-06

## 2021-02-05 RX ORDER — IPRATROPIUM BROMIDE AND ALBUTEROL SULFATE 2.5; .5 MG/3ML; MG/3ML
1 SOLUTION RESPIRATORY (INHALATION) 4 TIMES DAILY PRN
Status: DISCONTINUED | OUTPATIENT
Start: 2021-02-05 | End: 2021-02-08 | Stop reason: HOSPADM

## 2021-02-05 RX ORDER — FENTANYL 12.5 UG/1
12 PATCH TRANSDERMAL
Status: DISCONTINUED | OUTPATIENT
Start: 2021-02-05 | End: 2021-02-06

## 2021-02-05 RX ORDER — GABAPENTIN 300 MG/1
300 CAPSULE ORAL 3 TIMES DAILY
Status: DISCONTINUED | OUTPATIENT
Start: 2021-02-05 | End: 2021-02-06

## 2021-02-05 RX ORDER — ALBUTEROL SULFATE 90 UG/1
2 AEROSOL, METERED RESPIRATORY (INHALATION) ONCE
Status: COMPLETED | OUTPATIENT
Start: 2021-02-05 | End: 2021-02-05

## 2021-02-05 RX ORDER — AMOXICILLIN 250 MG
1 CAPSULE ORAL DAILY
Status: DISCONTINUED | OUTPATIENT
Start: 2021-02-05 | End: 2021-02-06

## 2021-02-05 RX ORDER — CARBOXYMETHYLCELLULOSE SODIUM 5 MG/ML
1 SOLUTION/ DROPS OPHTHALMIC DAILY PRN
Status: DISCONTINUED | OUTPATIENT
Start: 2021-02-05 | End: 2021-02-08 | Stop reason: HOSPADM

## 2021-02-05 RX ORDER — ALBUTEROL SULFATE 90 UG/1
2 AEROSOL, METERED RESPIRATORY (INHALATION) 4 TIMES DAILY PRN
Status: DISCONTINUED | OUTPATIENT
Start: 2021-02-05 | End: 2021-02-08 | Stop reason: HOSPADM

## 2021-02-05 RX ORDER — CALCIUM CARBONATE 500(1250)
1 TABLET ORAL DAILY
Status: DISCONTINUED | OUTPATIENT
Start: 2021-02-05 | End: 2021-02-06

## 2021-02-05 RX ORDER — HYDROCODONE BITARTRATE AND ACETAMINOPHEN 5; 325 MG/1; MG/1
1 TABLET ORAL EVERY 6 HOURS PRN
Status: DISCONTINUED | OUTPATIENT
Start: 2021-02-05 | End: 2021-02-08 | Stop reason: HOSPADM

## 2021-02-05 RX ORDER — BISMUTH SUBSALICYLATE 262 MG/1
2 TABLET, CHEWABLE ORAL
Status: DISCONTINUED | OUTPATIENT
Start: 2021-02-05 | End: 2021-02-08 | Stop reason: HOSPADM

## 2021-02-05 RX ORDER — FLUTICASONE PROPIONATE 50 MCG
2 SPRAY, SUSPENSION (ML) NASAL DAILY
Status: DISCONTINUED | OUTPATIENT
Start: 2021-02-05 | End: 2021-02-06

## 2021-02-05 RX ORDER — SIMETHICONE 80 MG
80 TABLET,CHEWABLE ORAL EVERY 6 HOURS PRN
Status: DISCONTINUED | OUTPATIENT
Start: 2021-02-05 | End: 2021-02-06

## 2021-02-05 RX ORDER — LATANOPROST 50 UG/ML
1 SOLUTION/ DROPS OPHTHALMIC AT BEDTIME
Status: DISCONTINUED | OUTPATIENT
Start: 2021-02-05 | End: 2021-02-08 | Stop reason: HOSPADM

## 2021-02-05 RX ORDER — DOXYCYCLINE 100 MG/10ML
100 INJECTION, POWDER, LYOPHILIZED, FOR SOLUTION INTRAVENOUS ONCE
Status: COMPLETED | OUTPATIENT
Start: 2021-02-05 | End: 2021-02-05

## 2021-02-05 RX ORDER — LIDOCAINE 4 G/G
1 PATCH TOPICAL EVERY 24 HOURS
Status: DISCONTINUED | OUTPATIENT
Start: 2021-02-05 | End: 2021-02-06

## 2021-02-05 RX ORDER — POTASSIUM CHLORIDE 1500 MG/1
20 TABLET, EXTENDED RELEASE ORAL DAILY
Status: DISCONTINUED | OUTPATIENT
Start: 2021-02-05 | End: 2021-02-06

## 2021-02-05 RX ORDER — METHYLPREDNISOLONE SODIUM SUCCINATE 125 MG/2ML
125 INJECTION, POWDER, LYOPHILIZED, FOR SOLUTION INTRAMUSCULAR; INTRAVENOUS ONCE
Status: COMPLETED | OUTPATIENT
Start: 2021-02-05 | End: 2021-02-05

## 2021-02-05 RX ORDER — IOPAMIDOL 755 MG/ML
500 INJECTION, SOLUTION INTRAVASCULAR ONCE
Status: COMPLETED | OUTPATIENT
Start: 2021-02-05 | End: 2021-02-05

## 2021-02-05 RX ADMIN — ENOXAPARIN SODIUM 70 MG: 80 INJECTION SUBCUTANEOUS at 18:15

## 2021-02-05 RX ADMIN — SODIUM CHLORIDE 77 ML: 9 INJECTION, SOLUTION INTRAVENOUS at 18:56

## 2021-02-05 RX ADMIN — METHYLPREDNISOLONE SODIUM SUCCINATE 125 MG: 125 INJECTION, POWDER, FOR SOLUTION INTRAMUSCULAR; INTRAVENOUS at 17:35

## 2021-02-05 RX ADMIN — ALBUTEROL SULFATE 2 PUFF: 90 AEROSOL, METERED RESPIRATORY (INHALATION) at 15:34

## 2021-02-05 RX ADMIN — FUROSEMIDE 40 MG: 10 INJECTION, SOLUTION INTRAMUSCULAR; INTRAVENOUS at 16:52

## 2021-02-05 RX ADMIN — IOPAMIDOL 57 ML: 755 INJECTION, SOLUTION INTRAVENOUS at 18:55

## 2021-02-05 RX ADMIN — DOXYCYCLINE 100 MG: 100 INJECTION, POWDER, LYOPHILIZED, FOR SOLUTION INTRAVENOUS at 17:35

## 2021-02-05 ASSESSMENT — ENCOUNTER SYMPTOMS
VOMITING: 0
COUGH: 0
ABDOMINAL PAIN: 0
FEVER: 0
NAUSEA: 0
SHORTNESS OF BREATH: 1

## 2021-02-05 ASSESSMENT — MIFFLIN-ST. JEOR
SCORE: 1079.25
SCORE: 1075.41

## 2021-02-05 ASSESSMENT — ACTIVITIES OF DAILY LIVING (ADL): ADLS_ACUITY_SCORE: 25

## 2021-02-05 NOTE — ED PROVIDER NOTES
History   Chief Complaint:  Shortness of Breath       HPI   Kelsey Wood is a 89 year old female with history of chronic obstructive pulmonary disease, paroxysmal atrial fibrillation who presents from [facility] with shortness of breath. The patient reports she has had increasing shortness of breath for the past few days. Nursing staff noted that her oxygen saturations were at 84% on room air but jumped in the 90's when she was put on 4L oxygen. They also note increased bilateral leg swelling for the past week which is worse in the left leg. She denies fevers, cough, chest pain or other symptoms.     Review of Systems   Constitutional: Negative for fever.   Respiratory: Positive for shortness of breath. Negative for cough.    Cardiovascular: Positive for leg swelling. Negative for chest pain.   Gastrointestinal: Negative for abdominal pain, nausea and vomiting.   All other systems reviewed and are negative.    Allergies:  No known drug allergies    Medications:  Lopressor  Albuterol inhaler  Calcium carbonate  Flonase  Trelegy ellipta  Lasix  Gabapentin  Duoneb nebulizer  Potassium chloride  Senna-docusate    Past Medical History:    Asthma  Hard of hearing  Chronic obstructive pulmonary disease   Mild persistent asthma  Paroxysmal atrial fibrillation  Chronic diastolic congestive heart failure    Bilateral lower extremity edema  Cognitive impairment  Osteoarthritis of both knees  Primary open angle glaucoma, bilaterally  Seborrheic keratosis  Scoliosis    Past Surgical History:    Appendectomy  Cholecystectomy  Obstetrical care x3  Excise parotid tumor (L neck)    Family History:    Lung cancer (Mother)  Glaucoma (Brother)  Prostate cancer (Brother)  Cataract (Sister)  Retinal detachment (Sister)  Hyperlipidemia (Sister)  Hypertension (Sister)  Thyroid disorder (Sister)  Asthma (son)    Social History:  Lives in senior living facility  Former smoker, quit 1994  Has son    Physical Exam     Patient Vitals for  "the past 24 hrs:   BP Temp Temp src Pulse Resp SpO2 Height Weight   02/05/21 1700 -- -- -- -- -- (!) 86 % -- --   02/05/21 1655 -- -- -- -- -- (!) 85 % -- --   02/05/21 1650 -- -- -- -- -- (!) 85 % -- --   02/05/21 1645 -- -- -- -- -- (!) 60 % -- --   02/05/21 1550 -- -- -- 97 -- 95 % -- --   02/05/21 1545 (!) 148/73 -- -- 99 -- -- -- --   02/05/21 1540 -- -- -- 102 -- 99 % -- --   02/05/21 1535 -- -- -- 106 -- -- -- --   02/05/21 1530 (!) 164/88 -- -- 110 -- 100 % -- --   02/05/21 1525 -- -- -- 105 -- 99 % -- --   02/05/21 1520 -- -- -- 103 -- 100 % -- --   02/05/21 1515 (!) 151/83 -- -- 105 -- 100 % -- --   02/05/21 1500 (!) 157/88 -- -- 105 -- -- -- --   02/05/21 1445 (!) 166/80 -- -- 103 -- 100 % -- --   02/05/21 1439 (!) 154/83 98.5  F (36.9  C) Oral 105 24 100 % -- --   02/05/21 1437 -- -- -- -- -- -- 1.575 m (5' 2\") 71.1 kg (156 lb 12 oz)       Physical Exam  Nursing note and vitals reviewed.  Constitutional: Well nourished.   Eyes: Conjunctiva normal.  Pupils are equal, round, and reactive to light.   ENT: Nose normal. Mucous membranes pink and moist.    Neck: Normal range of motion.  CVS: Sinus tachycardia.  Normal heart sounds.  2/2 DP pulses  Pulmonary: Lungs with rales and scant expiratory wheezing  GI: Abdomen soft. Nontender, nondistended. No rigidity or guarding.    MSK: +2 LE edema; LLE greater circumferential thickness than RLE, chronic sore to LLE, no overlying erythema/warmth/induration  Neuro: Alert. Follows simple commands.  Sensation intact x 4.   Skin: Skin is warm and dry. No rash noted.   Psychiatric: Normal affect.       Emergency Department Course   ECG (14:46:03):  Rate 02 bpm. NJ interval 158. QRS duration 114. QT/QTc 356/463. P-R-T axes 61 -9 30. Sinus tachycardia. Right bundle branch block. Abnormal ECG. Interpreted at 1520 by Arlene Feliciano DO.    Imaging:    US Lower Extremity Venous Duplex Bilateral  Thrombus in the proximal greater saphenous vein on the   right.  As read by " Radiology.    XR Chest Port 1 View  Midlung infiltrate on the right, question left base   infiltrate. Question a left upper lobe infiltrate.   As read by Radiology.    Laboratory:  CBC: WNL (WBC 8.0, HGB 14.4, )  BMP: Glucose 213 (H), Carbon Dioxide 34 (H), o/w WNL (Creatinine: 0.73)  Troponin (Collected 1522): <0.015  Lactic acid (Resulted 1534): 1.7  Blood gas venous: pH 7.41 / PCO2 59 (H) / PO2 40 / Bicarb 37 (H) / FlO2 room air / Base excess 10.2  Nt proBNP: 70  Procalcitonin: Pending  D-dimer: 0.9  Blood Cultures x2: Pending  Asymptomatic COVID-19 PCR: Negative    Emergency Department Course:    Reviewed:  1440 I reviewed nursing notes, vitals, past medical history and care everywhere    Assessments:  1442 I obtained history and examined the patient as noted above.   1509 I rechecked the patient and answered her questions about the logistics of getting admitted and getting her  second COVID-19 vaccine.   1518 The patient was placed on BiPAP.   1520 I spoke with the patient's son over the phone.     Consults:   1702 I spoke with Dr. Barboza who will accept the patient for admission.   1719 I spoke with Dr. Barboza to clarify treatment.     Interventions:  1534 Albuterol 2 puff inhalation  1652 Lasix 40 mg IV  1735 Solu-Medrol 125 mg IV  1735 Doxycycline 100 mg IV    Disposition:  The patient was admitted to the hospital under the care of Dr. Barboza.       Impression & Plan     Medical Decision Making:  Patient is a 89-year-old female presenting with reported dyspnea.  She was noted to be hypoxic at her facility though on arrival she is not requiring any oxygen.  Throughout her stay in the ED however her oxygen saturations were variable.  She was initiated on low supplemental oxygen and maintained SPO2 greater than 93%.  Chest x-ray read as concern for possible pneumonia.  Clinically patient is quite edematous on exam and I do have concerns her presentation is more secondary to CHF and perhaps mild COPD.   She was given diuresis during her time in the ED.  I discussion with hospitalist, he requested coverage for possible pneumonia with doxycycline.  Patient denies any reported fever, no leukocytosis.  Blood cultures and procalcitonin have been ordered at this time.  I did obtain formal duplex ultrasounds of her lower extremities given the swelling.  Her right lower extremity does have a DVT to the greater saphenous vein.  Hospitalist is requesting Lovenox at this time as well as formal CT to exclude PE. He states he will follow the CT.  Contraindications of anticoagulation reviewed with the patient.  She remained hemodynamically stable, currently not requiring advanced airway management at this time and is accepted by hospitalist for admission.    Covid-19  Kelsey Wood was evaluated during a global COVID-19 pandemic, which necessitated consideration that the patient might be at risk for infection with the SARS-CoV-2 virus that causes COVID-19.   Applicable protocols for evaluation were followed during the patient's care.   COVID-19 was considered as part of the patient's evaluation. The plan for testing is:  a test was obtained during this visit.    Diagnosis:    ICD-10-CM    1. Acute on chronic congestive heart failure, unspecified heart failure type (H)  I50.9 Procalcitonin   2. COPD exacerbation (H)  J44.1    3. Deep vein thrombosis (DVT) of proximal vein of right lower extremity, unspecified chronicity (H)  I82.4Y1        Scribe Disclosure:  Christofer VANESSA, am serving as a scribe at 2:42 PM on 2/5/2021 to document services personally performed by Arlene Feliciano DO based on my observations and the provider's statements to me.          Arlene Feliciano DO  02/05/21 2031

## 2021-02-05 NOTE — ED NOTES
Bed: ED01  Expected date: 2/5/21  Expected time: 2:28 PM  Means of arrival: Ambulance  Comments:  [facility] 89yo

## 2021-02-05 NOTE — H&P
"[facility]    History and Physical  Hospitalist     Date of Admission:  2/5/2021  Date of Service (when I saw the patient): 02/05/21  Provider: Christiano Barboza MD      Chief Complaint   Shortness of breath    History is obtained from the patient, electronic health record and emergency department physician    History of Present Illness   Kelsey Wood is a 89 year old female who COPD, bronchial asthma, hearing deficit, glaucoma, chronic pain and paroxysmal atrial fibrillation.  She is a resident of [facility].  She was sent to the emergency department because the staff of her place documented low oxygen in the mid 80s and they said this patient had been complaining of shortness of breath in the last 3 days preceding the admission.  She arrived to the emergency department hypoxemic with oxygen saturation 86% on room air, afebrile, heart rate initially more than 100 and systolic blood pressure slightly elevated up to 148.  She is mentally sharp.  She denies nausea, vomiting or diarrhea.  No muscle ache.  No chest pain.  No urinary symptoms.  During my interview the patient has stated \"I do not know why I am here, I was find in my place all of a sudden all these people came and put me in the ambulance\".  She denies of having any significant shortness of breath.  She suffers from muscle pain that she calls \"muscle spasm\" for which she is on fentanyl patch and Lidoderm patch.  She is also complaining of burning on urination and asking to have a urine check    I have discussed her case with Dr. Feliciano the emergency department physician, in her exam she has noted significant peripheral edema and chest x-ray has reported bilateral infiltrates. She has normal white count of the COVID-19 PCR test negative.    Vital signs:  Temp: 98.5  F (36.9  C) Temp src: Oral BP: (Abnormal) 148/73 Pulse: 97   Resp: 24 SpO2: (Abnormal) 86 % O2 Device: Nasal cannula Oxygen Delivery: 3 LPM Height: 157.5 cm " "(5' 2\") Weight: 71.1 kg (156 lb 12 oz)  Estimated body mass index is 28.67 kg/m  as calculated from the following:    Height as of this encounter: 1.575 m (5' 2\").    Weight as of this encounter: 71.1 kg (156 lb 12 oz).    Laboratory work-up:  CBC with differential notable for elevated hematocrit 47.7 and and absolute lymphocyte count of 0.7, other values are normal.  Chemistry significant for CO2 3 4 and GFR 71 but other values are normal.  Lactic acid 1.7.   NT proBNP 70.  Troponin IES <0.015  Glycemia 213  COVID-19 PCR test negative  EKG with sinus tachycardia 110/min, RBBB.    CXR                                                 IMPRESSION: Midlung infiltrate on the right, question left base  infiltrate. Question a left upper lobe infiltrate.    Echocardiogram 2/26/2020  Interpretation Summary  1. Normal left ventricular size and systolic function. LVEF 60-65%.  2. No regional wall motion abnormalities.  3. Normal right ventricular size and systolic function. The right ventricular  systolic pressure is approximated at 42.0 mmHg plus the right atrial pressure.  4. Trace mitral and tricuspid valve regurgitation.  5. Dilated inferior vena cava.    BLE venous ultrasound.  IMPRESSION: Thrombus in the proximal greater saphenous vein on the  right.    Past Medical History    I have reviewed this patient's medical history and updated it with pertinent information if needed.   Past Medical History:   Diagnosis Date     Asthma      Hard of hearing         Assessment & Plan   Kelsey Wood is a 89 year old female who presents with with worsening of shortness of breath, hypoxemia, bilateral infiltrates in her lungs, occlusive thrombus in the RGSV / SFV junction.  She has worsening of lower extremity edema, normal NT proBNP, normal first troponin IES.  Sinus tachycardia and right bundle branch block in the EKG, no ST abnormalities seen.  Negative Covid 19 PCR test.    1.  Acute hypoxemic respiratory failure.  Totally unclear " the nature of this event.  She has a history of pulmonary disease that include asthma and CT findings of emphysema.  She has basilar atelectasis in the CT but no obvious pneumonia.  And she does not have a pulmonary embolism.  Unclear whether the documented hypoxemia today is acute or chronic in her case.   2.  Deep vein thrombosis with occlusive thrombus in right GSV in the junction of the SFV.  It is a totally incidental finding, she needs treatment and has been started on Lovenox 1 mg/kg twice daily.  I have requested consult with pharmacy liaison for definitive treatment.  At this point we have to consider this event unprovoked.  Thrombophilia studies may be deferred to the primary care physician.  3.  History of PAF currently in sinus rhythm.  She is currently in NSR.  On metoprolol.  Not anticoagulated.  4.  History of COPD/emphysema.  She has tested negative for COVID-19 PCR test.  She is afebrile, not toxemic.  The likelihood of an infection is probably remote with undetectable procalcitonin.  She had one empiric dose of doxycycline IV in the emergency department.  At this point I will not continue antibiotic.  5.  History of bronchial asthma.  No evidence of acute exacerbation.  On trelegy Ellipta and albuterol.  6.  Hearing deficit.  7.  Glaucoma.  On Xalatan  8.  Low urinary symptoms, possible cystitis.  Pending UA results.  9.  Chronic pHTN.   10. Admit to inpatient.  -Oxygen per nasal cannula to keep oxygen saturation 92 or higher.  -Regular diet      Code Status   DNR / DNI    Primary Care Physician   ROGER GE      Past Surgical History   I have reviewed this patient's surgical history and updated it with pertinent information if needed.  Past Surgical History:   Procedure Laterality Date     CHOLECYSTECTOMY       LAPAROSCOPIC APPENDECTOMY         Prior to Admission Medications   Prior to Admission Medications   Prescriptions Last Dose Informant Patient Reported? Taking?   ACETAMINOPHEN PO   Yes  No   Sig: Take 650 mg by mouth every 4 hours as needed for pain or fever And q4h prn   Fluticasone-Umeclidin-Vilanterol (TRELEGY ELLIPTA) 100-62.5-25 MCG/INH oral inhaler   Yes No   Sig: Inhale 1 puff into the lungs daily   GABAPENTIN PO   Yes No   Sig: Take 300 mg by mouth 3 times daily 09:00, 14:00 and 21:00   Glucosamine-Chondroitin (OSTEO BI-FLEX REGULAR STRENGTH PO)   Yes No   Sig: Take 1 tablet by mouth 2 times daily   HYDROcodone-acetaminophen (NORCO) 5-325 MG tablet   No No   Sig: Take 1 tablet by mouth every 6 hours as needed for severe pain   Lidocaine (LIDOCARE) 4 % Patch   Yes No   Sig: Place 1 patch onto the skin every 24 hours To prevent lidocaine toxicity, patient should be patch free for 12 hrs daily.  Salonpas for hip pain   albuterol (PROAIR HFA/PROVENTIL HFA/VENTOLIN HFA) 108 (90 Base) MCG/ACT Inhaler   Yes No   Sig: Inhale 2 puffs into the lungs 4 times daily as needed for shortness of breath / dyspnea or wheezing    bismuth subsalicylate (PEPTO BISMOL) 262 MG chewable tablet   Yes No   Sig: Take 2 tablets by mouth every hour as needed for heartburn Max of 16 tabs per 24 hrs   calcium carbonate (OS-MARY GRACE 500 MG [place]. [place]) 500 MG tablet   Yes No   Sig: Take 1 tablet by mouth daily    fentaNYL (DURAGESIC) 12 mcg/hr 72 hr patch   No No   Sig: Place 1 patch onto the skin every 72 hours remove old patch.   fluticasone (FLONASE) 50 MCG/ACT nasal spray   Yes No   Sig: Spray 2 sprays into both nostrils daily   furosemide (LASIX) 20 MG tablet   Yes No   Sig: Take 20 mg by mouth daily   hypromellose (ARTIFICIAL TEARS) 0.5 % SOLN ophthalmic solution   Yes No   Sig: Place 1 drop into both eyes daily   hypromellose (ARTIFICIAL TEARS) 0.5 % SOLN ophthalmic solution   Yes No   Sig: Place 1 drop into both eyes daily as needed for dry eyes   ipratropium - albuterol 0.5 mg/2.5 mg/3 mL (DUONEB) 0.5-2.5 (3) MG/3ML neb solution   Yes No   Sig: Take 1 vial by nebulization 4 times daily as needed for shortness of breath /  dyspnea or wheezing    latanoprost (XALATAN) 0.005 % ophthalmic solution   Yes No   Sig: Place 1 drop into both eyes At Bedtime   metoprolol tartrate (LOPRESSOR) 25 MG tablet   No No   Sig: Take 0.5 tablets (12.5 mg) by mouth 2 times daily   potassium chloride ER (K-TAB) 20 MEQ CR tablet   Yes No   Sig: Take 20 mEq by mouth daily   senna-docusate (SENOKOT-S;PERICOLACE) 8.6-50 MG per tablet   Yes No   Sig: Take 1 tablet by mouth daily   simethicone (MYLICON) 80 MG chewable tablet   Yes No   Sig: Take 80 mg by mouth every 6 hours as needed for flatulence or cramping   vitamin C (ASCORBIC ACID) 500 MG tablet   Yes No   Sig: Take 500 mg by mouth daily      Facility-Administered Medications: None     Allergies   No Known Allergies    Social History   I have personally reviewed the social history with the patient showing.  Social History     Tobacco Use     Smoking status: Former Smoker     Smokeless tobacco: Never Used   Substance Use Topics     Alcohol use: Not Currently     Family History   I have reviewed this patient's family history and it is not contributory to the admission .       Review of Systems   Except for positive as noted in the HPI, a 12-system Review of Systems was found to be negative.      Physical Exam   Vital Signs with Ranges  Temp:  [98.5  F (36.9  C)] 98.5  F (36.9  C)  Pulse:  [] 97  Resp:  [24] 24  BP: (148-166)/(73-88) 148/73  SpO2:  [91 %-100 %] 91 %  156 lbs 11.95 oz    GEN:  Alert, oriented x 3, appears comfortable, NAD.  HEENT:  Normocephalic/atraumatic, no scleral icterus, no nasal discharge, mouth moist.  CV:  Regular rate and rhythm, no murmur or JVD.  S1 + S2 noted, no S3 or S4.  LUNGS: Globally diminished to auscultation bilaterally with enlarged expiration.  Symmetric chest rise on inhalation noted.  ABD:  Active bowel sounds, soft, non-tender/non-distended.  No rebound/guarding/rigidity.  EXT:  BLE pitting and dependent edema 1 - 2+,  no cyanosis.  No joint synovitis  noted.  SKIN:  Dry to touch, no exanthems noted in the visualized areas.       Data   I personally reviewed the EKG tracing showing NSR with RBBB.  Results for orders placed or performed during the hospital encounter of 02/05/21 (from the past 24 hour(s))   EKG 12-lead, tracing only   Result Value Ref Range    Interpretation ECG Click View Image link to view waveform and result    Basic metabolic panel   Result Value Ref Range    Sodium 140 133 - 144 mmol/L    Potassium 4.1 3.4 - 5.3 mmol/L    Chloride 102 94 - 109 mmol/L    Carbon Dioxide 34 (H) 20 - 32 mmol/L    Anion Gap 4 3 - 14 mmol/L    Glucose 213 (H) 70 - 99 mg/dL    Urea Nitrogen 23 7 - 30 mg/dL    Creatinine 0.73 0.52 - 1.04 mg/dL    GFR Estimate 71 >60 mL/min/[1.73_m2]    GFR Estimate If Black 83 >60 mL/min/[1.73_m2]    Calcium 9.1 8.5 - 10.1 mg/dL   Blood gas venous   Result Value Ref Range    Ph Venous 7.41 7.32 - 7.43 pH    PCO2 Venous 59 (H) 40 - 50 mm Hg    PO2 Venous 40 25 - 47 mm Hg    Bicarbonate Venous 37 (H) 21 - 28 mmol/L    Base Excess Venous 10.2 mmol/L    FIO2 room air    CBC with platelets differential   Result Value Ref Range    WBC 8.0 4.0 - 11.0 10e9/L    RBC Count 4.80 3.8 - 5.2 10e12/L    Hemoglobin 14.4 11.7 - 15.7 g/dL    Hematocrit 47.7 (H) 35.0 - 47.0 %    MCV 99 78 - 100 fl    MCH 30.0 26.5 - 33.0 pg    MCHC 30.2 (L) 31.5 - 36.5 g/dL    RDW 13.3 10.0 - 15.0 %    Platelet Count 291 150 - 450 10e9/L    Diff Method Automated Method     % Neutrophils 86.8 %    % Lymphocytes 8.1 %    % Monocytes 3.4 %    % Eosinophils 0.0 %    % Basophils 0.6 %    % Immature Granulocytes 1.1 %    Nucleated RBCs 0 0 /100    Absolute Neutrophil 6.9 1.6 - 8.3 10e9/L    Absolute Lymphocytes 0.7 (L) 0.8 - 5.3 10e9/L    Absolute Monocytes 0.3 0.0 - 1.3 10e9/L    Absolute Eosinophils 0.0 0.0 - 0.7 10e9/L    Absolute Basophils 0.1 0.0 - 0.2 10e9/L    Abs Immature Granulocytes 0.1 0 - 0.4 10e9/L    Absolute Nucleated RBC 0.0    Lactic acid whole blood   Result  Value Ref Range    Lactic Acid 1.7 0.7 - 2.0 mmol/L   Nt probnp inpatient (BNP)   Result Value Ref Range    N-Terminal Pro BNP Inpatient 70 0 - 1,800 pg/mL   Troponin I   Result Value Ref Range    Troponin I ES <0.015 0.000 - 0.045 ug/L   Procalcitonin   Result Value Ref Range    Procalcitonin 0.05 ng/ml   XR Chest Port 1 View    Narrative    CHEST ONE VIEW  2/5/2021 3:29 PM     HISTORY: SOB.    COMPARISON: April 3, 2019      Impression    IMPRESSION: Midlung infiltrate on the right, question left base  infiltrate. Question a left upper lobe infiltrate.    GRANT HAIDER MD   Asymptomatic SARS-CoV-2 COVID-19 Virus (Coronavirus) by PCR    Specimen: Nasopharyngeal   Result Value Ref Range    SARS-CoV-2 Virus Specimen Source Nasopharyngeal     SARS-CoV-2 PCR Result NEGATIVE     SARS-CoV-2 PCR Comment (Note)    US Lower Extremity Venous Duplex Bilateral    Narrative    ULTRASOUND VENOUS LOWER EXTREMITY BILATERAL 2/5/2021 4:29 PM     HISTORY: Leg swelling.    COMPARISON: April 3, 2019    TECHNIQUE: Ultrasound gray scale, Color Doppler flow, and spectral  Doppler waveform analysis performed.    FINDINGS: The bilateral common femoral, superficial femoral, popliteal  and posterior tibial veins are patent and fully compressible and  demonstrate normal venous Doppler flow. The proximal right greater  saphenous vein demonstrates thrombus. Left greater saphenous vein is  patent.      Impression    IMPRESSION: Thrombus in the proximal greater saphenous vein on the  right.    GRANT HAIDER MD   Blood culture    Specimen: Blood   Result Value Ref Range    Specimen Description Blood     Special Requests Right Arm     Culture Micro PENDING    Blood culture    Specimen: Blood   Result Value Ref Range    Specimen Description Blood     Special Requests Left Arm     Culture Micro PENDING    D dimer quantitative   Result Value Ref Range    D Dimer 0.9 (H) 0.0 - 0.50 ug/ml FEU   CT Chest Pulmonary Embolism w Contrast    Narrative    EXAM:  CT CHEST PULMONARY EMBOLISM W CONTRAST  LOCATION: [facility]  DATE/TIME: 2/5/2021 6:48 PM    INDICATION: Hypoxia. Shortness of breath for 3 days. COPD. PE suspected, low/intermediate prob, neg D-dimer  COMPARISON: 02/24/2020  TECHNIQUE: CT chest pulmonary angiogram during arterial phase injection of IV contrast. Multiplanar reformats and MIP reconstructions were performed. Dose reduction techniques were used.   CONTRAST:  57mL [id number]-[id number]    FINDINGS:  ANGIOGRAM CHEST: No acute pulmonary emboli are identified. There is mild dilatation of both left and right pulmonary arteries with tapering of the peripheral vessels suggesting pulmonary artery hypertension. Thoracic aorta is negative for dissection. No   CT evidence of right heart strain.    LUNGS AND PLEURA: New segmental atelectasis involving right lower lobe. Stable atelectasis involving lingula. Stable changes of emphysema.  Significant flattening of central bronchi consistent with bronchomalacia. Mild flattening of trachea.    MEDIASTINUM/AXILLAE: No lymphadenopathy. Heart size normal.    UPPER ABDOMEN: Left renal cyst minimally changed. Stable small left adrenal adenoma.    MUSCULOSKELETAL: Progression of the compression involving T9 vertebral body, now severe. Moderate compression involving L1 vertebral body unchanged.      Impression    IMPRESSION:  1.  No pulmonary embolism identified.  2.  Likely the patient has some degree of pulmonary artery hypertension.  3.  New atelectasis left lower lobe. Stable mild atelectasis lingula. No definite new pneumonia. Emphysema unchanged.  4.  Bronchomalacia.   UA with Microscopic reflex to Culture    Specimen: Urine clean catch; Unspecified Urine   Result Value Ref Range    Color Urine Straw     Appearance Urine Clear     Glucose Urine Negative NEG^Negative mg/dL    Bilirubin Urine Negative NEG^Negative    Ketones Urine Negative NEG^Negative mg/dL    Specific Gravity Urine 1.023 1.003 - 1.035    Blood Urine  Negative NEG^Negative    pH Urine 7.5 (H) 5.0 - 7.0 pH    Protein Albumin Urine 70 (A) NEG^Negative mg/dL    Urobilinogen mg/dL Normal 0.0 - 2.0 mg/dL    Nitrite Urine Negative NEG^Negative    Leukocyte Esterase Urine Negative NEG^Negative    Source Unspecified Urine     WBC Urine <1 0 - 5 /HPF    RBC Urine 1 0 - 2 /HPF       Disclaimer: This note consists of symbols derived from keyboarding, dictation and/or voice recognition software. As a result, there may be errors in the script that have gone undetected. Please consider this when interpreting information found in this chart.

## 2021-02-05 NOTE — ED TRIAGE NOTES
ABCs intact. Pt hx COPD, CHF. Pt c/o SOB x 2 days. Pt lives in a senior living facility. Pt BIBA. Per EMS, Pt 84% RA. Pt has had increased peripheral edema for the past week. Pt 97% 4L NC.

## 2021-02-06 ENCOUNTER — APPOINTMENT (OUTPATIENT)
Dept: CARDIOLOGY | Facility: CLINIC | Age: 86
DRG: 299 | End: 2021-02-06
Attending: HOSPITALIST
Payer: COMMERCIAL

## 2021-02-06 LAB
ANION GAP SERPL CALCULATED.3IONS-SCNC: 4 MMOL/L (ref 3–14)
BUN SERPL-MCNC: 20 MG/DL (ref 7–30)
CALCIUM SERPL-MCNC: 8.9 MG/DL (ref 8.5–10.1)
CHLORIDE SERPL-SCNC: 104 MMOL/L (ref 94–109)
CO2 SERPL-SCNC: 34 MMOL/L (ref 20–32)
CREAT SERPL-MCNC: 0.53 MG/DL (ref 0.52–1.04)
ERYTHROCYTE [DISTWIDTH] IN BLOOD BY AUTOMATED COUNT: 13 % (ref 10–15)
GFR SERPL CREATININE-BSD FRML MDRD: 83 ML/MIN/{1.73_M2}
GLUCOSE SERPL-MCNC: 134 MG/DL (ref 70–99)
HCT VFR BLD AUTO: 45.7 % (ref 35–47)
HGB BLD-MCNC: 13.9 G/DL (ref 11.7–15.7)
MCH RBC QN AUTO: 29.6 PG (ref 26.5–33)
MCHC RBC AUTO-ENTMCNC: 30.4 G/DL (ref 31.5–36.5)
MCV RBC AUTO: 97 FL (ref 78–100)
PLATELET # BLD AUTO: 301 10E9/L (ref 150–450)
POTASSIUM SERPL-SCNC: 3.8 MMOL/L (ref 3.4–5.3)
RBC # BLD AUTO: 4.7 10E12/L (ref 3.8–5.2)
SODIUM SERPL-SCNC: 142 MMOL/L (ref 133–144)
WBC # BLD AUTO: 10 10E9/L (ref 4–11)

## 2021-02-06 PROCEDURE — 250N000013 HC RX MED GY IP 250 OP 250 PS 637: Performed by: HOSPITALIST

## 2021-02-06 PROCEDURE — 250N000011 HC RX IP 250 OP 636: Performed by: HOSPITALIST

## 2021-02-06 PROCEDURE — 36415 COLL VENOUS BLD VENIPUNCTURE: CPT | Performed by: HOSPITALIST

## 2021-02-06 PROCEDURE — 99233 SBSQ HOSP IP/OBS HIGH 50: CPT | Performed by: INTERNAL MEDICINE

## 2021-02-06 PROCEDURE — 85027 COMPLETE CBC AUTOMATED: CPT | Performed by: HOSPITALIST

## 2021-02-06 PROCEDURE — 93306 TTE W/DOPPLER COMPLETE: CPT

## 2021-02-06 PROCEDURE — 250N000012 HC RX MED GY IP 250 OP 636 PS 637: Performed by: INTERNAL MEDICINE

## 2021-02-06 PROCEDURE — 80048 BASIC METABOLIC PNL TOTAL CA: CPT | Performed by: HOSPITALIST

## 2021-02-06 PROCEDURE — 120N000001 HC R&B MED SURG/OB

## 2021-02-06 PROCEDURE — 93306 TTE W/DOPPLER COMPLETE: CPT | Mod: 26 | Performed by: INTERNAL MEDICINE

## 2021-02-06 RX ORDER — POTASSIUM CHLORIDE 1500 MG/1
20 TABLET, EXTENDED RELEASE ORAL DAILY
Status: DISCONTINUED | OUTPATIENT
Start: 2021-02-06 | End: 2021-02-08 | Stop reason: HOSPADM

## 2021-02-06 RX ORDER — GABAPENTIN 300 MG/1
300 CAPSULE ORAL 3 TIMES DAILY
COMMUNITY

## 2021-02-06 RX ORDER — FLUTICASONE PROPIONATE 50 MCG
2 SPRAY, SUSPENSION (ML) NASAL DAILY
Status: DISCONTINUED | OUTPATIENT
Start: 2021-02-06 | End: 2021-02-08 | Stop reason: HOSPADM

## 2021-02-06 RX ORDER — GABAPENTIN 300 MG/1
300 CAPSULE ORAL 3 TIMES DAILY
Status: DISCONTINUED | OUTPATIENT
Start: 2021-02-06 | End: 2021-02-08 | Stop reason: HOSPADM

## 2021-02-06 RX ORDER — NALOXONE HYDROCHLORIDE 0.4 MG/ML
0.4 INJECTION, SOLUTION INTRAMUSCULAR; INTRAVENOUS; SUBCUTANEOUS
Status: DISCONTINUED | OUTPATIENT
Start: 2021-02-06 | End: 2021-02-08 | Stop reason: HOSPADM

## 2021-02-06 RX ORDER — FUROSEMIDE 20 MG
20 TABLET ORAL DAILY
Status: DISCONTINUED | OUTPATIENT
Start: 2021-02-06 | End: 2021-02-06

## 2021-02-06 RX ORDER — NALOXONE HYDROCHLORIDE 0.4 MG/ML
0.2 INJECTION, SOLUTION INTRAMUSCULAR; INTRAVENOUS; SUBCUTANEOUS
Status: DISCONTINUED | OUTPATIENT
Start: 2021-02-06 | End: 2021-02-08 | Stop reason: HOSPADM

## 2021-02-06 RX ORDER — CHOLECALCIFEROL (VITAMIN D3) 50 MCG
1 TABLET ORAL DAILY
COMMUNITY

## 2021-02-06 RX ORDER — FUROSEMIDE 20 MG
TABLET ORAL
Status: ON HOLD | COMMUNITY
End: 2021-02-08

## 2021-02-06 RX ORDER — LIDOCAINE 4 G/G
1 PATCH TOPICAL EVERY 24 HOURS
Status: DISCONTINUED | OUTPATIENT
Start: 2021-02-06 | End: 2021-02-08 | Stop reason: HOSPADM

## 2021-02-06 RX ORDER — ASCORBIC ACID 500 MG
500 TABLET ORAL DAILY
Status: DISCONTINUED | OUTPATIENT
Start: 2021-02-06 | End: 2021-02-08 | Stop reason: HOSPADM

## 2021-02-06 RX ORDER — ACETAMINOPHEN 325 MG/1
650 TABLET ORAL EVERY 4 HOURS PRN
COMMUNITY

## 2021-02-06 RX ORDER — FENTANYL 12.5 UG/1
12 PATCH TRANSDERMAL
Status: DISCONTINUED | OUTPATIENT
Start: 2021-02-07 | End: 2021-02-08 | Stop reason: HOSPADM

## 2021-02-06 RX ORDER — CARBOXYMETHYLCELLULOSE SODIUM 5 MG/ML
1 SOLUTION/ DROPS OPHTHALMIC DAILY
Status: DISCONTINUED | OUTPATIENT
Start: 2021-02-06 | End: 2021-02-08 | Stop reason: HOSPADM

## 2021-02-06 RX ORDER — CALCIUM CARBONATE 500(1250)
1 TABLET ORAL DAILY
Status: DISCONTINUED | OUTPATIENT
Start: 2021-02-06 | End: 2021-02-08 | Stop reason: HOSPADM

## 2021-02-06 RX ORDER — PREDNISONE 5 MG/1
TABLET ORAL
Status: ON HOLD | COMMUNITY
End: 2021-02-08

## 2021-02-06 RX ORDER — PREDNISONE 20 MG/1
40 TABLET ORAL DAILY
Status: DISCONTINUED | OUTPATIENT
Start: 2021-02-06 | End: 2021-02-08 | Stop reason: HOSPADM

## 2021-02-06 RX ORDER — AMOXICILLIN 250 MG
1 CAPSULE ORAL DAILY
Status: DISCONTINUED | OUTPATIENT
Start: 2021-02-06 | End: 2021-02-08 | Stop reason: HOSPADM

## 2021-02-06 RX ADMIN — GABAPENTIN 300 MG: 300 CAPSULE ORAL at 21:30

## 2021-02-06 RX ADMIN — DOCUSATE SODIUM 50 MG AND SENNOSIDES 8.6 MG 1 TABLET: 8.6; 5 TABLET, FILM COATED ORAL at 12:29

## 2021-02-06 RX ADMIN — LIDOCAINE 1 PATCH: 560 PATCH PERCUTANEOUS; TOPICAL; TRANSDERMAL at 13:23

## 2021-02-06 RX ADMIN — PREDNISONE 40 MG: 20 TABLET ORAL at 12:28

## 2021-02-06 RX ADMIN — Medication 1 MG: at 21:30

## 2021-02-06 RX ADMIN — GABAPENTIN 300 MG: 300 CAPSULE ORAL at 12:28

## 2021-02-06 RX ADMIN — ENOXAPARIN SODIUM 70 MG: 80 INJECTION SUBCUTANEOUS at 05:27

## 2021-02-06 RX ADMIN — GABAPENTIN 300 MG: 300 CAPSULE ORAL at 16:45

## 2021-02-06 RX ADMIN — ENOXAPARIN SODIUM 70 MG: 80 INJECTION SUBCUTANEOUS at 17:17

## 2021-02-06 RX ADMIN — Medication 500 MG: at 12:28

## 2021-02-06 RX ADMIN — HYDROCODONE BITARTRATE AND ACETAMINOPHEN 1 TABLET: 5; 325 TABLET ORAL at 10:35

## 2021-02-06 RX ADMIN — FLUTICASONE PROPIONATE 2 SPRAY: 50 SPRAY, METERED NASAL at 13:23

## 2021-02-06 RX ADMIN — Medication 12.5 MG: at 10:35

## 2021-02-06 RX ADMIN — HYDROCODONE BITARTRATE AND ACETAMINOPHEN 1 TABLET: 5; 325 TABLET ORAL at 21:40

## 2021-02-06 RX ADMIN — POTASSIUM CHLORIDE 20 MEQ: 1500 TABLET, EXTENDED RELEASE ORAL at 12:29

## 2021-02-06 RX ADMIN — LATANOPROST 1 DROP: 50 SOLUTION OPHTHALMIC at 21:31

## 2021-02-06 RX ADMIN — CARBOXYMETHYLCELLULOSE SODIUM 1 DROP: 0.5 SOLUTION/ DROPS OPHTHALMIC at 13:22

## 2021-02-06 RX ADMIN — OXYCODONE HYDROCHLORIDE AND ACETAMINOPHEN 500 MG: 500 TABLET ORAL at 12:29

## 2021-02-06 ASSESSMENT — ACTIVITIES OF DAILY LIVING (ADL)
ADLS_ACUITY_SCORE: 27
DEPENDENT_IADLS:: MEDICATION MANAGEMENT

## 2021-02-06 ASSESSMENT — MIFFLIN-ST. JEOR: SCORE: 1066.34

## 2021-02-06 NOTE — PROGRESS NOTES
CM is following for discharge planning. Per chart review patient lives at [facility]. Will contact nursing to verify services at [phone]/ [phone].    Dasia Thomas RN Case Manager  Inpatient Care Coordination  [facility]   [phone]

## 2021-02-06 NOTE — PLAN OF CARE
VSS, on 3L NC. Denies pain. Pt alert, oriented x1-2 this shift. Neuro assessment otherwise intact. LS diminished with crackles, KABA. Tele SR. 2-3+ BLE edema. Wound to left leg, dressing CDI, DVT to right leg- see US result. Blanchable redness to sacrum, turned and repositioned q2h. Abdomen round, distended. External catheter in place with good output.

## 2021-02-06 NOTE — CONSULTS
Care Management Initial Consult    General Information  Assessment completed with: Care Team MemberDasia  Type of CM/SW Visit: Initial Assessment    Primary Care Provider verified and updated as needed: Yes   Readmission within the last 30 days: no previous admission in last 30 days      Reason for Consult: care coordination/care conference, discharge planning          Communication Assessment  Patient's communication style: spoken language (English or Bilingual)    Hearing Difficulty or Deaf: yes   Wear Glasses or Blind: yes    Cognitive  Cognitive/Neuro/Behavioral: .WDL except  Level of Consciousness: alert  Arousal Level: opens eyes spontaneously  Orientation: disoriented to, time  Mood/Behavior: cooperative  Best Language: 0 - No aphasia  Speech: clear    Living Environment:          Current living Arrangements: assisted living  [facility] of [facility]: [facility]   Able to return to prior arrangements: yes       Family/Social Support:  Care provided by: self  Provides care for: no one       Current Resources:   Patient receiving home care services: Yes  Skilled Home Care Services: Skilled Nursing, Occupational Therapy  Community Resources: Home Care  Equipment currently used at home: wheelchair, manual  Supplies currently used at home: Nebulizer tubing      Lifestyle & Psychosocial Needs:        Socioeconomic History     Marital status:      Spouse name: Not on file     Number of children: Not on file     Years of education: Not on file     Highest education level: Not on file     Tobacco Use     Smoking status: Former Smoker     Smokeless tobacco: Never Used   Substance and Sexual Activity     Alcohol use: Not Currently     Drug use: Never       Functional Status:  Prior to admission patient needed assistance:   Dependent ADLs:: Wheelchair-independent  Dependent IADLs:: Medication Management             Care Management Follow Up    Length of Stay (days): 1    Expected Discharge Date: 02/08/21     Concerns  to be Addressed: care coordination/care conferences, discharge planning     Patient plan of care discussed at interdisciplinary rounds: Yes    Anticipated Discharge Disposition: Assisted Living     Anticipated Discharge Services: None  Anticipated Discharge DME: None    Referrals Placed by CM/SW: External Care Coordination  Private pay costs discussed: Not applicable    Additional Information:  Spoke with on call nurse Dasia at [facility] ([phone]), [phone]. Patient is independent with transfers to wheelchair, bathing, dressing, & grooming. Patient receives assistance with medication management. Patient is independent with taking prn Norco and nebulizer's.   Patient stated to nurse and EMS prior to admission concern with returning back to [facility] prior to Tuesday 2/9 for scheduled Covid vaccination.     Facility prefers medications filled in hospital prior to discharge.   Facility is able to accept patient back on the weekends and prefer return time prior to 1600.   Discharge orders to be faxed to [facility] at Fax [phone].  Patient is receiving home care RN/ Lymphedema through [facility]. P: [phone] Fax [phone].    Transportation TBD.       Dasia Thomas, RN          Dasia Thomas RN Case Manager  Inpatient Care Coordination  [facility]   [phone]

## 2021-02-06 NOTE — PROGRESS NOTES
[facility]    Hospitalist Progress Note  Provider : Omayra James MD  Date of Service (when I saw the patient): 02/06/2021    Assessment & Plan   Kelsey Wood is a 89 year old female patient with past medical history of COPD, bronchial asthma, hearing deficit, glaucoma, chronic pain and paroxysmal atrial fibrillation who was brought from [facility] for evaluation for shortness of breath and low oxygen saturation. She stated that she has been having shortness of breath for the last 3 days. She was noted to have oxygen saturation in low 80's at her facility.   In the ER her oxygen saturation was 86% on room air.   Laboratory workup showed normal cbc and BMP. Troponin negative. D-dimer 0.9.  BNP normal. Procalcitonin 0.05. Negative Covid 19 PCR test.  CT of the chest with contrast showed no evidence of pulmonary embolism. There is no no definite new pneumonia. Emphysema unchanged. There is bronchomalacia.   In the ER, she was given neb treatment, lasix 40 mg IV, IV solumedrol. She was admitted to the hospital for further management.      1.  Acute hypoxemic respiratory failure suspect secondary to COPD exacerbation  -No evidence of significant fluid overload or pneumonia.BNP and procalcitonin unremarkable. CT of the chest negative for PE, pulmonary edema or pneumonia. It showed evidence of emphysema.   -Today she was noted to have some scattered wheezing.  -Will continue duoneb,. Will start her on prednisone 40 mg po daily. Will likely need prednisone taper on discharge.   -Resumed her lasix at 20 mg daily    2.  History of COPD/emphysema.    -She has tested negative for COVID-19 PCR test.  She is afebrile, not toxemic.  - She had one empiric dose of doxycycline IV in the emergency department.  At this point I will not continue antibiotic.  -Will continue nebs, prednisone as above.     3.  Deep vein thrombosis with occlusive thrombus in right GSV in the junction of the  SFV.  -Started on Lovenox 1 mg/kg twice daily. Pharmacy requested to check for coverage for NOAC  -No evidence of pulmonary embolism.     4.  History of PAF currently in sinus rhythm. She is currently in NSR. On metoprolol.  Not anticoagulated.    5.  Hearing deficit.    6.  Glaucoma.  On Xalatan    7.  Low urinary symptoms, possible cystitis.  Pending UA results.    8.  Chronic pHTN.     DVT Prophylaxis: Lovenox   Code Status: No CPR- Do NOT Intubate    Disposition: Expected discharge in 1-2 days if breathing stable.     Omayra James MD    Interval History   Patient seen and examined. She stated that her breathing is at her baseline today. She denies cough or shortness of breath. She has no fever. She complains of some cramps on the right hip area.     -Data reviewed today: I reviewed all new labs and imaging results over the last 24 hours. I personally reviewed     Physical Exam   Temp: 97.6  F (36.4  C) Temp src: Oral BP: (!) 145/59 Pulse: 86   Resp: 18 SpO2: 99 % O2 Device: Nasal cannula Oxygen Delivery: 3 LPM  Vitals:    02/05/21 1437 02/05/21 1939 02/06/21 0529   Weight: 71.1 kg (156 lb 12 oz) 70.7 kg (155 lb 14.4 oz) 69.8 kg (153 lb 14.4 oz)     Vital Signs with Ranges  Temp:  [97.6  F (36.4  C)-98.5  F (36.9  C)] 97.6  F (36.4  C)  Pulse:  [] 86  Resp:  [18-24] 18  BP: (143-166)/(59-88) 145/59  SpO2:  [83 %-100 %] 99 %  I/O last 3 completed shifts:  In: -   Out: 1000 [Urine:1000]    GEN:  Alert, oriented x 3, appears comfortable, NAD.  HEENT:  Normocephalic/atraumatic, no scleral icterus, no nasal discharge, mouth moist.  CV:  Regular rate and rhythm, no murmur or JVD.  S1 + S2 noted, no S3 or S4.  LUNGS:  Clear to auscultation bilaterally without rales/rhonchi/wheezing/retractions.  Symmetric chest rise on inhalation noted.  ABD:  Active bowel sounds, soft, non-tender/non-distended.  No rebound/guarding/rigidity.  EXT:  No edema or cyanosis.  Hands/feet warm to touch with good signs of peripheral  perfusion.  No joint synovitis noted.  SKIN:  Dry to touch, no exanthems noted in the visualized areas.  NEURO:  Symmetric muscle strength, sensation to touch grossly intact.  No new focal deficits appreciated.    Medications       calcium carbonate  1 tablet Oral Daily     enoxaparin ANTICOAGULANT  1 mg/kg Subcutaneous Q12H     fentaNYL  12 mcg Transdermal Q72H     fluticasone  2 spray Both Nostrils Daily     Fluticasone-Umeclidin-Vilanterol  1 puff Inhalation Daily     furosemide  20 mg Oral Daily     gabapentin  300 mg Oral TID     hypromellose  1 drop Both Eyes Daily     latanoprost  1 drop Both Eyes At Bedtime     Lidocaine  1 patch Transdermal Q24H     metoprolol tartrate  12.5 mg Oral BID     potassium chloride ER  20 mEq Oral Daily     predniSONE  40 mg Oral Daily     senna-docusate  1 tablet Oral Daily     vitamin C  500 mg Oral Daily       Data   Recent Labs   Lab 02/06/21  0614 02/05/21  1522   WBC 10.0 8.0   HGB 13.9 14.4   MCV 97 99    291    140   POTASSIUM 3.8 4.1   CHLORIDE 104 102   CO2 34* 34*   BUN 20 23   CR 0.53 0.73   ANIONGAP 4 4   MARY GRACE 8.9 9.1   * 213*   TROPI  --  <0.015       Recent Results (from the past 24 hour(s))   XR Chest Port 1 View    Narrative    CHEST ONE VIEW  2/5/2021 3:29 PM     HISTORY: SOB.    COMPARISON: April 3, 2019      Impression    IMPRESSION: Midlung infiltrate on the right, question left base  infiltrate. Question a left upper lobe infiltrate.    GRANT HAIDER MD   US Lower Extremity Venous Duplex Bilateral    Narrative    ULTRASOUND VENOUS LOWER EXTREMITY BILATERAL 2/5/2021 4:29 PM     HISTORY: Leg swelling.    COMPARISON: April 3, 2019    TECHNIQUE: Ultrasound gray scale, Color Doppler flow, and spectral  Doppler waveform analysis performed.    FINDINGS: The bilateral common femoral, superficial femoral, popliteal  and posterior tibial veins are patent and fully compressible and  demonstrate normal venous Doppler flow. The proximal right  greater  saphenous vein demonstrates thrombus. Left greater saphenous vein is  patent.      Impression    IMPRESSION: Thrombus in the proximal greater saphenous vein on the  right.    GRANT HAIDER MD   CT Chest Pulmonary Embolism w Contrast    Narrative    EXAM: CT CHEST PULMONARY EMBOLISM W CONTRAST  LOCATION: [facility]  DATE/TIME: 2/5/2021 6:48 PM    INDICATION: Hypoxia. Shortness of breath for 3 days. COPD. PE suspected, low/intermediate prob, neg D-dimer  COMPARISON: 02/24/2020  TECHNIQUE: CT chest pulmonary angiogram during arterial phase injection of IV contrast. Multiplanar reformats and MIP reconstructions were performed. Dose reduction techniques were used.   CONTRAST:  57mL Isovue-370    FINDINGS:  ANGIOGRAM CHEST: No acute pulmonary emboli are identified. There is mild dilatation of both left and right pulmonary arteries with tapering of the peripheral vessels suggesting pulmonary artery hypertension. Thoracic aorta is negative for dissection. No   CT evidence of right heart strain.    LUNGS AND PLEURA: New segmental atelectasis involving right lower lobe. Stable atelectasis involving lingula. Stable changes of emphysema.  Significant flattening of central bronchi consistent with bronchomalacia. Mild flattening of trachea.    MEDIASTINUM/AXILLAE: No lymphadenopathy. Heart size normal.    UPPER ABDOMEN: Left renal cyst minimally changed. Stable small left adrenal adenoma.    MUSCULOSKELETAL: Progression of the compression involving T9 vertebral body, now severe. Moderate compression involving L1 vertebral body unchanged.      Impression    IMPRESSION:  1.  No pulmonary embolism identified.  2.  Likely the patient has some degree of pulmonary artery hypertension.  3.  New atelectasis left lower lobe. Stable mild atelectasis lingula. No definite new pneumonia. Emphysema unchanged.  4.  Bronchomalacia.

## 2021-02-06 NOTE — PHARMACY-ADMISSION MEDICATION HISTORY
Admission medication history interview status for this patient is complete. See [facility] admission navigator for allergy information, prior to admission medications and immunization status.     Medication history interview done via telephone during Covid-19 pandemic, indicate source(s): Patient  Medication history resources (including written lists, pill bottles, clinic record): med list from [facility]   Pharmacy: [facility]    Changes made to PTA medication list:  Added: prednisone, vitamin d3, melatonin  Deleted: metoprolol, simethicone  Changed: dosing on furosemide    Actions taken by pharmacist (provider contacted, etc):None     Additional medication history information: RN at [facility] reported last doses for scheduled meds was 2/5 and she reported that the patient's patch had 2/4 written on it, so she believes it was applied on 2/4, and is due to be changed on 2/7.    Here are the changing medications (also seen in med list):  2/3: Prednisone 20 mg once daily x 5 days (admin: 0800)  2/8: Prednisone 5 mg daily (admin: 0800)    2/3-2/5: furosemide 60 mg once daily x 3 days (admin: 0800)  2/6: furosemide 40 mg once daily (admin: 0800)    Prior to Admission medications    Medication Sig Last Dose Taking? Auth Provider   acetaminophen (TYLENOL) 325 MG tablet Take 650 mg by mouth every 4 hours as needed for mild pain  Yes Unknown, Entered By History   albuterol (PROAIR HFA/PROVENTIL HFA/VENTOLIN HFA) 108 (90 Base) MCG/ACT Inhaler Inhale 2 puffs into the lungs 4 times daily as needed for shortness of breath / dyspnea or wheezing   Yes Unknown, Entered By History   bismuth subsalicylate (PEPTO BISMOL) 262 MG chewable tablet Take 2 tablets by mouth every hour as needed for heartburn Max of 16 tabs per 24 hrs  Yes Unknown, Entered By History   calcium carbonate (OS-MARY GRACE 500 MG [place]. [place]) 500 MG tablet Take 1 tablet by mouth daily  2/5/2021 at 0900 Yes Unknown, Entered By  History   fentaNYL (DURAGESIC) 12 mcg/hr 72 hr patch Place 1 patch onto the skin every 72 hours remove old patch. 2/4/2021 at 0900 Yes Mg Garcia, DO   fluticasone (FLONASE) 50 MCG/ACT nasal spray Spray 2 sprays into both nostrils daily 2/5/2021 at 0900 Yes Unknown, Entered By History   Fluticasone-Umeclidin-Vilanterol (TRELEGY ELLIPTA) 100-62.5-25 MCG/INH oral inhaler Inhale 1 puff into the lungs daily 2/5/2021 at 0800 Yes Unknown, Entered By History   furosemide (LASIX) 20 MG tablet From 2/3-2/5, take 60 mg by mouth once daily for 3 days, then start 40 mg by mouth once daily on 2/6 2/5/2021 at 0800 Yes Unknown, Entered By History   gabapentin (NEURONTIN) 300 MG capsule Take 300 mg by mouth 3 times daily At 0900, 1400, 2100 2/5/2021 at 0900 Yes Unknown, Entered By History   Glucosamine-Chondroitin (OSTEO BI-FLEX REGULAR STRENGTH PO) Take 1 tablet by mouth 2 times daily 2/5/2021 at 0900 Yes Unknown, Entered By History   HYDROcodone-acetaminophen (NORCO) 5-325 MG tablet Take 1 tablet by mouth every 6 hours as needed for severe pain  Yes Mg Garcia, DO   hypromellose (ARTIFICIAL TEARS) 0.5 % SOLN ophthalmic solution Place 1 drop into both eyes daily 2/5/2021 at 0800 Yes Unknown, Entered By History   hypromellose (ARTIFICIAL TEARS) 0.5 % SOLN ophthalmic solution Place 1 drop into both eyes daily as needed for dry eyes  Yes Unknown, Entered By History   ipratropium - albuterol 0.5 mg/2.5 mg/3 mL (DUONEB) 0.5-2.5 (3) MG/3ML neb solution Take 1 vial by nebulization 4 times daily as needed for shortness of breath / dyspnea or wheezing   Yes Reported, Patient   latanoprost (XALATAN) 0.005 % ophthalmic solution Place 1 drop into both eyes At Bedtime 2/4/2021 at 2000 Yes Unknown, Entered By History   Lidocaine (LIDOCARE) 4 % Patch Place 1 patch onto the skin every 24 hours To prevent lidocaine toxicity, patient should be patch free for 12 hrs daily.  Salonpas for hip pain 2/5/2021 at 0800 Yes  Unknown, Entered By History   melatonin 5 MG tablet Take 5 mg by mouth At Bedtime 2/4/2021 at 2000 Yes Unknown, Entered By History   potassium chloride ER (K-TAB) 20 MEQ CR tablet Take 20 mEq by mouth daily 2/5/2021 at 0900 Yes Unknown, Entered By History   predniSONE (DELTASONE) 5 MG tablet Starting 2/3, take 4 tablets (20 mg) by mouth once daily for 5 days  Starting 2/8, take 1 tablet (5 mg) by mouth once daily 2/5/2021 at 0800 Yes Unknown, Entered By History   senna-docusate (SENOKOT-S;PERICOLACE) 8.6-50 MG per tablet Take 1 tablet by mouth daily 2/5/2021 at 0900 Yes Reported, Patient   vitamin C (ASCORBIC ACID) 500 MG tablet Take 500 mg by mouth daily 2/5/2021 at 0800 Yes Unknown, Entered By History   vitamin D3 (CHOLECALCIFEROL) 50 mcg (2000 units) tablet Take 1 tablet by mouth daily 2/5/2021 at 0800 Yes Unknown, Entered By History

## 2021-02-06 NOTE — PLAN OF CARE
Assumed care at 1930.     Vitals: VSS. BP elevated at 149/76. C/o pain to BLE. Improved with repositioning.   Neuro: A&O x4, however very forgetful and confused upon awakening. Irritable. Repeated demands.   Respiratory: Crackles and expiratory wheezes noted. O2 @ 3 LPM via NC. SpO2 94-96%.   Cardiac/Telemetry: SR, tall Ts. Denies chest pain. 2+ edema to BLE.   GI/: Dribbling. Purewick in place. UA collected.   Skin: Blanchable redness to coccyx and heels. Dry flaky skin. Pre-existing wound to LLE. Attempted to undress to visualize, however pt began screaming and adamantly refused continued assessment.   LDAs: PIV to right and left arms SL   Diet: Regular   Activity: A2. Repositioning q2hrs.   Plan: Abx. Monitor resp status. Lasix. Continue with POC.

## 2021-02-06 NOTE — PLAN OF CARE
Pt A/O x 4, but can be forgetful; VSS, pt denies headache, dizziness, & N/V. Pt has chronic pain in right hip and back - PRN Norco Q6H, Fentanyl patch on right shoulder, Lidocaine patch on right thigh/hip. Pt is dyspneic upon exertion. Lung sounds diminished, crackles, exp wheezes, on 3L O2 (pt baseline is Room Air). Tele: SR. Pt up Asst:2 w/Gabriella Steady (Pt uses a wheelchair at baseline, can stand and pivot and will transfer herself at home, but is afraid of falling). Pt has pre-existing wound on left shin - dressing is clean/dry/intact. BLE edema +2/+3. WOC, Social Work consulted. Will continue with plan of care.    Pt has compression stockings from home - due to DVT in right leg, do not apply at this time.

## 2021-02-06 NOTE — ED NOTES
[facility]  ED Nurse Handoff Report    Kelsey Wood is a 89 year old female   ED Chief complaint: Shortness of Breath  . ED Diagnosis:   Final diagnoses:   Acute on chronic congestive heart failure, unspecified heart failure type (H)   COPD exacerbation (H)   Deep vein thrombosis (DVT) of proximal vein of right lower extremity, unspecified chronicity (H)     Allergies: No Known Allergies    Code Status: Full Code  Activity level - Baseline/Home:  Stand by Assist. Activity Level - Current:   Assist X 2. Lift room needed: No. Bariatric: No   Needed: No   Isolation: No. Infection: Not Applicable.     Vital Signs:   Vitals:    02/05/21 1545 02/05/21 1550 02/05/21 1650 02/05/21 1745   BP: (!) 148/73      Pulse: 99 97     Resp:       Temp:       TempSrc:       SpO2:  95% (!) 83% 97%   Weight:       Height:           Cardiac Rhythm:  ,      Pain level:    Patient confused: Yes. Patient Falls Risk: Yes.   Elimination Status: Has voided   Patient Report - Initial Complaint: shortness of breath. Focused Assessment:  Kelsey Wood is a 89 year old female with history of chronic obstructive pulmonary disease, paroxysmal atrial fibrillation who presents from [facility] with shortness of breath. The patient reports she has had increasing shortness of breath for the past few days. Nursing staff noted that her oxygen saturations were at 84% on room air but jumped in the 90's when she was put on 4L oxygen. They also note increased bilateral leg swelling for the past week which is worse in the left leg. She denies fevers, cough, chest pain. Pt is currently on 5 liters NC.   Tests Performed: lab work, CT scan, . Abnormal Results:   Abnormal Labs Reviewed   BASIC METABOLIC PANEL - Abnormal; Notable for the following components:       Result Value    Carbon Dioxide 34 (*)     Glucose 213 (*)     All other components within normal limits   BLOOD GAS VENOUS - Abnormal; Notable for the following components:    PCO2  Venous 59 (*)     Bicarbonate Venous 37 (*)     All other components within normal limits   CBC WITH PLATELETS DIFFERENTIAL - Abnormal; Notable for the following components:    Hematocrit 47.7 (*)     MCHC 30.2 (*)     Absolute Lymphocytes 0.7 (*)     All other components within normal limits   D DIMER QUANTITATIVE - Abnormal; Notable for the following components:    D Dimer 0.9 (*)     All other components within normal limits     US Lower Extremity Venous Duplex Bilateral   Final Result   IMPRESSION: Thrombus in the proximal greater saphenous vein on the   right.      GRANT HAIDER MD      XR Chest Port 1 View   Final Result   IMPRESSION: Midlung infiltrate on the right, question left base   infiltrate. Question a left upper lobe infiltrate.      GRANT HAIDER MD      CT Chest Pulmonary Embolism w Contrast    (Results Pending)   .   Treatments provided: lovenox, solumedrol, vibramycin  Family Comments: Pt here by herself  OBS brochure/video discussed/provided to patient:  N/A  ED Medications:   Medications   doxycycline (VIBRAMYCIN) 100 mg vial to attach to  mL bag (100 mg Intravenous New Bag 2/5/21 1735)   albuterol (PROAIR HFA/PROVENTIL HFA/VENTOLIN HFA) 108 (90 Base) MCG/ACT inhaler 2 puff (2 puffs Inhalation Given 2/5/21 1534)   furosemide (LASIX) injection 40 mg (40 mg Intravenous Given 2/5/21 1652)   methylPREDNISolone sodium succinate (solu-MEDROL) injection 125 mg (125 mg Intravenous Given 2/5/21 1735)   enoxaparin ANTICOAGULANT (LOVENOX) injection 70 mg (70 mg Subcutaneous Given 2/5/21 1815)     Drips infusing:  Yes  For the majority of the shift, the patient's behavior Green. Interventions performed were n/a.    Sepsis treatment initiated: No     Patient tested for COVID 19 prior to admission: YES    ED Nurse Name/Phone Number: Jessica Jones RN,   6:19 PM    RECEIVING UNIT ED HANDOFF REVIEW    Above ED Nurse Handoff Report was reviewed: Yes  Reviewed by: Lilia Rocwkell RN on February 5, 2021 at  6:32 PM

## 2021-02-07 ENCOUNTER — APPOINTMENT (OUTPATIENT)
Dept: PHYSICAL THERAPY | Facility: CLINIC | Age: 86
DRG: 299 | End: 2021-02-07
Attending: INTERNAL MEDICINE
Payer: COMMERCIAL

## 2021-02-07 LAB
ANION GAP SERPL CALCULATED.3IONS-SCNC: 2 MMOL/L (ref 3–14)
BUN SERPL-MCNC: 22 MG/DL (ref 7–30)
CALCIUM SERPL-MCNC: 9.2 MG/DL (ref 8.5–10.1)
CHLORIDE SERPL-SCNC: 106 MMOL/L (ref 94–109)
CO2 SERPL-SCNC: 34 MMOL/L (ref 20–32)
CREAT SERPL-MCNC: 0.57 MG/DL (ref 0.52–1.04)
ERYTHROCYTE [DISTWIDTH] IN BLOOD BY AUTOMATED COUNT: 13 % (ref 10–15)
GFR SERPL CREATININE-BSD FRML MDRD: 81 ML/MIN/{1.73_M2}
GLUCOSE SERPL-MCNC: 87 MG/DL (ref 70–99)
HCT VFR BLD AUTO: 44.6 % (ref 35–47)
HGB BLD-MCNC: 13.3 G/DL (ref 11.7–15.7)
MCH RBC QN AUTO: 29.9 PG (ref 26.5–33)
MCHC RBC AUTO-ENTMCNC: 29.8 G/DL (ref 31.5–36.5)
MCV RBC AUTO: 100 FL (ref 78–100)
PLATELET # BLD AUTO: 271 10E9/L (ref 150–450)
POTASSIUM SERPL-SCNC: 3.8 MMOL/L (ref 3.4–5.3)
RBC # BLD AUTO: 4.45 10E12/L (ref 3.8–5.2)
SODIUM SERPL-SCNC: 142 MMOL/L (ref 133–144)
WBC # BLD AUTO: 10 10E9/L (ref 4–11)

## 2021-02-07 PROCEDURE — 94640 AIRWAY INHALATION TREATMENT: CPT

## 2021-02-07 PROCEDURE — 80048 BASIC METABOLIC PNL TOTAL CA: CPT | Performed by: INTERNAL MEDICINE

## 2021-02-07 PROCEDURE — 36415 COLL VENOUS BLD VENIPUNCTURE: CPT | Performed by: INTERNAL MEDICINE

## 2021-02-07 PROCEDURE — 250N000012 HC RX MED GY IP 250 OP 636 PS 637: Performed by: INTERNAL MEDICINE

## 2021-02-07 PROCEDURE — 97530 THERAPEUTIC ACTIVITIES: CPT | Mod: GP | Performed by: PHYSICAL THERAPIST

## 2021-02-07 PROCEDURE — 250N000011 HC RX IP 250 OP 636: Performed by: HOSPITALIST

## 2021-02-07 PROCEDURE — 120N000001 HC R&B MED SURG/OB

## 2021-02-07 PROCEDURE — 97161 PT EVAL LOW COMPLEX 20 MIN: CPT | Mod: GP | Performed by: PHYSICAL THERAPIST

## 2021-02-07 PROCEDURE — 85027 COMPLETE CBC AUTOMATED: CPT | Performed by: INTERNAL MEDICINE

## 2021-02-07 PROCEDURE — 250N000013 HC RX MED GY IP 250 OP 250 PS 637: Performed by: HOSPITALIST

## 2021-02-07 PROCEDURE — 250N000013 HC RX MED GY IP 250 OP 250 PS 637: Performed by: INTERNAL MEDICINE

## 2021-02-07 PROCEDURE — 99207 PR CDG-MDM COMPONENT: MEETS LOW - DOWN CODED: CPT | Performed by: INTERNAL MEDICINE

## 2021-02-07 PROCEDURE — 999N000157 HC STATISTIC RCP TIME EA 10 MIN

## 2021-02-07 PROCEDURE — 99232 SBSQ HOSP IP/OBS MODERATE 35: CPT | Performed by: INTERNAL MEDICINE

## 2021-02-07 RX ORDER — FUROSEMIDE 40 MG
40 TABLET ORAL DAILY
Status: DISCONTINUED | OUTPATIENT
Start: 2021-02-07 | End: 2021-02-08 | Stop reason: HOSPADM

## 2021-02-07 RX ORDER — GABAPENTIN 300 MG/1
300 CAPSULE ORAL 3 TIMES DAILY
Status: DISCONTINUED | OUTPATIENT
Start: 2021-02-07 | End: 2021-02-07

## 2021-02-07 RX ADMIN — GABAPENTIN 300 MG: 300 CAPSULE ORAL at 10:16

## 2021-02-07 RX ADMIN — CARBOXYMETHYLCELLULOSE SODIUM 1 DROP: 0.5 SOLUTION/ DROPS OPHTHALMIC at 10:16

## 2021-02-07 RX ADMIN — FUROSEMIDE 40 MG: 40 TABLET ORAL at 10:16

## 2021-02-07 RX ADMIN — ENOXAPARIN SODIUM 70 MG: 80 INJECTION SUBCUTANEOUS at 05:52

## 2021-02-07 RX ADMIN — PREDNISONE 40 MG: 20 TABLET ORAL at 10:16

## 2021-02-07 RX ADMIN — FENTANYL 1 PATCH: 12 PATCH, EXTENDED RELEASE TRANSDERMAL at 10:17

## 2021-02-07 RX ADMIN — Medication 500 MG: at 10:16

## 2021-02-07 RX ADMIN — ENOXAPARIN SODIUM 70 MG: 80 INJECTION SUBCUTANEOUS at 17:17

## 2021-02-07 RX ADMIN — LATANOPROST 1 DROP: 50 SOLUTION OPHTHALMIC at 21:23

## 2021-02-07 RX ADMIN — POTASSIUM CHLORIDE 20 MEQ: 1500 TABLET, EXTENDED RELEASE ORAL at 10:17

## 2021-02-07 RX ADMIN — OXYCODONE HYDROCHLORIDE AND ACETAMINOPHEN 500 MG: 500 TABLET ORAL at 10:16

## 2021-02-07 RX ADMIN — DOCUSATE SODIUM 50 MG AND SENNOSIDES 8.6 MG 1 TABLET: 8.6; 5 TABLET, FILM COATED ORAL at 10:16

## 2021-02-07 RX ADMIN — FLUTICASONE PROPIONATE 2 SPRAY: 50 SPRAY, METERED NASAL at 10:29

## 2021-02-07 RX ADMIN — GABAPENTIN 300 MG: 300 CAPSULE ORAL at 17:17

## 2021-02-07 RX ADMIN — FLUTICASONE FUROATE AND VILANTEROL TRIFENATATE 1 PUFF: 100; 25 POWDER RESPIRATORY (INHALATION) at 07:52

## 2021-02-07 RX ADMIN — GABAPENTIN 300 MG: 300 CAPSULE ORAL at 21:24

## 2021-02-07 ASSESSMENT — ACTIVITIES OF DAILY LIVING (ADL)
ADLS_ACUITY_SCORE: 31
ADLS_ACUITY_SCORE: 27
ADLS_ACUITY_SCORE: 25
ADLS_ACUITY_SCORE: 27
ADLS_ACUITY_SCORE: 25
ADLS_ACUITY_SCORE: 31

## 2021-02-07 ASSESSMENT — MIFFLIN-ST. JEOR: SCORE: 1083.12

## 2021-02-07 NOTE — PROGRESS NOTES
02/07/21 1408   Quick Adds   Type of Visit Initial PT Evaluation   Living Environment   People in home alone   Current Living Arrangements assisted living   Home Accessibility no concerns   Self-Care   Equipment Currently Used at Home wheelchair, manual   Activity/Exercise/Self-Care Comment Pt has help with meals, meds and cleaning. Pt reports she does her own dressing, sponge bathing, light cooking for her evening meal, transfers herself and does her own toileting. Pt uses W/C for mobilization which she self propels. Pt does not use a walker for transfers but aligns W/C for utilization of grab bars in home for transfers.    Disability/Function   Doing Errands Independently Difficulty (such as shopping) yes   Errands Management 1   General Information   Onset of Illness/Injury or Date of Surgery 02/04/21   Referring Physician  HEr   Patient/Family Therapy Goals Statement (PT) return home tomorrow   Pertinent History of Current Problem (include personal factors and/or comorbidities that impact the POC) Kelsey Wood is a 89 year old female patient with past medical history of COPD, bronchial asthma, hearing deficit, glaucoma, chronic pain and paroxysmal atrial fibrillation who was brought from [facility] for evaluation for shortness of breath and low oxygen saturation. She stated that she has been having shortness of breath for the last 3 days. She was noted to have oxygen saturation in low 80's at her facility.    Existing Precautions/Restrictions fall   Cognition   Orientation Status (Cognition) oriented x 3  (some confusion on situation-ie why she was admitted)   Affect/Mental Status (Cognition) WNL   Follows Commands (Cognition) WNL   Safety Deficit (Cognition) problem-solving  (some concern as she needed A with ordering meal?)   Memory Deficit (Cognition) recall, recent events  (why she was admitted)   Integumentary/Edema   Integumentary/Edema Comments chronic edema issues, currently no pitting  but has farro wraps.   Range of Motion (ROM)   ROM Comment Mild ROM deficits with reaching to feet   Strength   Strength Comments mild weakness with transfers/standing   Bed Mobility   Comment (Bed Mobility) NT   Transfers   Transfer Safety Comments CGA x 1, no AD, use of a W/C, on 2 liters of O2, SOB with activity, sats dropping mildly to 87% with activity   Gait/Stairs (Locomotion)   Comment (Gait/Stairs) able to self propel W/C within room   Clinical Impression   Criteria for Skilled Therapeutic Intervention yes, treatment indicated   PT Diagnosis (PT) decreased functional mobility    Influenced by the following impairments decreased ROM, decreased strength   Functional limitations due to impairments decreased transfers, bed mob   Clinical Presentation Stable/Uncomplicated   Clinical Presentation Rationale improving   Clinical Decision Making (Complexity) low complexity   Therapy Frequency (PT) Daily   Planned Therapy Interventions (PT) bed mobility training;strengthening;transfer training;home exercise program;patient/family education   Risk & Benefits of therapy have been explained evaluation/treatment results reviewed;care plan/treatment goals reviewed;risks/benefits reviewed;current/potential barriers reviewed;participants voiced agreement with care plan;participants included;patient   PT Discharge Planning    PT Discharge Recommendation (DC Rec) home with home care physical therapy   PT Rationale for DC Rec Pt is close to baseline with transfers, would benefit from home OT/PT to assess pt in own home environment and to provide ideas for modifications. Pt politely disagrees with need for home therapies at this time.    Total Evaluation Time   Total Evaluation Time (Minutes) 10

## 2021-02-07 NOTE — PLAN OF CARE
VSS. Tele SR. Intermittently disoriented to time/situation. Anxious at times w/ repeated requests. Difficult to redirect. On 3L O2 overnight. Fine crackles present in lung bases. PRN norco given for c/o R hip pain. Fentanyl patch in place on R shoulder. +3 edema in BLEs. Wound present to L shin - cleaned with wound cleanser and foam dressing applied. Repositioned in bed q2hr as tolerated. Purewick in place w/ 300cc output overnight. Plan to continue POC. Wean O2 as able.

## 2021-02-07 NOTE — PLAN OF CARE
"2 LPM NC. Oriented x4, forgetful. Pt reports being \"wheelchair\" at baseline. Ax2 with debbie rodriguez. Author educated on importance of removing purewick to give skin a break, pt verbalized understand but refused to remove purewick. Continue to monitor and with plan of care. Verified that tooth is in med box. Pulses in dorsalis pedis are equal +2 and warm.   "

## 2021-02-07 NOTE — PLAN OF CARE
VSS. A &O x 4 but forgetful and repeats questions often. Continuous O2 stats above 92%. On 1.5 L of oxygen. Fine crackles in LL.. Voiding in commode, purewic in place for the night. A of 2 with sera Steady. Tele SR. Wound to left ankle pink with mepilex applied. Regular diet. Lovenox given. Patient from assisted living, potentially discharging with or without oxygen tomorrow back to assisted living.

## 2021-02-07 NOTE — PROGRESS NOTES
[facility]             Hospitalist Progress Note    Name: Kelsey Wood    MRN: [id number]  YOB: 1929    Age: 89 year old  Date of admission: 2/5/2021  Primary care provider: Demetrio Arriaza      Reason for Stay (Diagnosis): Acute hypoxic respiratory failure secondary to COPD exacerbation         Assessment and Plan:      Summary of Stay:  Kelsey Wood is a 89 year old female patient with past medical history of COPD, bronchial asthma, hearing deficit, glaucoma, chronic pain and paroxysmal atrial fibrillation who was brought from [facility] for evaluation for shortness of breath and low oxygen saturation. She stated that she has been having shortness of breath for the last 3 days. She was noted to have oxygen saturation in low 80's at her facility.   In the ER her oxygen saturation was 86% on room air.   Laboratory workup showed normal cbc and BMP. Troponin negative. D-dimer 0.9.  BNP normal. Procalcitonin 0.05. Negative Covid 19 PCR test.  CT of the chest with contrast showed no evidence of pulmonary embolism. There is no no definite new pneumonia. Emphysema unchanged. There is bronchomalacia.   In the ER, she was given neb treatment, lasix 40 mg IV, IV solumedrol. She was admitted to the hospital for further management.      Since admission, clinically improved but still requiring O2 supplementation and trying to wean off FiO2.    Active medical issues    Acute hypoxic respiratory failure, suspect secondary to COPD exacerbation  No evidence of fluid overload nor infectious symptoms.  Procalcitonin level was unremarkable and CT on presentation was negative for acute PE.  Does have some notable emphysema.  Echocardiogram as outlined below with notable pulmonary hypertension  -Clinically improved with less wheezing on exam so will continue p.o. prednisone.  Plan on a short steroid burst course as opposed to taper  -Continue wean off FiO2 as able.  May or may not need  oxygen at discharge  -Continue chronic Lasix 40 mg p.o. daily  -Do note that patient is scheduled for her second vaccination on Tuesday per her report.  However, with acute exacerbation, do not suspect she will be a candidate for her second injection but did reassure her that she should get vaccinated hopefully within the appropriate time window    Acute COPD exacerbation/emphysema  Discussion as above.  Continue duo nebs and prednisone     DVT with occlusive thrombus in the right greater saphenous vein at the junction of the SFV  -Continue Lovenox subcu and awaiting pharmacy liaison input on direct oral anticoagulants at discharge    History of paroxysmal atrial fibrillation  Currently in sinus rhythm  -Continue metoprolol.  Not previously on anticoagulation for this but will need to be on blood thinners for DVT    Urinary frequency/possible dysuria  UA negative.  -No treatment at this time      Procedure  Complete Portable Echo Adult. Complete Echo Adult.  _____________________________________________________________________________  __        Interpretation Summary     1. Normal left ventricular size and systolic function. LVEF 60-65%. 2. No  regional wall motion abnormalities.  2. Normal right ventricular size and systolic function.  3. Pulmonary artery systolic pressure is approximated at 30-35 mmHg based on  right atrial pressure of 3 mmHg.  4. No hemodynamically significant valvular disease.  Comapred to prior, no change.    Chronic medical conditions    Pulmonary hypertension  Chronic hearing deficits  Glaucoma      -Resume chronic medications as ordered    DVT Prophylaxis: Enoxaparin (Lovenox) SQ  Code Status: DNR / DNI  Discharge Dispo: Back to [facility] living [facility].  PT OT to assess for disposition needs  Estimated Disch Date / # of Days until Disch: Plan on tomorrow with or without oxygen    Time spent: Greater than 35 minutes  Did try to contact patient's son, Sha, to give him an update.  " I did leave a voice message with plans for disposition tomorrow        Interval History (Subjective):      Denies any dyspnea and \"ready to go home\".  Anxious about being discharged as she reports she has an appointment tomorrow and is scheduled for her second COVID-19 vaccination in 2 days         Physical Exam:      Vital signs:  Temp: 97.8  F (36.6  C) Temp src: Oral BP: 129/64 Pulse: 80   Resp: 20 SpO2: 95 % O2 Device: Nasal cannula Oxygen Delivery: 2 LPM Height: 157.5 cm (5' 2\") Weight: 71.5 kg (157 lb 9.6 oz)  Estimated body mass index is 28.83 kg/m  as calculated from the following:    Height as of this encounter: 1.575 m (5' 2\").    Weight as of this encounter: 71.5 kg (157 lb 9.6 oz).    I/O last 3 completed shifts:  In: 480 [P.O.:480]  Out: 300 [Urine:300]  Vitals:    02/05/21 1437 02/05/21 1939 02/06/21 0529 02/07/21 0555   Weight: 71.1 kg (156 lb 12 oz) 70.7 kg (155 lb 14.4 oz) 69.8 kg (153 lb 14.4 oz) 71.5 kg (157 lb 9.6 oz)       Constitutional: Awake, alert, cooperative, no apparent distress     Respiratory: Nl work of breathing.  Prolonged expiratory phase with minimal end expiratory wheezing   Cardiovascular: Regular rate and rhythm, normal S1 and S2, and no murmur noted       Skin: No rashes, no cyanosis, dry to touch   Neuro: CN 2-12 intact, no localizing weakness   Extremities: No edema, normal range of motion   HEENT Normocephalic, atraumatic, normal nasal turbinates; oropharynx clear   Neck Supple; nl inspection; trachea midline; no thryomegaly   Psychiatric: A+O x3. Normal affect          Medications:      All current medications were reviewed with changes reflected in problem list.         Data:      All new lab and imaging data was reviewed.   Labs:  Recent Labs   Lab 02/05/21  1730 02/05/21  1728   CULT No growth after 2 days No growth after 2 days     Recent Labs   Lab 02/07/21  0633 02/06/21  0614 02/05/21  1522   WBC 10.0 10.0 8.0   HGB 13.3 13.9 14.4   HCT 44.6 45.7 47.7*    97 " 99    301 291     Recent Labs   Lab 21  0633 21  0614 21  1522    142 140   POTASSIUM 3.8 3.8 4.1   CHLORIDE 106 104 102   CO2 34* 34* 34*   ANIONGAP 2* 4 4   GLC 87 134* 213*   BUN 22 20 23   CR 0.57 0.53 0.73   GFRESTIMATED 81 83 71   GFRESTBLACK >90 >90 83   MARY GRACE 9.2 8.9 9.1      Imaging:   Recent Results (from the past 24 hour(s))   Echocardiogram Complete    Narrative    [id number]  [id number]  [id number]  [id number]^BRANT^RASHAWN^F           [facility]  Echocardiography Laboratory  [address] [place] [address]        Name: SONALI TUCKER  MRN: [id number]  : 1929  Study Date: 2021 12:40 PM  Age: 89 yrs  Gender: Female  Patient Location: [facility]  Reason For Study: Pulmonary Emboli  Ordering Physician: RASHAWN GUO  Referring Physician: Demetrio Arriaza  Performed By: Shreya Reid     BSA: 1.7 m2  Height: 62 in  Weight: 156 lb  BP: 148/73 mmHg  _____________________________________________________________________________  __        Procedure  Complete Portable Echo Adult. Complete Echo Adult.  _____________________________________________________________________________  __        Interpretation Summary     1. Normal left ventricular size and systolic function. LVEF 60-65%. 2. No  regional wall motion abnormalities.  2. Normal right ventricular size and systolic function.  3. Pulmonary artery systolic pressure is approximated at 30-35 mmHg based on  right atrial pressure of 3 mmHg.  4. No hemodynamically significant valvular disease.  Comapred to prior, no change.  _____________________________________________________________________________  __        Left Ventricle  The left ventricle is normal in size. There is normal left ventricular wall  thickness. Left ventricular systolic function is normal. The visual ejection  fraction is estimated at 60-65%. Grade I or early diastolic dysfunction. No  regional wall motion abnormalities noted.     Right  Ventricle  The right ventricle is normal in size and function.     Atria  Normal left atrial size. Right atrial size is normal.     Mitral Valve  The mitral valve leaflets appear normal. There is no evidence of stenosis,  fluttering, or prolapse. There is trace mitral regurgitation.        Tricuspid Valve  Normal tricuspid valve. There is trace tricuspid regurgitation. The right  ventricular systolic pressure is approximated at 33.5 mmHg plus the right  atrial pressure.     Aortic Valve  There is mild trileaflet aortic sclerosis. There is mild (1+) aortic  regurgitation. No aortic stenosis is present.     Pulmonic Valve  The pulmonic valve is not well visualized. There is trace pulmonic valvular  regurgitation.     Vessels  Normal size aorta. IVC diameter <2.1 cm collapsing >50% with sniff suggests a  normal RA pressure of 3 mmHg.     Pericardium  There is no pericardial effusion.        Rhythm  Sinus rhythm was noted.  _____________________________________________________________________________  __  MMode/2D Measurements & Calculations     IVSd: 0.71 cm  LVIDd: 4.1 cm  LVIDs: 2.3 cm  LVPWd: 0.56 cm  FS: 44.0 %  LV mass(C)d: 70.7 grams  LV mass(C)dI: 41.1 grams/m2  Ao root diam: 3.4 cm  LVOT diam: 1.8 cm  LVOT area: 2.5 cm2  LA Volume (BP): 46.7 ml  LA Volume Index (BP): 27.2 ml/m2  RWT: 0.27           Doppler Measurements & Calculations  MV E max avril: 70.3 cm/sec  MV A max avril: 121.7 cm/sec  MV E/A: 0.58  MV dec time: 0.30 sec  TV V2 max: 281.0 cm/sec  TV max P.6 mmHg  PA acc time: 0.11 sec  TR max avril: 289.6 cm/sec  TR max P.5 mmHg  E/E' avg: 15.5  Lateral E/e': 15.9  Medial E/e': 15.1           _____________________________________________________________________________  __           Report approved by: Bryan Daniel 2021 04:40 PM            [place] [phone]

## 2021-02-07 NOTE — PLAN OF CARE
Vitals: VSS. Afebrile. Denies pain.   Neuro: Disoriented x situation. Forgetful. Cooperative.   Respiratory: Crackles to bases. KABA. On O2 @ 3 LPM via NC.   Cardiac: Apical pulse regular. Denies chest pain. 2+/3+ Edema to BLE.   GI/: Purewick in place.   Skin: Wound to LLE   LDAs: PIV to right arm SL   Diet: Regular   Activity: A2 sera steady  Plan: Oral lasix. Lovenox. Monitor resp status. Continue with POC.

## 2021-02-08 ENCOUNTER — APPOINTMENT (OUTPATIENT)
Dept: OCCUPATIONAL THERAPY | Facility: CLINIC | Age: 86
DRG: 299 | End: 2021-02-08
Attending: INTERNAL MEDICINE
Payer: COMMERCIAL

## 2021-02-08 VITALS
WEIGHT: 157.6 LBS | HEART RATE: 71 BPM | OXYGEN SATURATION: 85 % | RESPIRATION RATE: 18 BRPM | HEIGHT: 62 IN | BODY MASS INDEX: 29 KG/M2 | DIASTOLIC BLOOD PRESSURE: 65 MMHG | TEMPERATURE: 97.5 F | SYSTOLIC BLOOD PRESSURE: 131 MMHG

## 2021-02-08 LAB
CREAT SERPL-MCNC: 0.59 MG/DL (ref 0.52–1.04)
GFR SERPL CREATININE-BSD FRML MDRD: 80 ML/MIN/{1.73_M2}
INTERPRETATION ECG - MUSE: NORMAL
PLATELET # BLD AUTO: 281 10E9/L (ref 150–450)

## 2021-02-08 PROCEDURE — 97535 SELF CARE MNGMENT TRAINING: CPT | Mod: GO

## 2021-02-08 PROCEDURE — G0463 HOSPITAL OUTPT CLINIC VISIT: HCPCS

## 2021-02-08 PROCEDURE — 85049 AUTOMATED PLATELET COUNT: CPT | Performed by: HOSPITALIST

## 2021-02-08 PROCEDURE — 82565 ASSAY OF CREATININE: CPT | Performed by: HOSPITALIST

## 2021-02-08 PROCEDURE — 99239 HOSP IP/OBS DSCHRG MGMT >30: CPT | Performed by: INTERNAL MEDICINE

## 2021-02-08 PROCEDURE — 250N000011 HC RX IP 250 OP 636: Performed by: HOSPITALIST

## 2021-02-08 PROCEDURE — 36415 COLL VENOUS BLD VENIPUNCTURE: CPT | Performed by: HOSPITALIST

## 2021-02-08 PROCEDURE — 250N000012 HC RX MED GY IP 250 OP 636 PS 637: Performed by: INTERNAL MEDICINE

## 2021-02-08 PROCEDURE — 250N000013 HC RX MED GY IP 250 OP 250 PS 637: Performed by: INTERNAL MEDICINE

## 2021-02-08 PROCEDURE — 94640 AIRWAY INHALATION TREATMENT: CPT

## 2021-02-08 PROCEDURE — 999N000157 HC STATISTIC RCP TIME EA 10 MIN

## 2021-02-08 PROCEDURE — 97165 OT EVAL LOW COMPLEX 30 MIN: CPT | Mod: GO

## 2021-02-08 PROCEDURE — 250N000013 HC RX MED GY IP 250 OP 250 PS 637: Performed by: HOSPITALIST

## 2021-02-08 RX ORDER — APIXABAN 5 MG (74)
KIT ORAL
Qty: 74 EACH | Refills: 0 | Status: SHIPPED | OUTPATIENT
Start: 2021-02-08 | End: 2021-03-10

## 2021-02-08 RX ORDER — PREDNISONE 20 MG/1
TABLET ORAL
Qty: 8 TABLET | Refills: 0 | Status: SHIPPED | OUTPATIENT
Start: 2021-02-08

## 2021-02-08 RX ORDER — FUROSEMIDE 40 MG
40 TABLET ORAL DAILY
Start: 2021-02-09

## 2021-02-08 RX ADMIN — CARBOXYMETHYLCELLULOSE SODIUM 1 DROP: 0.5 SOLUTION/ DROPS OPHTHALMIC at 10:06

## 2021-02-08 RX ADMIN — ENOXAPARIN SODIUM 70 MG: 80 INJECTION SUBCUTANEOUS at 05:25

## 2021-02-08 RX ADMIN — OXYCODONE HYDROCHLORIDE AND ACETAMINOPHEN 500 MG: 500 TABLET ORAL at 10:06

## 2021-02-08 RX ADMIN — DOCUSATE SODIUM 50 MG AND SENNOSIDES 8.6 MG 1 TABLET: 8.6; 5 TABLET, FILM COATED ORAL at 10:06

## 2021-02-08 RX ADMIN — UMECLIDINIUM 1 PUFF: 62.5 AEROSOL, POWDER ORAL at 10:06

## 2021-02-08 RX ADMIN — FUROSEMIDE 40 MG: 40 TABLET ORAL at 10:06

## 2021-02-08 RX ADMIN — POTASSIUM CHLORIDE 20 MEQ: 1500 TABLET, EXTENDED RELEASE ORAL at 10:07

## 2021-02-08 RX ADMIN — FLUTICASONE PROPIONATE 2 SPRAY: 50 SPRAY, METERED NASAL at 10:07

## 2021-02-08 RX ADMIN — LIDOCAINE 1 PATCH: 560 PATCH PERCUTANEOUS; TOPICAL; TRANSDERMAL at 02:45

## 2021-02-08 RX ADMIN — PREDNISONE 40 MG: 20 TABLET ORAL at 10:07

## 2021-02-08 RX ADMIN — FLUTICASONE FUROATE AND VILANTEROL TRIFENATATE 1 PUFF: 100; 25 POWDER RESPIRATORY (INHALATION) at 07:57

## 2021-02-08 RX ADMIN — Medication 500 MG: at 10:07

## 2021-02-08 RX ADMIN — GABAPENTIN 300 MG: 300 CAPSULE ORAL at 10:07

## 2021-02-08 ASSESSMENT — ACTIVITIES OF DAILY LIVING (ADL)
PREVIOUS_RESPONSIBILITIES: MEAL PREP
ADLS_ACUITY_SCORE: 25
ADLS_ACUITY_SCORE: 25
ADLS_ACUITY_SCORE: 29
ADLS_ACUITY_SCORE: 25

## 2021-02-08 NOTE — PROGRESS NOTES
Received intake call for home oxygen at 11:55AM. Reviewed patient's chart; Patient qualifies under insurance guidelines and all documentation is in the chart including a good order.   12:08pm- Called to offer choice and patient is okay with [facility] setting them up. Discussed equipment with patient and informed them that we would be to bedside with oxygen in the next 2 hours.   12:10PM- Spoke with care coordinatorAlice, confirmed we received the order, and provided them with ETA of oxygen.

## 2021-02-08 NOTE — PLAN OF CARE
VSS. Tele SR. Oriented x4, but appears forgetful. +2 edema in BLEs. Wound present to L calf - dressing CDI. Crackles present in lung bases. O2 in place at 2L NC overnight. Purewick in place with small amt UOP. Repositioned in bed q2hr overnight as tolerated. On lovenox for DVT. Continue POC.

## 2021-02-08 NOTE — PROGRESS NOTES
02/08/21 0941   Quick Adds   Type of Visit Initial Occupational Therapy Evaluation   Living Environment   People in home alone   Current Living Arrangements assisted living   Home Accessibility no concerns   Living Environment Comments pt resdies in [place], does not bathe in tub, has grab bars by toilet and stool she uses to dress   Self-Care   Usual Activity Tolerance moderate   Current Activity Tolerance fair   Activity/Exercise/Self-Care Comment pt uses w/c at baseline for mobility, grab bars in bathroom, gets A for medications AM./PM and AM Lunch meals provided. Makes something simple at dinner   Disability/Function   Hearing Difficulty or Deaf yes   Patient's preferred means of communication verbal   Describe hearing loss bilateral hearing loss   Use of hearing assistive devices bilateral hearing aids   Were auxiliary aids offered? yes   The following aids were provided; pocket talker   Wear Glasses or Blind yes   Concentrating, Remembering or Making Decisions Difficulty yes   Difficulty Communicating no   Dressing/Bathing Difficulty yes   Dressing/Bathing Management sponge bathes, assist for edema wraps   Toileting issues yes   Toileting Assistance toileting difficulty, requires equipment   Doing Errands Independently Difficulty (such as shopping) yes   General Information   Onset of Illness/Injury or Date of Surgery 02/05/21   Referring Physician Her Geo   Patient/Family Therapy Goal Statement (OT) to return home today   Additional Occupational Profile Info/Pertinent History of Current Problem Kelsey Wood is a 89 year old female patient with past medical history of COPD, bronchial asthma, hearing deficit, glaucoma, chronic pain and paroxysmal atrial fibrillation who was brought from [facility] for evaluation for shortness of breath and low oxygen saturation. She stated that she has been having shortness of breath for the last 3 days. She was noted to have oxygen saturation in low 80's at her  facility.    Cognitive Status Examination   Orientation Status person;place   Follows Commands follows two-step commands   Memory Deficit minimal deficit   Attention Deficit minimal deficit   Cognitive Status Comments pt perseverative, appears forgetful and asking/repeating same questions over and over again    Visual Perception   Visual Impairment/Limitations WNL   Sensory   Sensory Quick Adds No deficits were identified   Pain Assessment   Patient Currently in Pain No   Integumentary/Edema   Integumentary/Edema no deficits were identifed   Range of Motion Comprehensive   General Range of Motion bilateral upper extremity ROM WFL   Strength Comprehensive (MMT)   General Manual Muscle Testing (MMT) Assessment no strength deficits identified   Coordination   Upper Extremity Coordination No deficits were identified   Transfers   Transfer Comments sit<>stand SBA with room set-up to home environment. SPT to commode CGA with bed rail mimicking home set up   Lower Body Dressing Assessment/Training   [place] Level (Lower Body Dressing) supervision;set up   Comment (Lower Body Dressing) able to don pants over feet seated, CGA when in stance to pull breif/pants over hips   Instrumental Activities of Daily Living (IADL)   Previous Responsibilities meal prep   IADL Comments light meal prep for evening meal   Clinical Impression   Criteria for Skilled Therapeutic Interventions Met (OT) yes;meets criteria;skilled treatment is necessary   OT Diagnosis decreased ind in ADLs   OT Problem List-Impairments impacting ADL problems related to;activity tolerance impaired   Assessment of Occupational Performance 1-3 Performance Deficits   Identified Performance Deficits dressing, toileting   Planned Therapy Interventions (OT) ADL retraining;progressive activity/exercise   Clinical Decision Making Complexity (OT) low complexity   Therapy Frequency (OT) 3x/week   Predicted Duration of Therapy 3   Risk & Benefits of therapy have been  explained evaluation/treatment results reviewed;risks/benefits reviewed;care plan/treatment goals reviewed   OT Discharge Planning    OT Discharge Recommendation (DC Rec) Home with assist   OT Rationale for DC Rec return to [facility] with previous services of med management, management of edema wraps, and meals   Total Evaluation Time (Minutes)   Total Evaluation Time (Minutes) 10

## 2021-02-08 NOTE — DISCHARGE SUMMARY
AVS printed and given to  staff. PIV removed, no complications. Telemetry monitor removed. All belongings returned including two filled prescriptions. Fentanyl and lidocaine patches removed prior to discharge, documented in MAR. Pt escorted off floor via  WC back to prior living w/ home O2.

## 2021-02-08 NOTE — PLAN OF CARE
A/O x 4. VSS on 1L O2 per NC. Denied pain. Tele SR w/ invert T's, denied CP . LS fine crackles posterior lower lobes, KABA. PIV to right intact, SL . Up to bedside commode and chair Ax1 GB W. Dsg to left ankle wound CDI, WOC RN evaluated pt today. Planned discharge to prior living today @ 1415 via HE WC. Will continue POC.     Heart Failure Care Map  GOALS TO BE MET BEFORE DISCHARGE:    1. Decrease congestion and/or edema with diuretic therapy to achieve near optimal volume status.     Dyspnea improved: Yes, satisfactory for discharge.   Edema improved: No, further care required to meet this goal. Please explain +1 to BLEs        Net I/O and Weights since admission:   01/09 1500 - 02/08 1459  In: 720 [P.O.:720]  Out: 1400 [Urine:1400]  Net: -680     Vitals:    02/05/21 1437 02/05/21 1939 02/06/21 0529 02/07/21 0555   Weight: 71.1 kg (156 lb 12 oz) 70.7 kg (155 lb 14.4 oz) 69.8 kg (153 lb 14.4 oz) 71.5 kg (157 lb 9.6 oz)       2.  O2 sats > 90% on room air, or at prior home O2 therapy level.      Able to wean O2 this shift to keep sats above 90%?: No, further care required to meet this goal. Please explain 1L O2 per NC   Does patient use Home O2? No          Current oxygenation status:   SpO2: (!) 85 %     O2 Device: None (Room air), Oxygen Delivery: 1 LPM    3.  Tolerates ambulation and mobility near baseline.     Ambulation: No, further care required to meet this goal. Please explain Ax1 GB W   Times patient ambulated with staff this shift: bed to chair to bedside commode    Please review the Heart Failure Care Map for additional HF goal outcomes.    Shital Daugherty, GABRIELE  2/8/2021

## 2021-02-08 NOTE — PHARMACY-RX INSURANCE COVERAGE
Anticoagulation coverage check.  Patient has Medicare D through [employer] sponsored by an employer.    Xarelto/Eliquis:  $30/mo.       Enoxaparin 80mg x 10 syringes: $10.    Alexandrotoven (warfarin): $10/mo.      MGIUEL ANGEL Stewart, Pharmacy Technician/Liaison, Discharge Pharmacy, [phone]

## 2021-02-08 NOTE — PROGRESS NOTES
Care Management Discharge Note    Discharge Date: 02/08/21       Discharge Disposition: Assisted Living    Discharge Services: [facility] RN/PT/OT    Discharge DME: new home O2 through [facility]    Discharge Transportation: [facility] wheelchair at 1415    Private pay costs discussed: transportation costs        Patient/Family in Agreement with the Plan:  yes        Additional Information:  Pt has orders to discharge back to her [facility] with resumption of home care services today. Per bedside RN, pt's son Sha called and would like [facility] transportation arranged.     Call placed to [facility] and spoke to  ASHWIN Forman [phone] about discharge plan for today. They are able to accept pt back and if possible before 1600 today. They were informed of home care resumption orders and new home O2. New meds were sent to [facility] to fill here prior to discharge per their request. Discharge orders were faxed to fax #: [phone].    Call placed to [facility] and spoke to James [phone] and informed him pt would discharge today and resume Home care services. Discharge orders were faxed to fax #: [phone] as requested.     Call placed to [facility] transportation per son's request to arrange discharge ride back to [facility]. Wheelchair transportation was arranged for 1415 today. Bedside RN, charge nurse and facility updated on transport time.     Pt has orders for new home O2. CM following for new home oxygen needs. Provider has placed orders for home oxygen; patient's SpO2 readings at rest and with activity have been completed and documented by staff within the past 48 hours. Called [facility] [phone] to make them aware of new orders, spoke with Maryam who reports that patient's oxygen should be delivered by 1400, they will call back if any issues with orders or documentation. [facility] will follow-up with patient to discuss process, offer choice and  update AVS. Updated bedside RN.      Alice Cotter RN BSN CM  Inpatient Care Coordination  [facility]  [phone]

## 2021-02-08 NOTE — CONSULTS
[facility] Nurse Inpatient Wound Assessment   Reason for consultation: Evaluate and treat  LLE wound    Assessment  LLE wound due to Trauma  Status: initial assessment  Mostly covered with dried drainage, patient prefers to treat conservatively   Treatment Plan  Left leg wound: Every 5 days   1. Cleanse with wound cleanser and dry  2. Apply Mepilex 4x4  Orders Written  Recommended provider order: None, at this time  WO Nurse follow-up plan:weekly  Nursing to notify the Provider(s) and re-consult the WO Nurse if wound(s) deteriorates or new skin concern.    Patient History  According to provider note(s):  Kelsey Wood is a 89 year old female who COPD, bronchial asthma, hearing deficit, glaucoma, chronic pain and paroxysmal atrial fibrillation.  She is a resident of [facility].  She was sent to the emergency department because the staff of her place documented low oxygen in the mid 80s and they said this patient had been complaining of shortness of breath in the last 3 days preceding the admission.  She arrived to the emergency department hypoxemic with oxygen saturation 86% on room air, afebrile, heart rate initially more than 100 and systolic blood pressure slightly elevated up to 148.    Objective Data    Active Diet Order  Orders Placed This Encounter      Combination Diet Regular Diet Adult      Diet      Output:   I/O last 3 completed shifts:  In: 240 [P.O.:240]  Out: 100 [Urine:100]    Risk Assessment:   Sensory Perception: 4-->no impairment  Moisture: 3-->occasionally moist  Activity: 3-->walks occasionally  Mobility: 3-->slightly limited  Nutrition: 3-->adequate  Friction and Shear: 2-->potential problem  Constantine Score: 18                          Labs:   Recent Labs   Lab 02/07/21  0633   HGB 13.3   WBC 10.0       Physical Exam  Areas of skin assessed: focused Left leg    Wound Location:  Left leg  Date of last photo 2/8/21  Wound History: Patient reports wound started 2 months ago when a CNA  accidentally scratched her with her finger nail      Wound Base: 90 % dry drainage, 10% agranular     Palpation of the wound bed: normal      Drainage: scant     Description of drainage: serosanguinous     Measurements (length x width x depth, in cm) 5 x 0.5 cm      Tunneling N/A     Undermining N/A  Periwound skin: intact      Color: normal and consistent with surrounding tissue      Temperature: normal   Odor: none  Pain: denies , none    Interventions  Visual inspection and assessment completed   Wound Care Rationale Provide protection   Wound Care: done per plan of care  Supplies: at bedside  Current off-loading measures: Pillows under calves and Pillows  Current support surface: Standard  Atmos Air mattress  Education provided to: plan of care  Discussed plan of care with Patient and Nurse    Adam Ponce RN CWOCN

## 2021-02-08 NOTE — DISCHARGE INSTRUCTIONS
Oxygen Provider:  Arranged through [employer], contact number [phone]. . If you have any questions or concerns please call the oxygen company directly.    Left leg wound: Every 5 days   1. Cleanse with wound cleanser and dry  2. Apply Mepilex 4x4

## 2021-02-08 NOTE — PLAN OF CARE
Physical Therapy Discharge Summary    Reason for therapy discharge:    Discharged to home with home therapy.    Progress towards therapy goal(s). See goals on Care Plan in [facility] electronic health record for goal details.  Goals not met.  Barriers to achieving goals:   discharge from facility.    Therapy recommendation(s):    Continued therapy is recommended.  Rationale/Recommendations:  HHPT per prior PT recommendation.

## 2021-02-08 NOTE — PROGRESS NOTES
Patient has been assessed for Home Oxygen needs. Oxygen readings:    *Pulse oximetry (SpO2) = 88% on room air at rest while awake.    *SpO2 improved to 94% on 1liters/minute at rest.    *SpO2 = 85% on room air during activity/with exercise.    *SpO2 improved to 93% on 1liters/minute during activity/with exercise.

## 2021-02-08 NOTE — DISCHARGE SUMMARY
[facility]       DISCHARGE SUMMARY          Kelsey Wood MRN# [id number]   YOB: 1929 Age: 89 year old     Date of Admission:  2/5/2021  Date of Discharge:  2/8/2021  Admitting Physician:  Christiano Barboza MD  Discharge Physician: Geo Madrid MD  Discharging Service: Hospitalist     Primary Provider: Demetrio Esquivel  Primary Care Physician Phone Number: [phone]         Discharge Diagnoses/Problem Oriented Hospital Course (Providers):      Kelsey Wood was admitted on 2/5/2021 by Christiano Barboza MD and I would refer you to their history and physical.      Patient was seen and examined on the day of discharge  Time spent: Greater than 35 minutes.  Plan of care was discussed with patient's daughter on the day of discharge.  Participated in care coordination on disposition planning with care  as well as nursing staff.  Additionally, I did leave a voice message to son, Sha, who was initial point of contact but did not answer his phone as he is working.    Discharge Diagnoses    1. Acute hypoxic respiratory failure secondary to exacerbation of COPD  2. Right lower extremity DVT, new diagnosis this admission  3. History of paroxysmal atrial fibrillation  4. Urinary frequency without evidence of urinary tract infection      Hospital Course:    Kelsey Wood is a 89 year old female patient with past medical history of COPD, bronchial asthma, hearing deficit, glaucoma, chronic pain and paroxysmal atrial fibrillation who was brought from [facility] facility for evaluation for shortness of breath and low oxygen saturation. She stated that she has been having shortness of breath for the last 3 days. She was noted to have oxygen saturation in low 80's at her facility.   In the ER her oxygen saturation was 86% on room air.   Laboratory workup showed normal cbc and BMP. Troponin negative. D-dimer 0.9.  BNP normal. Procalcitonin 0.05. Negative Covid 19 PCR test.  CT of  the chest with contrast showed no evidence of pulmonary embolism. There is no no definite new pneumonia. Emphysema unchanged. There is bronchomalacia.   In the ER, she was given neb treatment, lasix 40 mg IV, IV solumedrol. She was admitted to the hospital for further management.          Active medical issues     Acute hypoxic respiratory failure, suspect secondary to COPD exacerbation  Patient was admitted and placed on O2 supplementation.  There was no evidence of fluid overload nor infectious symptoms.  Procalcitonin level was unremarkable and CT on presentation was negative for acute PE.  Does have some notable emphysema.  Echocardiogram was performed as outlined below with notable known pulmonary hypertension.  Patient was treated for acute exacerbation of her underlying COPD.  Patient's breathing did improve and FiO2 requirements did come down to 1 L/min but still requiring some degree of oxygen at discharge.  Suspect this may be short-term.  She was started on prednisone 40 mg p.o. daily and will be discharged on a taper down to 5 mg daily which appears to be her chronic dosing.  Will defer to primary care providers on further tapering as necessary.  Would favor coming off prednisone altogether if they can.  O2 supplementation was ordered at 1 L/min at discharge and could be weaned off at her care facility.  No indications for antibiotics this admission as clinically she did not have pneumonia.     Acute COPD exacerbation/emphysema  Discussion as above.       DVT with occlusive thrombus in the right greater saphenous vein at the junction of the SFV  This was diagnosed new this admission due to work-up of chronic lower extremity edema and some leg discomfort.  During her admission, she was placed on therapeutic dosing of subcu Lovenox and after pharmacy liaison was able to assist was with cost of direct oral anticoagulants, patient will be discharged home on Eliquis starter pack.  Would ideally anticoagulate  for at least 6 months if not indefinitely given her history of paroxysmal atrial fibrillation for stroke prophylaxis.       History of paroxysmal atrial fibrillation  Currently remains in sinus rhythm.  Continue chronic metoprolol and anticoagulation as above     Urinary frequency/possible dysuria  Some complaint of urinary problems this admission and a urinalysis was checked as patient does have a history of UTIs.  UA was negative and no treatment was offered this admission.             Pending Results:        Unresulted Labs Ordered in the Past 30 Days of this Admission     Date and Time Order Name Status Description    2/5/2021 1706 Blood culture Preliminary     2/5/2021 1706 Blood culture Preliminary             Discharge Instructions and Follow-Up:      Follow-up Appointments     Follow-up and recommended labs and tests       Follow up with primary care provider, ROGER GE, within 7 days for   hospital follow- up.  No follow up labs or test are needed.    You may receive your second COVID-19 vaccination on 2/9/2021 as scheduled   but with precautions and observation after administration for at least 30   minutes.               Discharge Disposition:        Discharged to assisted care living facility with with resumption of home care RN/PT/OT         Discharge Medications:        Current Discharge Medication List      START taking these medications    Details   Apixaban Starter Pack (ELIQUIS DVT/PE STARTER PACK) 5 MG TBPK Take 10 mg by mouth 2 times daily for 7 days, THEN 5 mg 2 times daily for 23 days.  Qty: 74 each, Refills: 0    Associated Diagnoses: Deep vein thrombosis (DVT) of proximal vein of right lower extremity, unspecified chronicity (H)         CONTINUE these medications which have CHANGED    Details   furosemide (LASIX) 40 MG tablet Take 1 tablet (40 mg) by mouth daily  Qty:      Associated Diagnoses: COPD exacerbation (H)      predniSONE (DELTASONE) 20 MG tablet Take 2 tabs daily x 2 days, then  1 tab daily x 3 days, then 1/2 tab daily x 2 days and then go to chronic 5mg tablets daily until assessed by primary care provider  Qty: 8 tablet, Refills: 0    Associated Diagnoses: Acute on chronic congestive heart failure, unspecified heart failure type (H)         CONTINUE these medications which have NOT CHANGED    Details   acetaminophen (TYLENOL) 325 MG tablet Take 650 mg by mouth every 4 hours as needed for mild pain      albuterol (PROAIR HFA/PROVENTIL HFA/VENTOLIN HFA) 108 (90 Base) MCG/ACT Inhaler Inhale 2 puffs into the lungs 4 times daily as needed for shortness of breath / dyspnea or wheezing       bismuth subsalicylate (PEPTO BISMOL) 262 MG chewable tablet Take 2 tablets by mouth every hour as needed for heartburn Max of 16 tabs per 24 hrs      calcium carbonate (OS-MARY GRACE 500 MG Qagan [place]in. CA) 500 MG tablet Take 1 tablet by mouth daily       fentaNYL (DURAGESIC) 12 mcg/hr 72 hr patch Place 1 patch onto the skin every 72 hours remove old patch.  Qty: 1 patch, Refills: 0    Associated Diagnoses: Other chronic pain      fluticasone (FLONASE) 50 MCG/ACT nasal spray Spray 2 sprays into both nostrils daily      Fluticasone-Umeclidin-Vilanterol (TRELEGY ELLIPTA) 100-62.5-25 MCG/INH oral inhaler Inhale 1 puff into the lungs daily      gabapentin (NEURONTIN) 300 MG capsule Take 300 mg by mouth 3 times daily At 0900, 1400, 2100      Glucosamine-Chondroitin (OSTEO BI-FLEX REGULAR STRENGTH PO) Take 1 tablet by mouth 2 times daily      HYDROcodone-acetaminophen (NORCO) 5-325 MG tablet Take 1 tablet by mouth every 6 hours as needed for severe pain  Qty: 5 tablet, Refills: 0    Associated Diagnoses: Back pain with radiation      !! hypromellose (ARTIFICIAL TEARS) 0.5 % SOLN ophthalmic solution Place 1 drop into both eyes daily      !! hypromellose (ARTIFICIAL TEARS) 0.5 % SOLN ophthalmic solution Place 1 drop into both eyes daily as needed for dry eyes      ipratropium - albuterol 0.5 mg/2.5 mg/3 mL (DUONEB) 0.5-2.5 (3)  MG/3ML neb solution Take 1 vial by nebulization 4 times daily as needed for shortness of breath / dyspnea or wheezing       latanoprost (XALATAN) 0.005 % ophthalmic solution Place 1 drop into both eyes At Bedtime      Lidocaine (LIDOCARE) 4 % Patch Place 1 patch onto the skin every 24 hours To prevent lidocaine toxicity, patient should be patch free for 12 hrs daily.  Salonpas for hip pain      melatonin 5 MG tablet Take 5 mg by mouth At Bedtime      potassium chloride ER (K-TAB) 20 MEQ CR tablet Take 20 mEq by mouth daily      senna-docusate (SENOKOT-S;PERICOLACE) 8.6-50 MG per tablet Take 1 tablet by mouth daily      vitamin C (ASCORBIC ACID) 500 MG tablet Take 500 mg by mouth daily      vitamin D3 (CHOLECALCIFEROL) 50 mcg (2000 units) tablet Take 1 tablet by mouth daily       !! - Potential duplicate medications found. Please discuss with provider.            Allergies:       No Known Allergies        Consultations This Hospital Stay:        No consultations were requested during this admission           Image Results From This Hospital Stay (For Non-EPIC Providers):        Results for orders placed or performed during the hospital encounter of 02/05/21   XR Chest Port 1 View    Narrative    CHEST ONE VIEW  2/5/2021 3:29 PM     HISTORY: SOB.    COMPARISON: April 3, 2019      Impression    IMPRESSION: Midlung infiltrate on the right, question left base  infiltrate. Question a left upper lobe infiltrate.    GRATN HAIDER MD   US Lower Extremity Venous Duplex Bilateral    Narrative    ULTRASOUND VENOUS LOWER EXTREMITY BILATERAL 2/5/2021 4:29 PM     HISTORY: Leg swelling.    COMPARISON: April 3, 2019    TECHNIQUE: Ultrasound gray scale, Color Doppler flow, and spectral  Doppler waveform analysis performed.    FINDINGS: The bilateral common femoral, superficial femoral, popliteal  and posterior tibial veins are patent and fully compressible and  demonstrate normal venous Doppler flow. The proximal right  greater  saphenous vein demonstrates thrombus. Left greater saphenous vein is  patent.      Impression    IMPRESSION: Thrombus in the proximal greater saphenous vein on the  right.    GRANT HAIDER MD   CT Chest Pulmonary Embolism w Contrast    Narrative    EXAM: CT CHEST PULMONARY EMBOLISM W CONTRAST  LOCATION: [facility]  DATE/TIME: 2/5/2021 6:48 PM    INDICATION: Hypoxia. Shortness of breath for 3 days. COPD. PE suspected, low/intermediate prob, neg D-dimer  COMPARISON: 02/24/2020  TECHNIQUE: CT chest pulmonary angiogram during arterial phase injection of IV contrast. Multiplanar reformats and MIP reconstructions were performed. Dose reduction techniques were used.   CONTRAST:  57mL Isovue-370    FINDINGS:  ANGIOGRAM CHEST: No acute pulmonary emboli are identified. There is mild dilatation of both left and right pulmonary arteries with tapering of the peripheral vessels suggesting pulmonary artery hypertension. Thoracic aorta is negative for dissection. No   CT evidence of right heart strain.    LUNGS AND PLEURA: New segmental atelectasis involving right lower lobe. Stable atelectasis involving lingula. Stable changes of emphysema.  Significant flattening of central bronchi consistent with bronchomalacia. Mild flattening of trachea.    MEDIASTINUM/AXILLAE: No lymphadenopathy. Heart size normal.    UPPER ABDOMEN: Left renal cyst minimally changed. Stable small left adrenal adenoma.    MUSCULOSKELETAL: Progression of the compression involving T9 vertebral body, now severe. Moderate compression involving L1 vertebral body unchanged.      Impression    IMPRESSION:  1.  No pulmonary embolism identified.  2.  Likely the patient has some degree of pulmonary artery hypertension.  3.  New atelectasis left lower lobe. Stable mild atelectasis lingula. No definite new pneumonia. Emphysema unchanged.  4.  Bronchomalacia.   Echocardiogram Complete    Narrative     [id number]  [id number]  [id number]  [id number]^BRANT^RASHAWN^F           [facility]  Echocardiography Laboratory  [address] [place] [address]        Name: SONALI TUCKER  MRN: [id number]  : 1929  Study Date: 2021 12:40 PM  Age: 89 yrs  Gender: Female  Patient Location: [facility]  Reason For Study: Pulmonary Emboli  Ordering Physician: RASHAWN GUO  Referring Physician: Demetrio Arriaza  Performed By: Shreya Reid     BSA: 1.7 m2  Height: 62 in  Weight: 156 lb  BP: 148/73 mmHg  _____________________________________________________________________________  __        Procedure  Complete Portable Echo Adult. Complete Echo Adult.  _____________________________________________________________________________  __        Interpretation Summary     1. Normal left ventricular size and systolic function. LVEF 60-65%. 2. No  regional wall motion abnormalities.  2. Normal right ventricular size and systolic function.  3. Pulmonary artery systolic pressure is approximated at 30-35 mmHg based on  right atrial pressure of 3 mmHg.  4. No hemodynamically significant valvular disease.  Comapred to prior, no change.  _____________________________________________________________________________  __        Left Ventricle  The left ventricle is normal in size. There is normal left ventricular wall  thickness. Left ventricular systolic function is normal. The visual ejection  fraction is estimated at 60-65%. Grade I or early diastolic dysfunction. No  regional wall motion abnormalities noted.     Right Ventricle  The right ventricle is normal in size and function.     Atria  Normal left atrial size. Right atrial size is normal.     Mitral Valve  The mitral valve leaflets appear normal. There is no evidence of stenosis,  fluttering, or prolapse. There is trace mitral regurgitation.        Tricuspid Valve  Normal tricuspid valve. There is trace tricuspid regurgitation. The right  ventricular systolic pressure is  approximated at 33.5 mmHg plus the right  atrial pressure.     Aortic Valve  There is mild trileaflet aortic sclerosis. There is mild (1+) aortic  regurgitation. No aortic stenosis is present.     Pulmonic Valve  The pulmonic valve is not well visualized. There is trace pulmonic valvular  regurgitation.     Vessels  Normal size aorta. IVC diameter <2.1 cm collapsing >50% with sniff suggests a  normal RA pressure of 3 mmHg.     Pericardium  There is no pericardial effusion.        Rhythm  Sinus rhythm was noted.  _____________________________________________________________________________  __  MMode/2D Measurements & Calculations     IVSd: 0.71 cm  LVIDd: 4.1 cm  LVIDs: 2.3 cm  LVPWd: 0.56 cm  FS: 44.0 %  LV mass(C)d: 70.7 grams  LV mass(C)dI: 41.1 grams/m2  Ao root diam: 3.4 cm  LVOT diam: 1.8 cm  LVOT area: 2.5 cm2  LA Volume (BP): 46.7 ml  LA Volume Index (BP): 27.2 ml/m2  RWT: 0.27           Doppler Measurements & Calculations  MV E max avril: 70.3 cm/sec  MV A max avril: 121.7 cm/sec  MV E/A: 0.58  MV dec time: 0.30 sec  TV V2 max: 281.0 cm/sec  TV max P.6 mmHg  PA acc time: 0.11 sec  TR max avril: 289.6 cm/sec  TR max P.5 mmHg  E/E' avg: 15.5  Lateral E/e': 15.9  Medial E/e': 15.1           _____________________________________________________________________________  __           Report approved by: Bryan Daniel 2021 04:40 PM

## 2021-02-08 NOTE — PROGRESS NOTES
Oxygen Documentation:   I certify that this patient, Kelsey Wood has been under my care (or a nurse practitioner or physican's assistant working with me). This is the face-to-face encounter for oxygen medical necessity.      Kelsey Wood is now in a chronic stable state and continues to require supplemental oxygen. Patient has continued oxygen desaturation due to COPD J44.9.    Alternative treatment(s) tried or considered and deemed clinically infective for treatment of COPD J44.9 include nebulizers, inhalers and steroids.  If portability is ordered, is the patient mobile within the home? yes    **Patients who qualify for home O2 coverage under the CMS guidelines require ABG tests or O2 sat readings obtained closest to, but no earlier than 2 days prior to the discharge, as evidence of the need for home oxygen therapy. Testing must be performed while patient is in the chronic stable state. See notes for O2 sats.**

## 2021-02-09 NOTE — PLAN OF CARE
"Occupational Therapy Discharge Summary    Reason for therapy discharge:    Discharged to home.    Progress towards therapy goal(s). See goals on Care Plan in [facility] electronic health record for goal details.  Goals not met.  Barriers to achieving goals:   discharge on same date as initial evaluation.    Therapy recommendation(s):    No further therapy is recommended. Per evaluating OT \"return to snf with previous services of med management, management of edema wraps, and meals\"    **This patient was not seen by writing OT, information for discharge summary taken from treating OT's notes.**    "

## 2021-02-11 LAB
BACTERIA SPEC CULT: NO GROWTH
BACTERIA SPEC CULT: NO GROWTH
Lab: NORMAL
Lab: NORMAL
SPECIMEN SOURCE: NORMAL
SPECIMEN SOURCE: NORMAL

## 2021-02-22 ENCOUNTER — RECORDS - HEALTHEAST (OUTPATIENT)
Dept: LAB | Facility: CLINIC | Age: 86
End: 2021-02-22

## 2021-02-22 ENCOUNTER — APPOINTMENT (OUTPATIENT)
Dept: CT IMAGING | Facility: CLINIC | Age: 86
End: 2021-02-22
Attending: EMERGENCY MEDICINE
Payer: COMMERCIAL

## 2021-02-22 ENCOUNTER — HOSPITAL ENCOUNTER (EMERGENCY)
Facility: CLINIC | Age: 86
Discharge: HOME | End: 2021-02-22
Attending: EMERGENCY MEDICINE | Admitting: EMERGENCY MEDICINE
Payer: COMMERCIAL

## 2021-02-22 VITALS
TEMPERATURE: 98.1 F | HEART RATE: 93 BPM | SYSTOLIC BLOOD PRESSURE: 163 MMHG | OXYGEN SATURATION: 99 % | RESPIRATION RATE: 22 BRPM | DIASTOLIC BLOOD PRESSURE: 90 MMHG

## 2021-02-22 DIAGNOSIS — K59.00 CONSTIPATION, UNSPECIFIED CONSTIPATION TYPE: ICD-10-CM

## 2021-02-22 DIAGNOSIS — J44.9 CHRONIC OBSTRUCTIVE PULMONARY DISEASE, UNSPECIFIED COPD TYPE (H): ICD-10-CM

## 2021-02-22 LAB
ALBUMIN SERPL-MCNC: 2.9 G/DL (ref 3.4–5)
ALBUMIN UR-MCNC: 50 MG/DL
ALBUMIN UR-MCNC: ABNORMAL MG/DL
ALP SERPL-CCNC: 72 U/L (ref 40–150)
ALT SERPL W P-5'-P-CCNC: 20 U/L (ref 0–50)
ANION GAP SERPL CALCULATED.3IONS-SCNC: 3 MMOL/L (ref 3–14)
APPEARANCE UR: ABNORMAL
APPEARANCE UR: CLEAR
AST SERPL W P-5'-P-CCNC: 18 U/L (ref 0–45)
BACTERIA #/AREA URNS HPF: ABNORMAL HPF
BASOPHILS # BLD AUTO: 0.1 10E9/L (ref 0–0.2)
BASOPHILS NFR BLD AUTO: 0.6 %
BILIRUB SERPL-MCNC: 0.6 MG/DL (ref 0.2–1.3)
BILIRUB UR QL STRIP: NEGATIVE
BILIRUB UR QL STRIP: NEGATIVE
BUN SERPL-MCNC: 16 MG/DL (ref 7–30)
CALCIUM SERPL-MCNC: 11 MG/DL (ref 8.5–10.1)
CHLORIDE SERPL-SCNC: 100 MMOL/L (ref 94–109)
CO2 SERPL-SCNC: 34 MMOL/L (ref 20–32)
COLOR UR AUTO: ABNORMAL
COLOR UR AUTO: YELLOW
CREAT SERPL-MCNC: 0.72 MG/DL (ref 0.52–1.04)
DIFFERENTIAL METHOD BLD: ABNORMAL
EOSINOPHIL # BLD AUTO: 0.1 10E9/L (ref 0–0.7)
EOSINOPHIL NFR BLD AUTO: 1.1 %
ERYTHROCYTE [DISTWIDTH] IN BLOOD BY AUTOMATED COUNT: 13.4 % (ref 10–15)
GFR SERPL CREATININE-BSD FRML MDRD: 73 ML/MIN/{1.73_M2}
GLUCOSE SERPL-MCNC: 97 MG/DL (ref 70–99)
GLUCOSE UR STRIP-MCNC: NEGATIVE MG/DL
GLUCOSE UR STRIP-MCNC: NEGATIVE MG/DL
HCT VFR BLD AUTO: 45.3 % (ref 35–47)
HGB BLD-MCNC: 13.5 G/DL (ref 11.7–15.7)
HGB UR QL STRIP: NEGATIVE
HGB UR QL STRIP: NEGATIVE
IMM GRANULOCYTES # BLD: 0.1 10E9/L (ref 0–0.4)
IMM GRANULOCYTES NFR BLD: 1.5 %
KETONES UR STRIP-MCNC: NEGATIVE MG/DL
KETONES UR STRIP-MCNC: NEGATIVE MG/DL
LEUKOCYTE ESTERASE UR QL STRIP: NEGATIVE
LEUKOCYTE ESTERASE UR QL STRIP: NEGATIVE
LIPASE SERPL-CCNC: 60 U/L (ref 73–393)
LYMPHOCYTES # BLD AUTO: 1.2 10E9/L (ref 0.8–5.3)
LYMPHOCYTES NFR BLD AUTO: 13.7 %
MCH RBC QN AUTO: 29.5 PG (ref 26.5–33)
MCHC RBC AUTO-ENTMCNC: 29.8 G/DL (ref 31.5–36.5)
MCV RBC AUTO: 99 FL (ref 78–100)
MONOCYTES # BLD AUTO: 0.7 10E9/L (ref 0–1.3)
MONOCYTES NFR BLD AUTO: 8.4 %
MUCOUS THREADS #/AREA URNS LPF: ABNORMAL LPF
NEUTROPHILS # BLD AUTO: 6.4 10E9/L (ref 1.6–8.3)
NEUTROPHILS NFR BLD AUTO: 74.7 %
NITRATE UR QL: NEGATIVE
NITRATE UR QL: NEGATIVE
NRBC # BLD AUTO: 0 10*3/UL
NRBC BLD AUTO-RTO: 0 /100
PH UR STRIP: 7 [PH] (ref 4.5–8)
PH UR STRIP: 7.5 PH (ref 5–7)
PLATELET # BLD AUTO: 262 10E9/L (ref 150–450)
POTASSIUM SERPL-SCNC: 4.3 MMOL/L (ref 3.4–5.3)
PROT SERPL-MCNC: 7 G/DL (ref 6.8–8.8)
RBC # BLD AUTO: 4.58 10E12/L (ref 3.8–5.2)
RBC #/AREA URNS AUTO: <1 /HPF (ref 0–2)
RBC #/AREA URNS AUTO: ABNORMAL HPF
SODIUM SERPL-SCNC: 137 MMOL/L (ref 133–144)
SOURCE: ABNORMAL
SP GR UR STRIP: 1.02 (ref 1–1.03)
SP GR UR STRIP: 1.02 (ref 1–1.03)
SQUAMOUS #/AREA URNS AUTO: >100 LPF
UROBILINOGEN UR STRIP-ACNC: ABNORMAL
UROBILINOGEN UR STRIP-MCNC: NORMAL MG/DL (ref 0–2)
WBC # BLD AUTO: 8.5 10E9/L (ref 4–11)
WBC #/AREA URNS AUTO: 2 /HPF (ref 0–5)
WBC #/AREA URNS AUTO: ABNORMAL HPF

## 2021-02-22 PROCEDURE — 80053 COMPREHEN METABOLIC PANEL: CPT | Performed by: EMERGENCY MEDICINE

## 2021-02-22 PROCEDURE — 85025 COMPLETE CBC W/AUTO DIFF WBC: CPT | Performed by: EMERGENCY MEDICINE

## 2021-02-22 PROCEDURE — 99285 EMERGENCY DEPT VISIT HI MDM: CPT | Mod: 25

## 2021-02-22 PROCEDURE — 74177 CT ABD & PELVIS W/CONTRAST: CPT | Mod: 59

## 2021-02-22 PROCEDURE — 81001 URINALYSIS AUTO W/SCOPE: CPT | Performed by: EMERGENCY MEDICINE

## 2021-02-22 PROCEDURE — 250N000009 HC RX 250: Performed by: EMERGENCY MEDICINE

## 2021-02-22 PROCEDURE — 250N000011 HC RX IP 250 OP 636: Performed by: EMERGENCY MEDICINE

## 2021-02-22 PROCEDURE — 83690 ASSAY OF LIPASE: CPT | Performed by: EMERGENCY MEDICINE

## 2021-02-22 RX ORDER — POLYETHYLENE GLYCOL 3350 17 G/17G
1 POWDER, FOR SOLUTION ORAL DAILY
Qty: 510 G | Refills: 0 | Status: SHIPPED | OUTPATIENT
Start: 2021-02-22 | End: 2021-03-24

## 2021-02-22 RX ORDER — IOPAMIDOL 755 MG/ML
500 INJECTION, SOLUTION INTRAVASCULAR ONCE
Status: COMPLETED | OUTPATIENT
Start: 2021-02-22 | End: 2021-02-22

## 2021-02-22 RX ADMIN — IOPAMIDOL 80 ML: 755 INJECTION, SOLUTION INTRAVENOUS at 18:27

## 2021-02-22 RX ADMIN — SODIUM CHLORIDE 60 ML: 9 INJECTION, SOLUTION INTRAVENOUS at 18:28

## 2021-02-22 ASSESSMENT — ENCOUNTER SYMPTOMS
ABDOMINAL DISTENTION: 1
DYSURIA: 0
SHORTNESS OF BREATH: 1

## 2021-02-22 NOTE — ED PROVIDER NOTES
History   Chief Complaint:  Shortness of Breath       The history is provided by the patient.      Kelsey Wood is a 89 year old female on Eliquis with history of DVT and COPD who presents via EMS with shortness of breath. On 2/8/2021, patient was discharged from [facility] on oxygen but she has not been wearing it. Today, patient reports shortness of breath to staff at the assisted facility she lives in. Staff found her O2 to be in the 70's and also noted patient to have a distended abdomen. Patient is currently on 2 L with normal O2 saturation. Here in the ED, patient reports discomfort to the abdomen. She has taken ibuprofen to relieve the pain. She also notes she has not urinated for the last few days. She denies dysuria.    Review of Systems   Respiratory: Positive for shortness of breath.    Gastrointestinal: Positive for abdominal distention.   Genitourinary: Negative for dysuria.   All other systems reviewed and are negative.    Discharge summary from 2/8/2021    Discharge Diagnoses     1. Acute hypoxic respiratory failure secondary to exacerbation of COPD  2. Right lower extremity DVT, new diagnosis this admission  3. History of paroxysmal atrial fibrillation  4. Urinary frequency without evidence of urinary tract infection        Hospital Course:     Kelsey Wood is a 89 year old female patient with past medical history of COPD, bronchial asthma, hearing deficit, glaucoma, chronic pain and paroxysmal atrial fibrillation who was brought from [facility] for evaluation for shortness of breath and low oxygen saturation. She stated that she has been having shortness of breath for the last 3 days. She was noted to have oxygen saturation in low 80's at her facility.   In the ER her oxygen saturation was 86% on room air.   Laboratory workup showed normal cbc and BMP. Troponin negative. D-dimer 0.9.  BNP normal. Procalcitonin 0.05. Negative Covid 19 PCR test.  CT of the chest with contrast  showed no evidence of pulmonary embolism. There is no no definite new pneumonia. Emphysema unchanged. There is bronchomalacia.   In the ER, she was given neb treatment, lasix 40 mg IV, IV solumedrol. She was admitted to the hospital for further management.          Active medical issues     Acute hypoxic respiratory failure, suspect secondary to COPD exacerbation  Patient was admitted and placed on O2 supplementation.  There was no evidence of fluid overload nor infectious symptoms.  Procalcitonin level was unremarkable and CT on presentation was negative for acute PE.  Does have some notable emphysema.  Echocardiogram was performed as outlined below with notable known pulmonary hypertension.  Patient was treated for acute exacerbation of her underlying COPD.  Patient's breathing did improve and FiO2 requirements did come down to 1 L/min but still requiring some degree of oxygen at discharge.  Suspect this may be short-term.  She was started on prednisone 40 mg p.o. daily and will be discharged on a taper down to 5 mg daily which appears to be her chronic dosing.  Will defer to primary care providers on further tapering as necessary.  Would favor coming off prednisone altogether if they can.  O2 supplementation was ordered at 1 L/min at discharge and could be weaned off at her care facility.  No indications for antibiotics this admission as clinically she did not have pneumonia.     Acute COPD exacerbation/emphysema  Discussion as above.       DVT with occlusive thrombus in the right greater saphenous vein at the junction of the SFV  This was diagnosed new this admission due to work-up of chronic lower extremity edema and some leg discomfort.  During her admission, she was placed on therapeutic dosing of subcu Lovenox and after pharmacy liaison was able to assist was with cost of direct oral anticoagulants, patient will be discharged home on Eliquis starter pack.  Would ideally anticoagulate for at least 6 months if  not indefinitely given her history of paroxysmal atrial fibrillation for stroke prophylaxis.       History of paroxysmal atrial fibrillation  Currently remains in sinus rhythm.  Continue chronic metoprolol and anticoagulation as above     Urinary frequency/possible dysuria  Some complaint of urinary problems this admission and a urinalysis was checked as patient does have a history of UTIs.  UA was negative and no treatment was offered this admission.          Allergies:  The patient has no known allergies.     Medications:  Eliquis  Lasix  Norco  Prednisone  Albuterol  Calcium carbonate  Flonase  Neurontin   Glucosamine-chondroitin  Melatonin   K-TAB    Past Medical History:    Cataract  Osteoarthritis  Asthma  Rhinitis  Seborrheic keratosis  Scoliosis  Glomerulonephritis  Mass of parotid gland  Hypercholesterolemia  Hard of hearing  Heart failure  DVT  COPD  Colitis    Past Surgical History:    Appendectomy   Cholecystectomy    Family History:    Mother: cancer  Brother: prostate cancer  Sister: retinal detachment, hyperlipidemia, hypertension, thyroid disease    Social History:  Patient presents via EMS.  Patient lives in an assisted living.     Physical Exam     Patient Vitals for the past 24 hrs:   BP Temp Temp src Pulse Resp SpO2   02/22/21 2218 -- -- -- -- -- 99 %   02/22/21 2200 (!) 163/90 -- -- 93 -- --   02/22/21 2130 (!) 156/73 -- -- 86 -- --   02/22/21 2115 (!) 149/83 -- -- 87 -- --   02/22/21 2015 (!) 158/125 -- -- 90 -- 99 %   02/22/21 2000 (!) 178/94 -- -- 92 -- 99 %   02/22/21 1945 (!) 170/89 -- -- 99 -- 99 %   02/22/21 1930 (!) 163/100 -- -- 107 -- 100 %   02/22/21 1915 (!) 171/86 -- -- 91 -- 100 %   02/22/21 1900 (!) 168/101 -- -- 92 -- 100 %   02/22/21 1815 (!) 160/118 -- -- 93 -- --   02/22/21 1800 (!) 151/82 -- -- 94 -- --   02/22/21 1731 (!) 148/81 98.1  F (36.7  C) Oral 94 22 91 %       Physical Exam  General: Patient is alert and cooperative.  HENT:  Normal nose, oropharynx. Moist oral  mucosa.  Eyes: EOMI. Normal conjunctiva.  Neck:  Normal range of motion and appearance.   Cardiovascular:  Normal rate, regular rhythm and normal heart sounds.   Pulmonary/Chest:  Effort normal. No wheezing or crackles.  Abdominal: Soft. Distended, non tender.     Musculoskeletal: Normal range of motion. No edema or tenderness.   Neurological: oriented, normal strength, sensation, and coordination.   Skin: Warm and dry. No rash or bruising.   Psychiatric: Normal mood and affect. Normal behavior and judgement.      Emergency Department Course     Imaging:  CT Abdomen Pelvis w Contrast  1.  Gas-filled transverse colonic loops and moderate stool in the ascending colon account for the patient's abdominal distention. No definitive evidence for obstructing colonic mass. Distal colon decompressed. Colonic diverticulosis without   diverticulitis. Mild gas and fluid filled small bowel loops in the anterior abdomen without discrete transition point. Findings can be seen with ileus or slow transit related to constipation.  2.  New or increasing biliary dilatation likely related to patient's postcholecystectomy state and patient age. Recommend correlation with biliary markers.  Reading per radiology.     Laboratory:   CBC: WBC: 8.5, HGB: 13.5, PLT: 262    CMP: Carbon Dioxide: 34 (H), Calcium: 11 (H), Albumin: 2.9 (L), o/w WNL (Creatinine: 0.72)    UA: pH: 7.5 (H), Protein Albumin: 50 (A), o/w Negative    Lipase: 60 (L)      Emergency Department Course:    Reviewed:  I reviewed nursing notes, vitals, past medical history and care everywhere    Assessments:  1735 I obtained history and examined the patient as noted above.    I explained findings    Disposition:  The patient was discharged to NH.       Impression & Plan   Medical Decision Making:  Afebrile 89-year-old female nursing home resident referred to the emergency department with 2 separate concerns.  First, she was noted to be hypoxic after her oxygen came off.  This  readily corrected with reapplication of 2 L of oxygen.  Her medical history is notable for recent admission to [facility] with acute hypoxic respiratory failure due to a COPD exacerbation.  She is was discharged on baseline supplemental oxygen.  She has had no interval development of chest discomfort or cough.  The second issue is abdominal distention.  She has no associated tenderness with this and has had no apparent vomiting or diarrhea.  Evaluation therefore has included a CT scan of the abdomen pelvis which does demonstrate gas-filled transverse colonic loops and moderate stool in the ascending colon which would account for her distention.  There is no evidence of a colonic mass or obstruction.  She has a normal CBC and CMP and a negative urinalysis.  Presentation therefore appears to be related to uncomplicated constipation and temporary disruption of her needed baseline oxygen.  There is no indication for readmission to the hospital.  I recommended a course of MiraLAX and follow-up with her clinic as needed if symptoms continue.      Diagnosis:    ICD-10-CM    1. Constipation, unspecified constipation type  K59.00 UA with Microscopic   2. Chronic obstructive pulmonary disease, unspecified COPD type (H)  J44.9        Discharge Medications:  Discharge Medication List as of 2/22/2021 10:15 PM      START taking these medications    Details   polyethylene glycol (MIRALAX) 17 GM/Dose powder Take 17 g (1 capful) by mouth daily, Disp-510 g, R-0, Local Print             Scribe Disclosure:  I, Sharri Guallpa, am serving as a scribe at 5:34 PM on 2/22/2021 to document services personally performed by Ra Jiang MD based on my observations and the provider's statements to me.        Ra Jiang MD  02/22/21 [id number]

## 2021-02-22 NOTE — ED TRIAGE NOTES
Pt arrives via EMS from assisted living.  Recently discharged from hospital on oxygen, but has not been wearing it.  Pt c/o SOB today to staff and they found her O2 to be in the 70's.  Pt currently on 2 L with normal O2 sat.  Staff also noted pt to have a distended abdomen which is new for her.

## 2021-02-23 LAB — BACTERIA SPEC CULT: NORMAL

## 2022-11-15 NOTE — PROGRESS NOTES
[facility]  [facility] Record Number:  [id number]  Place of Service where encounter took place:  [facility] [[id number]]  Chief Complaint   Patient presents with     RECHECK       HPI:    Kelsey Wood  is a 89 year old (4/18/1929), who is being seen today for an episodic care visit.  HPI information obtained from: facility chart records, facility staff, patient report and [facility] chart review.     Per recent TCU provider progress notes:  89 year old female with hx of asthma/COPD, cognitive impairment, osteoarthritis, osteoporosis, glaucoma, and impaired hearing. Hospitalized with abdominal pain, wheezing and decreased saturations: diagnosed with COPD exacerbation and acute hypoxic respiratory failure and received o2, nebs and prednisone. Developed a fib with RVR and started metoprolol, no AC due to cognitive impairment and impaired mobility. Abdominal pain ?due to colitis with CT showing subtle wall thickening and surrounding mesenteric inflammation involving the right colon and left colon, possibly an infectious or inflammatory process. Pain managed with PRN [place], may need outpatient colonoscopy. Edema with early diastolic dysfunction treated with lasix and KCL. Chronic pain managed with lidocaine patch, fentanyl, gabapentin and PRN tylenol/Norco. Initially reported loose stools and c diff ordered but stools now formed so test not completed.     Today's concern is:  Patient seen for episodic TCU follow up. Patient reports she is feeling well. Has a hard time remember what led to hospitalization. Denies headaches, dizziness, chest pain, dyspnea, bowel or bladder concerns. Reports has right hip/thigh muscle spasms which are chronic. Lives in [place] and does not walk at baseline. She reports she is doing very well in therapies and hopes to discharge home soon. Per EMR review wt stable. She has SBP range  and sats 93% on room air at this time. Note states she self  propels wheelchair 175 ft and needs CGA with transfers.     Past Medical and Surgical History reviewed in Epic today.    MEDICATIONS:  Current Outpatient Medications   Medication Sig Dispense Refill     ACETAMINOPHEN PO Take 650 mg by mouth every 4 hours as needed for pain or fever And q4h prn       albuterol (PROAIR HFA/PROVENTIL HFA/VENTOLIN HFA) 108 (90 Base) MCG/ACT Inhaler Inhale 2 puffs into the lungs 4 times daily as needed for shortness of breath / dyspnea or wheezing        bisacodyl (DULCOLAX) 10 MG suppository Place 1 suppository (10 mg) rectally daily as needed for constipation       bismuth subsalicylate (PEPTO BISMOL) 262 MG chewable tablet Take 2 tablets by mouth every hour as needed for heartburn Max of 16 tabs per 24 hrs       calcium carbonate (OS-MARY GRACE 500 MG [place]. [place]) 500 MG tablet Take 1 tablet by mouth daily        fentaNYL (DURAGESIC) 12 mcg/hr 72 hr patch Place 1 patch onto the skin every 72 hours remove old patch. 1 patch 0     fluticasone (FLONASE) 50 MCG/ACT nasal spray Spray 2 sprays into both nostrils daily       Fluticasone-Umeclidin-Vilanterol (TRELEGY ELLIPTA) 100-62.5-25 MCG/INH oral inhaler Inhale 1 puff into the lungs daily       furosemide (LASIX) 20 MG tablet Take 20 mg by mouth daily       GABAPENTIN PO Take 300 mg by mouth 3 times daily 09:00, 14:00 and 21:00       Glucosamine-Chondroitin (OSTEO BI-FLEX REGULAR STRENGTH PO) Take 1 tablet by mouth 2 times daily       guaiFENesin (ORGANIDIN) 200 MG TABS tablet Take 400 mg by mouth 2 times daily as needed for cough        HYDROcodone-acetaminophen (NORCO) 5-325 MG tablet Take 1 tablet by mouth every 6 hours as needed for severe pain 5 tablet 0     hypromellose (ARTIFICIAL TEARS) 0.5 % SOLN ophthalmic solution Place 1 drop into both eyes daily       hypromellose (ARTIFICIAL TEARS) 0.5 % SOLN ophthalmic solution Place 1 drop into both eyes daily as needed for dry eyes       ipratropium - albuterol 0.5 mg/2.5 mg/3 mL (DUONEB) 0.5-2.5  "(3) MG/3ML neb solution Take 1 vial by nebulization 4 times daily as needed for shortness of breath / dyspnea or wheezing        latanoprost (XALATAN) 0.005 % ophthalmic solution Place 1 drop into both eyes At Bedtime       Lidocaine (LIDOCARE) 4 % Patch Place 1 patch onto the skin every 24 hours To prevent lidocaine toxicity, patient should be patch free for 12 hrs daily.  Salonpas for hip pain       metoprolol tartrate (LOPRESSOR) 25 MG tablet Take 0.5 tablets (12.5 mg) by mouth 2 times daily       potassium chloride ER (K-TAB) 20 MEQ CR tablet Take 20 mEq by mouth daily       senna-docusate (SENOKOT-S;PERICOLACE) 8.6-50 MG per tablet Take 1 tablet by mouth daily       vitamin C (ASCORBIC ACID) 500 MG tablet Take 500 mg by mouth daily         REVIEW OF SYSTEMS:  10 point ROS of systems including Constitutional, Eyes, Respiratory, Cardiovascular, Gastroenterology, Genitourinary, Integumentary, Musculoskeletal, Psychiatric were all negative except for pertinent positives noted in my HPI.    Objective:  /65   Pulse 83   Temp 98.4  F (36.9  C)   Resp 16   Ht 1.6 m (5' 3\")   Wt 46.7 kg (103 lb)   SpO2 93%   BMI 18.25 kg/m    Exam:  GENERAL APPEARANCE:  Alert, in no distress, pleasant, cooperative, oriented x self place  EYES:  EOM, lids, pupils and irises normal, sclera clear and conjunctiva normal, no discharge or mattering on lids or lashes noted  ENT:  Mouth normal, moist mucous membranes, nose normal without drainage or crusting, external ears without lesions, hearing acuity intact  NECK: supple, symmetrical, trachea midline  RESP:  respiratory effort normal, no chest wall tenderness, no respiratory distress, Lung sounds diminished right base and few crackles bilateral bases, patient is on room air  CV:  Auscultation of heart done, rate and rhythm controlled and regular today, no murmur, no rub or gallop. Edema trace pitting bilateral lower extremities.   ABDOMEN:  normal bowel sounds, soft, nontender, " no palpable masses.  M/S:   Gait and station wheelchair bound and unsafe without assistance, no tenderness or swelling of the joints; able to move all extremities, digits normal  SKIN:  Inspection and palpation of skin and subcutaneous tissue: skin on extremities warm, dry and intact without rashes  NEURO: cranial nerves 2-12 grossly intact, no facial asymmetry, no speech deficits and able to follow directions, moves all extremities symmetrically  PSYCH:  insight and judgement and memory impaired, affect and mood normal     Labs:   Most Recent 3 CBC's:  Recent Labs   Lab Test 03/02/20  0950 02/25/20  0756 02/24/20  1047   WBC 11.0 12.2* 12.0*   HGB 14.5 13.1 14.9   MCV 92 94 93    293 291     Most Recent 3 BMP's:  Recent Labs   Lab Test 03/02/20  0950 02/25/20  0756 02/24/20  1047    137 134   POTASSIUM 3.8 4.9 4.2   CHLORIDE 101 103 98   CO2 29 33* 32   BUN 13 25 16   CR 0.53 0.47* 0.71   ANIONGAP 5 1* 4   MARY GRACE 9.2 9.9 11.6*   * 144* 139*     Most Recent 2 LFT's:  Recent Labs   Lab Test 02/25/20  0756 02/24/20  1047   AST 20 22   ALT 21 23   ALKPHOS 102 169*   BILITOTAL 0.5 0.9       ASSESSMENT/PLAN:  COPD exacerbation (H)  Mild persistent asthma with acute exacerbation  Acute on chronic, appears at baseline. Monitor, meds as ordered. F/U next visit.     Non-specific colitis  Abdominal pain, generalized  Acute, appears improved/resolved. F/U as needed.     Other chronic pain  Chronic, stable with tylenol and lidocaine. Monitor.     PAF (paroxysmal atrial fibrillation) (H)  Controlled rate, no AC. Monitor.     Chronic diastolic congestive heart failure (H)  Bilateral lower extremity edema  Chronic, stable, compensated. Meds, vs, wt as ordered and f/u next visit.     Cognitive impairment  Debility  Acute on chronic, making gains in therapies - f/u with progress next week. Monitor for safety. SW to assist with discharge planning.     Orders written by provider at facility  No new orders today.      Electronically signed by:  CAT Nagel CNP            no

## 2023-06-29 NOTE — LETTER
10/9/2018        RE: Kelsey Wood  [address]  [place] [address]        [facility]    Chief Complaint   Patient presents with     shelter Acute       [facility] Record Number:  [id number]  Place of Service where encounter took place:  [facility] [[id number]]    HPI:    Kelsey Wood is a 89 year old  (4/18/1929), who is being seen today for an episodic care visit.  HPI information obtained from: facility chart records, facility staff, patient report and [facility] chart review.    Today's concern is:    Traumatic ecchymosis of lower leg, left, subsequent encounter  Edema of left foot  Noted to have chronic bilateral knee pain with hx L knee plateau fracture 07/2017. Patient admitted to [facility] 9/21-9/23 d/t left leg pain with possible cellulitis. No reports of specific trauma/injury to LLE per patient. XR L tib/fib 9/21 negative for acute fractures and LLE venous US 9/21 negative for DVT. Was given IV ancef x 1 dose, then transitioned to keflex. Abx has since been dc'd as cellulitis less likely. Per hospital notes, bruise to LLE is likely r/t injury. During exam, patient does not recall trauma to LLE that would cause bruising. Admits to pain 2/10 at rest and increases with activity. XR L ankle/foot 9/28 negative for acute fracture. Currently taking tylenol TID and fentanyl patch for pain management. Has been attempting to elevate LLE while in bed.    Acute bronchitis  Patient reports coughing and chest congestion started over the weekend. Denies SOB or audible wheezing. Afebrile.     Physical deconditioning  Cognitive impairment  PTA lives in [facility]. Patient is actively participating in therapy sessions. Ambulates 8ft with walker; pain has been a limiting factor per therapy. Requires moderate-max assistance with transfers moderate assistance with ADLs. SLUMS 24/30 (07/2018); repeat SLUMS 15/30 (10/2018). SW following for discharge planning,  patient's goal is to discharge back to [facility].            ALLERGIES: Review of patient's allergies indicates no known allergies.  Past Medical, Surgical, Family and Social History reviewed and updated in [facility].    Current Outpatient Prescriptions   Medication Sig Dispense Refill     ACETAMINOPHEN PO Take 650 mg by mouth 3 times daily And q4h prn       albuterol (PROAIR HFA/PROVENTIL HFA/VENTOLIN HFA) 108 (90 Base) MCG/ACT Inhaler Inhale 2 puffs into the lungs every 4 hours as needed for shortness of breath / dyspnea or wheezing       bismuth subsalicylate (PEPTO BISMOL) 262 MG chewable tablet Take 2 tablets by mouth every hour as needed for heartburn Max of 16 tabs per 24 hrs       calcium carbonate (OS-MARY GRACE 500 MG [place]. [place]) 500 MG tablet Take 1 tablet by mouth daily        estradiol (ESTRACE) 0.1 MG/GM cream Place 2 g vaginally twice a week       fentaNYL (DURAGESIC) 12 mcg/hr 72 hr patch Place 1 patch onto the skin every 72 hours remove old patch. 3 patch 0     Fluticasone Propionate (FLONASE NA) Spray 2 sprays into both nostrils daily       GABAPENTIN PO Take 300 mg by mouth 3 times daily       hydrocortisone 1 % CREA cream Apply topically daily as needed for itching       ibuprofen (ADVIL/MOTRIN) 200 MG tablet Take 1 tablet (200 mg) by mouth daily as needed for moderate pain 100 tablet 0     latanoprost (XALATAN) 0.005 % ophthalmic solution Place 1 drop into both eyes At Bedtime       Liniments (SALONPAS PAIN RELIEF PATCH EX) Externally apply 1 patch topically daily as needed       Misc Natural Products (OSTEO BI-FLEX ADV DOUBLE ST PO) Take 1 tablet by mouth 2 times daily ON HOLD       POTASSIUM CHLORIDE PO Take 20 mEq by mouth daily       senna-docusate (SENOKOT-S;PERICOLACE) 8.6-50 MG per tablet Take 1 tablet by mouth daily       Medications reviewed:  Medications reconciled to facility chart and changes were made to reflect current medications as identified as above med list. Below are the changes that were made:  "  Medications stopped since last EPIC medication reconciliation:   There are no discontinued medications.    Medications started since last [facility] medication reconciliation:  Orders Placed This Encounter   Medications     Fluticasone Propionate (FLONASE NA)     Sig: Spray 2 sprays into both nostrils daily           REVIEW OF SYSTEMS:  4 point ROS including Respiratory, CV, GI and , other than that noted in the HPI,  is negative      Physical Exam:  /76  Pulse 86  Temp 98.9  F (37.2  C)  Resp 16  Ht 5' 2\" (1.575 m)  Wt 122 lb (55.3 kg)  SpO2 93%  BMI 22.31 kg/m2  GENERAL APPEARANCE:  Alert, in no distress, appears healthy, oriented, cooperative  RESP:  respiratory effort and palpation of chest normal, lungs with expiratory wheeze upper lungs and diminished lung sounds at bases, no respiratory distress  CV:  Palpation and auscultation of heart done, regular rate and rhythm, no murmur, rub, or gallop, +2 pedal pulses, mild edema BLE  ABDOMEN:  normal bowel sounds, soft, nontender, no hepatosplenomegaly or other masses, no guarding or reboundnormal bowel sounds, soft, nontender, no hepatosplenomegaly or other masses  M/S:   Gait and station abnormal, requires assistance with activity and ADLs. Digits and nails normal  SKIN:  Inspection of skin and subcutaneous tissue baseline, Palpation of skin and subcutaneous tissue baseline. LLE bruising, appears to be at various stages of healing; mild edema, no warmth or surrounding cellulitis. Appears to be bow-legged while standing.   NEURO:   Cranial nerves 2-12 are normal tested and grossly at patient's baseline, Examination of sensation by touch normal  PSYCH:  Oriented X 3, affect and mood normal, cognitive testing SLUMS 15/30 (retested)      BP Readings from Last 3 Encounters:   10/09/18 132/76   10/03/18 152/82   09/28/18 153/81     Pulse Readings from Last 4 Encounters:   10/09/18 86   10/03/18 78   09/28/18 80   09/24/18 84     Wt Readings from Last 5 " Encounters:   10/09/18 122 lb (55.3 kg)   10/03/18 123 lb 8 oz (56 kg)   09/28/18 122 lb 6.4 oz (55.5 kg)   09/24/18 122 lb 4.8 oz (55.5 kg)   09/21/18 126 lb 3.2 oz (57.2 kg)         Recent Labs:   CBC RESULTS:   Recent Labs   Lab Test  10/01/18   0549  09/27/18   0556   WBC  8.5  7.5   RBC  4.46  4.44   HGB  13.5  13.3   HCT  43.1  42.3   MCV  97  95   MCH  30.3  30.0   MCHC  31.3*  31.4*   RDW  13.2  13.2   PLT  322  316       Last Basic Metabolic Panel:  Recent Labs   Lab Test  10/01/18   0549  09/28/18   0645   NA  142  145*   POTASSIUM  3.5  3.8   CHLORIDE  102  106   MARY GRACE  10.4*  9.6   CO2  33*  32   BUN  13  19   CR  0.43*  0.55   GLC  80  87       Liver Function Studies -   Recent Labs   Lab Test  09/27/18   0556  06/25/18   1852   PROTTOTAL  6.9  7.4   ALBUMIN  3.3*  3.5   BILITOTAL  0.4  0.2   ALKPHOS  68  80   AST  16  17   ALT  19  18           Assessment/Plan:    (S80.12XD) Traumatic ecchymosis of lower leg, left, subsequent encounter  (primary encounter diagnosis)  (R60.0) Edema of left foot  Comment: LLE bruise w/edema likely r/t trauma, bruising appears to be improving. Pain moderately controlled.    Plan: Check CBC w/diff & BMP 10/10. Discontinue ibuprofen. Continue pain management. Patient to follow-up with Dr. Buster Armando, [facility], discussed recommendation with patient and patient's son.    (J20.9) Acute bronchitis, unspecified organism  Comment: Acute bronchitis r/t viral vs seasonal allergies.   Plan: Add guaifenesin 400mg tabs QID x 7 days. Add duonebs TID x 7 days. Continue flonase qAM. Monitor respiratory status.     (R53.81) Physical deconditioning  (R41.89) Cognitive impairment  Comment: Ongoing  Plan: Encourage active participation in therapy session to increase strength and promote independence in activities and ADLs. SW following for discharge planning.             Electronically signed by  CAT Sanchez CNP                      Sincerely,        CAT Sanchez  CNP     Terbinafine Counseling: Patient counseling regarding adverse effects of terbinafine including but not limited to headache, diarrhea, rash, upset stomach, liver function test abnormalities, itching, taste/smell disturbance, nausea, abdominal pain, and flatulence.  There is a rare possibility of liver failure that can occur when taking terbinafine.  The patient understands that a baseline LFT and kidney function test may be required. The patient verbalized understanding of the proper use and possible adverse effects of terbinafine.  All of the patient's questions and concerns were addressed.